# Patient Record
Sex: FEMALE | Race: WHITE | NOT HISPANIC OR LATINO | Employment: FULL TIME | URBAN - METROPOLITAN AREA
[De-identification: names, ages, dates, MRNs, and addresses within clinical notes are randomized per-mention and may not be internally consistent; named-entity substitution may affect disease eponyms.]

---

## 2017-02-24 ENCOUNTER — TRANSCRIBE ORDERS (OUTPATIENT)
Dept: LAB | Facility: CLINIC | Age: 62
End: 2017-02-24

## 2017-02-24 ENCOUNTER — APPOINTMENT (OUTPATIENT)
Dept: LAB | Facility: CLINIC | Age: 62
End: 2017-02-24
Payer: COMMERCIAL

## 2017-02-24 DIAGNOSIS — D80.2 SELECTIVE IGA IMMUNODEFICIENCY (HCC): ICD-10-CM

## 2017-02-24 DIAGNOSIS — D80.1 HYPOGAMMAGLOBULINAEMIA, UNSPECIFIED: ICD-10-CM

## 2017-02-24 DIAGNOSIS — J01.91 ACUTE RECURRENT SINUSITIS, UNSPECIFIED LOCATION: ICD-10-CM

## 2017-02-24 DIAGNOSIS — D84.9 UNSPECIFIED IMMUNITY DEFICIENCY: ICD-10-CM

## 2017-02-24 DIAGNOSIS — D84.89 CELL-MEDIATED IMMUNE DEFICIENCY (HCC): ICD-10-CM

## 2017-02-24 DIAGNOSIS — Z87.01 PERSONAL HISTORY OF PNEUMONIA (RECURRENT): Primary | ICD-10-CM

## 2017-02-24 DIAGNOSIS — D80.7: ICD-10-CM

## 2017-02-24 DIAGNOSIS — D80.3 SELECTIVE DEFICIENCY OF IGG (HCC): ICD-10-CM

## 2017-02-24 PROCEDURE — 86609 BACTERIUM ANTIBODY: CPT

## 2017-03-03 LAB
DEPRECATED S PNEUM14 IGG SER-MCNC: 11 UG/ML
DEPRECATED S PNEUM19 IGG SER-MCNC: 4.9 UG/ML
DEPRECATED S PNEUM23 IGG SER-MCNC: 3.2 UG/ML
DEPRECATED S PNEUM3 IGG SER-MCNC: 2.9 UG/ML
DEPRECATED S PNEUM4 IGG SER-MCNC: 0.6 UG/ML
DEPRECATED S PNEUM8 AB SER-MCNC: 0.7 UG/ML
DEPRECATED S PNEUM9 IGG SER-MCNC: >44.4 UG/ML
FLUBV RNA SPEC QL NAA+PROBE: 1.8 UG/ML
S PNEUM DA 18C IGG SER-MCNC: 3.4 UG/ML
S PNEUM DA 19A IGG SER-MCNC: 5.1 UG/ML
S PNEUM DA 6B IGG SER-MCNC: 8.2 UG/ML
STREP PNEUMO TYPE 1: 0.4 UG/ML
STREP PNEUMO TYPE 51: 13.9 UG/ML
STREP PNEUMO TYPE 68: 22.5 UG/ML

## 2017-05-12 ENCOUNTER — TRANSCRIBE ORDERS (OUTPATIENT)
Dept: ADMINISTRATIVE | Facility: HOSPITAL | Age: 62
End: 2017-05-12

## 2017-05-12 ENCOUNTER — APPOINTMENT (OUTPATIENT)
Dept: LAB | Facility: CLINIC | Age: 62
End: 2017-05-12
Payer: COMMERCIAL

## 2017-05-12 ENCOUNTER — TRANSCRIBE ORDERS (OUTPATIENT)
Dept: LAB | Facility: CLINIC | Age: 62
End: 2017-05-12

## 2017-05-12 DIAGNOSIS — K31.89 DYSPEPSIA AND OTHER SPECIFIED DISORDERS OF FUNCTION OF STOMACH: ICD-10-CM

## 2017-05-12 DIAGNOSIS — R10.13 EPIGASTRIC PAIN: Primary | ICD-10-CM

## 2017-05-12 DIAGNOSIS — Z79.899 ENCOUNTER FOR LONG-TERM (CURRENT) USE OF OTHER MEDICATIONS: ICD-10-CM

## 2017-05-12 DIAGNOSIS — R10.32 ABDOMINAL PAIN, LEFT LOWER QUADRANT: Primary | ICD-10-CM

## 2017-05-12 DIAGNOSIS — R10.13 DYSPEPSIA AND OTHER SPECIFIED DISORDERS OF FUNCTION OF STOMACH: ICD-10-CM

## 2017-05-12 DIAGNOSIS — R11.0 NAUSEA ALONE: ICD-10-CM

## 2017-05-12 LAB
ALBUMIN SERPL BCP-MCNC: 3.6 G/DL (ref 3.5–5)
ALP SERPL-CCNC: 99 U/L (ref 46–116)
ALT SERPL W P-5'-P-CCNC: 54 U/L (ref 12–78)
AMYLASE SERPL-CCNC: 38 IU/L (ref 25–115)
ANION GAP SERPL CALCULATED.3IONS-SCNC: 7 MMOL/L (ref 4–13)
AST SERPL W P-5'-P-CCNC: 43 U/L (ref 5–45)
BASOPHILS # BLD AUTO: 0.05 THOUSANDS/ΜL (ref 0–0.1)
BASOPHILS NFR BLD AUTO: 1 % (ref 0–1)
BILIRUB SERPL-MCNC: 0.53 MG/DL (ref 0.2–1)
BUN SERPL-MCNC: 22 MG/DL (ref 5–25)
CALCIUM SERPL-MCNC: 9.1 MG/DL (ref 8.3–10.1)
CHLORIDE SERPL-SCNC: 108 MMOL/L (ref 100–108)
CO2 SERPL-SCNC: 26 MMOL/L (ref 21–32)
CREAT SERPL-MCNC: 0.64 MG/DL (ref 0.6–1.3)
EOSINOPHIL # BLD AUTO: 0.47 THOUSAND/ΜL (ref 0–0.61)
EOSINOPHIL NFR BLD AUTO: 9 % (ref 0–6)
ERYTHROCYTE [DISTWIDTH] IN BLOOD BY AUTOMATED COUNT: 12.1 % (ref 11.6–15.1)
GFR SERPL CREATININE-BSD FRML MDRD: >60 ML/MIN/1.73SQ M
GLUCOSE P FAST SERPL-MCNC: 108 MG/DL (ref 65–99)
HCT VFR BLD AUTO: 41.5 % (ref 34.8–46.1)
HGB BLD-MCNC: 13.5 G/DL (ref 11.5–15.4)
LIPASE SERPL-CCNC: 143 U/L (ref 73–393)
LYMPHOCYTES # BLD AUTO: 1.98 THOUSANDS/ΜL (ref 0.6–4.47)
LYMPHOCYTES NFR BLD AUTO: 36 % (ref 14–44)
MCH RBC QN AUTO: 32.1 PG (ref 26.8–34.3)
MCHC RBC AUTO-ENTMCNC: 32.5 G/DL (ref 31.4–37.4)
MCV RBC AUTO: 99 FL (ref 82–98)
MONOCYTES # BLD AUTO: 0.54 THOUSAND/ΜL (ref 0.17–1.22)
MONOCYTES NFR BLD AUTO: 10 % (ref 4–12)
NEUTROPHILS # BLD AUTO: 2.42 THOUSANDS/ΜL (ref 1.85–7.62)
NEUTS SEG NFR BLD AUTO: 44 % (ref 43–75)
NRBC BLD AUTO-RTO: 0 /100 WBCS
PLATELET # BLD AUTO: 234 THOUSANDS/UL (ref 149–390)
PMV BLD AUTO: 10.2 FL (ref 8.9–12.7)
POTASSIUM SERPL-SCNC: 4.4 MMOL/L (ref 3.5–5.3)
PROT SERPL-MCNC: 7.2 G/DL (ref 6.4–8.2)
RBC # BLD AUTO: 4.2 MILLION/UL (ref 3.81–5.12)
SODIUM SERPL-SCNC: 141 MMOL/L (ref 136–145)
T4 FREE SERPL-MCNC: 0.91 NG/DL (ref 0.76–1.46)
TSH SERPL DL<=0.05 MIU/L-ACNC: 2.86 UIU/ML (ref 0.36–3.74)
WBC # BLD AUTO: 5.47 THOUSAND/UL (ref 4.31–10.16)

## 2017-05-12 PROCEDURE — 80053 COMPREHEN METABOLIC PANEL: CPT

## 2017-05-12 PROCEDURE — 85025 COMPLETE CBC W/AUTO DIFF WBC: CPT

## 2017-05-12 PROCEDURE — 84443 ASSAY THYROID STIM HORMONE: CPT

## 2017-05-12 PROCEDURE — 84439 ASSAY OF FREE THYROXINE: CPT

## 2017-05-12 PROCEDURE — 36415 COLL VENOUS BLD VENIPUNCTURE: CPT

## 2017-05-12 PROCEDURE — 83690 ASSAY OF LIPASE: CPT

## 2017-05-12 PROCEDURE — 82150 ASSAY OF AMYLASE: CPT

## 2017-05-19 ENCOUNTER — HOSPITAL ENCOUNTER (OUTPATIENT)
Dept: CT IMAGING | Facility: HOSPITAL | Age: 62
Discharge: HOME | End: 2017-05-19
Attending: INTERNAL MEDICINE
Payer: COMMERCIAL

## 2017-05-19 DIAGNOSIS — R10.13 EPIGASTRIC PAIN: ICD-10-CM

## 2017-05-19 PROCEDURE — 74177 CT ABD & PELVIS W/CONTRAST: CPT

## 2017-05-19 RX ADMIN — IOHEXOL 100 ML: 350 INJECTION, SOLUTION INTRAVENOUS at 19:03

## 2017-05-24 ENCOUNTER — ALLSCRIPTS OFFICE VISIT (OUTPATIENT)
Dept: OTHER | Facility: OTHER | Age: 62
End: 2017-05-24

## 2017-06-05 RX ORDER — PREDNISONE 20 MG/1
20 TABLET ORAL EVERY MORNING
Status: ON HOLD | COMMUNITY
End: 2018-01-24

## 2017-06-05 RX ORDER — DIPHENOXYLATE HYDROCHLORIDE AND ATROPINE SULFATE 2.5; .025 MG/1; MG/1
1 TABLET ORAL EVERY MORNING
COMMUNITY
End: 2018-04-12 | Stop reason: ALTCHOICE

## 2017-06-06 ENCOUNTER — ANESTHESIA (OUTPATIENT)
Dept: GASTROENTEROLOGY | Facility: AMBULARY SURGERY CENTER | Age: 62
End: 2017-06-06
Payer: COMMERCIAL

## 2017-06-06 ENCOUNTER — HOSPITAL ENCOUNTER (OUTPATIENT)
Facility: AMBULARY SURGERY CENTER | Age: 62
Setting detail: OUTPATIENT SURGERY
Discharge: HOME | End: 2017-06-06
Attending: INTERNAL MEDICINE | Admitting: INTERNAL MEDICINE
Payer: COMMERCIAL

## 2017-06-06 VITALS
SYSTOLIC BLOOD PRESSURE: 111 MMHG | DIASTOLIC BLOOD PRESSURE: 68 MMHG | RESPIRATION RATE: 18 BRPM | TEMPERATURE: 97.8 F | OXYGEN SATURATION: 97 % | HEART RATE: 66 BPM | HEIGHT: 65 IN | WEIGHT: 159 LBS | BODY MASS INDEX: 26.49 KG/M2

## 2017-06-06 DIAGNOSIS — R10.13 EPIGASTRIC PAIN: ICD-10-CM

## 2017-06-06 DIAGNOSIS — K21.9 GASTRO-ESOPHAGEAL REFLUX DISEASE WITHOUT ESOPHAGITIS: ICD-10-CM

## 2017-06-06 DIAGNOSIS — R19.4 CHANGE IN BOWEL HABITS: ICD-10-CM

## 2017-06-06 PROCEDURE — 88342 IMHCHEM/IMCYTCHM 1ST ANTB: CPT | Performed by: INTERNAL MEDICINE

## 2017-06-06 PROCEDURE — 88305 TISSUE EXAM BY PATHOLOGIST: CPT | Performed by: INTERNAL MEDICINE

## 2017-06-06 PROCEDURE — 88312 SPECIAL STAINS GROUP 1: CPT | Performed by: INTERNAL MEDICINE

## 2017-06-06 RX ORDER — SODIUM CHLORIDE, SODIUM LACTATE, POTASSIUM CHLORIDE, CALCIUM CHLORIDE 600; 310; 30; 20 MG/100ML; MG/100ML; MG/100ML; MG/100ML
INJECTION, SOLUTION INTRAVENOUS CONTINUOUS PRN
Status: DISCONTINUED | OUTPATIENT
Start: 2017-06-06 | End: 2017-06-06 | Stop reason: SURG

## 2017-06-06 RX ORDER — FENTANYL CITRATE 50 UG/ML
INJECTION, SOLUTION INTRAMUSCULAR; INTRAVENOUS AS NEEDED
Status: DISCONTINUED | OUTPATIENT
Start: 2017-06-06 | End: 2017-06-06 | Stop reason: SURG

## 2017-06-06 RX ORDER — PROPOFOL 10 MG/ML
INJECTION, EMULSION INTRAVENOUS AS NEEDED
Status: DISCONTINUED | OUTPATIENT
Start: 2017-06-06 | End: 2017-06-06 | Stop reason: SURG

## 2017-06-06 RX ADMIN — PROPOFOL 50 MG: 10 INJECTION, EMULSION INTRAVENOUS at 09:36

## 2017-06-06 RX ADMIN — FENTANYL CITRATE 50 MCG: 50 INJECTION, SOLUTION INTRAMUSCULAR; INTRAVENOUS at 09:35

## 2017-06-06 RX ADMIN — LIDOCAINE HYDROCHLORIDE 50 MG: 20 INJECTION, SOLUTION INTRAVENOUS at 09:27

## 2017-06-06 RX ADMIN — PROPOFOL 100 MG: 10 INJECTION, EMULSION INTRAVENOUS at 09:49

## 2017-06-06 RX ADMIN — PROPOFOL 50 MG: 10 INJECTION, EMULSION INTRAVENOUS at 09:42

## 2017-06-06 RX ADMIN — PROPOFOL 50 MG: 10 INJECTION, EMULSION INTRAVENOUS at 09:55

## 2017-06-06 RX ADMIN — SODIUM CHLORIDE, SODIUM LACTATE, POTASSIUM CHLORIDE, AND CALCIUM CHLORIDE: .6; .31; .03; .02 INJECTION, SOLUTION INTRAVENOUS at 09:15

## 2017-06-06 RX ADMIN — FENTANYL CITRATE 50 MCG: 50 INJECTION, SOLUTION INTRAMUSCULAR; INTRAVENOUS at 09:25

## 2017-06-06 RX ADMIN — PROPOFOL 150 MG: 10 INJECTION, EMULSION INTRAVENOUS at 09:27

## 2017-06-09 ENCOUNTER — TRANSCRIBE ORDERS (OUTPATIENT)
Dept: LAB | Facility: CLINIC | Age: 62
End: 2017-06-09

## 2017-06-09 ENCOUNTER — TRANSCRIBE ORDERS (OUTPATIENT)
Dept: ADMINISTRATIVE | Facility: HOSPITAL | Age: 62
End: 2017-06-09

## 2017-06-09 DIAGNOSIS — Z12.31 VISIT FOR SCREENING MAMMOGRAM: Primary | ICD-10-CM

## 2017-06-09 DIAGNOSIS — M81.0 SENILE OSTEOPOROSIS: ICD-10-CM

## 2017-06-13 ENCOUNTER — APPOINTMENT (OUTPATIENT)
Dept: LAB | Facility: CLINIC | Age: 62
End: 2017-06-13
Payer: COMMERCIAL

## 2017-06-13 ENCOUNTER — TRANSCRIBE ORDERS (OUTPATIENT)
Dept: LAB | Facility: CLINIC | Age: 62
End: 2017-06-13

## 2017-06-13 DIAGNOSIS — R53.83 FATIGUE, UNSPECIFIED TYPE: ICD-10-CM

## 2017-06-13 DIAGNOSIS — Z13.9 SCREENING FOR CONDITION: ICD-10-CM

## 2017-06-13 DIAGNOSIS — E78.00 PURE HYPERCHOLESTEROLEMIA: Primary | ICD-10-CM

## 2017-06-13 LAB
25(OH)D3 SERPL-MCNC: 15.4 NG/ML (ref 30–100)
ALT SERPL W P-5'-P-CCNC: 24 U/L (ref 12–78)
CHOLEST SERPL-MCNC: 211 MG/DL (ref 50–200)
HCV AB SER QL: NORMAL
HDLC SERPL-MCNC: 72 MG/DL (ref 40–60)
LDLC SERPL CALC-MCNC: 111 MG/DL (ref 0–100)
TRIGL SERPL-MCNC: 139 MG/DL

## 2017-06-13 PROCEDURE — 86665 EPSTEIN-BARR CAPSID VCA: CPT

## 2017-06-13 PROCEDURE — 82306 VITAMIN D 25 HYDROXY: CPT

## 2017-06-13 PROCEDURE — 86803 HEPATITIS C AB TEST: CPT

## 2017-06-13 PROCEDURE — 80061 LIPID PANEL: CPT

## 2017-06-13 PROCEDURE — 36415 COLL VENOUS BLD VENIPUNCTURE: CPT

## 2017-06-13 PROCEDURE — 84460 ALANINE AMINO (ALT) (SGPT): CPT

## 2017-06-14 LAB — EBV VCA IGM SER IA-ACNC: <36 U/ML (ref 0–35.9)

## 2017-06-20 ENCOUNTER — HOSPITAL ENCOUNTER (OUTPATIENT)
Dept: RADIOLOGY | Facility: HOSPITAL | Age: 62
Discharge: HOME | End: 2017-06-20
Attending: FAMILY MEDICINE
Payer: COMMERCIAL

## 2017-06-20 DIAGNOSIS — M81.0 SENILE OSTEOPOROSIS: ICD-10-CM

## 2017-06-20 DIAGNOSIS — Z12.31 VISIT FOR SCREENING MAMMOGRAM: ICD-10-CM

## 2017-06-20 PROCEDURE — G0202 SCR MAMMO BI INCL CAD: HCPCS

## 2017-06-20 PROCEDURE — 77080 DXA BONE DENSITY AXIAL: CPT

## 2017-07-21 ENCOUNTER — GENERIC CONVERSION - ENCOUNTER (OUTPATIENT)
Dept: OTHER | Facility: OTHER | Age: 62
End: 2017-07-21

## 2017-08-08 ENCOUNTER — HOSPITAL ENCOUNTER (OUTPATIENT)
Dept: RADIOLOGY | Facility: HOSPITAL | Age: 62
Discharge: HOME | End: 2017-08-08
Attending: FAMILY MEDICINE
Payer: COMMERCIAL

## 2017-08-08 ENCOUNTER — TRANSCRIBE ORDERS (OUTPATIENT)
Dept: ADMINISTRATIVE | Facility: HOSPITAL | Age: 62
End: 2017-08-08

## 2017-08-08 DIAGNOSIS — R52 ACUTE PAIN: Primary | ICD-10-CM

## 2017-08-08 DIAGNOSIS — R52 ACUTE PAIN: ICD-10-CM

## 2017-08-08 PROCEDURE — 73502 X-RAY EXAM HIP UNI 2-3 VIEWS: CPT

## 2017-08-08 PROCEDURE — 73552 X-RAY EXAM OF FEMUR 2/>: CPT

## 2017-08-15 ENCOUNTER — ALLSCRIPTS OFFICE VISIT (OUTPATIENT)
Dept: OTHER | Facility: OTHER | Age: 62
End: 2017-08-15

## 2017-08-15 DIAGNOSIS — M25.552 PAIN IN LEFT HIP: ICD-10-CM

## 2017-08-15 DIAGNOSIS — M16.12 PRIMARY OSTEOARTHRITIS OF LEFT HIP: ICD-10-CM

## 2017-08-17 ENCOUNTER — HOSPITAL ENCOUNTER (OUTPATIENT)
Dept: RADIOLOGY | Facility: HOSPITAL | Age: 62
Discharge: HOME | End: 2017-08-17
Attending: ORTHOPAEDIC SURGERY
Payer: COMMERCIAL

## 2017-08-17 DIAGNOSIS — M25.552 PAIN IN LEFT HIP: ICD-10-CM

## 2017-08-17 PROCEDURE — 73721 MRI JNT OF LWR EXTRE W/O DYE: CPT

## 2017-08-18 ENCOUNTER — ALLSCRIPTS OFFICE VISIT (OUTPATIENT)
Dept: OTHER | Facility: OTHER | Age: 62
End: 2017-08-18

## 2017-08-24 ENCOUNTER — GENERIC CONVERSION - ENCOUNTER (OUTPATIENT)
Dept: OTHER | Facility: OTHER | Age: 62
End: 2017-08-24

## 2017-10-23 ENCOUNTER — TRANSCRIBE ORDERS (OUTPATIENT)
Dept: LAB | Facility: CLINIC | Age: 62
End: 2017-10-23

## 2017-10-23 ENCOUNTER — APPOINTMENT (OUTPATIENT)
Dept: LAB | Facility: CLINIC | Age: 62
End: 2017-10-23
Payer: COMMERCIAL

## 2017-10-23 DIAGNOSIS — E78.00 PURE HYPERCHOLESTEROLEMIA: ICD-10-CM

## 2017-10-23 DIAGNOSIS — R53.83 FATIGUE, UNSPECIFIED TYPE: Primary | ICD-10-CM

## 2017-10-23 DIAGNOSIS — E55.9 AVITAMINOSIS D: ICD-10-CM

## 2017-10-23 LAB
25(OH)D3 SERPL-MCNC: 40.2 NG/ML (ref 30–100)
ALBUMIN SERPL BCP-MCNC: 3.7 G/DL (ref 3.5–5)
ALP SERPL-CCNC: 80 U/L (ref 46–116)
ALT SERPL W P-5'-P-CCNC: 24 U/L (ref 12–78)
ANION GAP SERPL CALCULATED.3IONS-SCNC: 4 MMOL/L (ref 4–13)
AST SERPL W P-5'-P-CCNC: 17 U/L (ref 5–45)
BASOPHILS # BLD AUTO: 0.03 THOUSANDS/ΜL (ref 0–0.1)
BASOPHILS NFR BLD AUTO: 0 % (ref 0–1)
BILIRUB SERPL-MCNC: 0.62 MG/DL (ref 0.2–1)
BUN SERPL-MCNC: 22 MG/DL (ref 5–25)
CALCIUM SERPL-MCNC: 9.5 MG/DL (ref 8.3–10.1)
CHLORIDE SERPL-SCNC: 104 MMOL/L (ref 100–108)
CHOLEST SERPL-MCNC: 249 MG/DL (ref 50–200)
CO2 SERPL-SCNC: 32 MMOL/L (ref 21–32)
CREAT SERPL-MCNC: 0.68 MG/DL (ref 0.6–1.3)
EOSINOPHIL # BLD AUTO: 0.27 THOUSAND/ΜL (ref 0–0.61)
EOSINOPHIL NFR BLD AUTO: 4 % (ref 0–6)
ERYTHROCYTE [DISTWIDTH] IN BLOOD BY AUTOMATED COUNT: 12.5 % (ref 11.6–15.1)
GFR SERPL CREATININE-BSD FRML MDRD: 94 ML/MIN/1.73SQ M
GLUCOSE P FAST SERPL-MCNC: 88 MG/DL (ref 65–99)
HCT VFR BLD AUTO: 41.5 % (ref 34.8–46.1)
HDLC SERPL-MCNC: 82 MG/DL (ref 40–60)
HGB BLD-MCNC: 13.8 G/DL (ref 11.5–15.4)
LDLC SERPL CALC-MCNC: 138 MG/DL (ref 0–100)
LYMPHOCYTES # BLD AUTO: 3.86 THOUSANDS/ΜL (ref 0.6–4.47)
LYMPHOCYTES NFR BLD AUTO: 54 % (ref 14–44)
MCH RBC QN AUTO: 33.3 PG (ref 26.8–34.3)
MCHC RBC AUTO-ENTMCNC: 33.3 G/DL (ref 31.4–37.4)
MCV RBC AUTO: 100 FL (ref 82–98)
MONOCYTES # BLD AUTO: 0.5 THOUSAND/ΜL (ref 0.17–1.22)
MONOCYTES NFR BLD AUTO: 7 % (ref 4–12)
NEUTROPHILS # BLD AUTO: 2.48 THOUSANDS/ΜL (ref 1.85–7.62)
NEUTS SEG NFR BLD AUTO: 35 % (ref 43–75)
NRBC BLD AUTO-RTO: 0 /100 WBCS
PLATELET # BLD AUTO: 241 THOUSANDS/UL (ref 149–390)
PMV BLD AUTO: 10.1 FL (ref 8.9–12.7)
POTASSIUM SERPL-SCNC: 3.9 MMOL/L (ref 3.5–5.3)
PROT SERPL-MCNC: 7.3 G/DL (ref 6.4–8.2)
RBC # BLD AUTO: 4.15 MILLION/UL (ref 3.81–5.12)
SODIUM SERPL-SCNC: 140 MMOL/L (ref 136–145)
TRIGL SERPL-MCNC: 145 MG/DL
WBC # BLD AUTO: 7.15 THOUSAND/UL (ref 4.31–10.16)

## 2017-10-23 PROCEDURE — 36415 COLL VENOUS BLD VENIPUNCTURE: CPT

## 2017-10-23 PROCEDURE — 80053 COMPREHEN METABOLIC PANEL: CPT

## 2017-10-23 PROCEDURE — 85025 COMPLETE CBC W/AUTO DIFF WBC: CPT

## 2017-10-23 PROCEDURE — 86618 LYME DISEASE ANTIBODY: CPT

## 2017-10-23 PROCEDURE — 82306 VITAMIN D 25 HYDROXY: CPT

## 2017-10-23 PROCEDURE — 80061 LIPID PANEL: CPT

## 2017-10-24 LAB
B BURGDOR IGG SER IA-ACNC: 0.07
B BURGDOR IGM SER IA-ACNC: 0.14

## 2017-11-08 ENCOUNTER — ALLSCRIPTS OFFICE VISIT (OUTPATIENT)
Dept: OTHER | Facility: OTHER | Age: 62
End: 2017-11-08

## 2017-11-10 NOTE — PROGRESS NOTES
Assessment    1  Asthma (493 90) (J45 909)   2  Allergic rhinitis (477 9) (J30 9)   3  Hypoxia, sleep related (327 24) (G47 34)   4  GERD (gastroesophageal reflux disease) (530 81) (K21 9)    Plan  Asthma    · ProAir  (90 Base) MCG/ACT Inhalation Aerosol Solution; INHALE 2 PUFFSEVERY 4 - 6 HOURS AS NEEDED   Rx By: Diony Llamas; Dispense: 30 Days ; #:1 X 8 5 GM Inhaler; Refill: 5;Asthma; LIZANDRO = N; Verified Transmission to [address] [place] [address]; Last Updated By: SystemMavatar; 11/8/2017 4:20:14 PM  Argueta's esophagus, GERD (gastroesophageal reflux disease)    · Pantoprazole Sodium 40 MG Oral Tablet Delayed Release; one tablet by mouthonce daily   Rx By: Diony Llamas; Dispense: 30 Days ; #:30 Tablet Delayed Release; Refill: 5;Argueta's esophagus, GERD (gastroesophageal reflux disease); LIZANDRO = N; Sent To: [address] [place] [address]    Discussion/Summary  Discussion Summary:   Overall, her asthma has been fairly well controlled despite not taking any maintenance inhalers  I renewed her ProAir  She understands that if she is needing it on a daily basis, she must resume Symbicort as her maintenance inhaler  She was encouraged to follow up with both ENT and GI for optimization of her sinus and reflux/hiatal hernia issues  I refilled her Protonix for now pending further GI evaluation  She can follow up with me in 6 months or sooner if needed  Counseling Documentation With Imm: The patient was counseled regarding instructions for management  Patient's Capacity to Self-Care: Patient is able to Self-Care  Active Problems    1  Acute sinusitis (461 9) (J01 90)   2  Allergic rhinitis (477 9) (J30 9)   3  Asthma (493 90) (J45 909)   4  Argueta's esophagus (530 85) (K22 70)   5  Breast pain (611 71) (N64 4)   6  Chronic sinusitis (473 9) (J32 9)   7  Cough (786 2) (R05)   8  Encounter for screening mammogram for malignant neoplasm of breast (V76 12) (Z12 31)   9   GERD (gastroesophageal reflux disease) (530 81) (K21 9)   10  Hypoxia, sleep related (327 24) (G47 34)   11  Left hip pain (719 45) (M25 552)   12  Limb pain (729 5) (M79 609)   13  Localized Primary Osteoarthritis Of Right Shoulder Acromioclavicular Joint (715 11)   14  Obstructive sleep apnea (327 23) (G47 33)   15  Pelvic pain in female (625 9) (R10 2)   16  Primary osteoarthritis of left hip (715 15) (M16 12)    History of Present Illness  HPI: Ld Demarco is here today for follow-up regarding asthma  She has not been using any maintenance inhalers  She rarely needs her albuterol rescue inhaler  She recently took a trip to [facility] in felt that her breathing was better there  Since being home, she has been using oxygen during hours of sleep and feels that she is sleeping better  She is scheduled to see ENT in a few weeks to discuss possible polyp surgery  She has also been following with Gastroenterology and they are proposing repeat EGD with which she describes as pH monitoring  She has no significant cough, wheeze or sputum production  Her sinuses are current we fairly clear with using prednisone 10 mg daily  Review of Systems  Complete-Female - Pulm:  Constitutional: No fever, no chills, feels well, no tiredness, no recent weight gain or weight loss  Eyes: no complaints of vision problems  ENT: as noted in HPI  Cardiovascular: no palpitations, no chest pain  Respiratory: as noted in HPI  Past Medical History  1  History of nasal polyp (V13 89) (Z87 09)   2  History of Lump of skin (782 2) (R22 9)   3  Normal delivery 21   9)    Surgical History    1  History of  Section   2  History of Complete Colonoscopy   3  History of Diagnostic Esophagogastroduodenoscopy   4  History of Dilation And Curettage   5  History of Hysteroscopy   6  History of Laparoscopy With Vaginal Hysterectomy   7  History of Salpingo-oophorectomy Bilateral   8  History of Simple Excision Of Nasal Polyp   9  History of Sinus Surgery   10   History of Stab Phlebectomy Of Varicose Veins   11  History of Tonsillectomy   12  History of Tubal Ligation During  Section  Surgical History Reviewed: The surgical history was reviewed and updated today  Family History  Mother    1  Family history of cerebrovascular accident (V17 1) (Z82 3)   2  Family history of diabetes mellitus (V18 0) (Z83 3)   3  Family history of hypertension (V17 49) (Z82 49)   4  Family history of malignant neoplasm of breast (V16 3) (Z80 3)   5  Family history of malignant neoplasm of cervix (V16 49) (Z80 49)   6  Family history of myocardial infarction (V17 3) (Z82 49)  Father    7  Family history of cerebrovascular accident (V17 1) (Z82 3)   8  Family history of hypertension (V17 49) (Z82 49)  Sister    5  Family history of osteoporosis (V17 81) (Z82 62)  Grandmother    10  Family history of malignant neoplasm of stomach (V16 0) (Z80 0)  Grandfather    6  Family history of malignant neoplasm of brain (V16 8) (Z80 8)  Family History    12  Family history of Arthritis (V17 7)  Family History Reviewed: The family history was reviewed and updated today  Social History     · Alcohol Use (History)   · Daily Coffee Consumption (___ Cups/Day)   · Daily Tea Consumption (___ Cups/Day)   · Never A Smoker   · Occupation   · sosa  Social History Reviewed: The social history was reviewed and is unchanged  Current Meds   1  Aleve TABS Recorded   2  Budesonide 0 25 MG/2ML Inhalation Suspension; Therapy: (Recorded:14Rzh1419) to Recorded   3  Multi-Vitamin Daily Oral Tablet Recorded   4  Pantoprazole Sodium 40 MG Oral Tablet Delayed Release; one tablet by mouth once daily  Requested for: 11Nsa4255; Last Rx:12Ydp6266 Ordered   5  PredniSONE 10 MG Oral Tablet; TAKE 1 TABLET DAILY; Therapy: 18JZE8378 to (Evaluate:03Oyz0033)  Requested for: 78MDN1737; Last Rx:96Fsx3605 Ordered   6  ProAir HFA AERS; Therapy: (Recorded:2013) to Recorded   7   Qnasl 80 MCG/ACT Nasal Aerosol Solution; INHALE 2 PUFF Daily; Therapy: 24Aug2016 to (Evaluate:95Yqw0228)  Requested for: 01Toq0526; Last Rx:24Aug2016 Ordered   8  Singulair 10 MG Oral Tablet; TAKE 1 TABLET DAILY; Last Rx:15Mar2017 Ordered   9  Xanax 0 25 MG Oral Tablet; Therapy: [phone]) to Recorded   10  ZyrTEC-D Allergy & Congestion 5-120 MG Oral Tablet Extended Release 12 Hour  Recorded  Medication List Reviewed: The medication list was reviewed and updated today  Allergies  1  cefditoren   2  Livalo TABS   3  Aleve TABS    Vitals  Vital Signs    Recorded: 41AYE1085 03:49PM   Temperature 98 1 F   Heart Rate 86   Respiration 14   Systolic 485   Diastolic 66   Height 5 ft    Weight 151 lb 6 oz   BMI Calculated 29 56   BSA Calculated 1 66   O2 Saturation 98, RA       Physical Exam   Constitutional  General appearance: No acute distress, well appearing and well nourished  Pulmonary  Respiratory effort: No increased work of breathing or signs of respiratory distress  Auscultation of lungs: Clear to auscultation, no rales, no crackles, no wheezing  Cardiovascular  Auscultation of heart: Normal rate and rhythm, normal S1 and S2, no murmurs  Examination of extremities for edema and/or varicosities: Normal    Neurologic  Mental Status: Normal  Not confused, no evidence of dementia, good comprehension, good concentration  Psychiatric  Mood and affect: Normal        Thank you very much for allowing me to participate in the care of this patient  If you have any questions, please do not hesitate to contact me        Signatures   Electronically signed by : Daquan Lucas DO; Nov 8 2017  4:27PM EST                       (Author)

## 2018-01-13 VITALS
DIASTOLIC BLOOD PRESSURE: 85 MMHG | SYSTOLIC BLOOD PRESSURE: 145 MMHG | WEIGHT: 152.25 LBS | HEART RATE: 97 BPM | BODY MASS INDEX: 29.89 KG/M2 | HEIGHT: 60 IN

## 2018-01-13 NOTE — MISCELLANEOUS
Message  Per pt's request,records faxed to Dr Aaron Hickman at [phone]  Active Problems    1  Acute sinusitis (461 9) (J01 90)   2  Allergic rhinitis (477 9) (J30 9)   3  Asthma (493 90) (J45 909)   4  Argueta's esophagus (530 85) (K22 70)   5  Breast pain (611 71) (N64 4)   6  Chronic sinusitis (473 9) (J32 9)   7  Cough (786 2) (R05)   8  Encounter for screening mammogram for malignant neoplasm of breast (V76 12)   (Z12 31)   9  Limb pain (729 5) (M79 609)   10  Localized Primary Osteoarthritis Of The Right Shoulder Acromioclavicular Joint (715 11)   11  Pelvic pain in female (625 9) (R10 2)    Current Meds   1  Aleve TABS Recorded   2  Multi-Vitamin Daily Oral Tablet Recorded   3  Nasonex 50 MCG/ACT Nasal Suspension (Mometasone Furoate); Therapy: 42WPH3880 to Recorded   4  Pantoprazole Sodium 40 MG Oral Tablet Delayed Release Recorded   5  ProAir HFA AERS; Therapy: (232 2319) to Recorded   6  Qvar 80 MCG/ACT Inhalation Aerosol Solution; Therapy: (Recorded:04Mar2013) to Recorded   7  Singulair 10 MG Oral Tablet (Montelukast Sodium) Recorded   8  Xanax 0 25 MG Oral Tablet (ALPRAZolam); Therapy: (154 7711) to Recorded   9  ZyrTEC-D Allergy & Congestion 5-120 MG Oral Tablet Extended Release 12 Hour   Recorded    Allergies    1  cefditoren   2  Livalo TABS   3   Aleve TABS    Signatures   Electronically signed by : Domingo Bryant, ; Jun 21 2016  8:42AM EST                       (Author)

## 2018-01-14 VITALS
WEIGHT: 152.5 LBS | HEIGHT: 60 IN | HEART RATE: 86 BPM | BODY MASS INDEX: 29.94 KG/M2 | SYSTOLIC BLOOD PRESSURE: 126 MMHG | DIASTOLIC BLOOD PRESSURE: 79 MMHG

## 2018-01-14 VITALS
WEIGHT: 157.5 LBS | HEART RATE: 98 BPM | HEIGHT: 60 IN | BODY MASS INDEX: 30.92 KG/M2 | DIASTOLIC BLOOD PRESSURE: 72 MMHG | OXYGEN SATURATION: 97 % | SYSTOLIC BLOOD PRESSURE: 124 MMHG | RESPIRATION RATE: 14 BRPM | TEMPERATURE: 98.2 F

## 2018-01-15 VITALS
OXYGEN SATURATION: 98 % | SYSTOLIC BLOOD PRESSURE: 108 MMHG | TEMPERATURE: 98.1 F | RESPIRATION RATE: 14 BRPM | DIASTOLIC BLOOD PRESSURE: 66 MMHG | HEIGHT: 60 IN | WEIGHT: 151.38 LBS | BODY MASS INDEX: 29.72 KG/M2 | HEART RATE: 86 BPM

## 2018-01-18 NOTE — MISCELLANEOUS
Message   Recorded as Task   Date: 03/03/2016 10:06 AM, Created By: Moe Morris   Task Name: Follow Up   Assigned To: Delilah Warren   Regarding Patient: Perez Zelaya, Status: In Progress   Ata Priest - 03 Mar 2016 10:06 AM     TASK CREATED  Caller: Self; General Medical Question; [phone] (Home); [phone] (Work)  PT called stating she needs a new script for a diagnostic mammogram due to having pain  Any questions PT can be reached at [phone]  Maura Sarbjit - 68 Mar 2016 10:11 AM     TASK REASSIGNED: Previously Assigned To Milla Olivo - 03 Mar 2016 10:24 AM     TASK IN PROGRESS   Ankit Pandey - 61 Mar 2016 10:28 AM     TASK EDITED  i ordered bilateral diag mammo for bilat breast pain  LM for pt - tcb if she needs further info  Will be having it done at [facility] [address]        Active Problems    1  Acute sinusitis (461 9) (J01 90)   2  Allergic rhinitis (477 9) (J30 9)   3  Asthma (493 90) (J45 909)   4  Argueta's esophagus (530 85) (K22 70)   5  Breast pain (611 71) (N64 4)   6  Chronic sinusitis (473 9) (J32 9)   7  Cough (786 2) (R05)   8  Encounter for screening mammogram for malignant neoplasm of breast (V76 12)   (Z12 31)   9  Limb pain (729 5) (M79 609)   10  Localized Primary Osteoarthritis Of The Right Shoulder Acromioclavicular Joint (715 11)   11  Pelvic pain in female (625 9) (R10 2)    Current Meds   1  Aleve TABS Recorded   2  Multi-Vitamin Daily Oral Tablet Recorded   3  Nasonex 50 MCG/ACT Nasal Suspension; Therapy: 49EOB8003 to Recorded   4  Pantoprazole Sodium 40 MG Oral Tablet Delayed Release Recorded   5  ProAir HFA AERS; Therapy: [phone]) to Recorded   6  Qvar 80 MCG/ACT Inhalation Aerosol Solution; Therapy: (Recorded:04Mar2013) to Recorded   7  Singulair 10 MG Oral Tablet (Montelukast Sodium) Recorded   8  Xanax 0 25 MG Oral Tablet (ALPRAZolam); Therapy: [phone]) to Recorded   9   [facility] 5-120 MG Oral Tablet Extended Release 12 Hour   Recorded    Allergies    1  cefditoren   2  Livalo TABS    Plan  Breast pain    · MAMMO DIAGNOSTIC BILATERAL W CAD; Status:Hold For - Scheduling,Retrospective  Authorization; Requested for:03Mar2016;     Signatures   Electronically signed by :  Deepa Crawford, ; Mar  3 2016 10:28AM EST                       (Author)

## 2018-01-22 ENCOUNTER — APPOINTMENT (EMERGENCY)
Dept: RADIOLOGY | Facility: HOSPITAL | Age: 63
DRG: 194 | End: 2018-01-22
Payer: COMMERCIAL

## 2018-01-22 ENCOUNTER — HOSPITAL ENCOUNTER (INPATIENT)
Facility: HOSPITAL | Age: 63
LOS: 1 days | Discharge: HOME | DRG: 194 | End: 2018-01-24
Attending: EMERGENCY MEDICINE | Admitting: INTERNAL MEDICINE
Payer: COMMERCIAL

## 2018-01-22 DIAGNOSIS — J10.1 INFLUENZA A: Primary | ICD-10-CM

## 2018-01-22 DIAGNOSIS — J45.41 MODERATE PERSISTENT ASTHMA WITH ACUTE EXACERBATION: ICD-10-CM

## 2018-01-22 DIAGNOSIS — J45.41 MODERATE PERSISTENT ASTHMA WITH EXACERBATION: ICD-10-CM

## 2018-01-22 PROBLEM — R79.89 ELEVATED LFTS: Status: ACTIVE | Noted: 2018-01-22

## 2018-01-22 PROBLEM — R00.0 TACHYCARDIA: Status: ACTIVE | Noted: 2018-01-22

## 2018-01-22 LAB
ALBUMIN SERPL BCP-MCNC: 3.5 G/DL (ref 3.5–5)
ALP SERPL-CCNC: 131 U/L (ref 46–116)
ALT SERPL W P-5'-P-CCNC: 146 U/L (ref 12–78)
ANION GAP SERPL CALCULATED.3IONS-SCNC: 9 MMOL/L (ref 4–13)
APTT PPP: 25 SECONDS (ref 23–35)
AST SERPL W P-5'-P-CCNC: 102 U/L (ref 5–45)
ATRIAL RATE: 101 BPM
BASOPHILS # BLD AUTO: 0.03 THOUSANDS/ΜL (ref 0–0.1)
BASOPHILS NFR BLD AUTO: 1 % (ref 0–1)
BILIRUB DIRECT SERPL-MCNC: 0.09 MG/DL (ref 0–0.2)
BILIRUB SERPL-MCNC: 0.3 MG/DL (ref 0.2–1)
BUN SERPL-MCNC: 11 MG/DL (ref 5–25)
CALCIUM SERPL-MCNC: 9.1 MG/DL (ref 8.3–10.1)
CHLORIDE SERPL-SCNC: 101 MMOL/L (ref 100–108)
CO2 SERPL-SCNC: 29 MMOL/L (ref 21–32)
CREAT SERPL-MCNC: 0.74 MG/DL (ref 0.6–1.3)
EOSINOPHIL # BLD AUTO: 0.32 THOUSAND/ΜL (ref 0–0.61)
EOSINOPHIL NFR BLD AUTO: 6 % (ref 0–6)
ERYTHROCYTE [DISTWIDTH] IN BLOOD BY AUTOMATED COUNT: 11.9 % (ref 11.6–15.1)
FLUAV AG SPEC QL IA: POSITIVE
FLUBV AG SPEC QL IA: NEGATIVE
GFR SERPL CREATININE-BSD FRML MDRD: 87 ML/MIN/1.73SQ M
GLUCOSE SERPL-MCNC: 97 MG/DL (ref 65–140)
HCT VFR BLD AUTO: 40 % (ref 34.8–46.1)
HGB BLD-MCNC: 13.2 G/DL (ref 11.5–15.4)
INR PPP: 0.9 (ref 0.86–1.16)
LACTATE SERPL-SCNC: 1.9 MMOL/L (ref 0.5–2)
LYMPHOCYTES # BLD AUTO: 0.74 THOUSANDS/ΜL (ref 0.6–4.47)
LYMPHOCYTES NFR BLD AUTO: 13 % (ref 14–44)
MCH RBC QN AUTO: 31.9 PG (ref 26.8–34.3)
MCHC RBC AUTO-ENTMCNC: 33 G/DL (ref 31.4–37.4)
MCV RBC AUTO: 97 FL (ref 82–98)
MONOCYTES # BLD AUTO: 0.69 THOUSAND/ΜL (ref 0.17–1.22)
MONOCYTES NFR BLD AUTO: 12 % (ref 4–12)
NEUTROPHILS # BLD AUTO: 3.8 THOUSANDS/ΜL (ref 1.85–7.62)
NEUTS SEG NFR BLD AUTO: 68 % (ref 43–75)
P AXIS: 77 DEGREES
PLATELET # BLD AUTO: 213 THOUSANDS/UL (ref 149–390)
PMV BLD AUTO: 9 FL (ref 8.9–12.7)
POTASSIUM SERPL-SCNC: 3.6 MMOL/L (ref 3.5–5.3)
PR INTERVAL: 148 MS
PROT SERPL-MCNC: 7 G/DL (ref 6.4–8.2)
PROTHROMBIN TIME: 12.4 SECONDS (ref 12.1–14.4)
QRS AXIS: 51 DEGREES
QRSD INTERVAL: 80 MS
QT INTERVAL: 314 MS
QTC INTERVAL: 397 MS
RBC # BLD AUTO: 4.14 MILLION/UL (ref 3.81–5.12)
SODIUM SERPL-SCNC: 139 MMOL/L (ref 136–145)
T WAVE AXIS: 74 DEGREES
TROPONIN I SERPL-MCNC: <0.02 NG/ML
VENTRICULAR RATE: 96 BPM
WBC # BLD AUTO: 5.58 THOUSAND/UL (ref 4.31–10.16)

## 2018-01-22 PROCEDURE — 80076 HEPATIC FUNCTION PANEL: CPT | Performed by: EMERGENCY MEDICINE

## 2018-01-22 PROCEDURE — 85025 COMPLETE CBC W/AUTO DIFF WBC: CPT | Performed by: EMERGENCY MEDICINE

## 2018-01-22 PROCEDURE — 94760 N-INVAS EAR/PLS OXIMETRY 1: CPT

## 2018-01-22 PROCEDURE — 96360 HYDRATION IV INFUSION INIT: CPT

## 2018-01-22 PROCEDURE — 83605 ASSAY OF LACTIC ACID: CPT | Performed by: EMERGENCY MEDICINE

## 2018-01-22 PROCEDURE — 94150 VITAL CAPACITY TEST: CPT

## 2018-01-22 PROCEDURE — 99285 EMERGENCY DEPT VISIT HI MDM: CPT

## 2018-01-22 PROCEDURE — 94640 AIRWAY INHALATION TREATMENT: CPT

## 2018-01-22 PROCEDURE — 93005 ELECTROCARDIOGRAM TRACING: CPT

## 2018-01-22 PROCEDURE — 87040 BLOOD CULTURE FOR BACTERIA: CPT | Performed by: EMERGENCY MEDICINE

## 2018-01-22 PROCEDURE — 93005 ELECTROCARDIOGRAM TRACING: CPT | Performed by: EMERGENCY MEDICINE

## 2018-01-22 PROCEDURE — 85730 THROMBOPLASTIN TIME PARTIAL: CPT | Performed by: EMERGENCY MEDICINE

## 2018-01-22 PROCEDURE — 87400 INFLUENZA A/B EACH AG IA: CPT | Performed by: EMERGENCY MEDICINE

## 2018-01-22 PROCEDURE — 84484 ASSAY OF TROPONIN QUANT: CPT | Performed by: EMERGENCY MEDICINE

## 2018-01-22 PROCEDURE — 94664 DEMO&/EVAL PT USE INHALER: CPT

## 2018-01-22 PROCEDURE — 80048 BASIC METABOLIC PNL TOTAL CA: CPT | Performed by: EMERGENCY MEDICINE

## 2018-01-22 PROCEDURE — 85610 PROTHROMBIN TIME: CPT | Performed by: EMERGENCY MEDICINE

## 2018-01-22 PROCEDURE — 36415 COLL VENOUS BLD VENIPUNCTURE: CPT | Performed by: EMERGENCY MEDICINE

## 2018-01-22 PROCEDURE — 71046 X-RAY EXAM CHEST 2 VIEWS: CPT

## 2018-01-22 RX ORDER — CEFUROXIME AXETIL 500 MG/1
500 TABLET ORAL EVERY 12 HOURS SCHEDULED
COMMUNITY
End: 2018-01-24 | Stop reason: HOSPADM

## 2018-01-22 RX ORDER — METHYLPREDNISOLONE SODIUM SUCCINATE 40 MG/ML
40 INJECTION, POWDER, LYOPHILIZED, FOR SOLUTION INTRAMUSCULAR; INTRAVENOUS EVERY 8 HOURS SCHEDULED
Status: DISCONTINUED | OUTPATIENT
Start: 2018-01-22 | End: 2018-01-22

## 2018-01-22 RX ORDER — FLUTICASONE PROPIONATE 110 UG/1
1 AEROSOL, METERED RESPIRATORY (INHALATION) EVERY 12 HOURS SCHEDULED
Status: DISCONTINUED | OUTPATIENT
Start: 2018-01-22 | End: 2018-01-24 | Stop reason: HOSPADM

## 2018-01-22 RX ORDER — OSELTAMIVIR PHOSPHATE 75 MG/1
75 CAPSULE ORAL ONCE
Status: COMPLETED | OUTPATIENT
Start: 2018-01-22 | End: 2018-01-22

## 2018-01-22 RX ORDER — ALBUTEROL SULFATE 90 UG/1
2 AEROSOL, METERED RESPIRATORY (INHALATION) EVERY 6 HOURS PRN
Status: DISCONTINUED | OUTPATIENT
Start: 2018-01-22 | End: 2018-01-22

## 2018-01-22 RX ORDER — ONDANSETRON 2 MG/ML
4 INJECTION INTRAMUSCULAR; INTRAVENOUS EVERY 6 HOURS PRN
Status: DISCONTINUED | OUTPATIENT
Start: 2018-01-22 | End: 2018-01-24 | Stop reason: HOSPADM

## 2018-01-22 RX ORDER — LORATADINE 10 MG/1
10 TABLET ORAL DAILY
Status: DISCONTINUED | OUTPATIENT
Start: 2018-01-23 | End: 2018-01-24 | Stop reason: HOSPADM

## 2018-01-22 RX ORDER — SODIUM CHLORIDE 9 MG/ML
125 INJECTION, SOLUTION INTRAVENOUS CONTINUOUS
Status: DISCONTINUED | OUTPATIENT
Start: 2018-01-22 | End: 2018-01-24 | Stop reason: HOSPADM

## 2018-01-22 RX ORDER — ALBUTEROL SULFATE 90 UG/1
2 AEROSOL, METERED RESPIRATORY (INHALATION) EVERY 4 HOURS PRN
Status: DISCONTINUED | OUTPATIENT
Start: 2018-01-22 | End: 2018-01-24 | Stop reason: HOSPADM

## 2018-01-22 RX ORDER — ACETAMINOPHEN 325 MG/1
650 TABLET ORAL ONCE
Status: COMPLETED | OUTPATIENT
Start: 2018-01-22 | End: 2018-01-22

## 2018-01-22 RX ORDER — LEVALBUTEROL INHALATION SOLUTION 0.63 MG/3ML
0.63 SOLUTION RESPIRATORY (INHALATION)
Status: DISCONTINUED | OUTPATIENT
Start: 2018-01-22 | End: 2018-01-22

## 2018-01-22 RX ORDER — MONTELUKAST SODIUM 10 MG/1
10 TABLET ORAL
Status: DISCONTINUED | OUTPATIENT
Start: 2018-01-22 | End: 2018-01-24 | Stop reason: HOSPADM

## 2018-01-22 RX ORDER — ALBUTEROL SULFATE 2.5 MG/3ML
2.5 SOLUTION RESPIRATORY (INHALATION) EVERY 6 HOURS PRN
Status: DISCONTINUED | OUTPATIENT
Start: 2018-01-22 | End: 2018-01-24 | Stop reason: HOSPADM

## 2018-01-22 RX ORDER — OSELTAMIVIR PHOSPHATE 75 MG/1
75 CAPSULE ORAL EVERY 12 HOURS SCHEDULED
Status: DISCONTINUED | OUTPATIENT
Start: 2018-01-22 | End: 2018-01-24 | Stop reason: HOSPADM

## 2018-01-22 RX ORDER — ALPRAZOLAM 0.25 MG/1
0.25 TABLET ORAL 3 TIMES DAILY PRN
Status: DISCONTINUED | OUTPATIENT
Start: 2018-01-22 | End: 2018-01-24 | Stop reason: HOSPADM

## 2018-01-22 RX ORDER — SODIUM CHLORIDE FOR INHALATION 0.9 %
3 VIAL, NEBULIZER (ML) INHALATION
Status: DISCONTINUED | OUTPATIENT
Start: 2018-01-22 | End: 2018-01-24 | Stop reason: HOSPADM

## 2018-01-22 RX ORDER — LEVALBUTEROL 1.25 MG/.5ML
1.25 SOLUTION, CONCENTRATE RESPIRATORY (INHALATION)
Status: DISCONTINUED | OUTPATIENT
Start: 2018-01-22 | End: 2018-01-24

## 2018-01-22 RX ORDER — METHYLPREDNISOLONE SODIUM SUCCINATE 40 MG/ML
40 INJECTION, POWDER, LYOPHILIZED, FOR SOLUTION INTRAMUSCULAR; INTRAVENOUS EVERY 8 HOURS
Status: DISCONTINUED | OUTPATIENT
Start: 2018-01-23 | End: 2018-01-24

## 2018-01-22 RX ORDER — OSELTAMIVIR PHOSPHATE 75 MG/1
75 CAPSULE ORAL EVERY 12 HOURS
Qty: 9 CAPSULE | Refills: 0 | Status: SHIPPED | OUTPATIENT
Start: 2018-01-22 | End: 2018-01-27

## 2018-01-22 RX ADMIN — ALBUTEROL SULFATE 5 MG: 2.5 SOLUTION RESPIRATORY (INHALATION) at 09:43

## 2018-01-22 RX ADMIN — ALPRAZOLAM 0.25 MG: 0.25 TABLET ORAL at 21:20

## 2018-01-22 RX ADMIN — FLUTICASONE PROPIONATE 1 PUFF: 110 AEROSOL, METERED RESPIRATORY (INHALATION) at 21:15

## 2018-01-22 RX ADMIN — LEVALBUTEROL HYDROCHLORIDE 1.25 MG: 1.25 SOLUTION, CONCENTRATE RESPIRATORY (INHALATION) at 20:32

## 2018-01-22 RX ADMIN — METHYLPREDNISOLONE SODIUM SUCCINATE 40 MG: 40 INJECTION, POWDER, FOR SOLUTION INTRAMUSCULAR; INTRAVENOUS at 18:01

## 2018-01-22 RX ADMIN — ACETAMINOPHEN 650 MG: 325 TABLET, FILM COATED ORAL at 11:53

## 2018-01-22 RX ADMIN — MONTELUKAST SODIUM 10 MG: 10 TABLET, FILM COATED ORAL at 21:15

## 2018-01-22 RX ADMIN — IPRATROPIUM BROMIDE 0.5 MG: 0.5 SOLUTION RESPIRATORY (INHALATION) at 09:43

## 2018-01-22 RX ADMIN — OSELTAMIVIR PHOSPHATE 75 MG: 75 CAPSULE ORAL at 09:43

## 2018-01-22 RX ADMIN — ISODIUM CHLORIDE 3 ML: 0.03 SOLUTION RESPIRATORY (INHALATION) at 20:32

## 2018-01-22 RX ADMIN — OSELTAMIVIR PHOSPHATE 75 MG: 75 CAPSULE ORAL at 21:15

## 2018-01-22 RX ADMIN — ONDANSETRON 4 MG: 2 INJECTION INTRAMUSCULAR; INTRAVENOUS at 18:01

## 2018-01-22 RX ADMIN — SODIUM CHLORIDE 125 ML/HR: 0.9 INJECTION, SOLUTION INTRAVENOUS at 18:01

## 2018-01-22 RX ADMIN — SODIUM CHLORIDE 1000 ML: 0.9 INJECTION, SOLUTION INTRAVENOUS at 08:51

## 2018-01-22 NOTE — ED PROVIDER NOTES
History  Chief Complaint   Patient presents with    Flu Symptoms     pt being tx with abx x2 weeks for sinusitis and steriods for asthma with frequent attacks lately  also states she had a fever of 104 last night and body aches  History provided by:  Patient and spouse  Flu Symptoms   Presenting symptoms: cough, fever, myalgias, nausea, rhinorrhea, shortness of breath and sore throat    Presenting symptoms: no diarrhea, no headaches and no vomiting    Cough:     Cough characteristics:  Productive    Sputum characteristics:  Nondescript    Severity:  Moderate    Onset quality:  Gradual    Duration:  2 days    Timing:  Intermittent    Progression:  Waxing and waning    Chronicity:  New  Fever:     Duration:  1 day    Timing:  Intermittent    Temp source:  Subjective    Progression:  Unchanged  Myalgias:     Location:  Generalized    Quality:  Aching    Severity:  Moderate    Onset quality:  Gradual    Duration:  2 days    Timing:  Intermittent    Progression:  Worsening  Rhinorrhea:     Quality:  Yellow    Severity:  Moderate    Rhinorrhea duration: This is a chronic problem due to patient having acute on chronic sinusitis  Timing:  Intermittent    Rhinorrhea progression: Worse over the past few weeks, prompting patient to be started on cephalosporin and prednisone by ENT  Severity:  Moderate  Onset quality:  Gradual  Duration:  2 days  Progression:  Waxing and waning  Chronicity:  New  Relieved by:  None tried  Worsened by:  Nothing  Ineffective treatments:  None tried  Associated symptoms: no chills, no decreased appetite, no decrease in physical activity, no ear pain, no mental status change, no congestion, no neck stiffness and no syncope    Associated symptoms comment:  Hypoxia home 88% last night on home pulse ox, patient does have p r n  oxygen for home use, states that 88% is not uncommon for her      Prior to Admission Medications   Prescriptions Last Dose Informant Patient Reported? Taking? ALPRAZolam (XANAX) 0 25 mg tablet   Yes Yes   Sig: Take 0 25 mg by mouth 3 (three) times a day as needed for anxiety  OXYGEN-HELIUM IN   Yes Yes   Sig: Inhale 2 L at hs via N/C   Red Yeast Rice Extract (RED YEAST RICE PO)   Yes No   Sig: Take by mouth every morning   albuterol (PROVENTIL HFA,VENTOLIN HFA) 90 mcg/act inhaler   Yes Yes   Sig: Inhale 2 puffs every 6 (six) hours as needed for wheezing  beclomethasone (QVAR) 80 MCG/ACT inhaler   Yes Yes   Sig: Inhale 1 puff 2 (two) times a day   cefuroxime (CEFTIN) 500 mg tablet   Yes Yes   Sig: Take 500 mg by mouth every 12 (twelve) hours   cetirizine (ZyrTEC) 10 mg tablet   Yes Yes   Sig: Take 10 mg by mouth every morning     montelukast (SINGULAIR) 10 mg tablet   Yes Yes   Sig: Take 10 mg by mouth daily at bedtime  multivitamin (THERAGRAN) TABS   Yes Yes   Sig: Take 1 tablet by mouth every morning   predniSONE 20 mg tablet   Yes Yes   Sig: Take 20 mg by mouth every morning      Facility-Administered Medications: None       Past Medical History:   Diagnosis Date    Anesthesia complication     desaturation of oxygen mostly at night- on O2 at 2L at hs-may wake up with asthma issue    Anxiety     Asthma     Argueta esophagus     DVT (deep venous thrombosis) (HCC)     during pregnancy    Hiatal hernia     Hypercholesteremia     Nasal polyps     Sinus disorder     Sinusitis, chronic        Past Surgical History:   Procedure Laterality Date     SECTION N/A     x 2    COLONOSCOPY      ESOPHAGOGASTRODUODENOSCOPY      HYSTERECTOMY      complete    NASAL POLYP SURGERY      x2    AR COLSC FLX W/RMVL OF TUMOR POLYP LESION SNARE TQ N/A 2017    Procedure: COLONOSCOPYwith  snare polypectomy ;  Surgeon: Kathia Rebollar MD;  Location: [facility];   Service: Gastroenterology    AR EGD TRANSORAL BIOPSY SINGLE/MULTIPLE N/A 2017    Procedure: ESOPHAGOGASTRODUODENOSCOPY (EGD)and biopsy ;  Surgeon: Kathia Rebollar MD;  Location: [facility]; Service: Gastroenterology    TONSILLECTOMY N/A     VEIN LIGATION AND STRIPPING Bilateral        Family History   Problem Relation Age of Onset    Heart disease Mother      CHF    Dysphagia Father      I have reviewed and agree with the history as documented  Social History   Substance Use Topics    Smoking status: Never Smoker    Smokeless tobacco: Never Used    Alcohol use Yes      Comment: occassionally        Review of Systems   Constitutional: Positive for fever  Negative for activity change, chills, decreased appetite and diaphoresis  HENT: Positive for rhinorrhea and sore throat  Negative for congestion, ear pain and sinus pressure  Eyes: Negative for pain and visual disturbance  Respiratory: Positive for cough and shortness of breath  Negative for chest tightness, wheezing and stridor  Cardiovascular: Negative for chest pain and palpitations  Gastrointestinal: Positive for nausea  Negative for abdominal distention, abdominal pain, constipation, diarrhea and vomiting  Genitourinary: Negative for dysuria and frequency  Musculoskeletal: Positive for myalgias  Negative for neck pain and neck stiffness  Skin: Negative for rash  Neurological: Negative for dizziness, speech difficulty, light-headedness, numbness and headaches  Physical Exam  ED Triage Vitals   Temperature Pulse Respirations Blood Pressure SpO2   01/22/18 0816 01/22/18 0816 01/22/18 0816 01/22/18 0821 01/22/18 0816   100 °F (37 8 °C) 100 18 138/65 94 %      Temp Source Heart Rate Source Patient Position - Orthostatic VS BP Location FiO2 (%)   01/22/18 0816 -- 01/22/18 0816 01/22/18 0816 --   Oral  Sitting Right arm       Pain Score       --                  Orthostatic Vital Signs  Vitals:    01/22/18 0816 01/22/18 0821 01/22/18 1000   BP:  138/65 130/59   Pulse: 100     Patient Position - Orthostatic VS: Sitting Sitting        Physical Exam   Constitutional: She is oriented to person, place, and time   She appears well-developed  No distress  HENT:   Head: Normocephalic and atraumatic  Eyes: Pupils are equal, round, and reactive to light  Neck: Normal range of motion  Neck supple  No tracheal deviation present  Cardiovascular: Normal rate, regular rhythm, normal heart sounds and intact distal pulses  No murmur heard  Pulmonary/Chest: Effort normal  No stridor  No respiratory distress  She has wheezes  Abdominal: Soft  She exhibits no distension  There is no tenderness  There is no rebound and no guarding  Musculoskeletal: Normal range of motion  Neurological: She is alert and oriented to person, place, and time  Skin: Skin is warm and dry  She is not diaphoretic  No erythema  No pallor  Psychiatric: She has a normal mood and affect  Vitals reviewed  ED Medications  Medications   oseltamivir (TAMIFLU) capsule 75 mg (75 mg Oral Not Given 1/22/18 1102)   sodium chloride 0 9 % bolus 1,000 mL (1,000 mL Intravenous New Bag 1/22/18 0851)   ipratropium (ATROVENT) 0 02 % inhalation solution 0 5 mg (0 5 mg Nebulization Given 1/22/18 0943)   albuterol inhalation solution 5 mg (5 mg Nebulization Given 1/22/18 0943)   oseltamivir (TAMIFLU) capsule 75 mg (75 mg Oral Given 1/22/18 0943)   acetaminophen (TYLENOL) tablet 650 mg (650 mg Oral Given 1/22/18 1153)       Diagnostic Studies  Results Reviewed     Procedure Component Value Units Date/Time    Lactic acid, plasma [[id number]]  (Normal) Collected:  01/22/18 1108    Lab Status:  Final result Specimen:  Blood from Arm, Left Updated:  01/22/18 1145     LACTIC ACID 1 9 mmol/L     Narrative:         Result may be elevated if tourniquet was used during collection  Blood culture #1 [[id number]] Collected:  01/22/18 0851    Lab Status: In process Specimen:  Blood from Arm, Right Updated:  01/22/18 1114    Blood culture #2 [[id number]] Collected:  01/22/18 1108    Lab Status:   In process Specimen:  Blood from Arm, Left Updated:  01/22/18 1112    Rapid Influenza Screen with Reflex PCR (indicated for patients <2mo of age) [[id number]]  (Abnormal) Collected:  01/22/18 0851    Lab Status:  Final result Specimen:  Nasopharyngeal from Nasopharyngeal Swab Updated:  01/22/18 0927     Rapid Influenza A Ag Positive (A)     Rapid Influenza B Ag Negative    Troponin I [[id number]]  (Normal) Collected:  01/22/18 0851    Lab Status:  Final result Specimen:  Blood from Arm, Right Updated:  01/22/18 0919     Troponin I <0 02 ng/mL     Narrative:         [employer] Chemistry analyzer 99% cutoff is > 0 04 ng/mL in network labs    o cTnI 99% cutoff is useful only when applied to patients in the clinical setting of myocardial ischemia  o cTnI 99% cutoff should be interpreted in the context of clinical history, ECG findings and possibly cardiac imaging to establish correct diagnosis  o cTnI 99% cutoff may be suggestive but clearly not indicative of a coronary event without the clinical setting of myocardial ischemia  Hepatic function panel [[id number]]  (Abnormal) Collected:  01/22/18 0851    Lab Status:  Final result Specimen:  Blood from Arm, Right Updated:  01/22/18 0916     Total Bilirubin 0 30 mg/dL      Bilirubin, Direct 0 09 mg/dL      Alkaline Phosphatase 131 (H) U/L       (H) U/L       (H) U/L      Total Protein 7 0 g/dL      Albumin 3 5 g/dL     Basic metabolic panel [[id number]] Collected:  01/22/18 0851    Lab Status:  Final result Specimen:  Blood from Arm, Right Updated:  01/22/18 0916     Sodium 139 mmol/L      Potassium 3 6 mmol/L      Chloride 101 mmol/L      CO2 29 mmol/L      Anion Gap 9 mmol/L      BUN 11 mg/dL      Creatinine 0 74 mg/dL      Glucose 97 mg/dL      Calcium 9 1 mg/dL      eGFR 87 ml/min/1 73sq m     Narrative:         National Kidney Disease Education Program recommendations are as follows:  GFR calculation is accurate only with a steady state creatinine  Chronic Kidney disease less than 60 ml/min/1 73 sq  meters  Kidney failure less than 15 ml/min/1 73 sq  meters  Protime-INR [[id number]]  (Normal) Collected:  01/22/18 0851    Lab Status:  Final result Specimen:  Blood from Arm, Right Updated:  01/22/18 0910     Protime 12 4 seconds      INR 0 90    APTT [[id number]]  (Normal) Collected:  01/22/18 0851    Lab Status:  Final result Specimen:  Blood from Arm, Right Updated:  01/22/18 0910     PTT 25 seconds     Narrative: Therapeutic Heparin Range = 60-90 seconds    CBC and differential [[id number]]  (Abnormal) Collected:  01/22/18 0851    Lab Status:  Final result Specimen:  Blood from Arm, Right Updated:  01/22/18 0901     WBC 5 58 Thousand/uL      RBC 4 14 Million/uL      Hemoglobin 13 2 g/dL      Hematocrit 40 0 %      MCV 97 fL      MCH 31 9 pg      MCHC 33 0 g/dL      RDW 11 9 %      MPV 9 0 fL      Platelets 827 Thousands/uL      Neutrophils Relative 68 %      Lymphocytes Relative 13 (L) %      Monocytes Relative 12 %      Eosinophils Relative 6 %      Basophils Relative 1 %      Neutrophils Absolute 3 80 Thousands/µL      Lymphocytes Absolute 0 74 Thousands/µL      Monocytes Absolute 0 69 Thousand/µL      Eosinophils Absolute 0 32 Thousand/µL      Basophils Absolute 0 03 Thousands/µL                  XR chest 2 views   ED Interpretation by Huang Stuart DO (01/22 7027)   No acute infiltrate      Final Result by Ellen Asher MD (01/22 2906)      1  Minimal linear atelectasis at the left lung base           Workstation performed: [id number]                    Procedures  ECG 12 Lead Documentation  Date/Time: 1/22/2018 9:16 AM  Performed by: Vahid Vaca  Authorized by: Vahid Vaca     ECG reviewed by me, the ED Provider: yes    Patient location:  ED  Previous ECG:     Previous ECG:  Compared to current    Comparison ECG info:  2 26 2016    Similarity:  No change  Interpretation:     Interpretation: normal    Rate:     ECG rate:  96    ECG rate assessment: normal    Rhythm:     Rhythm: sinus rhythm    Ectopy:     Ectopy: none    QRS:     QRS axis:  Normal    QRS intervals:  Normal  Conduction:     Conduction: normal    ST segments:     ST segments:  Normal  T waves:     T waves: normal             Phone Contacts  ED Phone Contact    ED Course  ED Course as of Jan 22 1507   Mon Jan 22, 2018   0921 LFTs slightly elevated unclear significance, possibly viral, patient has no upper abdominal pain, repeat examination no abdominal tenderness still, negative Wilson sign    1009 Patient has been very uncomfortable with discharge plan, stating due to respiratory issues, he is very concerned patient herself does not want to be at home in this condition stating she feels too ill to care for herself and  is uncomfortable providing  , will call for admission due to mild hypoxia, asthma and influenza                          Initial Sepsis Screening     Row Name 01/22/18 1028                Is the patient's history suggestive of a new or worsening infection?         Suspected source of infection          Are two or more of the following signs & symptoms of infection both present and new to the patient? No  -EP        Indicate SIRS criteria          If the answer is yes to both questions, suspicion of sepsis is present          If severe sepsis is present AND tissue hypoperfusion perists in the hour after fluid resuscitation or lactate > 4, the patient meets criteria for SEPTIC SHOCK          Are any of the following organ dysfunction criteria present within 6 hours of suspected infection and SIRS criteria that are NOT considered to be chronic conditions?         Organ dysfunction          Date of presentation of severe sepsis          Time of presentation of severe sepsis          Tissue hypoperfusion persists in the hour after crystalloid fluid administration, evidenced, by either:          Was hypotension present within one hour of the conclusion of crystalloid fluid administration?           Date of presentation of septic shock          Time of presentation of septic shock            User Key  (r) = Recorded By, (t) = Taken By, (c) = Cosigned By    [address] Name Provider Velvet Gaucher, MD Physician                  MDM  Number of Diagnoses or Management Options  Influenza A: new and requires workup  Moderate persistent asthma with exacerbation: new and requires workup  Diagnosis management comments: 78-year-old female, chronic sinusitis current acute flare on antibiotics and steroids presents with difficulty breathing URI symptoms, cough, nighttime hypoxia of 88%, although this is normal for her and she wears p r n  oxygen at home for this  Initial DDx includes but is not limited to: Influenza, upper respiratory tract infection, pneumonia      Initial ED Plan:   Blood work, chest x-ray, breathing treatment for wheezing, disposition pending ED workup        Amount and/or Complexity of Data Reviewed  Clinical lab tests: reviewed and ordered  Tests in the radiology section of CPT®: ordered and reviewed  Decide to obtain previous medical records or to obtain history from someone other than the patient: yes  Obtain history from someone other than the patient: yes  Review and summarize past medical records: yes  Independent visualization of images, tracings, or specimens: yes      CritCare Time    Disposition  Final diagnoses:   Influenza A   Moderate persistent asthma with exacerbation     Time reflects when diagnosis was documented in both MDM as applicable and the Disposition within this note     Time User Action Codes Description Comment    1/22/2018  9:38 AM Bernardo DODD Add [J10 1] Influenza A     1/22/2018  9:38 AM Arabella Cosme [J45 41] Moderate persistent asthma with exacerbation       ED Disposition     ED Disposition Condition Comment    Admit  Case was discussed with Dr Rashel Sharp and the patient's admission status was agreed to be Admission Status: observation status to the service of Dr Rashel Sharp           Follow-up Information None       Patient's Medications   Discharge Prescriptions    OSELTAMIVIR (TAMIFLU) 75 MG CAPSULE    Take 1 capsule by mouth every 12 (twelve) hours for 5 days       Start Date: 1/22/2018 End Date: 1/27/2018       Order Dose: 75 mg       Quantity: 9 capsule    Refills: 0     No discharge procedures on file      ED Provider  Electronically Signed by           Nati Gaspar DO  01/22/18 DO Alison  01/22/18 7718

## 2018-01-22 NOTE — H&P
History and Physical - [facility] Internal Medicine    Patient Information: Cl Loco 58 y o  female MRN: [id number]  Unit/Bed#: MANNY Encounter: [id number]  Admitting Physician: Tom Marrufo MD  PCP: Prme Rausch DO  Date of Admission:  01/22/18    Assessment/Plan:    Hospital Problem List:     Principal Problem:    Influenza A  Active Problems:    Asthma    Tachycardia    Elevated LFTs      Plan for the Primary Problem(s): Influenza A  Will continue with tamiflu  Will give supportive care  Asthma  Feels tight  Some wheeze  Will treat with IV steroids and give breathing treatments  Will re-assess tomorrow  SIRS criteria  Tachycardia and temp  Secondary to flu  Elevated LFTs   Will check US  VTE Prophylaxis: Heparin  / sequential compression device   Code Status: Full Code  POLST: There is no POLST form on file for this patient (pre-hospital)    Anticipated Length of Stay:  Patient will be admitted on an Observation basis with an anticipated length of stay of less 2 midnights  Justification for Hospital Stay: Iv steroids  Total Time for Visit, including Counseling / Coordination of Care: 1 hour  Greater than 50% of this total time spent on direct patient counseling and coordination of care  Chief Complaint:     "I feel terrible"    History of Present Illness:    Cl Loco is a 58 y o  female who presents with shortness of breath and a cough  This has been progressively getting worse despite being started on IV antibiotics by her PCP  She now tests flu positive in the ED and says her chest feels "tight"  She does have a history of asthma, with 2 prior hospitalization, no ICU stays or intubations  She follows with Dr Mercedes Alfaro and she is on baseline 20 mg of steroids currently  Review of Systems:    Review of Systems   Constitutional: Positive for chills, diaphoresis and fatigue  Negative for activity change and appetite change     HENT: Positive for congestion  Negative for dental problem, drooling, ear discharge and facial swelling  Respiratory: Positive for cough, chest tightness, shortness of breath and wheezing  Negative for apnea, choking and stridor  Cardiovascular: Positive for chest pain  Negative for palpitations and leg swelling  Gastrointestinal: Negative for abdominal distention, abdominal pain, anal bleeding, blood in stool, constipation, diarrhea and nausea  Genitourinary: Negative for difficulty urinating, dyspareunia, dysuria, enuresis and flank pain  Musculoskeletal: Negative for arthralgias, back pain, gait problem, joint swelling, myalgias, neck pain and neck stiffness  Skin: Negative for color change, pallor, rash and wound  Neurological: Positive for light-headedness  Negative for dizziness, seizures, facial asymmetry, speech difficulty, numbness and headaches  Hematological: Negative for adenopathy  Does not bruise/bleed easily  Psychiatric/Behavioral: Negative for agitation, behavioral problems, confusion, decreased concentration and dysphoric mood  Past Medical and Surgical History:     Past Medical History:   Diagnosis Date    Anesthesia complication     desaturation of oxygen mostly at night- on O2 at 2L at hs-may wake up with asthma issue    Anxiety     Asthma     Argueta esophagus     DVT (deep venous thrombosis) (HCC)     during pregnancy    Hiatal hernia     Hypercholesteremia     Nasal polyps     Sinus disorder     Sinusitis, chronic        Past Surgical History:   Procedure Laterality Date     SECTION N/A     x 2    COLONOSCOPY      ESOPHAGOGASTRODUODENOSCOPY      HYSTERECTOMY      complete    NASAL POLYP SURGERY      x2    MD COLSC FLX W/RMVL OF TUMOR POLYP LESION SNARE TQ N/A 2017    Procedure: COLONOSCOPYwith  snare polypectomy ;  Surgeon: Monae Boggs MD;  Location: [facility] GI LAB;   Service: Gastroenterology    MD EGD TRANSORAL BIOPSY SINGLE/MULTIPLE N/A 6/6/2017    Procedure: ESOPHAGOGASTRODUODENOSCOPY (EGD)and biopsy ;  Surgeon: Papo Preston MD;  Location: [facility] GI LAB; Service: Gastroenterology    TONSILLECTOMY N/A     VEIN LIGATION AND STRIPPING Bilateral        Meds/Allergies:    Prior to Admission medications    Medication Sig Start Date End Date Taking? Authorizing Provider   albuterol (PROVENTIL HFA,VENTOLIN HFA) 90 mcg/act inhaler Inhale 2 puffs every 6 (six) hours as needed for wheezing  Yes Historical Provider, MD   ALPRAZolam Erling Le) 0 25 mg tablet Take 0 25 mg by mouth 3 (three) times a day as needed for anxiety  Yes Historical Provider, MD   beclomethasone (QVAR) 80 MCG/ACT inhaler Inhale 1 puff 2 (two) times a day   Yes Historical Provider, MD   cefuroxime (CEFTIN) 500 mg tablet Take 500 mg by mouth every 12 (twelve) hours   Yes Historical Provider, MD   cetirizine (ZyrTEC) 10 mg tablet Take 10 mg by mouth every morning     Yes Historical Provider, MD   montelukast (SINGULAIR) 10 mg tablet Take 10 mg by mouth daily at bedtime  Yes Historical Provider, MD   multivitamin (THERAGRAN) TABS Take 1 tablet by mouth every morning   Yes Historical Provider, MD   OXYGEN-HELIUM IN Inhale 2 L at hs via N/C   Yes Historical Provider, MD   predniSONE 20 mg tablet Take 20 mg by mouth every morning   Yes Historical Provider, MD   oseltamivir (TAMIFLU) 75 mg capsule Take 1 capsule by mouth every 12 (twelve) hours for 5 days 1/22/18 1/27/18  Ld Purvis,    Red Yeast Rice Extract (RED YEAST RICE PO) Take by mouth every morning    Historical Provider, MD     I have reviewed home medications with patient personally  Allergies: Allergies   Allergen Reactions    Aspirin     Dust Mite Extract     Livalo [Pitavastatin] Hives     Nausea    Mold Extract [Trichophyton]     Advil [Ibuprofen] Rash     And N/V    Cefditoren Pivoxil Hives     N/V       Social History:     Marital Status: /Civil Union   Occupation: Works as a sosa     Patient Pre-hospital Living Situation: Lives with   Fully mobile  Patient Pre-hospital Level of Mobility: Fully mobile  Drives a care  Patient Pre-hospital Diet Restrictions: none  Substance Use History:   History   Alcohol Use    Yes     Comment: occassionally     History   Smoking Status    Never Smoker   Smokeless Tobacco    Never Used     History   Drug Use No       Family History:    Family History   Problem Relation Age of Onset    Heart disease Mother      CHF    Dysphagia Father      Daughter has asthma as well  Physical Exam:     Vitals:   Blood Pressure: 110/59 (01/22/18 1700)  Pulse: 94 (01/22/18 1700)  Temperature: 98 9 °F (37 2 °C) (01/22/18 1510)  Temp Source: Oral (01/22/18 1510)  Respirations: 16 (01/22/18 1700)  Weight - Scale: 68 kg (150 lb) (01/22/18 0816)  SpO2: 95 % (01/22/18 1700)    Physical Exam   Constitutional: She is oriented to person, place, and time  No distress  HENT:   Head: Normocephalic and atraumatic  Eyes: Right eye exhibits no discharge  Left eye exhibits no discharge  No scleral icterus  Neck: No JVD present  No tracheal deviation present  No thyromegaly present  Cardiovascular: Exam reveals no gallop and no friction rub  Murmur heard  Pulmonary/Chest: No respiratory distress  She has wheezes  She has no rales  She exhibits no tenderness  Abdominal: She exhibits no distension  There is no tenderness  There is no rebound and no guarding  Musculoskeletal: She exhibits no edema, tenderness or deformity  Lymphadenopathy:     She has no cervical adenopathy  Neurological: She is alert and oriented to person, place, and time  No cranial nerve deficit  Coordination normal    Skin: No rash noted  She is not diaphoretic  No erythema  No pallor  Psychiatric: She has a normal mood and affect  Additional Data:     Lab Results: I have personally reviewed pertinent reports          Results from last 7 days  Lab Units 01/22/18  0851   WBC Thousand/uL 5 58   HEMOGLOBIN g/dL 13 2   HEMATOCRIT % 40 0   PLATELETS Thousands/uL 213   NEUTROS PCT % 68   LYMPHS PCT % 13*   MONOS PCT % 12   EOS PCT % 6       Results from last 7 days  Lab Units 01/22/18  0851   SODIUM mmol/L 139   POTASSIUM mmol/L 3 6   CHLORIDE mmol/L 101   CO2 mmol/L 29   BUN mg/dL 11   CREATININE mg/dL 0 74   CALCIUM mg/dL 9 1   TOTAL PROTEIN g/dL 7 0   BILIRUBIN TOTAL mg/dL 0 30   ALK PHOS U/L 131*   ALT U/L 146*   AST U/L 102*   GLUCOSE RANDOM mg/dL 97       Results from last 7 days  Lab Units 01/22/18  0851   INR  0 90       Imaging: I have personally reviewed pertinent reports  Xr Chest 2 Views    Result Date: 1/22/2018  Narrative: CHEST INDICATION:  cough  History taken directly from the electronic ordering system  COMPARISON:  Chest x-ray from 2/26/2016 VIEWS:  Frontal and lateral projections IMAGES:  2 FINDINGS:     Cardiomediastinal silhouette appears unremarkable  There is minimal linear atelectasis at the left lung base  No pneumothorax or pleural effusion  Visualized osseous structures appear within normal limits for the patient's age  Impression: 1  Minimal linear atelectasis at the left lung base  Workstation performed: [id number]       EKG, Pathology, and Other Studies Reviewed on Admission:   · EKG: sinus tachycardia  Allscripts / Epic Records Reviewed: Yes     ** Please Note: This note has been constructed using a voice recognition system   **

## 2018-01-22 NOTE — DISCHARGE INSTRUCTIONS
Influenza, Ambulatory Care   Centers for Disease Control and Prevention: Recommended Adult Immunization Schedule [place] - 2014  Centers for Disease Control and Prevention  Terry Lopez [address]  Available from URL: DefaultForum be  As accessed 2014-02-20  Influenza Division, [facility] for Immunization and Respiratory Diseases, [facility]: Prevention and control of seasonal influenza with vaccines  MMWR Recomm Rep 2013; 62(RR-07):1-43  Keara Maki RK: Influenza  In: Jesse FJ, ed  The 5-Minute Clinical Consult 2012, 20th ed  [address] [facility] [place], [place], 2012  Ildefonso ANN & Antonio SR: Influenza  In: Irlanda Ho RM, [employer], et al, eds  [address] Emergency Medicine Consult, 4th ed  [address] [facility] [place], [place], 2011, [facility] [phone]  Centers for Disease Control and Prevention: Respiratory Hygiene/Cough Etiquette in [employer]  Centers for Disease Control and Prevention  [facility] [place]  2003  Available from URL: http://"[employer]"/  As accessed 2009-05-07  Centers for Disease Control and Prevention: Interim guidance on antiviral recommendations for patients with Novel Influenza A (H1N1) virus infection and their close contacts  Centers for Disease Control and Prevention  [place], [address] [facility]  Available from URL: http://[facility]/  As accessed 2009-05-07  Centers for Disease Control and Prevention: Interim guidance for infection control for care of patients with confirmed or suspected Swine Influenza A (H1N1) virus infection in a healthcare setting  Centers for Disease Control and Prevention  [place], [address] [facility] [facility]  Available from URL: [facility]  As accessed 2009-04-28  Centers for Disease Control and Prevention: H1N1 Flu (Swine Flu)   Centers for Disease Control and Prevention  [place], [address] [facility]  Available from URL: NewsActor se  As accessed 2009-05-07  Centers for Disease Control and Prevention: Interim Guidance for Swine influenza A (H1N1): Taking Care of a Sick Person in Your Home  [facility] for Disease Control and Prevention  [place], [address]  Available from URL: theBench com pt  As accessed 2009-04-27  Centers for Disease Control and Prevention: Interim recommendations for facemask and respirator use in certain community settings where H1N1 Influenza Virus transmission has been detected  [facility] for Disease Control and Prevention  [place], [address]  Available from URL: GiggleClubs co nz  As accessed 2009-05-07  Centers for Disease Control and Prevention: Antiviral drugs and H1N1 Flu (Swine Flu)  [facility] for Disease Control and Prevention  [place], [address]  Available from URL: TelephoneAffiliates pl  As accessed 2009-05-07  Centers for Disease Control and Prevention: Key facts about Swine Influenza  [facility] for Disease Control and Prevention  [place], [address]  Available from URL: Child[employer]aming pl  As accessed 2009-05-07  Centers for Disease Control and Prevention: Interim guidance on specimen collection, processing, and testing for patients with suspected Swine-Origin Influenza A (H1N1) Virus Infection  [facility] for Disease Control and Prevention  [place], [address]  Available from URL: InvestmentInstructor fi  As accessed 2009-05-07  Centers for Disease Control and Prevention: Interim guidance for clinicians on identifying and caring for patients with Swine-origin Influenza A (H1N1) Virus Infection  [facility] for Disease Control and Prevention  [place], [address]  Available from URL: ApartmentProfile is  As accessed 2009-05-07    Sg CRUZ, Reggie CHRISTIANSEN, Cordell Lopez et al: Prevention and control of influenza: recommendations of the Advisory Committee on [employer] (ACIP), 2008  MMWR Recomm Rep 2008; 57(RR-7):1-60  [facility]): The Benefits of Hand Washing  [facility])  [place], [place]  2009  Available from URL: MobileOffering pl  As accessed 2009-05-01  Infectious Diseases Society of [place]: Seasonal Influenza in Adults and Children--Diagnosis, Treatment, Chemoprophylaxis, and Institutional Outbreak Management: Clinical Practice Guidelines of the Infectious Diseases Society of [facility]  Infectious Diseases Society of [place], [place]  2009  Available from URL: InvestmentInstructor be  [facility]/doi/full/10 [id number]  As accessed 2009-05-01  Carlos Enrique TO, Vernon D, Diana C, et al: Vaccines for preventing influenza in healthy adults  [place] Database Syst Rev 2007; 2007(2):1  Rothberg MB, Hacheri SD, & Benji RB: Complications of viral influenza  Am J Med 2008; 121(4):258-264  World Health Organization: Swine influenza frequently asked questions  World Health Organization  [facility]  2009  Available from URL: SeekCultures si  pdf  As accessed 2009-04-28  Tacos Menard RK: Recent changes in influenza vaccination recommendations, 2007  J Fam Pract 2007; 56(2 Suppl Vaccines):S12-17, C1  [facility] and [employer], Centers for Disease Control and Prevention  : Inactivated influenza vaccine; what you need to know 2004 - 2005    May 24, 2004  Available at: RentalVa fr  pdf , (cited 11/11/04)  [facility] and [employer], Centers for Disease Control and Prevention  : Live, intranasal influenza vaccine; what you need to know 2004 - 2005    May 24, 2004  Available at: RentalVa fr  pdf , (cited 11/11/04)    © 2014 [address] is for End User's use only and may not be sold, redistributed or otherwise used for commercial purposes  All illustrations and images included in CareNotes® are the copyrighted property of A D A M , Inc  or Tad Dorantes  The above information is an  only  It is not intended as medical advice for individual conditions or treatments  Talk to your doctor, nurse or pharmacist before following any medical regimen to see if it is safe and effective for you

## 2018-01-23 ENCOUNTER — APPOINTMENT (INPATIENT)
Dept: ULTRASOUND IMAGING | Facility: HOSPITAL | Age: 63
DRG: 194 | End: 2018-01-23
Payer: COMMERCIAL

## 2018-01-23 PROBLEM — R00.0 TACHYCARDIA: Status: RESOLVED | Noted: 2018-01-22 | Resolved: 2018-01-23

## 2018-01-23 LAB
ALBUMIN SERPL BCP-MCNC: 3.2 G/DL (ref 3.5–5)
ALP SERPL-CCNC: 132 U/L (ref 46–116)
ALT SERPL W P-5'-P-CCNC: 128 U/L (ref 12–78)
ANION GAP SERPL CALCULATED.3IONS-SCNC: 9 MMOL/L (ref 4–13)
AST SERPL W P-5'-P-CCNC: 87 U/L (ref 5–45)
BILIRUB SERPL-MCNC: 0.4 MG/DL (ref 0.2–1)
BUN SERPL-MCNC: 11 MG/DL (ref 5–25)
CALCIUM SERPL-MCNC: 8.8 MG/DL (ref 8.3–10.1)
CHLORIDE SERPL-SCNC: 103 MMOL/L (ref 100–108)
CO2 SERPL-SCNC: 25 MMOL/L (ref 21–32)
CREAT SERPL-MCNC: 0.66 MG/DL (ref 0.6–1.3)
ERYTHROCYTE [DISTWIDTH] IN BLOOD BY AUTOMATED COUNT: 11.8 % (ref 11.6–15.1)
GFR SERPL CREATININE-BSD FRML MDRD: 95 ML/MIN/1.73SQ M
GLUCOSE SERPL-MCNC: 163 MG/DL (ref 65–140)
HCT VFR BLD AUTO: 40.5 % (ref 34.8–46.1)
HGB BLD-MCNC: 13.1 G/DL (ref 11.5–15.4)
MAGNESIUM SERPL-MCNC: 2.1 MG/DL (ref 1.6–2.6)
MCH RBC QN AUTO: 31 PG (ref 26.8–34.3)
MCHC RBC AUTO-ENTMCNC: 32.3 G/DL (ref 31.4–37.4)
MCV RBC AUTO: 96 FL (ref 82–98)
PLATELET # BLD AUTO: 207 THOUSANDS/UL (ref 149–390)
PMV BLD AUTO: 9.3 FL (ref 8.9–12.7)
POTASSIUM SERPL-SCNC: 4.1 MMOL/L (ref 3.5–5.3)
PROT SERPL-MCNC: 6.7 G/DL (ref 6.4–8.2)
RBC # BLD AUTO: 4.22 MILLION/UL (ref 3.81–5.12)
SODIUM SERPL-SCNC: 137 MMOL/L (ref 136–145)
WBC # BLD AUTO: 3.15 THOUSAND/UL (ref 4.31–10.16)

## 2018-01-23 PROCEDURE — 94150 VITAL CAPACITY TEST: CPT

## 2018-01-23 PROCEDURE — 94640 AIRWAY INHALATION TREATMENT: CPT

## 2018-01-23 PROCEDURE — 80053 COMPREHEN METABOLIC PANEL: CPT | Performed by: INTERNAL MEDICINE

## 2018-01-23 PROCEDURE — 76705 ECHO EXAM OF ABDOMEN: CPT

## 2018-01-23 PROCEDURE — 83735 ASSAY OF MAGNESIUM: CPT | Performed by: INTERNAL MEDICINE

## 2018-01-23 PROCEDURE — 94760 N-INVAS EAR/PLS OXIMETRY 1: CPT

## 2018-01-23 PROCEDURE — 85027 COMPLETE CBC AUTOMATED: CPT | Performed by: INTERNAL MEDICINE

## 2018-01-23 RX ORDER — ACETAMINOPHEN 325 MG/1
650 TABLET ORAL EVERY 6 HOURS PRN
Status: DISCONTINUED | OUTPATIENT
Start: 2018-01-23 | End: 2018-01-24 | Stop reason: HOSPADM

## 2018-01-23 RX ADMIN — LEVALBUTEROL HYDROCHLORIDE 1.25 MG: 1.25 SOLUTION, CONCENTRATE RESPIRATORY (INHALATION) at 20:03

## 2018-01-23 RX ADMIN — ISODIUM CHLORIDE 3 ML: 0.03 SOLUTION RESPIRATORY (INHALATION) at 08:56

## 2018-01-23 RX ADMIN — METHYLPREDNISOLONE SODIUM SUCCINATE 40 MG: 40 INJECTION, POWDER, FOR SOLUTION INTRAMUSCULAR; INTRAVENOUS at 10:17

## 2018-01-23 RX ADMIN — FLUTICASONE PROPIONATE 1 PUFF: 110 AEROSOL, METERED RESPIRATORY (INHALATION) at 20:46

## 2018-01-23 RX ADMIN — SODIUM CHLORIDE 125 ML/HR: 0.9 INJECTION, SOLUTION INTRAVENOUS at 19:25

## 2018-01-23 RX ADMIN — SODIUM CHLORIDE 125 ML/HR: 0.9 INJECTION, SOLUTION INTRAVENOUS at 11:19

## 2018-01-23 RX ADMIN — ONDANSETRON 4 MG: 2 INJECTION INTRAMUSCULAR; INTRAVENOUS at 03:30

## 2018-01-23 RX ADMIN — OSELTAMIVIR PHOSPHATE 75 MG: 75 CAPSULE ORAL at 20:47

## 2018-01-23 RX ADMIN — LEVALBUTEROL HYDROCHLORIDE 1.25 MG: 1.25 SOLUTION, CONCENTRATE RESPIRATORY (INHALATION) at 14:49

## 2018-01-23 RX ADMIN — METHYLPREDNISOLONE SODIUM SUCCINATE 40 MG: 40 INJECTION, POWDER, FOR SOLUTION INTRAMUSCULAR; INTRAVENOUS at 02:44

## 2018-01-23 RX ADMIN — ALPRAZOLAM 0.25 MG: 0.25 TABLET ORAL at 20:47

## 2018-01-23 RX ADMIN — ISODIUM CHLORIDE 3 ML: 0.03 SOLUTION RESPIRATORY (INHALATION) at 14:49

## 2018-01-23 RX ADMIN — OSELTAMIVIR PHOSPHATE 75 MG: 75 CAPSULE ORAL at 10:14

## 2018-01-23 RX ADMIN — FLUTICASONE PROPIONATE 1 PUFF: 110 AEROSOL, METERED RESPIRATORY (INHALATION) at 10:15

## 2018-01-23 RX ADMIN — LEVALBUTEROL HYDROCHLORIDE 1.25 MG: 1.25 SOLUTION, CONCENTRATE RESPIRATORY (INHALATION) at 08:56

## 2018-01-23 RX ADMIN — LORATADINE 10 MG: 10 TABLET ORAL at 10:14

## 2018-01-23 RX ADMIN — ISODIUM CHLORIDE 3 ML: 0.03 SOLUTION RESPIRATORY (INHALATION) at 20:03

## 2018-01-23 RX ADMIN — ALPRAZOLAM 0.25 MG: 0.25 TABLET ORAL at 11:29

## 2018-01-23 RX ADMIN — METHYLPREDNISOLONE SODIUM SUCCINATE 40 MG: 40 INJECTION, POWDER, FOR SOLUTION INTRAMUSCULAR; INTRAVENOUS at 17:29

## 2018-01-23 RX ADMIN — MONTELUKAST SODIUM 10 MG: 10 TABLET, FILM COATED ORAL at 21:29

## 2018-01-23 RX ADMIN — ACETAMINOPHEN 650 MG: 325 TABLET, FILM COATED ORAL at 20:47

## 2018-01-23 RX ADMIN — SODIUM CHLORIDE 125 ML/HR: 0.9 INJECTION, SOLUTION INTRAVENOUS at 02:28

## 2018-01-23 RX ADMIN — ACETAMINOPHEN 650 MG: 325 TABLET, FILM COATED ORAL at 03:27

## 2018-01-23 NOTE — ASSESSMENT & PLAN NOTE
Rapid Flu A positive  Continue Tamiflu  O2 sat 94% on 2L N[place], will wean as tolerated  Afebrile, WBC 3 15, BC pending  CXR no evidence of PNA

## 2018-01-23 NOTE — PLAN OF CARE
Problem: Potential for Falls  Goal: Patient will remain free of falls  INTERVENTIONS:  - Assess patient frequently for physical needs  -  Identify cognitive and physical deficits and behaviors that affect risk of falls    -  [place] fall precautions as indicated by assessment   - Educate patient/family on patient safety including physical limitations  - Instruct patient to call for assistance with activity based on assessment  - Modify environment to reduce risk of injury  - Consider OT/PT consult to assist with strengthening/mobility   Outcome: Adequate for Discharge      Problem: PAIN - ADULT  Goal: Verbalizes/displays adequate comfort level or baseline comfort level  Interventions:  - Encourage patient to monitor pain and request assistance  - Assess pain using appropriate pain scale  - Administer analgesics based on type and severity of pain and evaluate response  - Implement non-pharmacological measures as appropriate and evaluate response  - Consider cultural and social influences on pain and pain management  - Notify physician/advanced practitioner if interventions unsuccessful or patient reports new pain   Outcome: Progressing      Problem: INFECTION - ADULT  Goal: Absence or prevention of progression during hospitalization  INTERVENTIONS:  - Assess and monitor for signs and symptoms of infection  - Monitor lab/diagnostic results  - Monitor all insertion sites, i e  indwelling lines, tubes, and drains  - Monitor endotracheal (as able) and nasal secretions for changes in amount and color  - [place] appropriate cooling/warming therapies per order  - Administer medications as ordered  - Instruct and encourage patient and family to use good hand hygiene technique  - Identify and instruct in appropriate isolation precautions for identified infection/condition   Outcome: Progressing    Goal: Absence of fever/infection during neutropenic period  INTERVENTIONS:  - Monitor WBC  - Implement neutropenic guidelines Outcome: Progressing      Problem: DISCHARGE PLANNING  Goal: Discharge to home or other facility with appropriate resources  INTERVENTIONS:  - Identify barriers to discharge w/patient and caregiver  - Arrange for needed discharge resources and transportation as appropriate  - Identify discharge learning needs (meds, wound care, etc )  - Arrange for interpretive services to assist at discharge as needed  - Refer to Case Management Department for coordinating discharge planning if the patient needs post-hospital services based on physician/advanced practitioner order or complex needs related to functional status, cognitive ability, or social support system   Outcome: Progressing

## 2018-01-23 NOTE — ASSESSMENT & PLAN NOTE
Wean O2 as tolerated, pt on 2L [place] at night at home  Check ambulatory O2 sat  Continue methylprednisolone, taper when off O2  Continue Xopenex, Fluticasone

## 2018-01-23 NOTE — CASE MANAGEMENT
Initial Clinical Review    Admission: Date/Time/Statement: 1/22/2018  1017 OBSERVATION   AND CHANGED TO INPATIENT 1/23/2018  1136     Inpatient Admission (Order [id number])   Admission   Date: 1/23/2018 Department: [id number] Unit Released By: Muriel Quintanilla PA-C (auto-released) Authorizing: Daniele Lowry MD   Order Information     Order Name   INPATIENT ADMISSION [263]     The link below is only for use if the above order is AMB REFERRAL TO [address]   Face to Face Certification Statement (for AMB REFERRAL TO [address] Only)   Order History   Inpatient   Date/Time Action Taken User Additional Information   01/23/18 [address] (auto-released) From Order: [id number]   01/23/18 1136 Complete Muriel Quintanilla PA-C    Order Details     Frequency Duration Priority Order Class   Once 1  occurrence Routine Hospital Performed   Order Questions     Question Answer Comment   Admitting Physician Farooq Staton    Level of Care Med Surg    Estimated length of stay More than 2 Midnights    Certification I certify that inpatient services are medically necessary for this patient for a duration of greater than two midnights  See H&P and MD Progress Notes for additional information about the patient's course of treatment            ED: Date/Time/Mode of Arrival:   ED Arrival Information     Expected Arrival Acuity Means of Arrival Escorted By Service Admission Type    - 1/22/2018 08:08 Urgent Walk-In Self General Medicine Urgent    Arrival Complaint    Possible Flu          Chief Complaint:   Chief Complaint   Patient presents with    Flu Symptoms     pt being tx with abx x2 weeks for sinusitis and steriods for asthma with frequent attacks lately  also states she had a fever of 104 last night and body aches  History of Illness: 58 y o  female who presents with shortness of breath and a cough     This has been progressively getting worse despite being started on IV antibiotics by her PCP  She now tests flu positive in the ED and says her chest feels "tight"  She does have a history of asthma, with 2 prior hospitalization, no ICU stays or intubations     She follows with Dr Chava Alexis and she is on baseline 20 mg of steroids currently  On 1/23- sat on room air 85%    ED Vital Signs:   ED Triage Vitals   Temperature Pulse Respirations Blood Pressure SpO2   01/22/18 0816 01/22/18 0816 01/22/18 0816 01/22/18 0821 01/22/18 0816   100 °F (37 8 °C) 100 18 138/65 94 %      Temp Source Heart Rate Source Patient Position - Orthostatic VS BP Location FiO2 (%)   01/22/18 0816 01/22/18 1510 01/22/18 0816 01/22/18 0816 --   Oral Monitor Sitting Right arm       Pain Score       01/22/18 1510       5        Wt Readings from Last 1 Encounters:   01/22/18 69 4 kg (153 lb)       Vital Signs (abnormal):  Maximum temperature 100 9    1/23 98 5  90  17  124/59  92% on 2 liters, low of 85% room air  Abnormal Labs/Diagnostic Test Results:   Rapid influenza A Ag - positive  Alkaline phosphatase 131  ast 102  CxR- Minimal linear atelectasis at the left lung base    Labs 1/23- glucose 163   ast 87  Alt 128  Alkaline phosphatase 132  Albumin 3 2  Wbc 3 15       ED Treatment:   Medication Administration from 01/22/2018 0808 to 01/22/2018 1752       Date/Time Order Dose Route Action Action by Comments     01/22/2018 0851 sodium chloride 0 9 % bolus 1,000 mL 1,000 mL [facility]venous [facility]      01/22/2018 0943 ipratropium (ATROVENT) 0 02 % inhalation solution 0 5 mg 0 5 mg Nebulization Given Polo QuantSUNDEEP      01/22/2018 0943 albuterol inhalation solution 5 mg 5 mg Nebulization Given Polo QuantSUNDEEP      01/22/2018 0326 oseltamivir (TAMIFLU) capsule 75 mg 75 mg Oral Given Polo Quant RN      01/22/2018 1153 acetaminophen (TYLENOL) tablet 650 mg 650 mg Oral Given Hilario Puga RN      01/22/2018 1102 oseltamivir (TAMIFLU) capsule 75 mg 75 mg Oral Not Given Sariah Velez RN given earlier today          Past Medical/Surgical History: Active Ambulatory Problems     Diagnosis Date Noted    Asthma 02/26/2016    Chronic sinusitis 02/26/2016    Anxiety 02/26/2016    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    Cough 02/26/2016    Osteoarthritis 02/26/2016    Allergic reaction caused by a drug 02/29/2016     Resolved Ambulatory Problems     Diagnosis Date Noted    No Resolved Ambulatory Problems     Past Medical History:   Diagnosis Date    Anesthesia complication     Anxiety     Asthma     Argueta esophagus     DVT (deep venous thrombosis) (HCC)     Hiatal hernia     Hypercholesteremia     Nasal polyps     Sinus disorder     Sinusitis, chronic        Admitting Diagnosis: Influenza A [J10 1]  Moderate persistent asthma with exacerbation [J45 41]  Flu-like symptoms [R68 89]    Age/Sex: 58 y o  female    Assessment/Plan: Influenza A  Will continue with tamiflu  Will give supportive care       Asthma  Feels tight  Some wheeze  Will treat with IV steroids and give breathing treatments         Admission Orders:  1/22/2018  1017 OBSERVATION  AND CHANGED TO INPATIENT 1/23/2018  1136  Scheduled Meds:   fluticasone 1 puff Inhalation Q12H [address]   levalbuterol 1 25 mg Nebulization TID   loratadine 10 mg Oral Daily   methylPREDNISolone sodium succinate 40 mg Intravenous Q8H   montelukast 10 mg Oral HS   oseltamivir 75 mg Oral Q12H [address]   sodium chloride 3 mL Nebulization TID     Continuous Infusions:   sodium chloride 125 mL/hr Last Rate: 125 mL/hr (01/23/18 0228)     PRN Meds:   Acetaminophen - used x 1      albuterol    albuterol    ALPRAZolam - used x 1      Ondansetron  4 mg iv - used x 2  OTHER ORDERS: continuous cardiac monitoring  Respiratory protocol- on 2 liters         Changed to inpatient on 9/14:  Certification Statement: The patient will continue to require additional inpatient hospital stay due to influenza a requiring supplemental O2  Influenza A   Assessment & Plan     Rapid Flu A positive  Continue Tamiflu  O2 sat 94% on 2L Nc, will wean as tolerated  Afebrile, WBC 3 15, BC pending  CXR no evidence of PNA          Asthma   Assessment & Plan     Wean O2 as tolerated, pt on 2L NC at night at home  Check ambulatory O2 sat  Continue methylprednisolone, taper when off O2  Continue Xopenex, Fluticasone                Elevated LFTs   Assessment & Plan     Most likely viral  Will check abdominal u/s  Pt denies alcohol abuse, hx of hepatitis, liver dz       Thank you,  [facility] in the [facility] by Tad Dorantes for 2017  Network Utilization Review Department  Phone: [phone]; Fax [phone]  ATTENTION: The Network Utilization Review Department is now centralized for our 7 Facilities  Make a note that we have a new phone and fax numbers for our Department  Please call with any questions or concerns to [phone] and carefully follow the prompts so that you are directed to the right person  All voicemails are confidential  Fax any determinations, approvals, denials, and requests for initial or continue stay review clinical to [phone]  Due to HIGH CALL volume, it would be easier if you could please send faxed requests to expedite your requests and in part, help us provide discharge notifications faster

## 2018-01-23 NOTE — PROGRESS NOTES
Progress Note - Ignacio Alarcon 1955, 58 y o  female MRN: [id number]    Unit/Bed#: -01 Encounter: [id number]    Primary Care Provider: Darina Primrose, DO   Date and time admitted to hospital: 1/22/2018  8:12 AM        * Influenza A   Assessment & Plan    Rapid Flu A positive  Continue Tamiflu  O2 sat 94% on 2L Nc, will wean as tolerated  Afebrile, WBC 3 15, BC pending  CXR no evidence of PNA        Asthma   Assessment & Plan    Wean O2 as tolerated, pt on 2L NC at night at home  Check ambulatory O2 sat  Continue methylprednisolone, taper when off O2  Continue Xopenex, Fluticasone            Elevated LFTs   Assessment & Plan    Most likely viral  Will check abdominal u/s  Pt denies alcohol abuse, hx of hepatitis, liver dz              VTE Pharmacologic Prophylaxis:   Pharmacologic: Pt ambulatory  Mechanical VTE Prophylaxis in Place: Yes    Patient Centered Rounds: I have performed bedside rounds with nursing staff today  Discussions with Specialists or Other Care Team Provider: n/a    Education and Discussions with Family / Patient:     Time Spent for Care: 20 minutes  More than 50% of total time spent on counseling and coordination of care as described above  Current Length of Stay: 0 day(s)    Current Patient Status: Inpatient   Certification Statement: The patient will continue to require additional inpatient hospital stay due to influenza a requiring supplemental O2    Discharge Plan: d/c to home tomorrow  Code Status: Level 1 - Full Code      Subjective:   Pt seen and examined at bedside  Pt states she still feels fatigued and has dry cough  Denies chest pain, SOB, fever/chills  She states she was on home O2 2L at night only, currently on 2L O2 Nc  States she has nasal polyps and has f/u appointment at [facility] for possible surgical removal  She states prior to admission she was on 20 mg prednisone qd for asthma and abt for sinusitis   Patient with elevated LFTs on admission, she denies hx of alcohol abuse, hepatitis, or liver dz  Denies abdominal pain, n/v/c/d  Objective:     Vitals:   Temp (24hrs), Av °F (37 2 °C), Min:98 5 °F (36 9 °C), Max:99 6 °F (37 6 °C)    HR:  [79-96] 90  Resp:  [12-18] 17  BP: (100-124)/(55-59) 124/59  SpO2:  [92 %-95 %] 95 %  Body mass index is 29 88 kg/m²  Input and Output Summary (last 24 hours): Intake/Output Summary (Last 24 hours) at 18 1310  Last data filed at 18 0806   Gross per 24 hour   Intake              480 ml   Output                0 ml   Net              480 ml       Physical Exam:     Physical Exam   Constitutional: She is oriented to person, place, and time  She appears well-developed and well-nourished  No distress  HENT:   Head: Normocephalic  Eyes: EOM are normal    Neck: Neck supple  Cardiovascular: Normal rate, regular rhythm, normal heart sounds and intact distal pulses  Pulmonary/Chest: Effort normal  No respiratory distress  Inspiratory crackles lower lung fields b/l  No wheezing appreciated   Abdominal: Soft  Bowel sounds are normal  She exhibits no distension  There is no tenderness  Musculoskeletal: She exhibits no edema  Neurological: She is alert and oriented to person, place, and time  No cranial nerve deficit  Skin: Skin is warm and dry  Psychiatric: She has a normal mood and affect           Additional Data:     Labs:      Results from last 7 days  Lab Units 18  0532 18  0851   WBC Thousand/uL 3 15* 5 58   HEMOGLOBIN g/dL 13 1 13 2   HEMATOCRIT % 40 5 40 0   PLATELETS Thousands/uL 207 213   NEUTROS PCT %  --  68   LYMPHS PCT %  --  13*   MONOS PCT %  --  12   EOS PCT %  --  6       Results from last 7 days  Lab Units 18  0532   SODIUM mmol/L 137   POTASSIUM mmol/L 4 1   CHLORIDE mmol/L 103   CO2 mmol/L 25   BUN mg/dL 11   CREATININE mg/dL 0 66   CALCIUM mg/dL 8 8   TOTAL PROTEIN g/dL 6 7   BILIRUBIN TOTAL mg/dL 0 40   ALK PHOS U/L 132*   ALT U/L 128*   AST U/L 87*   GLUCOSE RANDOM mg/dL 163*       Results from last 7 days  Lab Units 01/22/18  0851   INR  0 90       * I Have Reviewed All Lab Data Listed Above  * Additional Pertinent Lab Tests Reviewed: All Labs Within Last 24 Hours Reviewed    Imaging:    Imaging Reports Reviewed Today Include: CXR  Imaging Personally Reviewed by Myself Includes:  none    Recent Cultures (last 7 days):           Last 24 Hours Medication List:     fluticasone 1 puff Inhalation Q12H [facility]   levalbuterol 1 25 mg Nebulization TID   loratadine 10 mg Oral Daily   methylPREDNISolone sodium succinate 40 mg Intravenous Q8H   montelukast 10 mg Oral HS   oseltamivir 75 mg Oral Q12H [facility]   sodium chloride 3 mL Nebulization TID        Today, Patient Was Seen By: Valentina York PA-C    ** Please Note: Dictation voice to text software may have been used in the creation of this document   **

## 2018-01-24 VITALS
DIASTOLIC BLOOD PRESSURE: 70 MMHG | HEART RATE: 78 BPM | RESPIRATION RATE: 18 BRPM | HEIGHT: 60 IN | SYSTOLIC BLOOD PRESSURE: 125 MMHG | TEMPERATURE: 98.1 F | OXYGEN SATURATION: 94 % | BODY MASS INDEX: 30.04 KG/M2 | WEIGHT: 153 LBS

## 2018-01-24 LAB
ALBUMIN SERPL BCP-MCNC: 2.8 G/DL (ref 3.5–5)
ALP SERPL-CCNC: 102 U/L (ref 46–116)
ALT SERPL W P-5'-P-CCNC: 91 U/L (ref 12–78)
ANION GAP SERPL CALCULATED.3IONS-SCNC: 10 MMOL/L (ref 4–13)
AST SERPL W P-5'-P-CCNC: 42 U/L (ref 5–45)
BILIRUB SERPL-MCNC: 0.2 MG/DL (ref 0.2–1)
BUN SERPL-MCNC: 11 MG/DL (ref 5–25)
CALCIUM SERPL-MCNC: 7.8 MG/DL (ref 8.3–10.1)
CHLORIDE SERPL-SCNC: 110 MMOL/L (ref 100–108)
CO2 SERPL-SCNC: 23 MMOL/L (ref 21–32)
CREAT SERPL-MCNC: 0.58 MG/DL (ref 0.6–1.3)
ERYTHROCYTE [DISTWIDTH] IN BLOOD BY AUTOMATED COUNT: 12 % (ref 11.6–15.1)
GFR SERPL CREATININE-BSD FRML MDRD: 99 ML/MIN/1.73SQ M
GLUCOSE SERPL-MCNC: 156 MG/DL (ref 65–140)
HCT VFR BLD AUTO: 35.6 % (ref 34.8–46.1)
HGB BLD-MCNC: 11.3 G/DL (ref 11.5–15.4)
MCH RBC QN AUTO: 31.7 PG (ref 26.8–34.3)
MCHC RBC AUTO-ENTMCNC: 31.7 G/DL (ref 31.4–37.4)
MCV RBC AUTO: 100 FL (ref 82–98)
PLATELET # BLD AUTO: 194 THOUSANDS/UL (ref 149–390)
PMV BLD AUTO: 9.5 FL (ref 8.9–12.7)
POTASSIUM SERPL-SCNC: 3.5 MMOL/L (ref 3.5–5.3)
PROT SERPL-MCNC: 5.8 G/DL (ref 6.4–8.2)
RBC # BLD AUTO: 3.57 MILLION/UL (ref 3.81–5.12)
SODIUM SERPL-SCNC: 143 MMOL/L (ref 136–145)
WBC # BLD AUTO: 7.15 THOUSAND/UL (ref 4.31–10.16)

## 2018-01-24 PROCEDURE — 85027 COMPLETE CBC AUTOMATED: CPT | Performed by: PHYSICIAN ASSISTANT

## 2018-01-24 PROCEDURE — 99238 HOSP IP/OBS DSCHRG MGMT 30/<: CPT | Performed by: PHYSICIAN ASSISTANT

## 2018-01-24 PROCEDURE — 80053 COMPREHEN METABOLIC PANEL: CPT | Performed by: PHYSICIAN ASSISTANT

## 2018-01-24 RX ORDER — METHYLPREDNISOLONE SODIUM SUCCINATE 40 MG/ML
40 INJECTION, POWDER, LYOPHILIZED, FOR SOLUTION INTRAMUSCULAR; INTRAVENOUS EVERY 12 HOURS SCHEDULED
Status: DISCONTINUED | OUTPATIENT
Start: 2018-01-24 | End: 2018-01-24

## 2018-01-24 RX ORDER — OSELTAMIVIR PHOSPHATE 75 MG/1
75 CAPSULE ORAL 2 TIMES DAILY
Qty: 5 CAPSULE | Refills: 0 | Status: SHIPPED | OUTPATIENT
Start: 2018-01-24 | End: 2018-01-26

## 2018-01-24 RX ORDER — GUAIFENESIN 600 MG
600 TABLET, EXTENDED RELEASE 12 HR ORAL EVERY 12 HOURS SCHEDULED
Status: DISCONTINUED | OUTPATIENT
Start: 2018-01-24 | End: 2018-01-24 | Stop reason: HOSPADM

## 2018-01-24 RX ORDER — METHYLPREDNISOLONE SODIUM SUCCINATE 40 MG/ML
40 INJECTION, POWDER, LYOPHILIZED, FOR SOLUTION INTRAMUSCULAR; INTRAVENOUS EVERY 12 HOURS SCHEDULED
Status: DISCONTINUED | OUTPATIENT
Start: 2018-01-24 | End: 2018-01-24 | Stop reason: HOSPADM

## 2018-01-24 RX ORDER — PREDNISONE 20 MG/1
20 TABLET ORAL EVERY MORNING
Qty: 14 TABLET | Refills: 0 | Status: SHIPPED | OUTPATIENT
Start: 2018-01-24 | End: 2018-04-12 | Stop reason: ALTCHOICE

## 2018-01-24 RX ORDER — METHYLPREDNISOLONE SODIUM SUCCINATE 40 MG/ML
INJECTION, POWDER, LYOPHILIZED, FOR SOLUTION INTRAMUSCULAR; INTRAVENOUS
Status: DISPENSED
Start: 2018-01-24 | End: 2018-01-24

## 2018-01-24 RX ADMIN — ALPRAZOLAM 0.25 MG: 0.25 TABLET ORAL at 08:40

## 2018-01-24 RX ADMIN — OSELTAMIVIR PHOSPHATE 75 MG: 75 CAPSULE ORAL at 08:37

## 2018-01-24 RX ADMIN — METHYLPREDNISOLONE SODIUM SUCCINATE 40 MG: 40 INJECTION, POWDER, FOR SOLUTION INTRAMUSCULAR; INTRAVENOUS at 00:43

## 2018-01-24 RX ADMIN — LORATADINE 10 MG: 10 TABLET ORAL at 08:37

## 2018-01-24 RX ADMIN — SODIUM CHLORIDE 125 ML/HR: 0.9 INJECTION, SOLUTION INTRAVENOUS at 03:24

## 2018-01-24 RX ADMIN — ALPRAZOLAM 0.25 MG: 0.25 TABLET ORAL at 03:24

## 2018-01-24 RX ADMIN — GUAIFENESIN 600 MG: 600 TABLET, EXTENDED RELEASE ORAL at 14:23

## 2018-01-24 RX ADMIN — METHYLPREDNISOLONE SODIUM SUCCINATE 40 MG: 40 INJECTION, POWDER, FOR SOLUTION INTRAMUSCULAR; INTRAVENOUS at 14:08

## 2018-01-24 RX ADMIN — FLUTICASONE PROPIONATE 1 PUFF: 110 AEROSOL, METERED RESPIRATORY (INHALATION) at 08:40

## 2018-01-24 NOTE — DISCHARGE SUMMARY
Discharge- Wil Suarez 1955, 58 y o  female MRN: [id number]    Unit/Bed#: -01 Encounter: [id number]    Primary Care Provider: Rani Estrada DO   Date and time admitted to hospital: 1/22/2018  8:12 AM        No new Assessment & Plan notes have been filed under this hospital service since the last note was generated  Service: Hospitalist          Consultations During Hospital Stay:  · none    Procedures Performed:     · none    Significant Findings / Test Results:     · CXR negative for pneumonia  · Flu A positive  · Elevated LFTs, abdominal U/S wnl    Incidental Findings:   · none    Test Results Pending at Discharge (will require follow up):   · none     Outpatient Tests Requested:  · F/u pulmonology    Complications:  none    Reason for Admission: Flu sx    Hospital Course:     Wil Suarez is a 58 y o  female patient who originally presented to the hospital on 1/22/2018 due to SOB and cough x 1 week  Pt tested positive for flu and was treated for asthma exacerbation with methylprednisolone, receive prednisone taper at D/C  On day of discharge pt O2 saturations 96% while ambulating and at rest  She will complete course of Tamiflu and follow up with her pulmonologist as an outpatient for further management of her asthma  Please see above list of diagnoses and related plan for additional information  Condition at Discharge: stable     Discharge Day Visit / Exam:     Subjective:  Pt seen and examined at bedside  Reports albuterol treatments make her feel like she is "jumping out of her skin"  Vitals: Blood Pressure: 125/70 (01/24/18 0759)  Pulse: 78 (01/24/18 0759)  Temperature: 98 1 °F (36 7 °C) (01/24/18 0759)  Temp Source: Oral (01/24/18 0759)  Respirations: 18 (01/24/18 0759)  Height: 5' (152 4 cm) (01/22/18 1756)  Weight - Scale: 69 4 kg (153 lb) (01/22/18 1756)  SpO2: 94 % (01/24/18 1100)  Exam:   Physical Exam   Constitutional: She is oriented to person, place, and time   She appears well-developed and well-nourished  No distress  HENT:   Head: Normocephalic and atraumatic  Eyes: EOM are normal    Neck: Neck supple  Cardiovascular: Normal rate, regular rhythm, normal heart sounds and intact distal pulses  No LE edema appreciated   Pulmonary/Chest: Effort normal  No respiratory distress  She has no wheezes  Inspiratory crackles LLL field   Abdominal: Soft  Bowel sounds are normal  She exhibits no distension  Neurological: She is alert and oriented to person, place, and time  Skin: Skin is warm and dry  Psychiatric:   Anxious       Discussion with Family: none    Discharge instructions/Information to patient and family:   See after visit summary for information provided to patient and family  Provisions for Follow-Up Care:  See after visit summary for information related to follow-up care and any pertinent home health orders  Disposition:     Home    For Discharges to [facility]:   · Not Applicable to this Patient - Not Applicable to this Patient    Planned Readmission: none     Discharge Statement:  I spent 30 minutes discharging the patient  This time was spent on the day of discharge  I had direct contact with the patient on the day of discharge  Greater than 50% of the total time was spent examining patient, answering all patient questions, arranging and discussing plan of care with patient as well as directly providing post-discharge instructions  Additional time then spent on discharge activities  Discharge Medications:  See after visit summary for reconciled discharge medications provided to patient and family        ** Please Note: This note has been constructed using a voice recognition system **

## 2018-01-24 NOTE — ASSESSMENT & PLAN NOTE
Wean O2 as tolerated, pt on 2L NC at night at home  ambulatory O2 sat 96%  Continue Xopenex, Fluticasone  Received 1 dose methylprednisolone, will continue prednisone taper as OP and f/u with pulmonologist

## 2018-01-24 NOTE — PLAN OF CARE
Problem: Potential for Falls  Goal: Patient will remain free of falls  INTERVENTIONS:  - Assess patient frequently for physical needs  -  Identify cognitive and physical deficits and behaviors that affect risk of falls    -  [place] fall precautions as indicated by assessment   - Educate patient/family on patient safety including physical limitations  - Instruct patient to call for assistance with activity based on assessment  - Modify environment to reduce risk of injury  - Consider OT/PT consult to assist with strengthening/mobility   Outcome: Progressing      Problem: PAIN - ADULT  Goal: Verbalizes/displays adequate comfort level or baseline comfort level  Interventions:  - Encourage patient to monitor pain and request assistance  - Assess pain using appropriate pain scale  - Administer analgesics based on type and severity of pain and evaluate response  - Implement non-pharmacological measures as appropriate and evaluate response  - Consider cultural and social influences on pain and pain management  - Notify physician/advanced practitioner if interventions unsuccessful or patient reports new pain   Outcome: Progressing      Problem: INFECTION - ADULT  Goal: Absence or prevention of progression during hospitalization  INTERVENTIONS:  - Assess and monitor for signs and symptoms of infection  - Monitor lab/diagnostic results  - Monitor all insertion sites, i e  indwelling lines, tubes, and drains  - Monitor endotracheal (as able) and nasal secretions for changes in amount and color  - [place] appropriate cooling/warming therapies per order  - Administer medications as ordered  - Instruct and encourage patient and family to use good hand hygiene technique  - Identify and instruct in appropriate isolation precautions for identified infection/condition   Outcome: Progressing    Goal: Absence of fever/infection during neutropenic period  INTERVENTIONS:  - Monitor WBC  - Implement neutropenic guidelines   Outcome: Progressing      Problem: DISCHARGE PLANNING  Goal: Discharge to home or other facility with appropriate resources  INTERVENTIONS:  - Identify barriers to discharge w/patient and caregiver  - Arrange for needed discharge resources and transportation as appropriate  - Identify discharge learning needs (meds, wound care, etc )  - Arrange for interpretive services to assist at discharge as needed  - Refer to Case Management Department for coordinating discharge planning if the patient needs post-hospital services based on physician/advanced practitioner order or complex needs related to functional status, cognitive ability, or social support system   Outcome: Progressing      Problem: Knowledge Deficit  Goal: Patient/family/caregiver demonstrates understanding of disease process, treatment plan, medications, and discharge instructions  Complete learning assessment and assess knowledge base    Interventions:  - Provide teaching at level of understanding  - Provide teaching via preferred learning methods   Outcome: Progressing

## 2018-01-25 NOTE — CASE MANAGEMENT
Reference #[id number]    Notification of Discharge  This is a Notification of Discharge from our facility [address]  Please be advised that this patient has been discharge from our facility  Below you will find the admission and discharge date and time including the patients disposition  PRESENTATION DATE: 1/22/2018  8:12 AM  IP ADMISSION DATE: 1/23/18 1136  DISCHARGE DATE: 1/24/2018  2:45 PM  DISPOSITION: [address] [place] [address] in the [facility] by Tad Dorantes for 2017  Network Utilization Review Department  Phone: [phone]; Fax [phone]  ATTENTION: The Network Utilization Review Department is now centralized for our 7 Facilities  Make a note that we have a new phone and fax numbers for our Department  Please call with any questions or concerns to [phone] and carefully follow the prompts so that you are directed to the right person  All voicemails are confidential  Fax any determinations, approvals, denials, and requests for initial or continue stay review clinical to [phone]  Due to HIGH CALL volume, it would be easier if you could please send faxed requests to expedite your requests and in part, help us provide discharge notifications faster

## 2018-01-27 LAB
BACTERIA BLD CULT: NORMAL
BACTERIA BLD CULT: NORMAL

## 2018-03-26 ENCOUNTER — APPOINTMENT (OUTPATIENT)
Dept: LAB | Facility: CLINIC | Age: 63
End: 2018-03-26
Payer: COMMERCIAL

## 2018-03-26 ENCOUNTER — TRANSCRIBE ORDERS (OUTPATIENT)
Dept: LAB | Facility: CLINIC | Age: 63
End: 2018-03-26

## 2018-03-26 DIAGNOSIS — S40.871A: Primary | ICD-10-CM

## 2018-03-26 PROCEDURE — 36415 COLL VENOUS BLD VENIPUNCTURE: CPT

## 2018-03-26 PROCEDURE — 86618 LYME DISEASE ANTIBODY: CPT

## 2018-03-27 LAB
B BURGDOR IGG SER IA-ACNC: 0.07
B BURGDOR IGM SER IA-ACNC: 0.14

## 2018-04-11 RX ORDER — MULTIVIT-MIN/IRON/FOLIC ACID/K 18-600-40
2000 CAPSULE ORAL
COMMUNITY
End: 2019-05-09 | Stop reason: ALTCHOICE

## 2018-04-11 RX ORDER — CEFUROXIME AXETIL 500 MG/1
500 TABLET ORAL 2 TIMES DAILY
Refills: 0 | COMMUNITY
Start: 2018-01-31 | End: 2018-06-26 | Stop reason: ALTCHOICE

## 2018-04-11 RX ORDER — NAPROXEN SODIUM 220 MG
TABLET ORAL
COMMUNITY
End: 2018-04-12 | Stop reason: ALTCHOICE

## 2018-04-11 RX ORDER — CHLORAL HYDRATE 500 MG
1000 CAPSULE ORAL
COMMUNITY
End: 2019-05-09 | Stop reason: ALTCHOICE

## 2018-04-11 RX ORDER — MAGNESIUM HYDROXIDE 1200 MG/15ML
LIQUID ORAL
Refills: 1 | COMMUNITY
Start: 2018-01-31 | End: 2019-02-27 | Stop reason: ALTCHOICE

## 2018-04-11 RX ORDER — PANTOPRAZOLE SODIUM 40 MG/1
TABLET, DELAYED RELEASE ORAL
COMMUNITY
End: 2018-04-12 | Stop reason: ALTCHOICE

## 2018-04-11 RX ORDER — BUDESONIDE AND FORMOTEROL FUMARATE DIHYDRATE 160; 4.5 UG/1; UG/1
AEROSOL RESPIRATORY (INHALATION)
COMMUNITY
End: 2018-04-12 | Stop reason: SDDI

## 2018-04-11 RX ORDER — OXYCODONE HYDROCHLORIDE AND ACETAMINOPHEN 5; 325 MG/1; MG/1
1 TABLET ORAL
COMMUNITY
Start: 2018-01-31 | End: 2018-04-12 | Stop reason: ALTCHOICE

## 2018-04-11 RX ORDER — BUDESONIDE 0.25 MG/2ML
INHALANT ORAL
COMMUNITY
Start: 2018-01-31 | End: 2018-04-12 | Stop reason: SDDI

## 2018-04-12 ENCOUNTER — OFFICE VISIT (OUTPATIENT)
Dept: PULMONOLOGY | Facility: CLINIC | Age: 63
End: 2018-04-12
Payer: COMMERCIAL

## 2018-04-12 VITALS
SYSTOLIC BLOOD PRESSURE: 108 MMHG | WEIGHT: 152.4 LBS | DIASTOLIC BLOOD PRESSURE: 70 MMHG | OXYGEN SATURATION: 97 % | BODY MASS INDEX: 25.39 KG/M2 | HEIGHT: 65 IN | TEMPERATURE: 97.7 F | HEART RATE: 82 BPM

## 2018-04-12 DIAGNOSIS — K21.9 GASTROESOPHAGEAL REFLUX DISEASE, ESOPHAGITIS PRESENCE NOT SPECIFIED: ICD-10-CM

## 2018-04-12 DIAGNOSIS — K21.9 GASTROESOPHAGEAL REFLUX DISEASE WITHOUT ESOPHAGITIS: ICD-10-CM

## 2018-04-12 DIAGNOSIS — J45.41 MODERATE PERSISTENT ASTHMA WITH ACUTE EXACERBATION: Primary | ICD-10-CM

## 2018-04-12 PROBLEM — J10.1 INFLUENZA A: Status: RESOLVED | Noted: 2018-01-22 | Resolved: 2018-04-12

## 2018-04-12 PROCEDURE — 99214 OFFICE O/P EST MOD 30 MIN: CPT | Performed by: INTERNAL MEDICINE

## 2018-04-12 RX ORDER — RANITIDINE 150 MG/1
150 CAPSULE ORAL EVERY EVENING
Qty: 30 CAPSULE | Refills: 11 | Status: SHIPPED | OUTPATIENT
Start: 2018-04-12 | End: 2018-08-01 | Stop reason: ALTCHOICE

## 2018-04-12 RX ORDER — BUDESONIDE 0.25 MG/2ML
0.25 INHALANT ORAL DAILY
COMMUNITY
End: 2018-06-26

## 2018-04-12 NOTE — ASSESSMENT & PLAN NOTE
She will continue with ProAir on an as-needed basis  She understands that if she is using the ProAir more than 2 or 3 times per day, we will need to revisit maintenance inhaler therapy  She would rather not use maintenance inhalers at this time

## 2018-04-12 NOTE — ASSESSMENT & PLAN NOTE
She has uncontrolled reflux and I will initiate Zantac once daily  I encouraged her to follow up with GI as well

## 2018-04-12 NOTE — PROGRESS NOTES
Pulmonary Follow Up Note   Rylan Schmitz 58 y o  female MRN: [id number]  4/12/2018      Assessment/Plan:    Asthma  She will continue with ProAir on an as-needed basis  She understands that if she is using the ProAir more than 2 or 3 times per day, we will need to revisit maintenance inhaler therapy  She would rather not use maintenance inhalers at this time  Gastroesophageal reflux disease without esophagitis  She has uncontrolled reflux and I will initiate Zantac once daily  I encouraged her to follow up with GI as well  Visit orders:    Diagnoses and all orders for this visit:    Moderate persistent asthma with acute exacerbation    Gastroesophageal reflux disease, esophagitis presence not specified  -     ranitidine (ZANTAC) 150 MG capsule; Take 1 capsule (150 mg total) by mouth every evening    Gastroesophageal reflux disease without esophagitis    Other orders  -     Discontinue: naproxen sodium (ALEVE) 220 MG tablet; Take by mouth  -     Discontinue: budesonide (PULMICORT) 0 25 mg/2 mL nebulizer solution; ADD 1 RESPULE TO 8OZ OF NORMAL SALINE 2 TIMES PER DAY  IRRIGATE EACH SIDE OF THE NOSE WITH 4OZ IN THE MORNING AND 4OZ IN THE EVENING   -     Discontinue: budesonide-formoterol (SYMBICORT) 160-4 5 mcg/act inhaler; Inhale  -     cefuroxime (CEFTIN) 500 mg tablet; Take 500 mg by mouth 2 (two) times a day  -     Cholecalciferol (VITAMIN D) 2000 units CAPS; Take 2,000 Units by mouth  -     LACTOBACILLUS ACID-PECTIN PO; Take by mouth  -     Omega-3 Fatty Acids (FISH OIL) 1,000 mg; Take 1,000 mg by mouth  -     Discontinue: oxyCODONE-acetaminophen (PERCOCET) 5-325 mg per tablet; Take 1 tablet by mouth  -     Discontinue: pantoprazole (PROTONIX) 40 mg tablet;  Take by mouth  -     Discontinue: Beclomethasone Dipropionate (QNASL) 80 MCG/ACT AERS; into each nostril Daily  -     sodium chloride 0 9 % irrigation; IRRIGATE EACH NARES WITH 4 OUNCES ONCE DAILY  -     budesonide (PULMICORT) 0 25 mg/2 mL nebulizer solution; Take 0 25 mg by nebulization daily        Return in about 4 months (around 2018)  History of Present Illness   HPI:  Tien Calvert is a 58 y o  female who is here today for follow-up regarding asthma  She was hospitalized in January with influenza a and asthma exacerbation  She was treated with high-dose steroids which caused significant shakiness  She was subsequently weaned off steroids  She then underwent sinus surgery in February  She did well with the surgery  She has not been using any maintenance inhalers  She uses albuterol on occasion  She denies shortness of breath  She has heartburn at least 3-4 times per week  She is no longer using the Protonix as previously prescribed  She has not followed up with GI either  Review of Systems   Constitutional: Negative for fever  HENT:        Recent sinus surgery   Eyes: Positive for visual disturbance  Respiratory:        Per HPI   Cardiovascular: Negative for chest pain  Gastrointestinal:        Heartburn 3-4 x per week   Musculoskeletal:        Left shoulder pain   Skin: Negative for rash  Hematological: Negative for adenopathy  Psychiatric/Behavioral: The patient is nervous/anxious          Historical Information   Past Medical History:   Diagnosis Date    Anesthesia complication     desaturation of oxygen mostly at night- on O2 at 2L at hs-may wake up with asthma issue    Anxiety     Asthma     Argueta esophagus     DVT (deep venous thrombosis) (HCC)     during pregnancy    Hiatal hernia     Hypercholesteremia     Nasal polyps     Sinus disorder     Sinusitis, chronic      Past Surgical History:   Procedure Laterality Date     SECTION N/A     x 2    COLONOSCOPY      ESOPHAGOGASTRODUODENOSCOPY      HYSTERECTOMY      complete    NASAL POLYP SURGERY      x2    NASAL POLYP SURGERY  2018    SC COLSC FLX W/RMVL OF TUMOR POLYP LESION SNARE TQ N/A 2017    Procedure: COLONOSCOPYwith  snare polypectomy ;  Surgeon: Edith Braxton MD;  Location: [facility] GI [facility]; Service: Gastroenterology    UT EGD TRANSORAL BIOPSY SINGLE/MULTIPLE N/A 6/6/2017    Procedure: ESOPHAGOGASTRODUODENOSCOPY (EGD)and biopsy ;  Surgeon: Edith Braxton MD;  Location: [place] [address] GI LAB; Service: Gastroenterology    TONSILLECTOMY N/A     VEIN LIGATION AND STRIPPING Bilateral      Family History   Problem Relation Age of Onset    Heart disease Mother      CHF    Dysphagia Father        History   Smoking Status    Never Smoker   Smokeless Tobacco    Never Used     Meds/Allergies     Current Outpatient Prescriptions:     albuterol (PROVENTIL HFA,VENTOLIN HFA) 90 mcg/act inhaler, Inhale 2 puffs every 6 (six) hours as needed for wheezing , Disp: , Rfl:     ALPRAZolam (XANAX) 0 25 mg tablet, Take 0 25 mg by mouth 3 (three) times a day as needed for anxiety  , Disp: , Rfl:     budesonide (PULMICORT) 0 25 mg/2 mL nebulizer solution, Take 0 25 mg by nebulization daily, Disp: , Rfl:     cetirizine (ZyrTEC) 10 mg tablet, Take 10 mg by mouth every morning  , Disp: , Rfl:     Cholecalciferol (VITAMIN D) 2000 units CAPS, Take 2,000 Units by mouth, Disp: , Rfl:     LACTOBACILLUS ACID-PECTIN PO, Take by mouth, Disp: , Rfl:     montelukast (SINGULAIR) 10 mg tablet, Take 10 mg by mouth daily at bedtime  , Disp: , Rfl:     Omega-3 Fatty Acids (FISH OIL) 1,000 mg, Take 1,000 mg by mouth, Disp: , Rfl:     OXYGEN-HELIUM IN, Inhale 2 L at hs via N/C, Disp: , Rfl:     Red Yeast Rice Extract (RED YEAST RICE PO), Take by mouth every morning, Disp: , Rfl:     sodium chloride 0 9 % irrigation, IRRIGATE EACH NARES WITH 4 OUNCES ONCE DAILY, Disp: , Rfl: 1    cefuroxime (CEFTIN) 500 mg tablet, Take 500 mg by mouth 2 (two) times a day, Disp: , Rfl: 0    ranitidine (ZANTAC) 150 MG capsule, Take 1 capsule (150 mg total) by mouth every evening, Disp: 30 capsule, Rfl: 11  Allergies   Allergen Reactions    Livalo [Pitavastatin] Hives and Shortness Of Breath     Nausea    Aspirin     Dust Mite Extract     Mold Extract [Trichophyton]     Advil [Ibuprofen] Rash     And N/V    Cefditoren Pivoxil Hives     N/V       Vitals: Blood pressure 108/70, pulse 82, temperature 97 7 °F (36 5 °C), temperature source Tympanic, height 5' 5" (1 651 m), weight 69 1 kg (152 lb 6 4 oz), SpO2 97 %  Body mass index is 25 36 kg/m²  Oxygen Therapy  SpO2: 97 %  Oxygen Therapy: None (Room air)    Physical Exam   Physical Exam   Constitutional: She is oriented to person, place, and time  No distress  HENT:   Head: Normocephalic  Mouth/Throat: No oropharyngeal exudate  Eyes: Pupils are equal, round, and reactive to light  No scleral icterus  Neck: Neck supple  No JVD present  Cardiovascular: Normal rate and regular rhythm  Pulmonary/Chest: She has no wheezes  She has no rales  Abdominal: Soft  There is no tenderness  Musculoskeletal: She exhibits no edema  Lymphadenopathy:     She has no cervical adenopathy  Neurological: She is alert and oriented to person, place, and time  Skin: Skin is warm and dry  Psychiatric: She has a normal mood and affect  Labs: I have personally reviewed pertinent lab results    Lab Results   Component Value Date    WBC 7 15 01/24/2018    HGB 11 3 (L) 01/24/2018    HCT 35 6 01/24/2018     (H) 01/24/2018     01/24/2018     Lab Results   Component Value Date    GLUCOSE 156 (H) 01/24/2018    CALCIUM 7 8 (L) 01/24/2018     01/24/2018    K 3 5 01/24/2018    CO2 23 01/24/2018     (H) 01/24/2018    BUN 11 01/24/2018    CREATININE 0 58 (L) 01/24/2018     No results found for: IGE  Lab Results   Component Value Date    ALT 91 (H) 01/24/2018    AST 42 01/24/2018    ALKPHOS 102 01/24/2018    BILITOT 0 20 01/24/2018     Imaging and other studies: I have personally reviewed pertinent films in PACS CXR 1/22/18 - lungs clear

## 2018-04-23 ENCOUNTER — APPOINTMENT (OUTPATIENT)
Dept: LAB | Facility: CLINIC | Age: 63
End: 2018-04-23
Payer: COMMERCIAL

## 2018-04-23 ENCOUNTER — TRANSCRIBE ORDERS (OUTPATIENT)
Dept: LAB | Facility: CLINIC | Age: 63
End: 2018-04-23

## 2018-04-23 DIAGNOSIS — Z13.29 SCREENING FOR THYROID DISORDER: Primary | ICD-10-CM

## 2018-04-23 LAB — TSH SERPL DL<=0.05 MIU/L-ACNC: 2.25 UIU/ML (ref 0.36–3.74)

## 2018-04-23 PROCEDURE — 36415 COLL VENOUS BLD VENIPUNCTURE: CPT

## 2018-04-23 PROCEDURE — 84443 ASSAY THYROID STIM HORMONE: CPT

## 2018-05-01 ENCOUNTER — HOSPITAL ENCOUNTER (EMERGENCY)
Facility: HOSPITAL | Age: 63
Discharge: HOME | End: 2018-05-01
Attending: EMERGENCY MEDICINE
Payer: COMMERCIAL

## 2018-05-01 VITALS
DIASTOLIC BLOOD PRESSURE: 58 MMHG | SYSTOLIC BLOOD PRESSURE: 120 MMHG | TEMPERATURE: 97.9 F | HEART RATE: 96 BPM | BODY MASS INDEX: 24.98 KG/M2 | OXYGEN SATURATION: 97 % | WEIGHT: 150.13 LBS | RESPIRATION RATE: 18 BRPM

## 2018-05-01 DIAGNOSIS — L50.9 FULL BODY HIVES: Primary | ICD-10-CM

## 2018-05-01 PROCEDURE — 96375 TX/PRO/DX INJ NEW DRUG ADDON: CPT

## 2018-05-01 PROCEDURE — 96374 THER/PROPH/DIAG INJ IV PUSH: CPT

## 2018-05-01 PROCEDURE — 99283 EMERGENCY DEPT VISIT LOW MDM: CPT

## 2018-05-01 PROCEDURE — 96372 THER/PROPH/DIAG INJ SC/IM: CPT

## 2018-05-01 RX ORDER — EPINEPHRINE 1 MG/ML
0.3 INJECTION, SOLUTION, CONCENTRATE INTRAVENOUS ONCE
Status: COMPLETED | OUTPATIENT
Start: 2018-05-01 | End: 2018-05-01

## 2018-05-01 RX ORDER — DIPHENHYDRAMINE HYDROCHLORIDE 50 MG/ML
50 INJECTION INTRAMUSCULAR; INTRAVENOUS ONCE
Status: COMPLETED | OUTPATIENT
Start: 2018-05-01 | End: 2018-05-01

## 2018-05-01 RX ORDER — METHYLPREDNISOLONE SODIUM SUCCINATE 125 MG/2ML
60 INJECTION, POWDER, LYOPHILIZED, FOR SOLUTION INTRAMUSCULAR; INTRAVENOUS ONCE
Status: COMPLETED | OUTPATIENT
Start: 2018-05-01 | End: 2018-05-01

## 2018-05-01 RX ORDER — PREDNISONE 20 MG/1
40 TABLET ORAL DAILY
Qty: 8 TABLET | Refills: 0 | Status: SHIPPED | OUTPATIENT
Start: 2018-05-01 | End: 2018-05-05

## 2018-05-01 RX ORDER — DIPHENHYDRAMINE HCL 25 MG
25 CAPSULE ORAL EVERY 6 HOURS PRN
Qty: 20 CAPSULE | Refills: 0 | Status: SHIPPED | OUTPATIENT
Start: 2018-05-01 | End: 2018-06-26 | Stop reason: ALTCHOICE

## 2018-05-01 RX ADMIN — EPINEPHRINE 0.3 MG: 1 INJECTION, SOLUTION, CONCENTRATE INTRAVENOUS at 19:32

## 2018-05-01 RX ADMIN — DIPHENHYDRAMINE HYDROCHLORIDE 50 MG: 50 INJECTION, SOLUTION INTRAMUSCULAR; INTRAVENOUS at 20:10

## 2018-05-01 RX ADMIN — EPINEPHRINE 0.3 MG: 1 INJECTION, SOLUTION, CONCENTRATE INTRAVENOUS at 20:50

## 2018-05-01 RX ADMIN — METHYLPREDNISOLONE SODIUM SUCCINATE 60 MG: 125 INJECTION, POWDER, FOR SOLUTION INTRAMUSCULAR; INTRAVENOUS at 20:47

## 2018-05-01 NOTE — ED PROVIDER NOTES
History  Chief Complaint   Patient presents with    Rash     PT reports "I have had this rash for a week, but now it is getting worse and my skin is starting to swell  I am taking zyrtec for it " PT has a history, "same thing about 5 years ago"     77-year-old female presents with chief complaint of worsening diffuse maculopapular rash  Patient has been taking oral Zyrtec without improvement  Patient has a history of recent diagnosis of macular degeneration as well as another retinal disorder that she has been told may be related to her chronic steroid usage because of both asthma and nasal polyps  Patient has not taken any steroids the past 6 months  History provided by:  Patient   used: No    Rash   Location:  Full body  Quality: itchiness, redness and swelling    Severity:  Moderate  Onset quality:  Gradual  Duration:  1 week  Timing:  Constant  Progression:  Worsening  Chronicity:  New  Relieved by:  Nothing  Worsened by:  Nothing  Ineffective treatments:  Antihistamines  Associated symptoms: wheezing    Associated symptoms: no diarrhea, no fever, no joint pain, no nausea, no shortness of breath and not vomiting        Prior to Admission Medications   Prescriptions Last Dose Informant Patient Reported? Taking? ALPRAZolam (XANAX) 0 25 mg tablet   Yes No   Sig: Take 0 25 mg by mouth 3 (three) times a day as needed for anxiety  Cholecalciferol (VITAMIN D) 2000 units CAPS   Yes No   Sig: Take 2,000 Units by mouth   LACTOBACILLUS ACID-PECTIN PO   Yes No   Sig: Take by mouth   OXYGEN-HELIUM IN   Yes No   Sig: Inhale 2 L at hs via N/C   Omega-3 Fatty Acids (FISH OIL) 1,000 mg   Yes No   Sig: Take 1,000 mg by mouth   Red Yeast Rice Extract (RED YEAST RICE PO)   Yes No   Sig: Take by mouth every morning   albuterol (PROVENTIL HFA,VENTOLIN HFA) 90 mcg/act inhaler   Yes No   Sig: Inhale 2 puffs every 6 (six) hours as needed for wheezing     budesonide (PULMICORT) 0 25 mg/2 mL nebulizer solution   Yes No   Sig: Take 0 25 mg by nebulization daily   cefuroxime (CEFTIN) 500 mg tablet   Yes No   Sig: Take 500 mg by mouth 2 (two) times a day   cetirizine (ZyrTEC) 10 mg tablet   Yes No   Sig: Take 10 mg by mouth every morning     montelukast (SINGULAIR) 10 mg tablet   Yes No   Sig: Take 10 mg by mouth daily at bedtime  ranitidine (ZANTAC) 150 MG capsule   No No   Sig: Take 1 capsule (150 mg total) by mouth every evening   sodium chloride 0 9 % irrigation   Yes No   Sig: IRRIGATE EACH NARES WITH 4 OUNCES ONCE DAILY      Facility-Administered Medications: None       Past Medical History:   Diagnosis Date    Anesthesia complication     desaturation of oxygen mostly at night- on O2 at 2L at hs-may wake up with asthma issue    Anxiety     Asthma     Argueta esophagus     DVT (deep venous thrombosis) (HCC)     during pregnancy    Hiatal hernia     Hypercholesteremia     Nasal polyps     Sinus disorder     Sinusitis, chronic        Past Surgical History:   Procedure Laterality Date     SECTION N/A     x 2    COLONOSCOPY      ESOPHAGOGASTRODUODENOSCOPY      HYSTERECTOMY      complete    NASAL POLYP SURGERY      x2    NASAL POLYP SURGERY  2018    SD COLSC FLX W/RMVL OF TUMOR POLYP LESION SNARE TQ N/A 2017    Procedure: COLONOSCOPYwith  snare polypectomy ;  Surgeon: Ling Carrillo MD;  Location: [facility] GI [facility]; Service: Gastroenterology    SD EGD TRANSORAL BIOPSY SINGLE/MULTIPLE N/A 2017    Procedure: ESOPHAGOGASTRODUODENOSCOPY (EGD)and biopsy ;  Surgeon: Ling Carrillo MD;  Location: [facility]; Service: Gastroenterology    TONSILLECTOMY N/A     VEIN LIGATION AND STRIPPING Bilateral        Family History   Problem Relation Age of Onset    Heart disease Mother      CHF    Dysphagia Father      I have reviewed and agree with the history as documented      Social History   Substance Use Topics    Smoking status: Never Smoker    Smokeless tobacco: Never Used  Alcohol use Yes      Comment: occassionally        Review of Systems   Constitutional: Negative for chills, diaphoresis and fever  Respiratory: Positive for wheezing  Negative for shortness of breath  Cardiovascular: Negative for chest pain and palpitations  Gastrointestinal: Negative for diarrhea, nausea and vomiting  Genitourinary: Negative for dysuria and frequency  Musculoskeletal: Negative for arthralgias  Skin: Positive for rash  All other systems reviewed and are negative  Physical Exam  ED Triage Vitals [05/01/18 1855]   Temperature Pulse Respirations Blood Pressure SpO2   97 9 °F (36 6 °C) 92 18 131/60 98 %      Temp Source Heart Rate Source Patient Position - Orthostatic VS BP Location FiO2 (%)   Oral Monitor Lying Right arm --      Pain Score       --           Orthostatic Vital Signs  Vitals:    05/01/18 1855 05/01/18 2030   BP: 131/60 120/58   Pulse: 92 96   Patient Position - Orthostatic VS: Lying Sitting       Physical Exam   Constitutional: She is oriented to person, place, and time  She appears well-developed and well-nourished  She appears distressed  HENT:   Head: Normocephalic and atraumatic  Eyes: EOM are normal  Pupils are equal, round, and reactive to light  Neck: Normal range of motion  No JVD present  Cardiovascular: Normal rate, regular rhythm, normal heart sounds and intact distal pulses  Exam reveals no gallop and no friction rub  No murmur heard  Pulmonary/Chest: Effort normal and breath sounds normal  No respiratory distress  She has no wheezes  She has no rales  She exhibits no tenderness  Musculoskeletal: Normal range of motion  She exhibits no tenderness  Neurological: She is alert and oriented to person, place, and time  Skin: Skin is warm and dry  Rash (diffuse maculopapular rash, blanchable) noted  Psychiatric: She has a normal mood and affect   Her behavior is normal  Judgment and thought content normal    Nursing note and vitals reviewed  ED Medications  Medications   methylPREDNISolone sodium succinate (Solu-MEDROL) injection 60 mg (not administered)   EPINEPHrine PF (ADRENALIN) 1 mg/mL injection 0 3 mg (not administered)   EPINEPHrine PF (ADRENALIN) 1 mg/mL injection 0 3 mg (0 3 mg Intramuscular Given 5/1/18 1932)   diphenhydrAMINE (BENADRYL) injection 50 mg (50 mg Intravenous Given 5/1/18 2010)       Diagnostic Studies  Results Reviewed     None                 No orders to display              Procedures  Procedures       Phone Contacts  ED Phone Contact    ED Course  ED Course as of May 01 2044   Tue May 01, 2018   1955 Patient reports no significant improvement with IM epinephrine (given about 20 minutes ago)  Will give injection of benadryl  2043 Patient still unfortunately did not get significant relief from diphenhydramine  After a long discussion of pros and cons of steroids she decided to move forward with a course of steroids (5 days, first dose in ED) and another injection of epinephrine  [facility]  Number of Diagnoses or Management Options  Full body hives: new and does not require workup  Diagnosis management comments: 61 y o  female presents with chief complaint of diffuse rash  No new exposures  Will offer steroids and recommend topical antihistamines for home  Patient requesting im epinephrine  Risk of Complications, Morbidity, and/or Mortality  Management options: high    Patient Progress  Patient progress: stable    CritCare Time    Disposition  Final diagnoses:   Full body hives     Time reflects when diagnosis was documented in both MDM as applicable and the Disposition within this note     Time User Action Codes Description Comment    5/1/2018  8:41 PM Amadou Rogerschestushar MARTINES Add [L50 9] Full body hives       ED Disposition     ED Disposition Condition Comment    Discharge  Sharla Broaden discharge to home/self care      Condition at discharge: Good        Follow-up Information Follow up With Specialties Details Why Contact Info    Abigail Flores DO Family Medicine Schedule an appointment as soon as possible for a visit As needed [address]  [address] [place] [address]  [phone]          Patient's Medications   Discharge Prescriptions    DIPHENHYDRAMINE (BENADRYL) 25 MG CAPSULE    Take 1 capsule (25 mg total) by mouth every 6 (six) hours as needed for itching       Start Date: 5/1/2018  End Date: --       Order Dose: 25 mg       Quantity: 20 capsule    Refills: 0    PREDNISONE 20 MG TABLET    Take 2 tablets (40 mg total) by mouth daily for 4 days       Start Date: 5/1/2018  End Date: 5/5/2018       Order Dose: 40 mg       Quantity: 8 tablet    Refills: 0     No discharge procedures on file      ED Provider  Electronically Signed by           Aggie Trujillo MD  05/01/18 Colin Roth MD  05/01/18 0549

## 2018-05-02 NOTE — DISCHARGE INSTRUCTIONS
Urticaria   WHAT YOU NEED TO KNOW:   Urticaria is also called hives  Hives can change size and shape, and appear anywhere on your skin  They can be mild or severe and last from a few minutes to a few days  Hives may be a sign of a severe allergic reaction called anaphylaxis that needs immediate treatment  Urticaria that lasts longer than 6 weeks may be a chronic condition that needs long-term treatment  DISCHARGE INSTRUCTIONS:   Call 911 for signs or symptoms of anaphylaxis,  such as trouble breathing, swelling in your mouth or throat, or wheezing  You may also have itching, a rash, or feel like you are going to faint  Return to the emergency department if:   · Your heart is beating faster than it normally does  · You have cramping or severe pain in your abdomen  Contact your healthcare provider if:   · You have a fever  · Your skin still itches 24 hours after you take your medicine  · You still have hives after 7 days  · Your joints are painful and swollen  · You have questions or concerns about your condition or care  Medicines:   · Epinephrine  is used to treat severe allergic reactions such as anaphylaxis  · Antihistamines  decrease mild symptoms such as itching or a rash  · Steroids  decrease redness, pain, and swelling  · Take your medicine as directed  Contact your healthcare provider if you think your medicine is not helping or if you have side effects  Tell him of her if you are allergic to any medicine  Keep a list of the medicines, vitamins, and herbs you take  Include the amounts, and when and why you take them  Bring the list or the pill bottles to follow-up visits  Carry your medicine list with you in case of an emergency  Steps to take for signs or symptoms of anaphylaxis:   · Immediately  give 1 shot of epinephrine only into the outer thigh muscle  · Leave the shot in place  as directed   Your healthcare provider may recommend you leave it in place for up to 10 seconds before you remove it  This helps make sure all of the epinephrine is delivered  · Call 911 and go to the emergency department,  even if the shot improved symptoms  Do not drive yourself  Bring the used epinephrine shot with you  Safety precautions to take if you are at risk for anaphylaxis:   · Keep 2 shots of epinephrine with you at all times  You may need a second shot, because epinephrine only works for about 20 minutes and symptoms may return  Your healthcare provider can show you and family members how to give the shot  Check the expiration date every month and replace it before it expires  · Create an action plan  Your healthcare provider can help you create a written plan that explains the allergy and an emergency plan to treat a reaction  The plan explains when to give a second epinephrine shot if symptoms return or do not improve after the first  Give copies of the action plan and emergency instructions to family members, work and school staff, and  providers  Show them how to give a shot of epinephrine  · Be careful when you exercise  If you have had exercise-induced anaphylaxis, do not exercise right after you eat  Stop exercising right away if you start to develop any signs or symptoms of anaphylaxis  You may first feel tired, warm, or have itchy skin  Hives, swelling, and severe breathing problems may develop if you continue to exercise  · Carry medical alert identification  Wear medical alert jewelry or carry a card that explains the allergy  Ask your healthcare provider where to get these items  · Keep a record of triggers and symptoms  Record everything you eat, drink, or apply to your skin for 3 weeks  Include stressful events and what you were doing right before your hives started  Bring the record with you to follow-up visits with your healthcare provider  Manage urticaria:   · Cool your skin  This may help decrease itching  Apply a cool pack to your hives  Dip a hand towel in cool water, wring it out, and place it on your hives  You may also soak your skin in a cool oatmeal bath  · Do not rub your hives  This can irritate your skin and cause more hives  · Wear loose clothing  Tight clothes may irritate your skin and cause more hives  · Manage stress  Stress may trigger hives, or make them worse  Learn new ways to relax, such as deep breathing  Follow up with your healthcare provider as directed:  Write down your questions so you remember to ask them during your visits  © 2017 [employer] [employer] Information is for End User's use only and may not be sold, redistributed or otherwise used for commercial purposes  All illustrations and images included in CareNotes® are the copyrighted property of [employer]  or [employer]ad Dor[employer]  The above information is an  only  It is not intended as medical advice for individual conditions or treatments  Talk to your doctor, nurse or pharmacist before following any medical regimen to see if it is safe and effective for you

## 2018-05-09 ENCOUNTER — APPOINTMENT (EMERGENCY)
Dept: CT IMAGING | Facility: HOSPITAL | Age: 63
End: 2018-05-09
Payer: COMMERCIAL

## 2018-05-09 ENCOUNTER — HOSPITAL ENCOUNTER (EMERGENCY)
Facility: HOSPITAL | Age: 63
Discharge: HOME | End: 2018-05-09
Attending: EMERGENCY MEDICINE
Payer: COMMERCIAL

## 2018-05-09 VITALS
DIASTOLIC BLOOD PRESSURE: 58 MMHG | OXYGEN SATURATION: 93 % | SYSTOLIC BLOOD PRESSURE: 132 MMHG | TEMPERATURE: 97.6 F | RESPIRATION RATE: 18 BRPM | HEART RATE: 90 BPM

## 2018-05-09 DIAGNOSIS — S29.012A STRAIN OF MUSCLE AND TENDON OF BACK WALL OF THORAX, INITIAL ENCOUNTER: ICD-10-CM

## 2018-05-09 DIAGNOSIS — K21.9 GERD (GASTROESOPHAGEAL REFLUX DISEASE): Primary | ICD-10-CM

## 2018-05-09 LAB
ALBUMIN SERPL BCP-MCNC: 3.8 G/DL (ref 3.5–5)
ALP SERPL-CCNC: 83 U/L (ref 46–116)
ALT SERPL W P-5'-P-CCNC: 30 U/L (ref 12–78)
ANION GAP SERPL CALCULATED.3IONS-SCNC: 10 MMOL/L (ref 4–13)
APTT PPP: 26 SECONDS (ref 23–35)
AST SERPL W P-5'-P-CCNC: 19 U/L (ref 5–45)
BASOPHILS # BLD AUTO: 0.04 THOUSANDS/ΜL (ref 0–0.1)
BASOPHILS NFR BLD AUTO: 1 % (ref 0–1)
BILIRUB SERPL-MCNC: 0.4 MG/DL (ref 0.2–1)
BUN SERPL-MCNC: 20 MG/DL (ref 5–25)
CALCIUM SERPL-MCNC: 9.5 MG/DL (ref 8.3–10.1)
CHLORIDE SERPL-SCNC: 106 MMOL/L (ref 100–108)
CO2 SERPL-SCNC: 26 MMOL/L (ref 21–32)
CREAT SERPL-MCNC: 0.87 MG/DL (ref 0.6–1.3)
EOSINOPHIL # BLD AUTO: 1.41 THOUSAND/ΜL (ref 0–0.61)
EOSINOPHIL NFR BLD AUTO: 17 % (ref 0–6)
ERYTHROCYTE [DISTWIDTH] IN BLOOD BY AUTOMATED COUNT: 11.9 % (ref 11.6–15.1)
GFR SERPL CREATININE-BSD FRML MDRD: 71 ML/MIN/1.73SQ M
GLUCOSE SERPL-MCNC: 97 MG/DL (ref 65–140)
HCT VFR BLD AUTO: 39.3 % (ref 34.8–46.1)
HGB BLD-MCNC: 13 G/DL (ref 11.5–15.4)
INR PPP: 0.86 (ref 0.86–1.16)
LIPASE SERPL-CCNC: 99 U/L (ref 73–393)
LYMPHOCYTES # BLD AUTO: 2.78 THOUSANDS/ΜL (ref 0.6–4.47)
LYMPHOCYTES NFR BLD AUTO: 33 % (ref 14–44)
MCH RBC QN AUTO: 31.5 PG (ref 26.8–34.3)
MCHC RBC AUTO-ENTMCNC: 33.1 G/DL (ref 31.4–37.4)
MCV RBC AUTO: 95 FL (ref 82–98)
MONOCYTES # BLD AUTO: 0.6 THOUSAND/ΜL (ref 0.17–1.22)
MONOCYTES NFR BLD AUTO: 7 % (ref 4–12)
NEUTROPHILS # BLD AUTO: 3.68 THOUSANDS/ΜL (ref 1.85–7.62)
NEUTS SEG NFR BLD AUTO: 42 % (ref 43–75)
PLATELET # BLD AUTO: 249 THOUSANDS/UL (ref 149–390)
PMV BLD AUTO: 9.4 FL (ref 8.9–12.7)
POTASSIUM SERPL-SCNC: 3.5 MMOL/L (ref 3.5–5.3)
PROT SERPL-MCNC: 6.9 G/DL (ref 6.4–8.2)
PROTHROMBIN TIME: 12 SECONDS (ref 12.1–14.4)
RBC # BLD AUTO: 4.13 MILLION/UL (ref 3.81–5.12)
SODIUM SERPL-SCNC: 142 MMOL/L (ref 136–145)
TROPONIN I SERPL-MCNC: <0.02 NG/ML
WBC # BLD AUTO: 8.51 THOUSAND/UL (ref 4.31–10.16)

## 2018-05-09 PROCEDURE — 85610 PROTHROMBIN TIME: CPT | Performed by: EMERGENCY MEDICINE

## 2018-05-09 PROCEDURE — 85025 COMPLETE CBC W/AUTO DIFF WBC: CPT | Performed by: EMERGENCY MEDICINE

## 2018-05-09 PROCEDURE — 93005 ELECTROCARDIOGRAM TRACING: CPT

## 2018-05-09 PROCEDURE — 83690 ASSAY OF LIPASE: CPT | Performed by: EMERGENCY MEDICINE

## 2018-05-09 PROCEDURE — 99285 EMERGENCY DEPT VISIT HI MDM: CPT

## 2018-05-09 PROCEDURE — 80053 COMPREHEN METABOLIC PANEL: CPT | Performed by: EMERGENCY MEDICINE

## 2018-05-09 PROCEDURE — 84484 ASSAY OF TROPONIN QUANT: CPT | Performed by: EMERGENCY MEDICINE

## 2018-05-09 PROCEDURE — 36415 COLL VENOUS BLD VENIPUNCTURE: CPT | Performed by: EMERGENCY MEDICINE

## 2018-05-09 PROCEDURE — 96374 THER/PROPH/DIAG INJ IV PUSH: CPT

## 2018-05-09 PROCEDURE — 74177 CT ABD & PELVIS W/CONTRAST: CPT

## 2018-05-09 PROCEDURE — 85730 THROMBOPLASTIN TIME PARTIAL: CPT | Performed by: EMERGENCY MEDICINE

## 2018-05-09 PROCEDURE — 96361 HYDRATE IV INFUSION ADD-ON: CPT

## 2018-05-09 RX ORDER — HYDROCODONE BITARTRATE AND ACETAMINOPHEN 5; 325 MG/1; MG/1
1 TABLET ORAL EVERY 6 HOURS PRN
Qty: 12 TABLET | Refills: 0 | Status: SHIPPED | OUTPATIENT
Start: 2018-05-09 | End: 2018-05-19

## 2018-05-09 RX ORDER — MAGNESIUM HYDROXIDE/ALUMINUM HYDROXICE/SIMETHICONE 120; 1200; 1200 MG/30ML; MG/30ML; MG/30ML
20 SUSPENSION ORAL ONCE
Status: COMPLETED | OUTPATIENT
Start: 2018-05-09 | End: 2018-05-09

## 2018-05-09 RX ADMIN — ALUMINUM HYDROXIDE, MAGNESIUM HYDROXIDE, AND SIMETHICONE 20 ML: 200; 200; 20 SUSPENSION ORAL at 20:20

## 2018-05-09 RX ADMIN — LIDOCAINE HYDROCHLORIDE 15 ML: 20 SOLUTION ORAL; TOPICAL at 20:20

## 2018-05-09 RX ADMIN — HYDROMORPHONE HYDROCHLORIDE 0.5 MG: 1 INJECTION, SOLUTION INTRAMUSCULAR; INTRAVENOUS; SUBCUTANEOUS at 18:52

## 2018-05-09 RX ADMIN — IOHEXOL 100 ML: 350 INJECTION, SOLUTION INTRAVENOUS at 19:26

## 2018-05-09 RX ADMIN — SODIUM CHLORIDE 1000 ML: 0.9 INJECTION, SOLUTION INTRAVENOUS at 18:52

## 2018-05-09 NOTE — ED PROVIDER NOTES
History  Chief Complaint   Patient presents with    Abdominal Pain     Pt here with abd pain and back pain and rib pain  Pt noted with very dry lips, c/o nausea  Very anxious during triage   Chest Pain       History provided by:  Patient   used: No     77-year-old female presented for evaluation of upper back pain bilaterally beginning last night with some radiation to the epigastrium  States that pain has been somewhat intermittent, worse with movement  No history of similar pain  No nausea, vomiting  No rashes  No recent medication changes  She has a history of hiatal hernia with Argueta's esophagus  Tender in the epigastrium and the right upper quadrant with Dongola sign  No back tenderness  Differential diagnosis includes pancreatitis, cholecystitis, esophagitis  Will check labs, CT, control pain and re-evaluate  Prior to Admission Medications   Prescriptions Last Dose Informant Patient Reported? Taking? ALPRAZolam (XANAX) 0 25 mg tablet   Yes Yes   Sig: Take 0 25 mg by mouth 3 (three) times a day as needed for anxiety  Cholecalciferol (VITAMIN D) 2000 units CAPS   Yes Yes   Sig: Take 2,000 Units by mouth   LACTOBACILLUS ACID-PECTIN PO   Yes Yes   Sig: Take by mouth   OXYGEN-HELIUM IN   Yes Yes   Sig: Inhale 2 L at hs via N/C   Omega-3 Fatty Acids (FISH OIL) 1,000 mg   Yes Yes   Sig: Take 1,000 mg by mouth   Red Yeast Rice Extract (RED YEAST RICE PO)   Yes Yes   Sig: Take by mouth every morning   albuterol (PROVENTIL HFA,VENTOLIN HFA) 90 mcg/act inhaler   Yes Yes   Sig: Inhale 2 puffs every 6 (six) hours as needed for wheezing     budesonide (PULMICORT) 0 25 mg/2 mL nebulizer solution   Yes Yes   Sig: Take 0 25 mg by nebulization daily   cefuroxime (CEFTIN) 500 mg tablet   Yes Yes   Sig: Take 500 mg by mouth 2 (two) times a day   cetirizine (ZyrTEC) 10 mg tablet   Yes Yes   Sig: Take 10 mg by mouth every morning     diphenhydrAMINE (BENADRYL) 25 mg capsule   No Yes Sig: Take 1 capsule (25 mg total) by mouth every 6 (six) hours as needed for itching   montelukast (SINGULAIR) 10 mg tablet   Yes Yes   Sig: Take 10 mg by mouth daily at bedtime  ranitidine (ZANTAC) 150 MG capsule   No Yes   Sig: Take 1 capsule (150 mg total) by mouth every evening   sodium chloride 0 9 % irrigation   Yes Yes   Sig: IRRIGATE EACH NARES WITH 4 OUNCES ONCE DAILY      Facility-Administered Medications: None       Past Medical History:   Diagnosis Date    Anesthesia complication     desaturation of oxygen mostly at night- on O2 at 2L at hs-may wake up with asthma issue    Anxiety     Asthma     Argueta esophagus     DVT (deep venous thrombosis) (HCC)     during pregnancy    Hiatal hernia     Hypercholesteremia     Nasal polyps     Sinus disorder     Sinusitis, chronic        Past Surgical History:   Procedure Laterality Date     SECTION N/A     x 2    COLONOSCOPY      ESOPHAGOGASTRODUODENOSCOPY      HYSTERECTOMY      complete    NASAL POLYP SURGERY      x2    NASAL POLYP SURGERY  2018    IA COLSC FLX W/RMVL OF TUMOR POLYP LESION SNARE TQ N/A 2017    Procedure: COLONOSCOPYwith  snare polypectomy ;  Surgeon: Anupam Narayan MD;  Location: [facility] GI LAB; Service: Gastroenterology    IA EGD TRANSORAL BIOPSY SINGLE/MULTIPLE N/A 2017    Procedure: ESOPHAGOGASTRODUODENOSCOPY (EGD)and biopsy ;  Surgeon: Anupam Narayan MD;  Location: [facility] GI LAB; Service: Gastroenterology    TONSILLECTOMY N/A     VEIN LIGATION AND STRIPPING Bilateral        Family History   Problem Relation Age of Onset    Heart disease Mother      CHF    Dysphagia Father      I have reviewed and agree with the history as documented      Social History   Substance Use Topics    Smoking status: Never Smoker    Smokeless tobacco: Never Used    Alcohol use Yes      Comment: occassionally        Review of Systems   Constitutional: Negative for activity change, appetite change, fatigue and fever    Respiratory: Negative for chest tightness and shortness of breath  Cardiovascular: Negative for chest pain  Gastrointestinal: Positive for abdominal pain  Negative for nausea and vomiting  Musculoskeletal: Positive for back pain  Skin: Negative for color change and rash  Neurological: Negative for dizziness and weakness  All other systems reviewed and are negative  Physical Exam  ED Triage Vitals [05/09/18 1713]   Temperature Pulse Respirations Blood Pressure SpO2   97 6 °F (36 4 °C) 96 18 110/64 98 %      Temp Source Heart Rate Source Patient Position - Orthostatic VS BP Location FiO2 (%)   Oral Monitor Sitting Left arm --      Pain Score       7           Orthostatic Vital Signs  Vitals:    05/09/18 1713 05/09/18 1841   BP: 110/64 132/58   Pulse: 96 82   Patient Position - Orthostatic VS: Sitting Lying       Physical Exam   Constitutional: She is oriented to person, place, and time  She appears well-developed and well-nourished  No distress  HENT:   Head: Normocephalic and atraumatic  Oropharynx somewhat dry  Neck: Normal range of motion  Neck supple  Cardiovascular: Normal rate, regular rhythm and intact distal pulses  Pulmonary/Chest: Effort normal and breath sounds normal    Abdominal: Soft  She exhibits no distension  Tenderness in the epigastrium and in the right upper quadrant with a Lopeno sign  Musculoskeletal: Normal range of motion  She exhibits no edema or tenderness  No spine or rib tenderness  Neurological: She is alert and oriented to person, place, and time  Skin: Skin is warm and dry  Psychiatric: She has a normal mood and affect  Her behavior is normal    Nursing note and vitals reviewed        ED Medications  Medications   aluminum-magnesium hydroxide-simethicone (MYLANTA) 200-200-20 mg/5 mL oral suspension 20 mL (not administered)   lidocaine viscous (XYLOCAINE) 2 % mucosal solution 15 mL (not administered)   HYDROmorphone (DILAUDID) injection 0 5 mg (0 5 mg Intravenous Given 5/9/18 1852)   sodium chloride 0 9 % bolus 1,000 mL (1,000 mL Intravenous New Bag 5/9/18 1852)   iohexol (OMNIPAQUE) 350 MG/ML injection (MULTI-DOSE) 100 mL (100 mL Intravenous Given 5/9/18 1926)       Diagnostic Studies  Results Reviewed     Procedure Component Value Units Date/Time    Lipase [[id number]]  (Normal) Collected:  05/09/18 1825    Lab Status:  Final result Specimen:  Blood from Arm, Right Updated:  05/09/18 1915     Lipase 99 u/L     Comprehensive metabolic panel [[id number]] Collected:  05/09/18 1825    Lab Status:  Final result Specimen:  Blood from Arm, Right Updated:  05/09/18 1915     Sodium 142 mmol/L      Potassium 3 5 mmol/L      Chloride 106 mmol/L      CO2 26 mmol/L      Anion Gap 10 mmol/L      BUN 20 mg/dL      Creatinine 0 87 mg/dL      Glucose 97 mg/dL      Calcium 9 5 mg/dL      AST 19 U/L      ALT 30 U/L      Alkaline Phosphatase 83 U/L      Total Protein 6 9 g/dL      Albumin 3 8 g/dL      Total Bilirubin 0 40 mg/dL      eGFR 71 ml/min/1 73sq m     Narrative:         National Kidney Disease Education Program recommendations are as follows:  GFR calculation is accurate only with a steady state creatinine  Chronic Kidney disease less than 60 ml/min/1 73 sq  meters  Kidney failure less than 15 ml/min/1 73 sq  meters  Troponin I [[id number]]  (Normal) Collected:  05/09/18 1825    Lab Status:  Final result Specimen:  Blood from Arm, Right Updated:  05/09/18 1914     Troponin I <0 02 ng/mL     Narrative:         [employer] Chemistry analyzer 99% cutoff is > 0 04 ng/mL in network labs    o cTnI 99% cutoff is useful only when applied to patients in the clinical setting of myocardial ischemia  o cTnI 99% cutoff should be interpreted in the context of clinical history, ECG findings and possibly cardiac imaging to establish correct diagnosis    o cTnI 99% cutoff may be suggestive but clearly not indicative of a coronary event without the clinical setting of myocardial ischemia  Protime-INR [[id number]]  (Abnormal) Collected:  05/09/18 1825    Lab Status:  Final result Specimen:  Blood from Arm, Right Updated:  05/09/18 1910     Protime 12 0 (L) seconds      INR 0 86    APTT [[id number]]  (Normal) Collected:  05/09/18 1825    Lab Status:  Final result Specimen:  Blood from Arm, Right Updated:  05/09/18 1910     PTT 26 seconds     CBC and differential [[id number]]  (Abnormal) Collected:  05/09/18 1825    Lab Status:  Final result Specimen:  Blood from Arm, Right Updated:  05/09/18 1841     WBC 8 51 Thousand/uL      RBC 4 13 Million/uL      Hemoglobin 13 0 g/dL      Hematocrit 39 3 %      MCV 95 fL      MCH 31 5 pg      MCHC 33 1 g/dL      RDW 11 9 %      MPV 9 4 fL      Platelets 109 Thousands/uL      Neutrophils Relative 42 (L) %      Lymphocytes Relative 33 %      Monocytes Relative 7 %      Eosinophils Relative 17 (H) %      Basophils Relative 1 %      Neutrophils Absolute 3 68 Thousands/µL      Lymphocytes Absolute 2 78 Thousands/µL      Monocytes Absolute 0 60 Thousand/µL      Eosinophils Absolute 1 41 (H) Thousand/µL      Basophils Absolute 0 04 Thousands/µL                  CT abdomen pelvis with contrast   Final Result by Nisa Arriaza MD (05/09 1944)      No acute intra-abdominal abnormality  No free air or free fluid              Workstation performed: [id number]                    Procedures  ECG 12 Lead Documentation  Date/Time: 5/9/2018 6:12 PM  Performed by: Jackqulyn Fothergill  Authorized by: Jackqulyn Fothergill     Indications / Diagnosis:  Epigastric pain  ECG reviewed by me, the ED Provider: yes    Patient location:  ED  Previous ECG:     Previous ECG:  Compared to current    Comparison ECG info:  1/22/18    Similarity:  No change  Rate:     ECG rate:  83  Rhythm:     Rhythm: sinus rhythm    Ectopy:     Ectopy: none    QRS:     QRS axis:  Normal  Conduction:     Conduction: normal    ST segments:     ST segments:  Normal  T waves:     T waves: normal Phone Contacts  ED Phone Contact    ED Course                               MDM  Number of Diagnoses or Management Options  GERD (gastroesophageal reflux disease):   Strain of muscle and tendon of back wall of thorax, initial encounter:   Diagnosis management comments: 60-year-old female presented with bilateral upper back pain beginning yesterday with some radiation to the epigastrium today  She had tenderness in the epigastrium and right upper quadrant on exam   Her CT and lab work was unremarkable  No ACS, pancreatitis, biliary disease  Differential diagnosis remains GERD with history of hiatal hernia, muscular strain in the back  Patient stable at this time  Will plan discharge and have her return if symptoms worsen  Amount and/or Complexity of Data Reviewed  Clinical lab tests: ordered and reviewed  Tests in the radiology section of CPT®: ordered and reviewed    Patient Progress  Patient progress: improved    CritCare Time    Disposition  Final diagnoses:   GERD (gastroesophageal reflux disease)   Strain of muscle and tendon of back wall of thorax, initial encounter     Time reflects when diagnosis was documented in both MDM as applicable and the Disposition within this note     Time User Action Codes Description Comment    5/9/2018  8:02 PM Jie DODD Add [K21 9] GERD (gastroesophageal reflux disease)     5/9/2018  8:03 PM Brayan [address]  [Z63 [id number]] Strain of muscle and tendon of back wall of thorax, initial encounter       ED Disposition     ED Disposition Condition Comment    Discharge  Tito Ahumada discharge to home/self care      Condition at discharge: Good        Follow-up Information     Follow up With Specialties Details Why Contact Info Additional [address], [address]  [address]  [address] [place] [address] Emergency Department Emergency Medicine  If symptoms worsen [address] [facility] [address] [place] [address]  [phone]  ED, [address], [place], [place], [address]        Patient's Medications   Discharge Prescriptions    HYDROCODONE-ACETAMINOPHEN (NORCO) 5-325 MG PER TABLET    Take 1 tablet by mouth every 6 (six) hours as needed for pain for up to 10 days Max Daily Amount: 4 tablets       Start Date: 5/9/2018  End Date: 5/19/2018       Order Dose: 1 tablet       Quantity: 12 tablet    Refills: 0     No discharge procedures on file      ED Provider  Electronically Signed by           My Ferro MD  05/09/18 2005

## 2018-05-10 LAB
ATRIAL RATE: 83 BPM
P AXIS: 73 DEGREES
PR INTERVAL: 156 MS
QRS AXIS: 26 DEGREES
QRSD INTERVAL: 82 MS
QT INTERVAL: 360 MS
QTC INTERVAL: 423 MS
T WAVE AXIS: 82 DEGREES
VENTRICULAR RATE: 83 BPM

## 2018-05-10 PROCEDURE — 93010 ELECTROCARDIOGRAM REPORT: CPT | Performed by: INTERNAL MEDICINE

## 2018-05-10 NOTE — DISCHARGE INSTRUCTIONS
Gastroesophageal Reflux Disease   WHAT YOU NEED TO KNOW:   Gastroesophageal reflux occurs when acid and food in the stomach back up into the esophagus  Gastroesophageal reflux disease (GERD) is reflux that occurs more than twice a week for a few weeks  It usually causes heartburn and other symptoms  GERD can cause other health problems over time if it is not treated  DISCHARGE INSTRUCTIONS:   Return to the emergency department if:   · You feel full and cannot burp or vomit  · You have severe chest pain and sudden trouble breathing  · Your bowel movements are black, bloody, or tarry-looking  · Your vomit looks like coffee grounds or has blood in it  Contact your healthcare provider if:   · You vomit large amounts, or you vomit often  · You have trouble breathing after you vomit  · You have trouble swallowing, or pain with swallowing  · You are losing weight without trying  · Your symptoms get worse or do not improve with treatment  · You have questions or concerns about your condition or care  Medicines:   · Medicines  are used to decrease stomach acid  Medicine may also be used to help your lower esophageal sphincter and stomach contract (tighten) more  · Take your medicine as directed  Contact your healthcare provider if you think your medicine is not helping or if you have side effects  Tell him of her if you are allergic to any medicine  Keep a list of the medicines, vitamins, and herbs you take  Include the amounts, and when and why you take them  Bring the list or the pill bottles to follow-up visits  Carry your medicine list with you in case of an emergency  Manage GERD:   · Do not have foods or drinks that may increase heartburn  These include chocolate, peppermint, fried or fatty foods, drinks that contain caffeine, or carbonated drinks (soda)  Other foods include spicy foods, onions, tomatoes, and tomato-based foods   Do not have foods or drinks that can irritate your esophagus, such as citrus fruits, juices, and alcohol  · Do not eat large meals  When you eat a lot of food at one time, your stomach needs more acid to digest it  Eat 6 small meals each day instead of 3 large ones, and eat slowly  Do not eat meals 2 to 3 hours before bedtime  · Elevate the head of your bed  Place 6-inch blocks under the head of your bed frame  You may also use more than one pillow under your head and shoulders while you sleep  · Maintain a healthy weight  If you are overweight, weight loss may help relieve symptoms of GERD  · Do not smoke  Smoking weakens the lower esophageal sphincter and increases the risk of GERD  Ask your healthcare provider for information if you currently smoke and need help to quit  E-cigarettes or smokeless tobacco still contain nicotine  Talk to your healthcare provider before you use these products  · Do not wear clothing that is tight around your waist   Tight clothing can put pressure on your stomach and cause or worsen GERD symptoms  Follow up with your healthcare provider as directed:  Write down your questions so you remember to ask them during your visits  © 2017 [employer] [facility] Information is for End User's use only and may not be sold, redistributed or otherwise used for commercial purposes  All illustrations and images included in CareNotes® are the copyrighted property of A D A M , Inc  or Tad Dorantes  The above information is an  only  It is not intended as medical advice for individual conditions or treatments  Talk to your doctor, nurse or pharmacist before following any medical regimen to see if it is safe and effective for you  Muscle Strain   WHAT YOU NEED TO KNOW:   A muscle strain is a twist, pull, or tear of a muscle or tendon  A tendon is a strong elastic tissue that connects a muscle to a bone   Signs of a strained muscle include bruising and swelling over the area, pain with movement, and loss of strength  DISCHARGE INSTRUCTIONS:   Return to the emergency department if:   · You suddenly cannot feel or move your injured muscle  Contact your healthcare provider if:   · Your pain and swelling worsen or do not go away  · You have questions or concerns about your condition or care  Medicines:   · NSAIDs  help decrease swelling and pain or fever  This medicine is available with or without a doctor's order  NSAIDs can cause stomach bleeding or kidney problems in certain people  If you take blood thinner medicine, always ask your healthcare provider if NSAIDs are safe for you  Always read the medicine label and follow directions  · Muscle relaxers  help decrease pain and muscle spasms  · Take your medicine as directed  Contact your healthcare provider if you think your medicine is not helping or if you have side effects  Tell him of her if you are allergic to any medicine  Keep a list of the medicines, vitamins, and herbs you take  Include the amounts, and when and why you take them  Bring the list or the pill bottles to follow-up visits  Carry your medicine list with you in case of an emergency  Follow up with your healthcare provider as directed: Your healthcare provider may suggest that you have a follow-up visit before you go back to your usual activity  Write down your questions so you remember to ask them during your visits  Self-care:   · 3 to 7 days after the injury:  Use Rest, Ice, Compression, and Elevation (RICE) to help stop bruising and decrease pain and swelling  ¨ Rest:  Rest your muscle to allow your injury to heal  When the pain decreases, begin normal, slow movements  For mild and moderate muscle strains, you should rest your muscles for about 2 days  However, if you have a severe muscle strain, you should rest for 10 to 14 days  You may need to use crutches to walk if your muscle strain is in your legs or lower body       ¨ Ice:  Put an ice pack on the injured area  Put a towel between the ice pack and your skin  Do not put the ice pack directly on your skin  You can use a package of frozen peas instead of an ice pack  ¨ Compression:  You may need to wrap an elastic bandage around the area to decrease swelling  It should be tight enough for you to feel support  Do not wrap it too tightly  ¨ Elevation:  Keep the injured muscle raised above your heart if possible  For example if you have a strain of your lower leg muscle, lie down and prop your leg up on pillows  This helps decrease pain and swelling  · 3 to 21 days after the injury:  Start to slowly and regularly exercise your muscle  This will help it heal  If you feel pain, decrease how hard you are exercising  · 1 to 6 weeks after the injury:  Stretch the injured muscle  Hold the stretch for about 30 seconds  Do this 4 times a day  You may stretch the muscle until you feel a slight pull  Stop stretching if you feel pain  · 2 weeks to 6 months after the injury:  The goal of this phase is to return to the activity you were doing before the injury happened, without hurting the muscle again  · 3 weeks to 6 months after the injury:  Keep stretching and strengthening your muscles to avoid injury  Slowly increase the time and distance that you exercise  You may have signs and symptoms of muscle strain 6 months after the injury, even if you do things to help it heal  In this case, you may need surgery on the muscle  © 2017 [address] [employer] Information is for End User's use only and may not be sold, redistributed or otherwise used for commercial purposes  All illustrations and images included in CareNotes® are the copyrighted property of A D A M , Inc  or Tad Dorantes  The above information is an  only  It is not intended as medical advice for individual conditions or treatments   Talk to your doctor, nurse or pharmacist before following any medical regimen to see if it is safe and effective for you

## 2018-06-18 ENCOUNTER — APPOINTMENT (OUTPATIENT)
Dept: LAB | Facility: CLINIC | Age: 63
End: 2018-06-18
Payer: COMMERCIAL

## 2018-06-18 ENCOUNTER — TRANSCRIBE ORDERS (OUTPATIENT)
Dept: LAB | Facility: CLINIC | Age: 63
End: 2018-06-18

## 2018-06-18 DIAGNOSIS — Z13.9 SCREENING FOR CONDITION: ICD-10-CM

## 2018-06-18 DIAGNOSIS — E55.9 VITAMIN D DEFICIENCY: Primary | ICD-10-CM

## 2018-06-18 LAB
25(OH)D3 SERPL-MCNC: 26.2 NG/ML (ref 30–100)
ALBUMIN SERPL BCP-MCNC: 3.6 G/DL (ref 3.5–5)
ALP SERPL-CCNC: 85 U/L (ref 46–116)
ALT SERPL W P-5'-P-CCNC: 23 U/L (ref 12–78)
ANION GAP SERPL CALCULATED.3IONS-SCNC: 6 MMOL/L (ref 4–13)
AST SERPL W P-5'-P-CCNC: 18 U/L (ref 5–45)
BASOPHILS # BLD AUTO: 0.09 THOUSANDS/ΜL (ref 0–0.1)
BASOPHILS NFR BLD AUTO: 2 % (ref 0–1)
BILIRUB SERPL-MCNC: 0.29 MG/DL (ref 0.2–1)
BUN SERPL-MCNC: 16 MG/DL (ref 5–25)
CALCIUM SERPL-MCNC: 8.5 MG/DL (ref 8.3–10.1)
CHLORIDE SERPL-SCNC: 109 MMOL/L (ref 100–108)
CHOLEST SERPL-MCNC: 158 MG/DL (ref 50–200)
CO2 SERPL-SCNC: 27 MMOL/L (ref 21–32)
CREAT SERPL-MCNC: 0.71 MG/DL (ref 0.6–1.3)
EOSINOPHIL # BLD AUTO: 1.3 THOUSAND/ΜL (ref 0–0.61)
EOSINOPHIL NFR BLD AUTO: 21 % (ref 0–6)
ERYTHROCYTE [DISTWIDTH] IN BLOOD BY AUTOMATED COUNT: 12 % (ref 11.6–15.1)
GFR SERPL CREATININE-BSD FRML MDRD: 91 ML/MIN/1.73SQ M
GLUCOSE P FAST SERPL-MCNC: 104 MG/DL (ref 65–99)
HCT VFR BLD AUTO: 39.5 % (ref 34.8–46.1)
HDLC SERPL-MCNC: 51 MG/DL (ref 40–60)
HGB BLD-MCNC: 12.9 G/DL (ref 11.5–15.4)
IMM GRANULOCYTES # BLD AUTO: 0.01 THOUSAND/UL (ref 0–0.2)
IMM GRANULOCYTES NFR BLD AUTO: 0 % (ref 0–2)
LDLC SERPL CALC-MCNC: 88 MG/DL (ref 0–100)
LYMPHOCYTES # BLD AUTO: 2.16 THOUSANDS/ΜL (ref 0.6–4.47)
LYMPHOCYTES NFR BLD AUTO: 36 % (ref 14–44)
MCH RBC QN AUTO: 31.8 PG (ref 26.8–34.3)
MCHC RBC AUTO-ENTMCNC: 32.7 G/DL (ref 31.4–37.4)
MCV RBC AUTO: 97 FL (ref 82–98)
MONOCYTES # BLD AUTO: 0.5 THOUSAND/ΜL (ref 0.17–1.22)
MONOCYTES NFR BLD AUTO: 8 % (ref 4–12)
NEUTROPHILS # BLD AUTO: 2.05 THOUSANDS/ΜL (ref 1.85–7.62)
NEUTS SEG NFR BLD AUTO: 33 % (ref 43–75)
NONHDLC SERPL-MCNC: 107 MG/DL
NRBC BLD AUTO-RTO: 0 /100 WBCS
PLATELET # BLD AUTO: 231 THOUSANDS/UL (ref 149–390)
PMV BLD AUTO: 10.3 FL (ref 8.9–12.7)
POTASSIUM SERPL-SCNC: 4 MMOL/L (ref 3.5–5.3)
PROT SERPL-MCNC: 6.8 G/DL (ref 6.4–8.2)
RBC # BLD AUTO: 4.06 MILLION/UL (ref 3.81–5.12)
SODIUM SERPL-SCNC: 142 MMOL/L (ref 136–145)
TRIGL SERPL-MCNC: 97 MG/DL
TSH SERPL DL<=0.05 MIU/L-ACNC: 2.23 UIU/ML (ref 0.36–3.74)
WBC # BLD AUTO: 6.11 THOUSAND/UL (ref 4.31–10.16)

## 2018-06-18 PROCEDURE — 80053 COMPREHEN METABOLIC PANEL: CPT

## 2018-06-18 PROCEDURE — 36415 COLL VENOUS BLD VENIPUNCTURE: CPT

## 2018-06-18 PROCEDURE — 85025 COMPLETE CBC W/AUTO DIFF WBC: CPT

## 2018-06-18 PROCEDURE — 84443 ASSAY THYROID STIM HORMONE: CPT

## 2018-06-18 PROCEDURE — 82306 VITAMIN D 25 HYDROXY: CPT

## 2018-06-18 PROCEDURE — 80061 LIPID PANEL: CPT

## 2018-06-26 ENCOUNTER — OFFICE VISIT (OUTPATIENT)
Dept: PULMONOLOGY | Facility: CLINIC | Age: 63
End: 2018-06-26
Payer: COMMERCIAL

## 2018-06-26 VITALS
TEMPERATURE: 97.8 F | OXYGEN SATURATION: 95 % | DIASTOLIC BLOOD PRESSURE: 70 MMHG | HEART RATE: 85 BPM | BODY MASS INDEX: 24.32 KG/M2 | SYSTOLIC BLOOD PRESSURE: 102 MMHG | WEIGHT: 146 LBS | HEIGHT: 65 IN

## 2018-06-26 DIAGNOSIS — R09.02 HYPOXIA: ICD-10-CM

## 2018-06-26 DIAGNOSIS — J20.9 ACUTE BRONCHITIS, UNSPECIFIED ORGANISM: Primary | ICD-10-CM

## 2018-06-26 DIAGNOSIS — K21.9 GASTROESOPHAGEAL REFLUX DISEASE WITHOUT ESOPHAGITIS: ICD-10-CM

## 2018-06-26 DIAGNOSIS — R06.02 SHORTNESS OF BREATH: ICD-10-CM

## 2018-06-26 DIAGNOSIS — J45.41 MODERATE PERSISTENT ASTHMA WITH ACUTE EXACERBATION: ICD-10-CM

## 2018-06-26 DIAGNOSIS — J20.8 ACUTE BRONCHITIS DUE TO OTHER SPECIFIED ORGANISMS: ICD-10-CM

## 2018-06-26 DIAGNOSIS — J45.40 MODERATE PERSISTENT ASTHMA WITHOUT COMPLICATION: ICD-10-CM

## 2018-06-26 PROCEDURE — 99215 OFFICE O/P EST HI 40 MIN: CPT | Performed by: INTERNAL MEDICINE

## 2018-06-26 RX ORDER — ERYTHROMYCIN 5 MG/G
OINTMENT OPHTHALMIC
Refills: 0 | COMMUNITY
Start: 2018-04-25 | End: 2018-08-01 | Stop reason: ALTCHOICE

## 2018-06-26 RX ORDER — HYDROXYZINE HYDROCHLORIDE 25 MG/1
TABLET, FILM COATED ORAL
Refills: 0 | COMMUNITY
Start: 2018-04-25 | End: 2018-06-26 | Stop reason: SDUPTHER

## 2018-06-26 RX ORDER — ALBUTEROL SULFATE 2.5 MG/3ML
2.5 SOLUTION RESPIRATORY (INHALATION) EVERY 6 HOURS PRN
Qty: 120 VIAL | Refills: 5 | Status: SHIPPED | OUTPATIENT
Start: 2018-06-26 | End: 2019-05-09 | Stop reason: ALTCHOICE

## 2018-06-26 RX ORDER — BUDESONIDE AND FORMOTEROL FUMARATE DIHYDRATE 160; 4.5 UG/1; UG/1
2 AEROSOL RESPIRATORY (INHALATION) 2 TIMES DAILY
Qty: 1 INHALER | Refills: 5 | Status: SHIPPED | OUTPATIENT
Start: 2018-06-26 | End: 2019-02-27 | Stop reason: ALTCHOICE

## 2018-06-26 RX ORDER — TRAZODONE HYDROCHLORIDE 100 MG/1
TABLET ORAL
COMMUNITY
Start: 2018-05-07 | End: 2018-08-01 | Stop reason: ALTCHOICE

## 2018-06-26 RX ORDER — FAMCICLOVIR 500 MG/1
TABLET, FILM COATED ORAL
Refills: 0 | COMMUNITY
Start: 2018-06-04 | End: 2019-05-09 | Stop reason: ALTCHOICE

## 2018-06-26 NOTE — ASSESSMENT & PLAN NOTE
Despite having her nasal polyps addressed, she continues to suffer from difficult to control asthma  I am worried that she may have secondary complications related to asthma such as bronchiectasis or even ABPA,  given her recurrent  lower respiratory infections  For further evaluation, I would like to update her PFTs and also obtain high-resolution chest CT to evaluate for underlying bronchiectasis or infiltrates not appreciated on chest x-ray  If there is in fact evidence of underlying bronchiectasis, then further evaluation for cystic fibrosis or other secondary causes of bronchiectasis would be indicated and undertaken  I instructed her to increase Symbicort to 2 puffs twice daily  I will also add a nebulizer to use albuterol at least 3 times per day

## 2018-06-26 NOTE — PROGRESS NOTES
Pulmonary Follow Up Note   Felicitas Mean 61 y o  female MRN: [id number]  6/26/2018      Assessment/Plan: Moderate persistent asthma without complication   Despite having her nasal polyps addressed, she continues to suffer from difficult to control asthma  I am worried that she may have secondary complications related to asthma such as bronchiectasis or even ABPA,  given her recurrent  lower respiratory infections  For further evaluation, I would like to update her PFTs and also obtain high-resolution chest CT to evaluate for underlying bronchiectasis or infiltrates not appreciated on chest x-ray  If there is in fact evidence of underlying bronchiectasis, then further evaluation for cystic fibrosis or other secondary causes of bronchiectasis would be indicated and undertaken  I instructed her to increase Symbicort to 2 puffs twice daily  I will also add a nebulizer to use albuterol at least 3 times per day  Acute bronchitis due to other specified organisms    We will obtain a sputum culture prior to deciding on which, if any antibiotic to prescribe    Gastroesophageal reflux disease without esophagitis   Despite having a diagnosis of reflux, she feels that reflux is not playing a part and does not wish to use Protonix  She does elevate the head of the bed which may be helpful  I encouraged her to go back to GI for ongoing follow-up      Visit orders:    Diagnoses and all orders for this visit:    Acute bronchitis, unspecified organism  -     Sputum culture and Gram stain; Future    Moderate persistent asthma with acute exacerbation  -     budesonide-formoterol (SYMBICORT) 160-4 5 mcg/act inhaler; Inhale 2 puffs 2 (two) times a day Rinse mouth after use  -     CT chest high resolution; Future  -     Complete pulmonary function test; Future  -     Nebulizer  -     Nebulizer Supplies  -     albuterol (2 5 mg/3 mL) 0 083 % nebulizer solution;  Take 1 vial (2 5 mg total) by nebulization every 6 (six) hours as needed for wheezing or shortness of breath    Shortness of breath  -     CT chest high resolution; Future    Hypoxia  -     Oxygen Supplies    Acute bronchitis due to other specified organisms    Gastroesophageal reflux disease without esophagitis    Moderate persistent asthma without complication    Other orders  -     erythromycin (ILOTYCIN) ophthalmic ointment; apply into left eye four times a day  -     famciclovir (FAMVIR) 500 mg tablet; TAKE 3 TABLETS BY MOUTH AS DIRECTED AT ONSET OF VIRAL ULCER  -     Discontinue: hydrOXYzine HCL (ATARAX) 25 mg tablet; take 1 to 2 tablets by mouth four times a day if needed for itching  -     traZODone (DESYREL) 100 mg tablet; No Follow-up on file  History of Present Illness   HPI:  Kenia Victoria is a 61 y o  female who  Is here today for follow-up regarding asthma  She has been struggling with persistent cough, wheeze and sputum which is sticky / green in nature  She is concerned that perhaps she has cystic fibrosis and is asking to be tested  She was seen by an allergist about 2 months ago and was restarted on Symbicort which she has been using 2 puffs in the morning only  She has been suffering from frequent "asthma" attacks, requiring frequent ProAir  She does not have a nebulizer at home  She has supplemental oxygen that she uses on occasion  She says her  Saturations will drop down to 88% when she is most short of breath  She has no fevers or chills  She was seen in the emergency department in May with complaints of abdominal pain and was diagnosed with reflux  She took Protonix first few days afterwards, but has since stopped  She has not been back to GI  She does not believe that reflux is contributing to her symptoms  Review of Systems   Constitutional: Negative for chills, fever and unexpected weight change  HENT: Positive for postnasal drip and sinus pressure  Negative for sore throat  Eyes: Negative for visual disturbance  Respiratory:        As noted in HPI   Cardiovascular: Negative for chest pain  Gastrointestinal: Positive for abdominal pain  Negative for diarrhea and vomiting  "Sticky" stools   Genitourinary: Negative for difficulty urinating  Musculoskeletal: Positive for arthralgias  Skin: Negative for rash  Neurological: Negative for headaches  Hematological: Negative for adenopathy  Psychiatric/Behavioral: Negative  All other systems reviewed and are negative  Historical Information   Past Medical History:   Diagnosis Date    Anesthesia complication     desaturation of oxygen mostly at night- on O2 at 2L at hs-may wake up with asthma issue    Anxiety     Asthma     Argueta esophagus     DVT (deep venous thrombosis) (HCC)     during pregnancy    Hiatal hernia     Hypercholesteremia     Nasal polyps     Sinus disorder     Sinusitis, chronic      Past Surgical History:   Procedure Laterality Date     SECTION N/A     x 2    COLONOSCOPY      ESOPHAGOGASTRODUODENOSCOPY      HYSTERECTOMY      complete    NASAL POLYP SURGERY      x2    NASAL POLYP SURGERY  2018    [place] COLSC FLX W/RMVL OF TUMOR POLYP LESION SNARE TQ N/A 2017    Procedure: COLONOSCOPYwith  snare polypectomy ;  Surgeon: Raissa Cordero MD;  Location: [facility]; Service: Gastroenterology    [place] EGD TRANSORAL BIOPSY SINGLE/MULTIPLE N/A 2017    Procedure: ESOPHAGOGASTRODUODENOSCOPY (EGD)and biopsy ;  Surgeon: Raissa Cordero MD;  Location: [facility];   Service: Gastroenterology    TONSILLECTOMY N/A     VEIN LIGATION AND STRIPPING Bilateral      Family History   Problem Relation Age of Onset    Heart disease Mother         CHF    Dysphagia Father        History   Smoking Status    Never Smoker   Smokeless Tobacco    Never Used         Meds/Allergies     Current Outpatient Prescriptions:     albuterol (PROVENTIL HFA,VENTOLIN HFA) 90 mcg/act inhaler, Inhale 2 puffs every 6 (six) hours as needed for wheezing , Disp: , Rfl:     ALPRAZolam (XANAX) 0 25 mg tablet, Take 0 25 mg by mouth 3 (three) times a day as needed for anxiety  , Disp: , Rfl:     cetirizine (ZyrTEC) 10 mg tablet, Take 10 mg by mouth every morning  , Disp: , Rfl:     Cholecalciferol (VITAMIN D) 2000 units CAPS, Take 2,000 Units by mouth, Disp: , Rfl:     erythromycin (ILOTYCIN) ophthalmic ointment, apply into left eye four times a day, Disp: , Rfl: 0    famciclovir (FAMVIR) 500 mg tablet, TAKE 3 TABLETS BY MOUTH AS DIRECTED AT ONSET OF VIRAL ULCER, Disp: , Rfl: 0    LACTOBACILLUS ACID-PECTIN PO, Take by mouth, Disp: , Rfl:     montelukast (SINGULAIR) 10 mg tablet, Take 10 mg by mouth daily at bedtime  , Disp: , Rfl:     Omega-3 Fatty Acids (FISH OIL) 1,000 mg, Take 1,000 mg by mouth, Disp: , Rfl:     OXYGEN-HELIUM IN, Inhale 2 L at hs via N/C, Disp: , Rfl:     ranitidine (ZANTAC) 150 MG capsule, Take 1 capsule (150 mg total) by mouth every evening, Disp: 30 capsule, Rfl: 11    sodium chloride 0 9 % irrigation, IRRIGATE EACH NARES WITH 4 OUNCES ONCE DAILY, Disp: , Rfl: 1    traZODone (DESYREL) 100 mg tablet, , Disp: , Rfl:     albuterol (2 5 mg/3 mL) 0 083 % nebulizer solution, Take 1 vial (2 5 mg total) by nebulization every 6 (six) hours as needed for wheezing or shortness of breath, Disp: 120 vial, Rfl: 5    budesonide-formoterol (SYMBICORT) 160-4 5 mcg/act inhaler, Inhale 2 puffs 2 (two) times a day Rinse mouth after use , Disp: 1 Inhaler, Rfl: 5  Allergies   Allergen Reactions    Livalo [Pitavastatin] Hives and Shortness Of Breath     Nausea    Aspirin     Dust Mite Extract     Mold Extract [Trichophyton]     Naproxen     Advil [Ibuprofen] Rash     And N/V    Cefditoren Pivoxil Hives     N/V       Vitals: Blood pressure 102/70, pulse 85, temperature 97 8 °F (36 6 °C), temperature source Tympanic, height 5' 5" (1 651 m), weight 66 2 kg (146 lb), SpO2 95 %  Body mass index is 24 3 kg/m²   Oxygen Therapy  SpO2: 95 %  Oxygen Therapy: None (Room air)    Physical Exam   Physical Exam   Constitutional: She is oriented to person, place, and time  No distress  HENT:   Head: Normocephalic  Mouth/Throat: No oropharyngeal exudate  Eyes: Pupils are equal, round, and reactive to light  No scleral icterus  Neck: Neck supple  No JVD present  Cardiovascular: Normal rate and regular rhythm  Pulmonary/Chest: No respiratory distress  She has wheezes ( left base)  She has no rales  Abdominal: Soft  There is no tenderness  Musculoskeletal: She exhibits no edema  Lymphadenopathy:     She has no cervical adenopathy  Neurological: She is alert and oriented to person, place, and time  Skin: Skin is warm and dry  Psychiatric: She has a normal mood and affect  Labs: I have personally reviewed pertinent lab results    Lab Results   Component Value Date    WBC 6 11 06/18/2018    HGB 12 9 06/18/2018    HCT 39 5 06/18/2018    MCV 97 06/18/2018     06/18/2018     Lab Results   Component Value Date    GLUCOSE 97 05/09/2018    CALCIUM 8 5 06/18/2018     06/18/2018    K 4 0 06/18/2018    CO2 27 06/18/2018     (H) 06/18/2018    BUN 16 06/18/2018    CREATININE 0 71 06/18/2018     No results found for: IGE  Lab Results   Component Value Date    ALT 23 06/18/2018    AST 18 06/18/2018    ALKPHOS 85 06/18/2018    BILITOT 0 29 06/18/2018       Imaging and other studies: I have personally reviewed pertinent films in PACS  Chest x-ray from 01/22/2018 shows minimal left basilar atelectasis    Pulmonary function testing:  Performed  05/13/2016   FEV1/FVC ratio  75%   FEV1  93% predicted  FVC  95% predicted   no response to bronchodilators  TLC  99 % predicted  RV  111 % predicted  DLCO corrected for hemoglobin  83 % predicted   methacholine challenge testing was also performed however patient experienced chest tightness at the 1 milligram/mL dose in testing was discontinued

## 2018-06-26 NOTE — ASSESSMENT & PLAN NOTE
Despite having a diagnosis of reflux, she feels that reflux is not playing a part and does not wish to use Protonix  She does elevate the head of the bed which may be helpful    I encouraged her to go back to GI for ongoing follow-up

## 2018-06-29 ENCOUNTER — TELEPHONE (OUTPATIENT)
Dept: PULMONOLOGY | Facility: CLINIC | Age: 63
End: 2018-06-29

## 2018-06-29 NOTE — TELEPHONE ENCOUNTER
[facility] calling asking for chart notes  They said they received a script for a nebulizer yesterday but no notes  Notes faxed

## 2018-07-02 ENCOUNTER — APPOINTMENT (OUTPATIENT)
Dept: LAB | Facility: CLINIC | Age: 63
End: 2018-07-02
Payer: COMMERCIAL

## 2018-07-02 DIAGNOSIS — J20.9 ACUTE BRONCHITIS, UNSPECIFIED ORGANISM: ICD-10-CM

## 2018-07-02 PROCEDURE — 87070 CULTURE OTHR SPECIMN AEROBIC: CPT

## 2018-07-02 PROCEDURE — 87205 SMEAR GRAM STAIN: CPT

## 2018-07-04 LAB
BACTERIA SPT RESP CULT: NORMAL
GRAM STN SPEC: NORMAL

## 2018-07-09 ENCOUNTER — HOSPITAL ENCOUNTER (OUTPATIENT)
Dept: CT IMAGING | Facility: HOSPITAL | Age: 63
Discharge: HOME | End: 2018-07-09
Attending: INTERNAL MEDICINE
Payer: COMMERCIAL

## 2018-07-09 DIAGNOSIS — R06.02 SHORTNESS OF BREATH: ICD-10-CM

## 2018-07-09 DIAGNOSIS — J45.41 MODERATE PERSISTENT ASTHMA WITH ACUTE EXACERBATION: ICD-10-CM

## 2018-07-09 PROCEDURE — 71250 CT THORAX DX C-: CPT

## 2018-07-11 ENCOUNTER — HOSPITAL ENCOUNTER (OUTPATIENT)
Dept: PULMONOLOGY | Facility: HOSPITAL | Age: 63
Discharge: HOME | End: 2018-07-11
Attending: INTERNAL MEDICINE
Payer: COMMERCIAL

## 2018-07-11 DIAGNOSIS — J45.41 MODERATE PERSISTENT ASTHMA WITH ACUTE EXACERBATION: ICD-10-CM

## 2018-07-11 PROCEDURE — 94060 EVALUATION OF WHEEZING: CPT

## 2018-07-11 PROCEDURE — 94729 DIFFUSING CAPACITY: CPT

## 2018-07-11 PROCEDURE — 94726 PLETHYSMOGRAPHY LUNG VOLUMES: CPT

## 2018-07-11 PROCEDURE — 94760 N-INVAS EAR/PLS OXIMETRY 1: CPT

## 2018-07-11 PROCEDURE — 94726 PLETHYSMOGRAPHY LUNG VOLUMES: CPT | Performed by: INTERNAL MEDICINE

## 2018-07-11 PROCEDURE — 94729 DIFFUSING CAPACITY: CPT | Performed by: INTERNAL MEDICINE

## 2018-07-11 PROCEDURE — 94060 EVALUATION OF WHEEZING: CPT | Performed by: INTERNAL MEDICINE

## 2018-07-11 RX ORDER — ALBUTEROL SULFATE 2.5 MG/3ML
2.5 SOLUTION RESPIRATORY (INHALATION) ONCE
Status: COMPLETED | OUTPATIENT
Start: 2018-07-11 | End: 2018-07-11

## 2018-07-11 RX ADMIN — ALBUTEROL SULFATE 2.5 MG: 2.5 SOLUTION RESPIRATORY (INHALATION) at 09:02

## 2018-07-18 ENCOUNTER — TELEPHONE (OUTPATIENT)
Dept: PULMONOLOGY | Facility: CLINIC | Age: 63
End: 2018-07-18

## 2018-07-18 DIAGNOSIS — J45.909 UNCOMPLICATED ASTHMA, UNSPECIFIED ASTHMA SEVERITY, UNSPECIFIED WHETHER PERSISTENT: Primary | ICD-10-CM

## 2018-07-19 RX ORDER — PREDNISONE 20 MG/1
20 TABLET ORAL DAILY
Qty: 5 TABLET | Refills: 0 | Status: SHIPPED | OUTPATIENT
Start: 2018-07-19 | End: 2018-07-24

## 2018-07-19 NOTE — TELEPHONE ENCOUNTER
[facility] and discussed results of HRCT and PFTs  All questions answered  She continues to have occasional wheezing and a cough productive of clear mucous  She is tired and SOB at times  D/W Dr Jaspreet Harrison will start prednisone 20 mg PO daily x 5 days and initiate Spiriva  She is aware to call our office with further questions or concerns      LEIF Manzo

## 2018-08-01 ENCOUNTER — OFFICE VISIT (OUTPATIENT)
Dept: PULMONOLOGY | Facility: CLINIC | Age: 63
End: 2018-08-01
Payer: COMMERCIAL

## 2018-08-01 VITALS
TEMPERATURE: 97.4 F | BODY MASS INDEX: 28.47 KG/M2 | HEART RATE: 83 BPM | DIASTOLIC BLOOD PRESSURE: 60 MMHG | SYSTOLIC BLOOD PRESSURE: 110 MMHG | HEIGHT: 60 IN | WEIGHT: 145 LBS | OXYGEN SATURATION: 94 %

## 2018-08-01 DIAGNOSIS — J45.40 MODERATE PERSISTENT ASTHMA WITHOUT COMPLICATION: Primary | ICD-10-CM

## 2018-08-01 DIAGNOSIS — G47.33 OSA (OBSTRUCTIVE SLEEP APNEA): ICD-10-CM

## 2018-08-01 DIAGNOSIS — R05.9 COUGH: ICD-10-CM

## 2018-08-01 DIAGNOSIS — G47.34 NOCTURNAL HYPOXIA: ICD-10-CM

## 2018-08-01 PROBLEM — J20.8 ACUTE BRONCHITIS DUE TO OTHER SPECIFIED ORGANISMS: Status: RESOLVED | Noted: 2018-06-26 | Resolved: 2018-08-01

## 2018-08-01 PROCEDURE — 99215 OFFICE O/P EST HI 40 MIN: CPT | Performed by: INTERNAL MEDICINE

## 2018-08-01 NOTE — ASSESSMENT & PLAN NOTE
Danilo Solano continues to suffer from difficult to control moderate persistent asthma  She is using rescue therapy 2-3 times per day  Despite having her nasal polyps and postnasal drip addressed, there has been no improvement in her asthma  In reviewing her blood work over the past 6 months, she has persistent eosinophilia and I think she would benefit from either [place] or [place]  I provided her with websites for additional information  To be complete, we will also check an IgE level and [place] [facility]  I asked her to call me after she has reviewed the literature to determine whether she would like to proceed

## 2018-08-01 NOTE — PROGRESS NOTES
Pulmonary Follow Up Note   Johanne Goldberg 61 y o  female MRN: [id number]  8/1/2018      Assessment/Plan: Moderate persistent asthma without complication   Kerri Osei continues to suffer from difficult to control moderate persistent asthma  She is using rescue therapy 2-3 times per day  Despite having her nasal polyps and postnasal drip addressed, there has been no improvement in her asthma  In reviewing her blood work over the past 6 months, she has persistent eosinophilia and I think she would benefit from either [place] or [place]  I provided her with websites for additional information  To be complete, we will also check an IgE level and [place] [facility]  I asked her to call me after she has reviewed the literature to determine whether she would like to proceed  Nocturnal hypoxia   She will continue with oxygen at 2 liters/minute during sleep  She has declined CPAP in past       Visit orders:    Diagnoses and all orders for this visit:    Moderate persistent asthma without complication  -     ipratropium (ATROVENT) 0 02 % nebulizer solution; Take 1 vial (0 5 mg total) by nebulization 3 (three) times a day  -     IgE; Future  -     [facility] Allergy Panel, Adult; Future    Cough    WILBERT (obstructive sleep apnea)    Nocturnal hypoxia        No Follow-up on file  History of Present Illness   HPI:  Johanne Goldberg is a 61 y o  female who is here today for follow-up regarding moderate persistent asthma  She continues to have problems with cough, wheeze and sputum production  She is using Symbicort twice daily  She did not like the way Spiriva made her feel  She finds that she gets the most benefit by using her nebulizer with albuterol  She complains of chest tightness  She has no fevers, chills or sweats  She follows with an allergist   She has tried allergy shots in the past which were ineffective  She has no pets      Review of Systems   Constitutional: Negative for chills, fever and unexpected weight change  HENT: Positive for congestion  Negative for postnasal drip and sore throat  Eyes: Negative for visual disturbance  Respiratory:        As noted in HPI   Cardiovascular: Negative for chest pain  Gastrointestinal: Negative for abdominal pain, diarrhea and vomiting  Genitourinary: Negative for difficulty urinating  Musculoskeletal: Positive for arthralgias  Skin: Negative for rash  Neurological: Negative for headaches  Hematological: Negative for adenopathy  Psychiatric/Behavioral: Negative  All other systems reviewed and are negative  Historical Information   Past Medical History:   Diagnosis Date    Anesthesia complication     desaturation of oxygen mostly at night- on O2 at 2L at hs-may wake up with asthma issue    Anxiety     Asthma     Argueta esophagus     DVT (deep venous thrombosis) (HCC)     during pregnancy    Hiatal hernia     Hypercholesteremia     Nasal polyps     Sinus disorder     Sinusitis, chronic      Past Surgical History:   Procedure Laterality Date     SECTION N/A     x 2    COLONOSCOPY      ESOPHAGOGASTRODUODENOSCOPY      HYSTERECTOMY      complete    NASAL POLYP SURGERY      x2    NASAL POLYP SURGERY  2018    [place] COLSC FLX W/RMVL OF TUMOR POLYP LESION SNARE TQ N/A 2017    Procedure: COLONOSCOPYwith  snare polypectomy ;  Surgeon: Hugo Fragoso MD;  Location: [facility]; Service: Gastroenterology    [place] EGD TRANSORAL BIOPSY SINGLE/MULTIPLE N/A 2017    Procedure: ESOPHAGOGASTRODUODENOSCOPY (EGD)and biopsy ;  Surgeon: Hugo Fragoso MD;  Location: [facility];   Service: Gastroenterology    TONSILLECTOMY N/A     VEIN LIGATION AND STRIPPING Bilateral      Family History   Problem Relation Age of Onset    Heart disease Mother         CHF    Dysphagia Father        History   Smoking Status    Never Smoker   Smokeless Tobacco    Never Used         Meds/Allergies     Current Outpatient Prescriptions:    albuterol (2 5 mg/3 mL) 0 083 % nebulizer solution, Take 1 vial (2 5 mg total) by nebulization every 6 (six) hours as needed for wheezing or shortness of breath, Disp: 120 vial, Rfl: 5    albuterol (PROVENTIL HFA,VENTOLIN HFA) 90 mcg/act inhaler, Inhale 2 puffs every 6 (six) hours as needed for wheezing , Disp: , Rfl:     ALPRAZolam (XANAX) 0 25 mg tablet, Take 0 25 mg by mouth 3 (three) times a day as needed for anxiety  , Disp: , Rfl:     budesonide-formoterol (SYMBICORT) 160-4 5 mcg/act inhaler, Inhale 2 puffs 2 (two) times a day Rinse mouth after use , Disp: 1 Inhaler, Rfl: 5    cetirizine (ZyrTEC) 10 mg tablet, Take 10 mg by mouth every morning  , Disp: , Rfl:     Cholecalciferol (VITAMIN D) 2000 units CAPS, Take 2,000 Units by mouth, Disp: , Rfl:     famciclovir (FAMVIR) 500 mg tablet, TAKE 3 TABLETS BY MOUTH AS DIRECTED AT ONSET OF VIRAL ULCER, Disp: , Rfl: 0    LACTOBACILLUS ACID-PECTIN PO, Take by mouth, Disp: , Rfl:     montelukast (SINGULAIR) 10 mg tablet, Take 10 mg by mouth daily at bedtime  , Disp: , Rfl:     Omega-3 Fatty Acids (FISH OIL) 1,000 mg, Take 1,000 mg by mouth, Disp: , Rfl:     OXYGEN-HELIUM IN, Inhale 2 L at hs via N/C, Disp: , Rfl:     sodium chloride 0 9 % irrigation, IRRIGATE EACH NARES WITH 4 OUNCES ONCE DAILY, Disp: , Rfl: 1    ipratropium (ATROVENT) 0 02 % nebulizer solution, Take 1 vial (0 5 mg total) by nebulization 3 (three) times a day, Disp: 90 vial, Rfl: 5  Allergies   Allergen Reactions    Livalo [Pitavastatin] Hives and Shortness Of Breath     Nausea    Aspirin     Dust Mite Extract     Mold Extract [Trichophyton]     Naproxen     Advil [Ibuprofen] Rash     And N/V    Cefditoren Pivoxil Hives     N/V       Vitals: Blood pressure 110/60, pulse 83, temperature (!) 97 4 °F (36 3 °C), temperature source Oral, height 5' (1 524 m), weight 65 8 kg (145 lb), SpO2 94 %  Body mass index is 28 32 kg/m²   Oxygen Therapy  SpO2: 94 %  Oxygen Therapy: None (Room air)    Physical Exam   Physical Exam   Constitutional: She is oriented to person, place, and time  No distress  HENT:   Head: Normocephalic  Mouth/Throat: No oropharyngeal exudate  Eyes: Pupils are equal, round, and reactive to light  No scleral icterus  Neck: Neck supple  No JVD present  Cardiovascular: Normal rate and regular rhythm  Abdominal: Soft  There is no tenderness  Musculoskeletal: She exhibits no edema  Lymphadenopathy:     She has no cervical adenopathy  Neurological: She is alert and oriented to person, place, and time  Skin: Skin is warm and dry  Psychiatric: She has a normal mood and affect  Labs: I have personally reviewed pertinent lab results  Lab Results   Component Value Date    WBC 6 11 06/18/2018    HGB 12 9 06/18/2018    HCT 39 5 06/18/2018    MCV 97 06/18/2018     06/18/2018     21% eosinophils    Lab Results   Component Value Date    GLUCOSE 97 05/09/2018    CALCIUM 8 5 06/18/2018     06/18/2018    K 4 0 06/18/2018    CO2 27 06/18/2018     (H) 06/18/2018    BUN 16 06/18/2018    CREATININE 0 71 06/18/2018     Lab Results   Component Value Date    ALT 23 06/18/2018    AST 18 06/18/2018    ALKPHOS 85 06/18/2018    BILITOT 0 29 06/18/2018     Sputum cultures from 7/4/18 showed mixed zechariah    Imaging and other studies: I have personally reviewed pertinent films in PACS   High-resolution chest CT from 7/9/18 shows no evidence of interstitial lung disease or bronchiectasis  There is mild bronchial wall thickening consistent with reactive airways disease  Pulmonary function testing:  Performed   7/11/18   FEV1/FVC ratio 62%   FEV1 50% predicted  FVC 61% predicted  There is significant response to bronchodilators (+24%)   post bronchodilator FEV1 is 62% predicted  TLC 92 % predicted   % predicted  DLCO corrected for hemoglobin 43 % predicted  PFTs show moderate obstruction with reduced vital capacity due to air trapping    There is improvement with bronchodilators  Diffusion capacity is reduced

## 2018-08-08 ENCOUNTER — APPOINTMENT (OUTPATIENT)
Dept: LAB | Facility: CLINIC | Age: 63
End: 2018-08-08
Payer: COMMERCIAL

## 2018-08-08 DIAGNOSIS — J45.40 MODERATE PERSISTENT ASTHMA WITHOUT COMPLICATION: ICD-10-CM

## 2018-08-08 PROCEDURE — 86003 ALLG SPEC IGE CRUDE XTRC EA: CPT

## 2018-08-08 PROCEDURE — 82785 ASSAY OF IGE: CPT

## 2018-08-08 PROCEDURE — 36415 COLL VENOUS BLD VENIPUNCTURE: CPT

## 2018-08-09 LAB
A ALTERNATA IGE QN: <0.1 KUA/I
A FUMIGATUS IGE QN: <0.1 KUA/I
ALLERGEN COMMENT: ABNORMAL
BERMUDA GRASS IGE QN: <0.1 KUA/I
BOXELDER IGE QN: <0.1 KUA/I
C HERBARUM IGE QN: <0.1 KUA/I
CAT DANDER IGE QN: <0.1 KUA/I
CMN PIGWEED IGE QN: <0.1 KUA/I
COMMON RAGWEED IGE QN: 0.26 KUA/I
COTTONWOOD IGE QN: <0.1 KUA/I
D FARINAE IGE QN: 0.8 KUA/I
D PTERONYSS IGE QN: 0.6 KUA/I
DOG DANDER IGE QN: <0.1 KUA/I
LONDON PLANE IGE QN: <0.1 KUA/I
MOUSE URINE PROT IGE QN: <0.1 KUA/I
MT JUNIPER IGE QN: <0.1 KUA/I
MUGWORT IGE QN: 0.3 KUA/I
P NOTATUM IGE QN: <0.1 KUA/I
ROACH IGE QN: <0.1 KUA/I
SHEEP SORREL IGE QN: <0.1 KUA/I
SILVER BIRCH IGE QN: <0.1 KUA/I
TIMOTHY IGE QN: <0.1 KUA/I
TOTAL IGE SMQN RAST: 64 KU/L (ref 0–113)
WALNUT IGE QN: <0.1 KUA/I
WHITE ASH IGE QN: <0.1 KUA/I
WHITE ELM IGE QN: <0.1 KUA/I
WHITE MULBERRY IGE QN: <0.1 KUA/I
WHITE OAK IGE QN: <0.1 KUA/I

## 2018-08-30 ENCOUNTER — TELEPHONE (OUTPATIENT)
Dept: PULMONOLOGY | Facility: CLINIC | Age: 63
End: 2018-08-30

## 2018-08-30 DIAGNOSIS — J40 BRONCHITIS: Primary | ICD-10-CM

## 2018-08-30 RX ORDER — AZITHROMYCIN 250 MG/1
TABLET, FILM COATED ORAL
Qty: 6 TABLET | Refills: 0 | Status: SHIPPED | OUTPATIENT
Start: 2018-08-30 | End: 2018-09-03

## 2018-08-30 NOTE — TELEPHONE ENCOUNTER
Called Kerri Osie to discuss symptoms  She reports cough with yellow mucous production  "I always have too much mucous " She does not have fevers or chills  She is seeing an allergist tomorrow and wants to discuss Yohannes Shelton versus Josh Yi at that time; however, I answered many questions about both for her  She will call our office if she decides to start one of these therapies  In regard to her cough, I have prescribed azithromycin to complete a course  She is agreeable and is aware to call our office with further questions or concerns  D/W Dr Diane Turner who is in agreement with LEIF Antunez

## 2018-08-30 NOTE — TELEPHONE ENCOUNTER
Kody Maldonado calling saying she has been using her nebulizer 3 times a day for the past 2 weeks and not getting much relief  She is using her ProAir 3-4 times a week as well  She has chest congestion  She is wheezing  She has SOB all the time  She is tired  She is coughing up "bubbles" and yellow sputum  At the last visit it was introduced for her to try [place] or [place]  She did look them up and has some questions before she decides if she wants to try that or not  She wants to know if she should have more blood work again to check her eosinophils again and if they are always high or do they fluctuate  She wants to know what there injections do to the WBC  She wants to know if she can just stop it if need be  Patient would like call back

## 2018-08-31 ENCOUNTER — TELEPHONE (OUTPATIENT)
Dept: PULMONOLOGY | Facility: CLINIC | Age: 63
End: 2018-08-31

## 2018-08-31 NOTE — TELEPHONE ENCOUNTER
Dr Rashida Turpin would like to enroll patient for [facility]  I called pt to notify her she will be getting a form that requires her signature to start the enrollment process  I asked patient to send back the form to my ATT  Michelle Lino (Dr Jered Aguilera MA) is aware

## 2018-09-14 NOTE — TELEPHONE ENCOUNTER
I lvm to patient asking  if she mailed back the [employer] form to us  I asked patient to call us back and ask for Robbie Davis or myself

## 2018-09-27 NOTE — TELEPHONE ENCOUNTER
I called pt back to inform her we did receive the enrollment form and it was faxed on 9/18  Pt can call the office and ask for Elio Pollock for an update

## 2018-09-27 NOTE — TELEPHONE ENCOUNTER
Patient called stating she had forwarded the enrollment form for the [facility]  She would call back on Tuesday 10/01/18 for an update   FYI

## 2018-09-28 NOTE — TELEPHONE ENCOUNTER
Received letter that a prior auth was needed  I called and got prior auth approval   Awaiting for the approval letter so that I can fax it over and get the process of to the next steps   Pt is aware

## 2018-10-09 DIAGNOSIS — J45.50 SEVERE PERSISTENT ASTHMA, UNSPECIFIED WHETHER COMPLICATED: Primary | ICD-10-CM

## 2018-10-09 RX ORDER — EPINEPHRINE 0.3 MG/.3ML
0.3 INJECTION SUBCUTANEOUS ONCE
Qty: 0.3 ML | Refills: 0 | Status: SHIPPED | OUTPATIENT
Start: 2018-10-09 | End: 2019-07-15 | Stop reason: SDUPTHER

## 2018-10-16 ENCOUNTER — HOSPITAL ENCOUNTER (OUTPATIENT)
Dept: INFUSION CENTER | Facility: CLINIC | Age: 63
Discharge: HOME | End: 2018-10-16
Payer: COMMERCIAL

## 2018-10-16 VITALS
HEART RATE: 76 BPM | TEMPERATURE: 98 F | DIASTOLIC BLOOD PRESSURE: 65 MMHG | SYSTOLIC BLOOD PRESSURE: 134 MMHG | RESPIRATION RATE: 18 BRPM | OXYGEN SATURATION: 98 %

## 2018-10-16 PROCEDURE — 96372 THER/PROPH/DIAG INJ SC/IM: CPT

## 2018-10-16 NOTE — PROGRESS NOTES
Pt to clinic for fasenra injection, epi pen checked expiration date of July 2019, pt tolerated injection without complications and stayed for one hour observation time, pt making next appointment when leaving, declines avs

## 2018-10-30 ENCOUNTER — OFFICE VISIT (OUTPATIENT)
Dept: PULMONOLOGY | Facility: CLINIC | Age: 63
End: 2018-10-30
Payer: COMMERCIAL

## 2018-10-30 ENCOUNTER — DOCUMENTATION (OUTPATIENT)
Dept: PULMONOLOGY | Facility: CLINIC | Age: 63
End: 2018-10-30

## 2018-10-30 VITALS
DIASTOLIC BLOOD PRESSURE: 60 MMHG | SYSTOLIC BLOOD PRESSURE: 100 MMHG | WEIGHT: 145 LBS | HEIGHT: 60 IN | HEART RATE: 84 BPM | OXYGEN SATURATION: 92 % | TEMPERATURE: 97.4 F | BODY MASS INDEX: 28.47 KG/M2

## 2018-10-30 DIAGNOSIS — Z23 NEED FOR INFLUENZA VACCINATION: ICD-10-CM

## 2018-10-30 DIAGNOSIS — J32.0 CHRONIC MAXILLARY SINUSITIS: ICD-10-CM

## 2018-10-30 DIAGNOSIS — J45.40 MODERATE PERSISTENT ASTHMA WITHOUT COMPLICATION: Primary | ICD-10-CM

## 2018-10-30 DIAGNOSIS — G47.34 NOCTURNAL HYPOXIA: ICD-10-CM

## 2018-10-30 PROCEDURE — 90471 IMMUNIZATION ADMIN: CPT

## 2018-10-30 PROCEDURE — 99214 OFFICE O/P EST MOD 30 MIN: CPT | Performed by: INTERNAL MEDICINE

## 2018-10-30 PROCEDURE — 90682 RIV4 VACC RECOMBINANT DNA IM: CPT

## 2018-10-30 RX ORDER — BENRALIZUMAB 30 MG/ML
30 INJECTION, SOLUTION SUBCUTANEOUS
COMMUNITY
Start: 2018-10-09 | End: 2021-11-17

## 2018-10-30 RX ORDER — BUDESONIDE 0.25 MG/2ML
INHALANT ORAL
Refills: 1 | COMMUNITY
Start: 2018-09-27 | End: 2018-11-16

## 2018-10-30 RX ORDER — CLARITHROMYCIN 500 MG/1
500 TABLET, COATED ORAL EVERY 12 HOURS SCHEDULED
Qty: 28 TABLET | Refills: 0 | Status: SHIPPED | OUTPATIENT
Start: 2018-10-30 | End: 2018-11-13

## 2018-10-30 NOTE — ASSESSMENT & PLAN NOTE
She was encouraged to use the Symbicort twice daily on a regular basis  She will continue with Asmita Hampton  I will reach out to the rep and/or infusion  to see if he can assist with the billing procedure for administration  She is concerned that her co-pay may become unreasonable  She will follow up with [facility] to see if she would be a candidate for aspirin desensitization

## 2018-10-30 NOTE — ASSESSMENT & PLAN NOTE
I will initiate Biaxin for a 2 week course and defer whether she needs more prolonged treatment to her ENT  I will also defer whether she needs steroids to ENT as well  She has no wheezing on exam, therefore does not need prednisone from pulmonary perspective

## 2018-10-30 NOTE — PROGRESS NOTES
I Faxed order to [facility] for o2 mask also faxed patients office note from today and 8/1/18 along with her PFT to [address]  Dr Adrienne Canales Otorhinolaryngology Head and neck surgery fax # [phone]

## 2018-10-30 NOTE — LETTER
October 30, 2018     Mayra Giraldo DO  [address] [facility]  [address]  [address] [place] [address]    Patient: Eleonora Storm   YOB: 1955   Date of Visit: 10/30/2018       Dear Dr Flaquito Laura: Thank you for referring Temi Phani to me for evaluation  Below are my notes for this consultation  If you have questions, please do not hesitate to call me  I look forward to following your patient along with you  Sincerely,        Cedrick Cadena DO        CC: MD Cedrick Grady DO  10/30/2018  9:00 AM  Signed  Pulmonary Follow Up Note   Eleonora Storm 61 y o  female MRN: [id number]  10/30/2018      Assessment/Plan: Moderate persistent asthma without complication    She was encouraged to use the Symbicort twice daily on a regular basis  She will continue with Aleda Babinski  I will reach out to the rep and/or infusion  to see if he can assist with the billing procedure for administration  She is concerned that her co-pay may become unreasonable  She will follow up with Riddhi Youngblood [id number] to see if she would be a candidate for aspirin desensitization  Chronic sinusitis    I will initiate Biaxin for a 2 week course and defer whether she needs more prolonged treatment to her ENT  I will also defer whether she needs steroids to ENT as well  She has no wheezing on exam, therefore does not need prednisone from pulmonary perspective  Visit orders:    Diagnoses and all orders for this visit:    Moderate persistent asthma without complication    Nocturnal hypoxia  -     Oxygen Supplies    Chronic maxillary sinusitis  -     clarithromycin (BIAXIN) 500 mg tablet;  Take 1 tablet (500 mg total) by mouth every 12 (twelve) hours for 14 days    Need for influenza vaccination  -     influenza vaccine, [id number], quadrivalent, recombinant, PF, 0 5 mL, for patients 18 yr+ (FLUBLOK)    Other orders  -     FASENRA;   -     budesonide (PULMICORT) 0 25 mg/2 mL nebulizer solution; Return in about 3 months (around 1/30/2019)  History of Present Illness   HPI:  Ilana Bhtati is a 61 y o  female who is here today for follow-up regarding moderate persistent asthma  She recently started Clayton Stable in the hopes of improving her asthma control  Unfortunately, she continues to struggle with significant sinus congestion and her polyps have recurred  She recently saw ENT   But treatment was not changed  She continues with steroid saline washes  She has significant congestion  She complains of significant sinus pressure  She is struggling with wheezing most notable in the mornings and mostly in her upper airway  She is using Symbicort in the mornings and sometimes forgets the evening dose  She uses ProAir once or twice per day  She uses oxygen during hours of sleep, but when her sinuses are congested, she does not feel that she is getting the oxygen flow rate  She has done some research and is looking into aspirin desensitization  She has an appointment with Oliverio RAPHAEL [place] to discuss this option later this month  Review of Systems   Constitutional: Positive for fatigue  Negative for chills, fever and unexpected weight change  HENT: Positive for nosebleeds, postnasal drip, rhinorrhea, sinus pain and sinus pressure  Negative for sore throat  Eyes: Negative for visual disturbance  Respiratory:        As noted in HPI   Cardiovascular: Negative for chest pain  Gastrointestinal: Negative for abdominal pain, diarrhea and vomiting  Genitourinary: Negative for difficulty urinating  Skin: Negative for rash  Neurological: Negative for headaches  Hematological: Negative for adenopathy  Psychiatric/Behavioral: Negative  All other systems reviewed and are negative        Historical Information   Past Medical History:   Diagnosis Date    Anesthesia complication     desaturation of oxygen mostly at night- on O2 at 2L at hs-may wake up with asthma issue    Anxiety  Asthma     Argueta esophagus     DVT (deep venous thrombosis) (HCC)     during pregnancy    Hiatal hernia     Hypercholesteremia     Nasal polyps     Sinus disorder     Sinusitis, chronic      Past Surgical History:   Procedure Laterality Date     SECTION N/A     x 2    COLONOSCOPY      ESOPHAGOGASTRODUODENOSCOPY      HYSTERECTOMY      complete    NASAL POLYP SURGERY      x2    NASAL POLYP SURGERY  2018    AR COLSC FLX W/RMVL OF TUMOR POLYP LESION SNARE TQ N/A 2017    Procedure: COLONOSCOPYwith  snare polypectomy ;  Surgeon: Leslee Cazares MD;  Location: [facility] GI LAB; Service: Gastroenterology    AR EGD TRANSORAL BIOPSY SINGLE/MULTIPLE N/A 2017    Procedure: ESOPHAGOGASTRODUODENOSCOPY (EGD)and biopsy ;  Surgeon: Leslee Cazares MD;  Location: [facility] GI LAB; Service: Gastroenterology    TONSILLECTOMY N/A     VEIN LIGATION AND STRIPPING Bilateral      Family History   Problem Relation Age of Onset    Heart disease Mother         CHF    Dysphagia Father      History   Smoking Status    Never Smoker   Smokeless Tobacco    Never Used     Meds/Allergies     Current Outpatient Prescriptions:     albuterol (2 5 mg/3 mL) 0 083 % nebulizer solution, Take 1 vial (2 5 mg total) by nebulization every 6 (six) hours as needed for wheezing or shortness of breath, Disp: 120 vial, Rfl: 5    albuterol (PROVENTIL HFA,VENTOLIN HFA) 90 mcg/act inhaler, Inhale 2 puffs every 6 (six) hours as needed for wheezing , Disp: , Rfl:     ALPRAZolam (XANAX) 0 25 mg tablet, Take 0 25 mg by mouth 3 (three) times a day as needed for anxiety  , Disp: , Rfl:     budesonide (PULMICORT) 0 25 mg/2 mL nebulizer solution, , Disp: , Rfl: 1    budesonide-formoterol (SYMBICORT) 160-4 5 mcg/act inhaler, Inhale 2 puffs 2 (two) times a day Rinse mouth after use , Disp: 1 Inhaler, Rfl: 5    cetirizine (ZyrTEC) 10 mg tablet, Take 10 mg by mouth every morning  , Disp: , Rfl:     Cholecalciferol (VITAMIN D) 2000 units CAPS, Take 2,000 Units by mouth, Disp: , Rfl:     FASENRA, , Disp: , Rfl:     ipratropium (ATROVENT) 0 02 % nebulizer solution, Take 1 vial (0 5 mg total) by nebulization 3 (three) times a day, Disp: 90 vial, Rfl: 5    montelukast (SINGULAIR) 10 mg tablet, Take 10 mg by mouth daily at bedtime  , Disp: , Rfl:     Omega-3 Fatty Acids (FISH OIL) 1,000 mg, Take 1,000 mg by mouth, Disp: , Rfl:     OXYGEN-HELIUM IN, Inhale 2 L at hs via N/C, Disp: , Rfl:     sodium chloride 0 9 % irrigation, IRRIGATE EACH NARES WITH 4 OUNCES ONCE DAILY, Disp: , Rfl: 1    clarithromycin (BIAXIN) 500 mg tablet, Take 1 tablet (500 mg total) by mouth every 12 (twelve) hours for 14 days, Disp: 28 tablet, Rfl: 0    EPINEPHrine (EPIPEN) 0 3 mg/0 3 mL SOAJ, Inject 0 3 mL (0 3 mg total) into a muscle once for 1 dose, Disp: 0 3 mL, Rfl: 0    famciclovir (FAMVIR) 500 mg tablet, TAKE 3 TABLETS BY MOUTH AS DIRECTED AT ONSET OF VIRAL ULCER, Disp: , Rfl: 0    LACTOBACILLUS ACID-PECTIN PO, Take by mouth, Disp: , Rfl:   Allergies   Allergen Reactions    Livalo [Pitavastatin] Hives and Shortness Of Breath     Nausea    Aspirin     Dust Mite Extract     Mold Extract [Trichophyton]     Naproxen     Advil [Ibuprofen] Rash     And N/V    Cefditoren Pivoxil Hives     N/V     Vitals: Blood pressure 100/60, pulse 84, temperature (!) 97 4 °F (36 3 °C), temperature source Tympanic, height 5' (1 524 m), weight 65 8 kg (145 lb), SpO2 92 %  Body mass index is 28 32 kg/m²  Oxygen Therapy  SpO2: 92 %  Oxygen Therapy: None (Room air)    Physical Exam   Physical Exam   Constitutional: She is oriented to person, place, and time  No distress  HENT:   Head: Normocephalic  Mouth/Throat: No oropharyngeal exudate  Significant nasal congestion with purulent drainage B/L   Eyes: Pupils are equal, round, and reactive to light  No scleral icterus  Neck: Neck supple  No JVD present  Cardiovascular: Normal rate and regular rhythm  Pulmonary/Chest: She has no wheezes  She has no rales  Abdominal: Soft  There is no tenderness  Musculoskeletal: She exhibits no edema  Lymphadenopathy:     She has no cervical adenopathy  Neurological: She is alert and oriented to person, place, and time  Skin: Skin is warm and dry  Psychiatric: She has a normal mood and affect  Labs: I have personally reviewed pertinent lab results  Lab Results   Component Value Date    WBC 6 11 06/18/2018    HGB 12 9 06/18/2018    HCT 39 5 06/18/2018    MCV 97 06/18/2018     06/18/2018     Lab Results   Component Value Date    GLUCOSE 165 (H) 05/08/2015    CALCIUM 8 5 06/18/2018     06/18/2018    K 4 0 06/18/2018    CO2 27 06/18/2018     (H) 06/18/2018    BUN 16 06/18/2018    CREATININE 0 71 06/18/2018     Lab Results   Component Value Date    IGE 64 0 08/08/2018     Lab Results   Component Value Date    ALT 23 06/18/2018    AST 18 06/18/2018    ALKPHOS 85 06/18/2018    BILITOT 0 48 05/08/2015     Imaging and other studies: I have personally reviewed pertinent reports  and I have personally reviewed pertinent films in PACS High-resolution chest CT from 7/9/18 shows no evidence of interstitial lung disease or bronchiectasis  There is mild bronchial wall thickening consistent with reactive airways disease      Pulmonary function testing:  Performed 7/11/18   FEV1/FVC ratio 62%   FEV1 50% predicted  FVC 61% predicted  There is significant response to bronchodilators (+24%)   post bronchodilator FEV1 is 62% predicted  TLC 92 % predicted   % predicted  DLCO corrected for hemoglobin 43 % predicted  PFTs show moderate obstruction with reduced vital capacity due to air trapping  There is improvement with bronchodilators    Diffusion capacity is reduced

## 2018-10-30 NOTE — PATIENT INSTRUCTIONS
· Start Biaxin - I will prescribe 2 weeks prescription and defer to ENT whether you need a longer course of steroids for the nasal polyps  · Continue Symbicort twice a day  · Continue Fasenra every month for the next 2 months then every 2 months thereafter

## 2018-11-14 ENCOUNTER — HOSPITAL ENCOUNTER (OUTPATIENT)
Dept: INFUSION CENTER | Facility: CLINIC | Age: 63
Discharge: HOME | End: 2018-11-14
Payer: COMMERCIAL

## 2018-11-14 VITALS
BODY MASS INDEX: 28.37 KG/M2 | HEART RATE: 87 BPM | TEMPERATURE: 97.7 F | DIASTOLIC BLOOD PRESSURE: 61 MMHG | RESPIRATION RATE: 18 BRPM | OXYGEN SATURATION: 97 % | SYSTOLIC BLOOD PRESSURE: 125 MMHG | WEIGHT: 144.5 LBS | HEIGHT: 60 IN

## 2018-11-14 PROCEDURE — 96372 THER/PROPH/DIAG INJ SC/IM: CPT

## 2018-11-14 NOTE — PROGRESS NOTES
Pt to clinic for fasenra injection, pt offers no complaints at this time, epi pen checked expiration date Oct 2019, will continue monitor, aware of next appointment, declines avs

## 2018-11-16 ENCOUNTER — TELEPHONE (OUTPATIENT)
Dept: PULMONOLOGY | Facility: CLINIC | Age: 63
End: 2018-11-16

## 2018-11-16 NOTE — TELEPHONE ENCOUNTER
Moisés Woodall calling saying she has been sick  She tries to stay off steroids, but feels she may need them this time to get better  She is coughing up a yellow/green sputum  She is wheezing  She is SOB all the time  She has chest tightness and congestion  She says she feels "tingling" in her body  Denies fevers, chills and night sweats  She is using her inhalers and nebulizer as prescribed  If something gets called in, please send to [phone]  Please advise

## 2018-11-16 NOTE — TELEPHONE ENCOUNTER
Return call to Abimbola Connor regarding her complaint of feeling sick  Abimbola Connor reports that she saw Dr Bambi Willams in the end of October  Note reviewed  At that time, she was given a course of Biaxin for two weeks for chronic sinusitis and was to follow up with ENT for prolonged antibiotic delineation  Abimbola Connor reports that she feels very congested in her sinuses and is having more drainage  She has postnasal drip, but does not have a cough productive of mucus from her chest at this time  She reports she does have occasional wheezing and shortness of breath  She refuses any systemic corticosteroids at this time  She is using her Symbicort two puffs twice daily  She has her nebulizer and has only been using it 1 to 2 times daily  I have told her to use her albuterol and Atrovent via nebulizer 4 times daily and she will call our office if symptoms do not improve  She is aware to go to the emergency room in case of an emergency  Of note, she continues to use her budesonide/saline sinus washes      LEIF Pérez

## 2018-12-12 ENCOUNTER — HOSPITAL ENCOUNTER (OUTPATIENT)
Dept: INFUSION CENTER | Facility: CLINIC | Age: 63
Discharge: HOME | End: 2018-12-12
Payer: COMMERCIAL

## 2018-12-12 VITALS
HEART RATE: 93 BPM | TEMPERATURE: 97.9 F | OXYGEN SATURATION: 96 % | RESPIRATION RATE: 18 BRPM | DIASTOLIC BLOOD PRESSURE: 58 MMHG | SYSTOLIC BLOOD PRESSURE: 121 MMHG

## 2018-12-12 PROCEDURE — 96372 THER/PROPH/DIAG INJ SC/IM: CPT

## 2018-12-12 RX ORDER — ZILEUTON 600 MG/1
1200 TABLET, MULTILAYER, EXTENDED RELEASE ORAL 2 TIMES DAILY
COMMUNITY
Start: 2018-11-28 | End: 2020-07-02 | Stop reason: ALTCHOICE

## 2018-12-12 NOTE — PROGRESS NOTES
Pt to clinic for fasenra injection, today is dose 3 now dosing will go to every 8 weeks, pt offers no complaints at this time, will continue to monitor, epi pen checked expiration date of July 2019, aware of next appointment, declines avs

## 2019-01-15 ENCOUNTER — OFFICE VISIT (OUTPATIENT)
Dept: PULMONOLOGY | Facility: CLINIC | Age: 64
End: 2019-01-15
Payer: COMMERCIAL

## 2019-01-15 VITALS
BODY MASS INDEX: 27.52 KG/M2 | RESPIRATION RATE: 18 BRPM | HEIGHT: 60 IN | OXYGEN SATURATION: 98 % | DIASTOLIC BLOOD PRESSURE: 70 MMHG | SYSTOLIC BLOOD PRESSURE: 118 MMHG | WEIGHT: 140.2 LBS | HEART RATE: 92 BPM | TEMPERATURE: 97.8 F

## 2019-01-15 DIAGNOSIS — R07.89 OTHER CHEST PAIN: ICD-10-CM

## 2019-01-15 DIAGNOSIS — G47.33 OSA (OBSTRUCTIVE SLEEP APNEA): ICD-10-CM

## 2019-01-15 DIAGNOSIS — G47.34 NOCTURNAL HYPOXIA: ICD-10-CM

## 2019-01-15 DIAGNOSIS — J45.40 MODERATE PERSISTENT ASTHMA WITHOUT COMPLICATION: Primary | ICD-10-CM

## 2019-01-15 PROCEDURE — 99214 OFFICE O/P EST MOD 30 MIN: CPT | Performed by: INTERNAL MEDICINE

## 2019-01-15 RX ORDER — FLUTICASONE PROPIONATE AND SALMETEROL 232; 14 UG/1; UG/1
1 POWDER, METERED RESPIRATORY (INHALATION)
COMMUNITY
Start: 2018-12-20 | End: 2019-05-09 | Stop reason: ALTCHOICE

## 2019-01-15 NOTE — ASSESSMENT & PLAN NOTE
I suspect her complaints of chest pain are related to musculoskeletal discomfort  She also complains of episodic palpitations, though feels this may be related to stress  She has no history of underlying cardiac disease, but I suggest that if her symptoms continue to be an issue, she be evaluated by Cardiology

## 2019-01-15 NOTE — PROGRESS NOTES
Pulmonary Follow Up Note   Sammy Sanchez 61 y o  female MRN: [id number]  1/15/2019      Assessment/Plan: Moderate persistent asthma without complication    Asthma control continues to be an issue  She very clearly has an eosinophilic- predominant asthma and I would favor continuing with Fabiola Bussing  for at least 6 months to see if we can gain control  She will continue with AirDuo  twice daily and albuterol as needed  She is considering holding Zyflo  For a few days to see if some of her side effects will improve, but she is hesitant, feeling that her current regimen seems to be helping  She will be following up with ENT next week to ask additional questions regarding timing of sinus surgery and aspirin desensitization  Other chest pain    I suspect her complaints of chest pain are related to musculoskeletal discomfort  She also complains of episodic palpitations, though feels this may be related to stress  She has no history of underlying cardiac disease, but I suggest that if her symptoms continue to be an issue, she be evaluated by Cardiology  Visit orders:    Diagnoses and all orders for this visit:    Moderate persistent asthma without complication    WILBERT (obstructive sleep apnea)    Nocturnal hypoxia    Other chest pain  -     Ambulatory referral to Cardiology; Future    Other orders  -     Fluticasone-Salmeterol 232-14 MCG/ACT AEPB; Inhale 1 puff        Return in about 1 month (around 2/15/2019)  History of Present Illness   HPI:  Sammy Sanchez is a 61 y o  female who is here today for follow-up regarding moderate persistent, eosinophilic asthma  She initiated Fabiola Bussing  In November and has received 3 injections so far  In the interim, she also started Zyflo and Symbicort was transitioned to AirDuo at the direction of her ENT at [address] [place] [address]    She is having some side effects of increase in her headaches and muscle aches, though she is not sure if the symptoms have been related to her new medications or intercurrent illnesses  She also reports episodic tingling in her arms and legs  She continues to struggle with sinus congestion and coughing  Wheezing has been minimal   She is hoping to proceed with sinus surgery and aspirin desensitization and in review of ENT notes, this is anticipated for after her asthma control has improved  Review of Systems   Constitutional: Negative for chills, fever and unexpected weight change  HENT: Positive for congestion and sinus pressure  Negative for postnasal drip and sore throat  Eyes: Negative for visual disturbance  Respiratory:        As noted in HPI   Cardiovascular: Positive for chest pain (  Episodic, occurs with coughing, at rest and with exertion  No radiation or associated diaphoresis ) and palpitations  Negative for leg swelling  Gastrointestinal: Negative for abdominal pain, diarrhea and vomiting  Genitourinary: Negative for difficulty urinating  Musculoskeletal: Positive for myalgias  Skin: Negative for rash  Neurological: Positive for headaches  Hematological: Negative for adenopathy  Psychiatric/Behavioral: Negative  All other systems reviewed and are negative  Historical Information   Past Medical History:   Diagnosis Date    Anesthesia complication     desaturation of oxygen mostly at night- on O2 at 2L at hs-may wake up with asthma issue    Anxiety     Asthma     Argueta esophagus     DVT (deep venous thrombosis) (HCC)     during pregnancy    Hiatal hernia     Hypercholesteremia     Nasal polyps     Sinus disorder     Sinusitis, chronic      Past Surgical History:   Procedure Laterality Date     SECTION N/A     x 2    COLONOSCOPY      ESOPHAGOGASTRODUODENOSCOPY      HYSTERECTOMY      complete    NASAL POLYP SURGERY      x2    NASAL POLYP SURGERY  2018    ID COLSC FLX W/RMVL OF TUMOR POLYP LESION SNARE TQ N/A 2017    Procedure: COLONOSCOPYwith  snare polypectomy  ; Surgeon: Sandee Acosta MD;  Location: [facility] GI LAB; Service: Gastroenterology    [place] EGD TRANSORAL BIOPSY SINGLE/MULTIPLE N/A 6/6/2017    Procedure: ESOPHAGOGASTRODUODENOSCOPY (EGD)and biopsy ;  Surgeon: Sandee Acosta MD;  Location: [facility]; Service: Gastroenterology    TONSILLECTOMY N/A     VEIN LIGATION AND STRIPPING Bilateral      Family History   Problem Relation Age of Onset    Heart disease Mother         CHF    Dysphagia Father        History   Smoking Status    Never Smoker   Smokeless Tobacco    Never Used     Meds/Allergies     Current Outpatient Prescriptions:     albuterol (2 5 mg/3 mL) 0 083 % nebulizer solution, Take 1 vial (2 5 mg total) by nebulization every 6 (six) hours as needed for wheezing or shortness of breath, Disp: 120 vial, Rfl: 5    albuterol (PROVENTIL HFA,VENTOLIN HFA) 90 mcg/act inhaler, Inhale 2 puffs every 6 (six) hours as needed for wheezing , Disp: , Rfl:     ALPRAZolam (XANAX) 0 25 mg tablet, Take 0 25 mg by mouth 3 (three) times a day as needed for anxiety  , Disp: , Rfl:     cetirizine (ZyrTEC) 10 mg tablet, Take 10 mg by mouth every morning  , Disp: , Rfl:     Cholecalciferol (VITAMIN D) 2000 units CAPS, Take 2,000 Units by mouth, Disp: , Rfl:     FASENRA, , Disp: , Rfl:     Fluticasone-Salmeterol 232-14 MCG/ACT AEPB, Inhale 1 puff, Disp: , Rfl:     ipratropium (ATROVENT) 0 02 % nebulizer solution, Take 1 vial (0 5 mg total) by nebulization 3 (three) times a day, Disp: 90 vial, Rfl: 5    LACTOBACILLUS ACID-PECTIN PO, Take by mouth, Disp: , Rfl:     montelukast (SINGULAIR) 10 mg tablet, Take 10 mg by mouth daily at bedtime  , Disp: , Rfl:     OXYGEN-HELIUM IN, Inhale 2 L at hs via N/C, Disp: , Rfl:     zileuton (ZYFLO CR) 600 MG, Take 1,200 mg by mouth 2 (two) times a day, Disp: , Rfl:     budesonide-formoterol (SYMBICORT) 160-4 5 mcg/act inhaler, Inhale 2 puffs 2 (two) times a day Rinse mouth after use   (Patient not taking: Reported on 1/15/2019 ), Disp: 1 Inhaler, Rfl: 5    EPINEPHrine (EPIPEN) 0 3 mg/0 3 mL SOAJ, Inject 0 3 mL (0 3 mg total) into a muscle once for 1 dose, Disp: 0 3 mL, Rfl: 0    famciclovir (FAMVIR) 500 mg tablet, TAKE 3 TABLETS BY MOUTH AS DIRECTED AT ONSET OF VIRAL ULCER, Disp: , Rfl: 0    Omega-3 Fatty Acids (FISH OIL) 1,000 mg, Take 1,000 mg by mouth, Disp: , Rfl:     sodium chloride 0 9 % irrigation, IRRIGATE EACH NARES WITH 4 OUNCES ONCE DAILY, Disp: , Rfl: 1  Allergies   Allergen Reactions    Livalo [Pitavastatin] Hives and Shortness Of Breath     Nausea    Aspirin     Dust Mite Extract     Mold Extract [Trichophyton]     Naproxen     Advil [Ibuprofen] Rash     And N/V    Cefditoren Pivoxil Hives     N/V       Vitals: Blood pressure 118/70, pulse 92, temperature 97 8 °F (36 6 °C), temperature source Tympanic, resp  rate 18, height 5' (1 524 m), weight 63 6 kg (140 lb 3 2 oz), SpO2 98 %  Body mass index is 27 38 kg/m²  Oxygen Therapy  SpO2: 98 %    Physical Exam   Physical Exam   Constitutional: She is oriented to person, place, and time  No distress  HENT:   Head: Normocephalic  Mouth/Throat: No oropharyngeal exudate  Eyes: Pupils are equal, round, and reactive to light  No scleral icterus  Neck: Neck supple  No JVD present  Cardiovascular: Normal rate and regular rhythm  Pulmonary/Chest: No respiratory distress  She has no wheezes  She has no rales  Abdominal: Soft  There is no tenderness  Musculoskeletal: She exhibits no edema  Lymphadenopathy:     She has no cervical adenopathy  Neurological: She is alert and oriented to person, place, and time  Skin: Skin is warm and dry  Psychiatric: She has a normal mood and affect  Labs: I have personally reviewed pertinent lab results    Lab Results   Component Value Date    WBC 6 11 06/18/2018    HGB 12 9 06/18/2018    HCT 39 5 06/18/2018    MCV 97 06/18/2018     06/18/2018     Lab Results   Component Value Date    GLUCOSE 165 (H) 05/08/2015    CALCIUM 8 5 06/18/2018     05/08/2015    K 4 0 06/18/2018    CO2 27 06/18/2018     (H) 06/18/2018    BUN 16 06/18/2018    CREATININE 0 71 06/18/2018     Lab Results   Component Value Date    IGE 64 0 08/08/2018     Lab Results   Component Value Date    ALT 23 06/18/2018    AST 18 06/18/2018    ALKPHOS 85 06/18/2018    BILITOT 0 48 05/08/2015     Imaging and other studies: I have personally reviewed pertinent reports  and I have personally reviewed pertinent films in PACS High-resolution chest CT from 7/9/18 shows no evidence of interstitial lung disease or bronchiectasis  Figueroa Gnosticism is mild bronchial wall thickening consistent with reactive airways disease      Pulmonary function testing:  Performed 7/11/18   FEV1/FVC ratio 62%   FEV1 50% predicted  FVC 61% predicted  There is significant response to bronchodilators (+24%)   post bronchodilator FEV1 is 62% predicted  TLC 92 % predicted   % predicted  DLCO corrected for hemoglobin 43 % predicted    PFTs show moderate obstruction with reduced vital capacity due to air trapping   There is improvement with bronchodilators   Diffusion capacity is reduced

## 2019-01-15 NOTE — LETTER
January 15, 2019     Candice Mulligan DO  [address] [place] [address]    Patient: Wing Benitez   YOB: 1955   Date of Visit: 1/15/2019       Dear Dr Arnie Gonzáles: Thank you for referring Seema Martinez to me for evaluation  Below are my notes for this consultation  If you have questions, please do not hesitate to call me  I look forward to following your patient along with you  Sincerely,        Beena Ortega DO        CC: Kristian Peabody, MD Tamsen Gaskin, DO  1/15/2019  8:39 AM  Sign at close encounter  Pulmonary Follow Up Note   Wing Benitez 61 y o  female MRN: [id number]  1/15/2019      Assessment/Plan: Moderate persistent asthma without complication    Asthma control continues to be an issue  She very clearly has an eosinophilic- predominant asthma and I would favor continuing with [facility]  for at least 6 months to see if we can gain control  She will continue with AirDuo  twice daily and albuterol as needed  She is considering holding Zyflo  For a few days to see if some of her side effects will improve, but she is hesitant, feeling that her current regimen seems to be helping  She will be following up with ENT next week to ask additional questions regarding timing of sinus surgery and aspirin desensitization  Other chest pain    I suspect her complaints of chest pain are related to musculoskeletal discomfort  She also complains of episodic palpitations, though feels this may be related to stress  She has no history of underlying cardiac disease, but I suggest that if her symptoms continue to be an issue, she be evaluated by Cardiology  Visit orders:    Diagnoses and all orders for this visit:    Moderate persistent asthma without complication    WILBERT (obstructive sleep apnea)    Nocturnal hypoxia    Other chest pain  -     Ambulatory referral to Cardiology; Future    Other orders  -     Fluticasone-Salmeterol 232-14 MCG/ACT AEPB;  Inhale 1 puff        Return in about 1 month (around 2/15/2019)  History of Present Illness   HPI:  Sam Morin is a 61 y o  female who is here today for follow-up regarding moderate persistent, eosinophilic asthma  She initiated Lorri Captain  In November and has received 3 injections so far  In the interim, she also started Zyflo and Symbicort was transitioned to AirDuo at the direction of her ENT at [address]  She is having some side effects of increase in her headaches and muscle aches, though she is not sure if the symptoms have been related to her new medications or intercurrent illnesses  She also reports episodic tingling in her arms and legs  She continues to struggle with sinus congestion and coughing  Wheezing has been minimal   She is hoping to proceed with sinus surgery and aspirin desensitization and in review of ENT notes, this is anticipated for after her asthma control has improved  Review of Systems   Constitutional: Negative for chills, fever and unexpected weight change  HENT: Positive for congestion and sinus pressure  Negative for postnasal drip and sore throat  Eyes: Negative for visual disturbance  Respiratory:        As noted in HPI   Cardiovascular: Positive for chest pain (  Episodic, occurs with coughing, at rest and with exertion  No radiation or associated diaphoresis ) and palpitations  Negative for leg swelling  Gastrointestinal: Negative for abdominal pain, diarrhea and vomiting  Genitourinary: Negative for difficulty urinating  Musculoskeletal: Positive for myalgias  Skin: Negative for rash  Neurological: Positive for headaches  Hematological: Negative for adenopathy  Psychiatric/Behavioral: Negative  All other systems reviewed and are negative          Historical Information   Past Medical History:   Diagnosis Date    Anesthesia complication     desaturation of oxygen mostly at night- on O2 at 2L at hs-may wake up with asthma issue    Anxiety     Asthma     Argueta esophagus     DVT (deep venous thrombosis) (HCC)     during pregnancy    Hiatal hernia     Hypercholesteremia     Nasal polyps     Sinus disorder     Sinusitis, chronic      Past Surgical History:   Procedure Laterality Date     SECTION N/A     x 2    COLONOSCOPY      ESOPHAGOGASTRODUODENOSCOPY      HYSTERECTOMY      complete    NASAL POLYP SURGERY      x2    NASAL POLYP SURGERY  2018    MO COLSC FLX W/RMVL OF TUMOR POLYP LESION SNARE TQ N/A 2017    Procedure: COLONOSCOPYwith  snare polypectomy ;  Surgeon: Patricia Oakes MD;  Location: [place] [address] [facility]; Service: Gastroenterology    MO EGD TRANSORAL BIOPSY SINGLE/MULTIPLE N/A 2017    Procedure: ESOPHAGOGASTRODUODENOSCOPY (EGD)and biopsy ;  Surgeon: Patricia Oakes MD;  Location: [facility] [place] [address] [facility]; Service: Gastroenterology    TONSILLECTOMY N/A     VEIN LIGATION AND STRIPPING Bilateral      Family History   Problem Relation Age of Onset    Heart disease Mother         CHF    Dysphagia Father        History   Smoking Status    Never Smoker   Smokeless Tobacco    Never Used     Meds/Allergies     Current Outpatient Prescriptions:     albuterol (2 5 mg/3 mL) 0 083 % nebulizer solution, Take 1 vial (2 5 mg total) by nebulization every 6 (six) hours as needed for wheezing or shortness of breath, Disp: 120 vial, Rfl: 5    albuterol (PROVENTIL HFA,VENTOLIN HFA) 90 mcg/act inhaler, Inhale 2 puffs every 6 (six) hours as needed for wheezing , Disp: , Rfl:     ALPRAZolam (XANAX) 0 25 mg tablet, Take 0 25 mg by mouth 3 (three) times a day as needed for anxiety  , Disp: , Rfl:     cetirizine (ZyrTEC) 10 mg tablet, Take 10 mg by mouth every morning  , Disp: , Rfl:     Cholecalciferol (VITAMIN D) 2000 units CAPS, Take 2,000 Units by mouth, Disp: , Rfl:     FASENRA, , Disp: , Rfl:     Fluticasone-Salmeterol 232-14 MCG/ACT AEPB, Inhale 1 puff, Disp: , Rfl:     ipratropium (ATROVENT) 0 02 % nebulizer solution, Take 1 vial (0 5 mg total) by nebulization 3 (three) times a day, Disp: 90 vial, Rfl: 5    LACTOBACILLUS ACID-PECTIN PO, Take by mouth, Disp: , Rfl:     montelukast (SINGULAIR) 10 mg tablet, Take 10 mg by mouth daily at bedtime  , Disp: , Rfl:     OXYGEN-HELIUM IN, Inhale 2 L at hs via N/C, Disp: , Rfl:     zileuton (ZYFLO CR) 600 MG, Take 1,200 mg by mouth 2 (two) times a day, Disp: , Rfl:     budesonide-formoterol (SYMBICORT) 160-4 5 mcg/act inhaler, Inhale 2 puffs 2 (two) times a day Rinse mouth after use  (Patient not taking: Reported on 1/15/2019 ), Disp: 1 Inhaler, Rfl: 5    EPINEPHrine (EPIPEN) 0 3 mg/0 3 mL SOAJ, Inject 0 3 mL (0 3 mg total) into a muscle once for 1 dose, Disp: 0 3 mL, Rfl: 0    famciclovir (FAMVIR) 500 mg tablet, TAKE 3 TABLETS BY MOUTH AS DIRECTED AT ONSET OF VIRAL ULCER, Disp: , Rfl: 0    Omega-3 Fatty Acids (FISH OIL) 1,000 mg, Take 1,000 mg by mouth, Disp: , Rfl:     sodium chloride 0 9 % irrigation, IRRIGATE EACH NARES WITH 4 OUNCES ONCE DAILY, Disp: , Rfl: 1  Allergies   Allergen Reactions    Livalo [Pitavastatin] Hives and Shortness Of Breath     Nausea    Aspirin     Dust Mite Extract     Mold Extract [Trichophyton]     Naproxen     Advil [Ibuprofen] Rash     And N/V    Cefditoren Pivoxil Hives     N/V       Vitals: Blood pressure 118/70, pulse 92, temperature 97 8 °F (36 6 °C), temperature source Tympanic, resp  rate 18, height 5' (1 524 m), weight 63 6 kg (140 lb 3 2 oz), SpO2 98 %  Body mass index is 27 38 kg/m²  Oxygen Therapy  SpO2: 98 %    Physical Exam   Physical Exam   Constitutional: She is oriented to person, place, and time  No distress  HENT:   Head: Normocephalic  Mouth/Throat: No oropharyngeal exudate  Eyes: Pupils are equal, round, and reactive to light  No scleral icterus  Neck: Neck supple  No JVD present  Cardiovascular: Normal rate and regular rhythm  Pulmonary/Chest: No respiratory distress  She has no wheezes   She has no rales    Abdominal: Soft  There is no tenderness  Musculoskeletal: She exhibits no edema  Lymphadenopathy:     She has no cervical adenopathy  Neurological: She is alert and oriented to person, place, and time  Skin: Skin is warm and dry  Psychiatric: She has a normal mood and affect  Labs: I have personally reviewed pertinent lab results  Lab Results   Component Value Date    WBC 6 11 06/18/2018    HGB 12 9 06/18/2018    HCT 39 5 06/18/2018    MCV 97 06/18/2018     06/18/2018     Lab Results   Component Value Date    GLUCOSE 165 (H) 05/08/2015    CALCIUM 8 5 06/18/2018     05/08/2015    K 4 0 06/18/2018    CO2 27 06/18/2018     (H) 06/18/2018    BUN 16 06/18/2018    CREATININE 0 71 06/18/2018     Lab Results   Component Value Date    IGE 64 0 08/08/2018     Lab Results   Component Value Date    ALT 23 06/18/2018    AST 18 06/18/2018    ALKPHOS 85 06/18/2018    BILITOT 0 48 05/08/2015     Imaging and other studies: I have personally reviewed pertinent reports  and I have personally reviewed pertinent films in PACS High-resolution chest CT from 7/9/18 shows no evidence of interstitial lung disease or bronchiectasis  Steve Leem is mild bronchial wall thickening consistent with reactive airways disease      Pulmonary function testing:  Performed 7/11/18   FEV1/FVC ratio 62%   FEV1 50% predicted  FVC 61% predicted  There is significant response to bronchodilators (+24%)   post bronchodilator FEV1 is 62% predicted  TLC 92 % predicted   % predicted  DLCO corrected for hemoglobin 43 % predicted    PFTs show moderate obstruction with reduced vital capacity due to air trapping   There is improvement with bronchodilators   Diffusion capacity is reduced

## 2019-01-15 NOTE — ASSESSMENT & PLAN NOTE
Asthma control continues to be an issue  She very clearly has an eosinophilic- predominant asthma and I would favor continuing with [facility]  for at least 6 months to see if we can gain control  She will continue with AirDuo  twice daily and albuterol as needed  She is considering holding Zyflo  For a few days to see if some of her side effects will improve, but she is hesitant, feeling that her current regimen seems to be helping  She will be following up with ENT next week to ask additional questions regarding timing of sinus surgery and aspirin desensitization

## 2019-02-01 ENCOUNTER — TELEPHONE (OUTPATIENT)
Dept: PULMONOLOGY | Facility: CLINIC | Age: 64
End: 2019-02-01

## 2019-02-01 NOTE — TELEPHONE ENCOUNTER
Beau Padilla from [address] [phone] requesting order for patient's Alexandra Willoughby  Patient having infusion Wed 02/06/19   Please advise

## 2019-02-06 ENCOUNTER — HOSPITAL ENCOUNTER (OUTPATIENT)
Dept: INFUSION CENTER | Facility: CLINIC | Age: 64
Discharge: HOME | End: 2019-02-06
Payer: COMMERCIAL

## 2019-02-06 VITALS
SYSTOLIC BLOOD PRESSURE: 123 MMHG | HEART RATE: 82 BPM | TEMPERATURE: 97.9 F | RESPIRATION RATE: 20 BRPM | OXYGEN SATURATION: 96 % | DIASTOLIC BLOOD PRESSURE: 56 MMHG

## 2019-02-06 PROCEDURE — 96372 THER/PROPH/DIAG INJ SC/IM: CPT

## 2019-02-06 NOTE — PROGRESS NOTES
Pt to clinic for fasenra injection, epi pen checked expiration date of July 2019   Pt offers no complaints at this time, will continue to monitor, aware of next appointment, declines avs

## 2019-02-07 ENCOUNTER — TELEPHONE (OUTPATIENT)
Dept: PULMONOLOGY | Facility: CLINIC | Age: 64
End: 2019-02-07

## 2019-02-07 NOTE — TELEPHONE ENCOUNTER
Pt is going for pre testing on 2/13 same day she had a f/u appt with you  She is having  polyps removal in nose  Pt wants to know if  you want to see her prior to 2/13 or your next available is ok? Please advise

## 2019-02-11 ENCOUNTER — TRANSCRIBE ORDERS (OUTPATIENT)
Dept: ADMINISTRATIVE | Facility: HOSPITAL | Age: 64
End: 2019-02-11

## 2019-02-27 ENCOUNTER — OFFICE VISIT (OUTPATIENT)
Dept: PULMONOLOGY | Facility: CLINIC | Age: 64
End: 2019-02-27
Payer: COMMERCIAL

## 2019-02-27 VITALS
WEIGHT: 144.8 LBS | OXYGEN SATURATION: 97 % | HEART RATE: 72 BPM | SYSTOLIC BLOOD PRESSURE: 114 MMHG | TEMPERATURE: 96.5 F | BODY MASS INDEX: 28.43 KG/M2 | HEIGHT: 60 IN | DIASTOLIC BLOOD PRESSURE: 72 MMHG

## 2019-02-27 DIAGNOSIS — G47.33 OSA (OBSTRUCTIVE SLEEP APNEA): ICD-10-CM

## 2019-02-27 DIAGNOSIS — J45.40 MODERATE PERSISTENT ASTHMA WITHOUT COMPLICATION: Primary | ICD-10-CM

## 2019-02-27 PROCEDURE — 99212 OFFICE O/P EST SF 10 MIN: CPT | Performed by: INTERNAL MEDICINE

## 2019-02-27 RX ORDER — OXYCODONE HYDROCHLORIDE AND ACETAMINOPHEN 5; 325 MG/1; MG/1
1 TABLET ORAL
COMMUNITY
Start: 2019-02-13 | End: 2019-05-09 | Stop reason: ALTCHOICE

## 2019-02-27 RX ORDER — PREDNISONE 10 MG/1
TABLET ORAL
COMMUNITY
Start: 2019-02-13 | End: 2019-05-09 | Stop reason: ALTCHOICE

## 2019-02-27 RX ORDER — CIPROFLOXACIN 500 MG/1
500 TABLET, FILM COATED ORAL
COMMUNITY
Start: 2019-02-13 | End: 2019-05-09 | Stop reason: ALTCHOICE

## 2019-02-27 NOTE — PROGRESS NOTES
Pulmonary Follow Up Note   Ignacio Alarcon 61 y o  female MRN: [id number]  2/27/2019      Assessment/Plan: Moderate persistent asthma without complication    Her asthma control seems to have improved with Fasenra,  Which we will continue every 8 weeks  She will continue with fluticasone /salmeterol, Singulair and zyflo  She will be undergoing sinus surgery, followed by aspirin desensitization which will hopefully also help with her asthma control  Visit orders:    Diagnoses and all orders for this visit:    Moderate persistent asthma without complication    WILBERT (obstructive sleep apnea)    Other orders  -     ciprofloxacin (CIPRO) 500 mg tablet; Take 500 mg by mouth  -     oxyCODONE-acetaminophen (PERCOCET) 5-325 mg per tablet; Take 1 tablet by mouth  -     predniSONE 10 mg tablet; Take 2 tabs daily        Return in about 8 weeks (around 4/27/2019)  History of Present Illness   HPI:  Ignacio Alarcon is a 61 y o  female who is here today for follow-up regarding moderate persistent asthma  Since starting Byrd Matt,  Her asthma control seems to have improved  She is not using her rescue inhaler  She is not having frequent symptoms of cough, wheeze or shortness of breath  She is compliant with fluticasone /salmeterol twice daily  She has sinus surgery scheduled for next week, then aspirin desensitization 4 weeks thereafter  She is currently on prednisone for her sinuses  Review of Systems    She denies fevers or chills  She denies chest pain  She had been scheduled to see Cardiology today, but is considering postponing until after her surgery  She denies lower extremity edema or calf tenderness  She denies skin rashes  Her muscle aches have improved with the lower dose of Zyflo  She denies dysuria or hematuria  All other review of systems are negative      Historical Information   Past Medical History:   Diagnosis Date    Anesthesia complication     desaturation of oxygen mostly at night- on O2 at 2L at hs-may wake up with asthma issue    Anxiety     Asthma     Argueta esophagus     DVT (deep venous thrombosis) (HCC)     during pregnancy    Hiatal hernia     Hypercholesteremia     Nasal polyps     Sinus disorder     Sinusitis, chronic      Past Surgical History:   Procedure Laterality Date     SECTION N/A     x 2    COLONOSCOPY      ESOPHAGOGASTRODUODENOSCOPY      HYSTERECTOMY      complete    NASAL POLYP SURGERY      x2    NASAL POLYP SURGERY  2018    NC COLSC FLX W/RMVL OF TUMOR POLYP LESION SNARE TQ N/A 2017    Procedure: COLONOSCOPYwith  snare polypectomy ;  Surgeon: Jignesh Bunn MD;  Location: [facility]; Service: Gastroenterology    NC EGD TRANSORAL BIOPSY SINGLE/MULTIPLE N/A 2017    Procedure: ESOPHAGOGASTRODUODENOSCOPY (EGD)and biopsy ;  Surgeon: Jignesh Bunn MD;  Location: [facility]; Service: Gastroenterology    TONSILLECTOMY N/A     VEIN LIGATION AND STRIPPING Bilateral      Family History   Problem Relation Age of Onset    Heart disease Mother         CHF    Dysphagia Father        Social History     Tobacco Use   Smoking Status Never Smoker   Smokeless Tobacco Never Used     Meds/Allergies     Current Outpatient Medications:     albuterol (2 5 mg/3 mL) 0 083 % nebulizer solution, Take 1 vial (2 5 mg total) by nebulization every 6 (six) hours as needed for wheezing or shortness of breath, Disp: 120 vial, Rfl: 5    albuterol (PROVENTIL HFA,VENTOLIN HFA) 90 mcg/act inhaler, Inhale 2 puffs every 6 (six) hours as needed for wheezing , Disp: , Rfl:     ALPRAZolam (XANAX) 0 25 mg tablet, Take 0 25 mg by mouth 3 (three) times a day as needed for anxiety  , Disp: , Rfl:     cetirizine (ZyrTEC) 10 mg tablet, Take 10 mg by mouth every morning  , Disp: , Rfl:     Cholecalciferol (VITAMIN D) 2000 units CAPS, Take 2,000 Units by mouth, Disp: , Rfl:     ciprofloxacin (CIPRO) 500 mg tablet, Take 500 mg by mouth, Disp: , Rfl:     famciclovir (FAMVIR) 500 mg tablet, TAKE 3 TABLETS BY MOUTH AS DIRECTED AT ONSET OF VIRAL ULCER, Disp: , Rfl: 0    FASENRA, , Disp: , Rfl:     Fluticasone-Salmeterol 232-14 MCG/ACT AEPB, Inhale 1 puff, Disp: , Rfl:     ipratropium (ATROVENT) 0 02 % nebulizer solution, Take 1 vial (0 5 mg total) by nebulization 3 (three) times a day, Disp: 90 vial, Rfl: 5    LACTOBACILLUS ACID-PECTIN PO, Take by mouth, Disp: , Rfl:     montelukast (SINGULAIR) 10 mg tablet, Take 10 mg by mouth daily at bedtime  , Disp: , Rfl:     Omega-3 Fatty Acids (FISH OIL) 1,000 mg, Take 1,000 mg by mouth, Disp: , Rfl:     oxyCODONE-acetaminophen (PERCOCET) 5-325 mg per tablet, Take 1 tablet by mouth, Disp: , Rfl:     OXYGEN-HELIUM IN, Inhale 2 L at hs via N/C, Disp: , Rfl:     predniSONE 10 mg tablet, Take 2 tabs daily, Disp: , Rfl:     zileuton (ZYFLO CR) 600 MG, Take 1,200 mg by mouth 2 (two) times a day, Disp: , Rfl:     EPINEPHrine (EPIPEN) 0 3 mg/0 3 mL SOAJ, Inject 0 3 mL (0 3 mg total) into a muscle once for 1 dose, Disp: 0 3 mL, Rfl: 0  Allergies   Allergen Reactions    Livalo [Pitavastatin] Hives and Shortness Of Breath     Nausea    Aspirin     Dust Mite Extract     Mold Extract [Trichophyton]     Naproxen     Advil [Ibuprofen] Rash     And N/V    Cefditoren Pivoxil Hives     N/V       Vitals: Blood pressure 114/72, pulse 72, temperature (!) 96 5 °F (35 8 °C), temperature source Tympanic, height 5' (1 524 m), weight 65 7 kg (144 lb 12 8 oz), SpO2 97 %  Body mass index is 28 28 kg/m²  Oxygen Therapy  SpO2: 97 %  Oxygen Therapy: None (Room air)    Physical Exam   Physical Exam   Constitutional: She is oriented to person, place, and time  No distress  HENT:   Head: Normocephalic  Mouth/Throat: No oropharyngeal exudate  Eyes: Pupils are equal, round, and reactive to light  No scleral icterus  Neck: Neck supple  No JVD present  Cardiovascular: Normal rate and regular rhythm  Pulmonary/Chest: She has no wheezes   She has no rales    Abdominal: Soft  There is no tenderness  Musculoskeletal: She exhibits no edema  Lymphadenopathy:     She has no cervical adenopathy  Neurological: She is alert and oriented to person, place, and time  Skin: Skin is warm and dry  Psychiatric: She has a normal mood and affect  Labs: I have personally reviewed pertinent lab results  Lab Results   Component Value Date    WBC 6 11 06/18/2018    HGB 12 9 06/18/2018    HCT 39 5 06/18/2018    MCV 97 06/18/2018     06/18/2018     Lab Results   Component Value Date    GLUCOSE 165 (H) 05/08/2015    CALCIUM 8 5 06/18/2018     05/08/2015    K 4 0 06/18/2018    CO2 27 06/18/2018     (H) 06/18/2018    BUN 16 06/18/2018    CREATININE 0 71 06/18/2018     Lab Results   Component Value Date    IGE 64 0 08/08/2018     Lab Results   Component Value Date    ALT 23 06/18/2018    AST 18 06/18/2018    ALKPHOS 85 06/18/2018    BILITOT 0 48 05/08/2015       Pulmonary function testing:  Performed   7/11/18   FEV1/FVC ratio 62%   FEV1 50% predicted  FVC 61% predicted  (+) response to bronchodilators  TLC 92 % predicted   % predicted  DLCO corrected for hemoglobin 43 % predicted  PFT showed moderate obstruction with significant bronchodilator response  There is moderate air trapping

## 2019-02-27 NOTE — ASSESSMENT & PLAN NOTE
Her asthma control seems to have improved with Myrtis Ala,  Which we will continue every 8 weeks  She will continue with fluticasone /salmeterol, Singulair and zyflo  She will be undergoing sinus surgery, followed by aspirin desensitization which will hopefully also help with her asthma control

## 2019-04-05 ENCOUNTER — TELEPHONE (OUTPATIENT)
Dept: PULMONOLOGY | Facility: CLINIC | Age: 64
End: 2019-04-05

## 2019-04-10 ENCOUNTER — HOSPITAL ENCOUNTER (OUTPATIENT)
Dept: INFUSION CENTER | Facility: CLINIC | Age: 64
Discharge: HOME | End: 2019-04-10
Payer: COMMERCIAL

## 2019-04-10 VITALS
DIASTOLIC BLOOD PRESSURE: 63 MMHG | SYSTOLIC BLOOD PRESSURE: 113 MMHG | RESPIRATION RATE: 16 BRPM | HEART RATE: 72 BPM | TEMPERATURE: 98 F

## 2019-04-10 PROCEDURE — 96372 THER/PROPH/DIAG INJ SC/IM: CPT

## 2019-04-10 RX ORDER — OMEPRAZOLE 20 MG/1
20 CAPSULE, DELAYED RELEASE ORAL DAILY
COMMUNITY
End: 2019-12-05 | Stop reason: DRUGHIGH

## 2019-04-10 RX ORDER — ASPIRIN 325 MG
325 TABLET ORAL
COMMUNITY
End: 2020-07-02 | Stop reason: SDUPTHER

## 2019-04-10 RX ADMIN — BENRALIZUMAB 30 MG: 30 INJECTION, SOLUTION SUBCUTANEOUS at 09:05

## 2019-04-10 NOTE — PROGRESS NOTES
Patient has epi pen with expiration date of 7/2019  Patient tolerated injection, was observed for 30 minutes after injection, offers no complaints   Patient is aware of upcoming appts and refuses AVS

## 2019-04-24 ENCOUNTER — TELEPHONE (OUTPATIENT)
Dept: PULMONOLOGY | Facility: CLINIC | Age: 64
End: 2019-04-24

## 2019-04-26 DIAGNOSIS — R09.02 HYPOXIA: Primary | ICD-10-CM

## 2019-04-29 DIAGNOSIS — J45.40 MODERATE PERSISTENT ASTHMA WITHOUT COMPLICATION: ICD-10-CM

## 2019-04-29 RX ORDER — EPINEPHRINE 1 MG/ML
0.3 INJECTION, SOLUTION, CONCENTRATE INTRAVENOUS AS NEEDED
Status: CANCELLED | OUTPATIENT
Start: 2019-06-07

## 2019-05-09 ENCOUNTER — OFFICE VISIT (OUTPATIENT)
Dept: OBGYN CLINIC | Facility: CLINIC | Age: 64
End: 2019-05-09
Payer: COMMERCIAL

## 2019-05-09 VITALS — DIASTOLIC BLOOD PRESSURE: 60 MMHG | SYSTOLIC BLOOD PRESSURE: 100 MMHG | WEIGHT: 148 LBS | BODY MASS INDEX: 28.9 KG/M2

## 2019-05-09 DIAGNOSIS — Z01.419 ENCOUNTER FOR ANNUAL ROUTINE GYNECOLOGICAL EXAMINATION: Primary | ICD-10-CM

## 2019-05-09 DIAGNOSIS — Z12.4 PAP SMEAR FOR CERVICAL CANCER SCREENING: ICD-10-CM

## 2019-05-09 DIAGNOSIS — Z12.39 SCREENING FOR MALIGNANT NEOPLASM OF BREAST: ICD-10-CM

## 2019-05-09 PROCEDURE — 99386 PREV VISIT NEW AGE 40-64: CPT | Performed by: OBSTETRICS & GYNECOLOGY

## 2019-05-10 ENCOUNTER — OFFICE VISIT (OUTPATIENT)
Dept: PULMONOLOGY | Facility: CLINIC | Age: 64
End: 2019-05-10
Payer: COMMERCIAL

## 2019-05-10 VITALS
WEIGHT: 150.6 LBS | TEMPERATURE: 97.8 F | HEIGHT: 60 IN | OXYGEN SATURATION: 98 % | RESPIRATION RATE: 16 BRPM | HEART RATE: 68 BPM | DIASTOLIC BLOOD PRESSURE: 68 MMHG | SYSTOLIC BLOOD PRESSURE: 112 MMHG | BODY MASS INDEX: 29.57 KG/M2

## 2019-05-10 DIAGNOSIS — J45.40 MODERATE PERSISTENT ASTHMA WITHOUT COMPLICATION: Primary | ICD-10-CM

## 2019-05-10 DIAGNOSIS — G47.33 OSA (OBSTRUCTIVE SLEEP APNEA): ICD-10-CM

## 2019-05-10 PROCEDURE — 99213 OFFICE O/P EST LOW 20 MIN: CPT | Performed by: INTERNAL MEDICINE

## 2019-05-16 ENCOUNTER — APPOINTMENT (OUTPATIENT)
Dept: LAB | Facility: CLINIC | Age: 64
End: 2019-05-16
Payer: COMMERCIAL

## 2019-05-16 ENCOUNTER — HOSPITAL ENCOUNTER (OUTPATIENT)
Dept: RADIOLOGY | Facility: HOSPITAL | Age: 64
Discharge: HOME | End: 2019-05-16
Attending: OBSTETRICS & GYNECOLOGY
Payer: COMMERCIAL

## 2019-05-16 VITALS — BODY MASS INDEX: 29.45 KG/M2 | HEIGHT: 60 IN | WEIGHT: 150 LBS

## 2019-05-16 DIAGNOSIS — Z12.39 SCREENING FOR MALIGNANT NEOPLASM OF BREAST: ICD-10-CM

## 2019-05-16 DIAGNOSIS — Z01.419 ENCOUNTER FOR ANNUAL ROUTINE GYNECOLOGICAL EXAMINATION: ICD-10-CM

## 2019-05-16 LAB
T4 FREE SERPL-MCNC: 0.97 NG/DL (ref 0.76–1.46)
TSH 30M P TRH SERPL-ACNC: 2 UIU/ML
TSH SERPL DL<=0.05 MIU/L-ACNC: 2.01 UIU/ML

## 2019-05-16 PROCEDURE — 84439 ASSAY OF FREE THYROXINE: CPT

## 2019-05-16 PROCEDURE — 77067 SCR MAMMO BI INCL CAD: CPT

## 2019-05-16 PROCEDURE — 84443 ASSAY THYROID STIM HORMONE: CPT

## 2019-05-16 PROCEDURE — 36415 COLL VENOUS BLD VENIPUNCTURE: CPT

## 2019-05-21 LAB
HPV HR 12 DNA CVX QL NAA+PROBE: DETECTED
HPV16 DNA SPEC QL NAA+PROBE: NOT DETECTED
HPV18 DNA SPEC QL NAA+PROBE: NOT DETECTED
THIN PREP CVX: ABNORMAL

## 2019-06-03 ENCOUNTER — TELEPHONE (OUTPATIENT)
Dept: PULMONOLOGY | Facility: CLINIC | Age: 64
End: 2019-06-03

## 2019-06-05 ENCOUNTER — TELEPHONE (OUTPATIENT)
Dept: PULMONOLOGY | Facility: CLINIC | Age: 64
End: 2019-06-05

## 2019-06-05 NOTE — TELEPHONE ENCOUNTER
[facility] called requesting date change in [place] to June 7, 2019  Patient's coming in Friday for her Josh Skeans treatment   Please advise

## 2019-06-06 DIAGNOSIS — J45.30 MILD PERSISTENT ASTHMA WITHOUT COMPLICATION: Primary | ICD-10-CM

## 2019-06-06 RX ORDER — ALBUTEROL SULFATE 90 UG/1
2 AEROSOL, METERED RESPIRATORY (INHALATION) EVERY 6 HOURS PRN
Qty: 8.5 G | Refills: 5 | Status: SHIPPED | OUTPATIENT
Start: 2019-06-06 | End: 2019-09-27 | Stop reason: SDUPTHER

## 2019-06-07 ENCOUNTER — HOSPITAL ENCOUNTER (OUTPATIENT)
Dept: INFUSION CENTER | Facility: CLINIC | Age: 64
End: 2019-06-07

## 2019-06-07 ENCOUNTER — HOSPITAL ENCOUNTER (OUTPATIENT)
Dept: INFUSION CENTER | Facility: CLINIC | Age: 64
Discharge: HOME | End: 2019-06-07
Payer: COMMERCIAL

## 2019-06-07 VITALS
OXYGEN SATURATION: 96 % | RESPIRATION RATE: 18 BRPM | DIASTOLIC BLOOD PRESSURE: 62 MMHG | TEMPERATURE: 97.6 F | HEART RATE: 98 BPM | SYSTOLIC BLOOD PRESSURE: 108 MMHG

## 2019-06-07 DIAGNOSIS — J45.40 MODERATE PERSISTENT ASTHMA WITHOUT COMPLICATION: Primary | ICD-10-CM

## 2019-06-07 PROCEDURE — 96372 THER/PROPH/DIAG INJ SC/IM: CPT

## 2019-06-07 RX ORDER — EPINEPHRINE 1 MG/ML
0.3 INJECTION, SOLUTION, CONCENTRATE INTRAVENOUS AS NEEDED
Status: CANCELLED | OUTPATIENT
Start: 2019-08-02

## 2019-06-07 RX ORDER — EPINEPHRINE 1 MG/ML
0.3 INJECTION, SOLUTION, CONCENTRATE INTRAVENOUS AS NEEDED
Status: DISCONTINUED | OUTPATIENT
Start: 2019-06-07 | End: 2019-06-10 | Stop reason: HOSPADM

## 2019-06-07 RX ADMIN — BENRALIZUMAB 30 MG: 30 INJECTION, SOLUTION SUBCUTANEOUS at 08:41

## 2019-06-07 NOTE — PROGRESS NOTES
Pt observed x 30 min post injection w/out adverse reaction  Confirmed pts  Next appt pt   Declined AVS

## 2019-06-26 ENCOUNTER — TELEPHONE (OUTPATIENT)
Dept: PULMONOLOGY | Facility: CLINIC | Age: 64
End: 2019-06-26

## 2019-07-01 ENCOUNTER — TRANSCRIBE ORDERS (OUTPATIENT)
Dept: LAB | Facility: CLINIC | Age: 64
End: 2019-07-01

## 2019-07-01 ENCOUNTER — APPOINTMENT (OUTPATIENT)
Dept: LAB | Facility: CLINIC | Age: 64
End: 2019-07-01
Payer: COMMERCIAL

## 2019-07-01 DIAGNOSIS — E78.00 PURE HYPERCHOLESTEROLEMIA: Primary | ICD-10-CM

## 2019-07-01 DIAGNOSIS — Z13.9 SCREENING FOR UNSPECIFIED CONDITION: ICD-10-CM

## 2019-07-01 DIAGNOSIS — M25.50 PAIN IN JOINT, MULTIPLE SITES: ICD-10-CM

## 2019-07-01 DIAGNOSIS — Z13.820 SPECIAL SCREENING FOR OSTEOPOROSIS: ICD-10-CM

## 2019-07-01 DIAGNOSIS — E55.9 VITAMIN D DEFICIENCY, UNSPECIFIED: ICD-10-CM

## 2019-07-01 LAB
25(OH)D3 SERPL-MCNC: 23.7 NG/ML (ref 30–100)
ALBUMIN SERPL BCP-MCNC: 3.6 G/DL (ref 3.5–5)
ALP SERPL-CCNC: 89 U/L (ref 46–116)
ALT SERPL W P-5'-P-CCNC: 33 U/L (ref 12–78)
ANION GAP SERPL CALCULATED.3IONS-SCNC: 7 MMOL/L (ref 4–13)
AST SERPL W P-5'-P-CCNC: 27 U/L (ref 5–45)
BILIRUB SERPL-MCNC: 0.35 MG/DL (ref 0.2–1)
BUN SERPL-MCNC: 23 MG/DL (ref 5–25)
CALCIUM SERPL-MCNC: 9.1 MG/DL (ref 8.3–10.1)
CHLORIDE SERPL-SCNC: 109 MMOL/L (ref 100–108)
CHOLEST SERPL-MCNC: 190 MG/DL (ref 50–200)
CO2 SERPL-SCNC: 27 MMOL/L (ref 21–32)
CREAT SERPL-MCNC: 0.7 MG/DL (ref 0.6–1.3)
CRP SERPL QL: 8.8 MG/L
ERYTHROCYTE [DISTWIDTH] IN BLOOD BY AUTOMATED COUNT: 11.9 % (ref 11.6–15.1)
ERYTHROCYTE [SEDIMENTATION RATE] IN BLOOD: 16 MM/HOUR (ref 0–20)
GFR SERPL CREATININE-BSD FRML MDRD: 92 ML/MIN/1.73SQ M
GLUCOSE P FAST SERPL-MCNC: 91 MG/DL (ref 65–99)
HCT VFR BLD AUTO: 38.7 % (ref 34.8–46.1)
HDLC SERPL-MCNC: 45 MG/DL (ref 40–60)
HGB BLD-MCNC: 12.7 G/DL (ref 11.5–15.4)
LDLC SERPL CALC-MCNC: 105 MG/DL (ref 0–100)
MCH RBC QN AUTO: 32.4 PG (ref 26.8–34.3)
MCHC RBC AUTO-ENTMCNC: 32.8 G/DL (ref 31.4–37.4)
MCV RBC AUTO: 99 FL (ref 82–98)
NONHDLC SERPL-MCNC: 145 MG/DL
PLATELET # BLD AUTO: 228 THOUSANDS/UL (ref 149–390)
PMV BLD AUTO: 10.2 FL (ref 8.9–12.7)
POTASSIUM SERPL-SCNC: 3.8 MMOL/L (ref 3.5–5.3)
PROT SERPL-MCNC: 6.7 G/DL (ref 6.4–8.2)
RBC # BLD AUTO: 3.92 MILLION/UL (ref 3.81–5.12)
SODIUM SERPL-SCNC: 143 MMOL/L (ref 136–145)
TRIGL SERPL-MCNC: 198 MG/DL
WBC # BLD AUTO: 4.76 THOUSAND/UL (ref 4.31–10.16)

## 2019-07-01 PROCEDURE — 86618 LYME DISEASE ANTIBODY: CPT

## 2019-07-01 PROCEDURE — 86430 RHEUMATOID FACTOR TEST QUAL: CPT

## 2019-07-01 PROCEDURE — 80053 COMPREHEN METABOLIC PANEL: CPT

## 2019-07-01 PROCEDURE — 82306 VITAMIN D 25 HYDROXY: CPT

## 2019-07-01 PROCEDURE — 86225 DNA ANTIBODY NATIVE: CPT

## 2019-07-01 PROCEDURE — 85652 RBC SED RATE AUTOMATED: CPT

## 2019-07-01 PROCEDURE — 80061 LIPID PANEL: CPT

## 2019-07-01 PROCEDURE — 85027 COMPLETE CBC AUTOMATED: CPT

## 2019-07-01 PROCEDURE — 86038 ANTINUCLEAR ANTIBODIES: CPT

## 2019-07-01 PROCEDURE — 86140 C-REACTIVE PROTEIN: CPT

## 2019-07-01 PROCEDURE — 36415 COLL VENOUS BLD VENIPUNCTURE: CPT

## 2019-07-02 LAB
DSDNA AB SER-ACNC: 6 IU/ML (ref 0–9)
RHEUMATOID FACT SER QL LA: NEGATIVE

## 2019-07-03 LAB
B BURGDOR IGG SER IA-ACNC: 0.07
B BURGDOR IGM SER IA-ACNC: 0.16
RYE IGE QN: NEGATIVE

## 2019-07-10 ENCOUNTER — APPOINTMENT (OUTPATIENT)
Dept: RADIOLOGY | Facility: CLINIC | Age: 64
End: 2019-07-10
Payer: COMMERCIAL

## 2019-07-10 ENCOUNTER — OFFICE VISIT (OUTPATIENT)
Dept: RHEUMATOLOGY | Facility: CLINIC | Age: 64
End: 2019-07-10
Payer: COMMERCIAL

## 2019-07-10 VITALS
DIASTOLIC BLOOD PRESSURE: 70 MMHG | HEART RATE: 81 BPM | HEIGHT: 60 IN | SYSTOLIC BLOOD PRESSURE: 110 MMHG | WEIGHT: 150 LBS | BODY MASS INDEX: 29.45 KG/M2

## 2019-07-10 DIAGNOSIS — Z92.241 HISTORY OF RECENT STEROID USE: ICD-10-CM

## 2019-07-10 DIAGNOSIS — R79.82 ELEVATED C-REACTIVE PROTEIN (CRP): ICD-10-CM

## 2019-07-10 DIAGNOSIS — M25.50 DIFFUSE ARTHRALGIA: ICD-10-CM

## 2019-07-10 DIAGNOSIS — Z87.09 HISTORY OF NASAL POLYPOSIS: ICD-10-CM

## 2019-07-10 DIAGNOSIS — M85.80 OSTEOPENIA, UNSPECIFIED LOCATION: ICD-10-CM

## 2019-07-10 DIAGNOSIS — E55.9 VITAMIN D INSUFFICIENCY: ICD-10-CM

## 2019-07-10 DIAGNOSIS — J45.20 INTERMITTENT ASTHMA WITHOUT COMPLICATION, UNSPECIFIED ASTHMA SEVERITY: ICD-10-CM

## 2019-07-10 DIAGNOSIS — M25.50 DIFFUSE ARTHRALGIA: Primary | ICD-10-CM

## 2019-07-10 PROCEDURE — 73630 X-RAY EXAM OF FOOT: CPT

## 2019-07-10 PROCEDURE — 73130 X-RAY EXAM OF HAND: CPT

## 2019-07-10 PROCEDURE — 99205 OFFICE O/P NEW HI 60 MIN: CPT | Performed by: INTERNAL MEDICINE

## 2019-07-10 RX ORDER — MELATONIN
4000 DAILY
COMMUNITY
End: 2021-12-02

## 2019-07-10 RX ORDER — ACETAMINOPHEN 325 MG/1
650 TABLET ORAL EVERY 6 HOURS PRN
COMMUNITY

## 2019-07-10 NOTE — PROGRESS NOTES
Assessment and Plan:   Ms Turk is a 17-year-old female with history significant for osteopenia, recurrent nasal polyposis, chronic sinus disease, and chronic steroid use, who presents for further evaluation of an elevated C reactive protein of 8, as well as diffuse joint pains  She is referred by Dr Sarah Green presents today for further evaluation of diffuse arthralgias, which have become more pronounced following the discontinuation of steroids that were primarily used for respiratory issues  Her significant response to the steroids may be indicative of an underlying inflammatory arthritis, but based on the description of her symptoms this does not sound particularly concerning, and I do not appreciate any evidence of synovitis on her physical examination today  Her serologies till date have been unrevealing, including an anti neutrophilic cytoplasmic antibody (given the history of asthma, recurrent sinus disease and nasal polyposis), but I would like to obtain the additional x-rays and labs as listed below to further evaluate  In the interim I advised her to continue Tylenol daily as needed, but to limit the dose to 3-4 g daily  We will discuss the results at the follow-up visit and determine if specific treatment is indicated for the joint pains     - In view of the significant respiratory issues, including asthma, recurrent sinus disease and nasal polyposis, this currently appears to be in remission after a challenge with aspirin, as well as with use of Fasenra  I did review the nasal polyp histopathology reports, which did not show any evidence for vasculitis, granulomas or necrosis, but 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  She does have a negative anti neutrophilic cytoplasmic antibody, but if there is a concern for an underlying condition such as eosinophilic granulomatosis with poly angiitis, the presence of this antibody would not be required, and is frequently not seen    She does not appear to have peripheral eosinophilia on her most recent CBC (has been present in the past), and there are no concerning findings noted on the recently done CT of her chest   In addition her asthma appears to be well controlled at this time, without requirement for ongoing steroid use  At this time clinically there does not appear to be concerns for a vasculitic process that could be associated with the upper respiratory tract issues that she has been dealing with  She will continue follow up with ENT  - I will plan to see her back in the office in 6 weeks to review the results  Plan:  Diagnoses and all orders for this visit:    Diffuse arthralgia  -     Cyclic citrul peptide antibody, IgG; Future  -     Sjogren's Antibodies; Future  -     IgG, IgA, IgM; Future  -     CK; Future  -     XR hand 3+ vw right; Future  -     XR hand 3+ vw left; Future  -     XR foot 3+ vw left; Future  -     XR foot 3+ vw right; Future  -     Urinalysis with microscopic  -     Protein / creatinine ratio, urine  -     C4 complement; Future  -     C3 complement; Future  -     CBC and differential; Future    Elevated C-reactive protein (CRP)    Vitamin D insufficiency    History of nasal polyposis  -     IgG 1, 2, 3, and 4; Future    History of recent steroid use    Intermittent asthma without complication, unspecified asthma severity    Osteopenia, unspecified location    Other orders  -     acetaminophen (TYLENOL) 325 mg tablet; Take 650 mg by mouth every 6 (six) hours as needed for mild pain  -     cholecalciferol (VITAMIN D3) 1,000 units tablet; Take 4,000 Units by mouth daily      Activities as tolerated    Diet: low carb/low fat, more greens/vegetables, adequate hydration  Exercise: try to maintain a low impact exercise regimen as much as possible  Walk for 30 minutes a day for at least 3 days a week    Encouraged to maintain good sleep hygiene    Continue other medications as prescribed by PCP and other specialists        RTC in 6 weeks  HPI  Ms Nadia Quintanilla is a 77-year-old female with history significant for osteopenia, recurrent nasal polyposis, chronic sinus disease, and chronic steroid use, who presents for further evaluation of an elevated C reactive protein of 8, as well as diffuse joint pains  She is referred by Dr Joel Yung  Patient states she may have had chronic sinus issues from her childhood, but approximately 8 years ago is when she started seeking medical attention and was diagnosed with chronic sinus disease with recurrent sinus infections, as well as nasal polyposis  She has undergone multiple nasal surgeries and also had testing for anti neutrophilic cytoplasmic antibody which was negative  She has also been dealing with recurrent asthma exacerbations which have required chronic steroids over the years  She was diagnosed with aspirin hypersensitivity, but has been challenged with aspirin, and states since doing this the nasal polyposis has decreased  She was also started on Fasenra injections which has significantly helped with the asthma flares  Since March 2019 she has not required steroid use  She reports approximately 1 and half to 2 years ago is when she started noticing diffuse joint pains mostly affecting her hands, elbows, shoulders, knees, ankles and feet  Her symptoms have been gradually progressive over time, and especially since March 2019 when the steroids were discontinued is when she has noticed a significant flare-up  She does feel like overall when she was on intermittent chronic steroids her joint pains were better  She experiences her symptoms on a daily basis in an intermittent manner  She is a hairdresser, 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  She has not noticed any obvious joint swelling  She does experience morning stiffness which affects her diffusely and lasts about 1 hour    She is currently managing her symptoms with Tylenol 6-8 tablets daily  She refrains from NSAID use in view of the history of aspirin sensitivity  She also reports a bump that she has noticed on her right arm just above her elbow, with a tightness sensation in her left arm just above her elbow  She does report vision changes as a result of macular degeneration  She reports chronic dry mouth  She does have ongoing occasional cough with shortness of breath  She reports a history of DVT during pregnancy, but otherwise there is no history of blood clots  She denies fevers, chills, night sweats, unintentional weight loss, hearing changes, skin rash, photosensitivity, hemoptysis, epistaxis, mouth/nose ulcers, swollen glands, pleuritic chest pain, vomiting, diarrhea, blood in stools, blood in urine, muscle weakness, focal weakness (wrist/foot drop), persistent numbness/tingling, or family history of autoimmune disease  She was seen by her primary care physician and had additional testing done which revealed a negative CARLOS, double-stranded DNA antibody, ESR, rheumatoid factor, Lyme antibody profile, CBC and CMP  Previous testing of anti neutrophilic cytoplasmic antibody, IgG levels and eosinophils was unremarkable  A CRP was elevated at 8 8  I did review the multiple images in our system, including a CT chest which did not show evidence of interstitial lung disease  There was mild perihilar bronchial wall thickening with areas of mucoid impaction that would be seen with reactive airway disease  A CT of her abdomen was unrevealing  She has previously had an MRI of her pelvis which did not show any evidence of inflammatory arthritis or involvement of the sacroiliac joints  I also reviewed the histopathology reports following the nasal polyp surgeries which showed degrees of chronic inflammation with eosinophilic infiltration, but without evidence of necrosis, vasculitis or necrotizing granulomas      The following portions of the patient's history were reviewed and updated as appropriate: allergies, current medications, past family history, past medical history, past social history, past surgical history and problem list       Review of Systems  Constitutional: Negative for fevers, chills, night sweats, fatigue  Positive for weight gain  ENT/Mouth: Negative for hearing changes, ear pain, sinus pain, hoarseness, sore throat, rhinorrhea, swallowing difficulty  Eyes: Negative for pain, redness, discharge  Positive for vision changes secondary to macular degeneration  Cardiovascular: Negative for chest pain  Positive for palpitations  Respiratory:  Positive for intermittent cough, sputum, wheezing, dyspnea  Gastrointestinal: Negative for nausea, vomiting, diarrhea, constipation, heartburn  Positive for intermittent abdominal pain  Genitourinary: Negative for dysuria, urinary frequency, hematuria  Musculoskeletal: As per HPI  Skin: Negative for skin rash, color changes  Neuro: Negative for weakness, tingling, loss of consciousness  Positive for headaches and occasional numbness  Psych: Negative for depression  Positive for anxiety  Heme/Lymph: Negative for easy bruising, bleeding, lymphadenopathy          Past Medical History:   Diagnosis Date    Anesthesia complication     desaturation of oxygen mostly at night- on O2 at 2L at hs-may wake up with asthma issue    Anxiety     Asthma     Argueta esophagus     DVT (deep venous thrombosis) (HCC)     during pregnancy    Hiatal hernia     Hypercholesteremia     Nasal polyps     Sinus disorder     Sinusitis, chronic        Past Surgical History:   Procedure Laterality Date     SECTION N/A     x 2    COLONOSCOPY      ESOPHAGOGASTRODUODENOSCOPY      HYSTERECTOMY      complete    NASAL POLYP SURGERY      x2    NASAL POLYP SURGERY  2018    VT COLSC FLX W/RMVL OF TUMOR POLYP LESION SNARE TQ N/A 2017    Procedure: COLONOSCOPYwith  snare polypectomy ;  Surgeon: Ruslan Hooper MD;  Location: [facility] [address] [facility]; Service: Gastroenterology    TX EGD TRANSORAL BIOPSY SINGLE/MULTIPLE N/A 6/6/2017    Procedure: ESOPHAGOGASTRODUODENOSCOPY (EGD)and biopsy ;  Surgeon: Ruslan Hooper MD;  Location: [facility] [address] [facility];   Service: Gastroenterology    TONSILLECTOMY N/A     VEIN LIGATION AND STRIPPING Bilateral        Social History     Socioeconomic History    Marital status: /Civil Union     Spouse name: Not on file    Number of children: Not on file    Years of education: Not on file    Highest education level: Not on file   Occupational History    Not on file   Social Needs    Financial resource strain: Not on file    Food insecurity:     Worry: Not on file     Inability: Not on file    Transportation needs:     Medical: Not on file     Non-medical: Not on file   Tobacco Use    Smoking status: Never Smoker    Smokeless tobacco: Never Used   Substance and Sexual Activity    Alcohol use: Yes     Comment: occassionally    Drug use: No    Sexual activity: Yes     Partners: Male     Birth control/protection: Female Sterilization   Lifestyle    Physical activity:     Days per week: Not on file     Minutes per session: Not on file    Stress: Not on file   Relationships    Social connections:     Talks on phone: Not on file     Gets together: Not on file     Attends [employer] service: Not on file     Active member of club or organization: Not on file     Attends meetings of clubs or organizations: Not on file     Relationship status: Not on file    Intimate partner violence:     Fear of current or ex partner: Not on file     Emotionally abused: Not on file     Physically abused: Not on file     Forced sexual activity: Not on file   Other Topics Concern    Not on file   Social History Narrative    Not on file       Family History   Problem Relation Age of Onset    Heart disease Mother         CHF    Breast cancer additional onset Mother 52    Breast cancer Mother 52    Dysphagia Father     Breast cancer Maternal Aunt     Breast cancer Paternal Aunt        Allergies   Allergen Reactions    Livalo [Pitavastatin] Hives and Shortness Of Breath     Nausea    Dust Mite Extract     Mold Extract [Trichophyton]     Cefditoren Pivoxil Hives     N/V       Current Outpatient Medications:     acetaminophen (TYLENOL) 325 mg tablet, Take 650 mg by mouth every 6 (six) hours as needed for mild pain, Disp: , Rfl:     albuterol (PROAIR HFA) 90 mcg/act inhaler, Inhale 2 puffs every 6 (six) hours as needed for wheezing, Disp: 8 5 g, Rfl: 5    ALPRAZolam (XANAX) 0 25 mg tablet, Take 0 25 mg by mouth as needed for anxiety , Disp: , Rfl:     aspirin 325 mg tablet, Take 325 mg by mouth 2 (two) times a day, Disp: , Rfl:     cetirizine (ZyrTEC) 10 mg tablet, Take 10 mg by mouth every morning  , Disp: , Rfl:     cholecalciferol (VITAMIN D3) 1,000 units tablet, Take 4,000 Units by mouth daily, Disp: , Rfl:     FASENRA, 30 mg every 56 days , Disp: , Rfl:     omeprazole (PriLOSEC) 20 mg delayed release capsule, Take 20 mg by mouth daily, Disp: , Rfl:     EPINEPHrine (EPIPEN) 0 3 mg/0 3 mL SOAJ, Inject 0 3 mL (0 3 mg total) into a muscle once for 1 dose, Disp: 0 3 mL, Rfl: 0    LACTOBACILLUS ACID-PECTIN PO, Take by mouth, Disp: , Rfl:     OXYGEN-HELIUM IN, Inhale 2 L at hs via N/C, Disp: , Rfl:     zileuton (ZYFLO CR) 600 MG, Take 1,200 mg by mouth 2 (two) times a day, Disp: , Rfl:       Objective:    Vitals:    07/10/19 1437   BP: 110/70   BP Location: Left arm   Patient Position: Sitting   Cuff Size: Standard   Pulse: 81   Weight: 68 kg (150 lb)   Height: 5' (1 524 m)       Physical Exam  General: Well appearing, well nourished, in no distress  Oriented x 3, normal mood and affect  Ambulating without difficulty  Skin: Good turgor, no rash, unusual bruising or prominent lesions  Hair: Normal texture and distribution  Nails: Normal color, no deformities    HEENT:  Head: Normocephalic, atraumatic  Eyes: Conjunctiva clear, sclera non-icteric, EOM intact  Nose: No external lesions, mucosa non-inflamed  Mouth: Mucous membranes moist, no mucosal lesions  Heart: RRR  Lungs: CTAB  Extremities: No amputations or deformities, cyanosis, edema  Musculoskeletal:   Hands - she has mild osteoarthritic changes present at her bilateral DIPs and PIPs  She is tender to palpation of various DIPs and PIPs bilaterally, but there is no soft tissue swelling noted  Her MCPs bilaterally are unremarkable  She is able to make full fists bilaterally  Wrists - she is tender to palpation bilaterally, but there is no soft tissue swelling or restriction in range of motion  Elbows - mild tenderness to palpation of right elbow  There is no soft tissue swelling or restriction in range of motion bilaterally  Shoulders - unremarkable  Knees and ankles - unremarkable  Feet - positive MTP squeeze test bilaterally  There is no enthesitis or dactylitis  12/18 positive fibromyalgia tender points  Neurologic: Alert and oriented  No focal neurological deficits appreciated  Psychiatric: Normal mood and affect  RENARD Lawrence    Rheumatology

## 2019-07-15 ENCOUNTER — APPOINTMENT (OUTPATIENT)
Dept: LAB | Facility: CLINIC | Age: 64
End: 2019-07-15
Payer: COMMERCIAL

## 2019-07-15 DIAGNOSIS — Z87.09 HISTORY OF NASAL POLYPOSIS: ICD-10-CM

## 2019-07-15 DIAGNOSIS — M25.50 DIFFUSE ARTHRALGIA: ICD-10-CM

## 2019-07-15 DIAGNOSIS — J45.50 SEVERE PERSISTENT ASTHMA, UNSPECIFIED WHETHER COMPLICATED: ICD-10-CM

## 2019-07-15 LAB
BACTERIA UR QL AUTO: ABNORMAL /HPF
BASOPHILS # BLD AUTO: 0 THOUSANDS/ΜL (ref 0–0.1)
BASOPHILS NFR BLD AUTO: 0 % (ref 0–1)
BILIRUB UR QL STRIP: NEGATIVE
C3 SERPL-MCNC: 110 MG/DL (ref 90–180)
C4 SERPL-MCNC: 24 MG/DL (ref 10–40)
CK SERPL-CCNC: 52 U/L (ref 26–192)
CLARITY UR: CLEAR
COLOR UR: YELLOW
CREAT UR-MCNC: 66.1 MG/DL
EOSINOPHIL # BLD AUTO: 0.02 THOUSAND/ΜL (ref 0–0.61)
EOSINOPHIL NFR BLD AUTO: 0 % (ref 0–6)
ERYTHROCYTE [DISTWIDTH] IN BLOOD BY AUTOMATED COUNT: 11.9 % (ref 11.6–15.1)
GLUCOSE UR STRIP-MCNC: NEGATIVE MG/DL
HCT VFR BLD AUTO: 39.4 % (ref 34.8–46.1)
HGB BLD-MCNC: 12.9 G/DL (ref 11.5–15.4)
HGB UR QL STRIP.AUTO: NEGATIVE
HYALINE CASTS #/AREA URNS LPF: ABNORMAL /LPF
IGA SERPL-MCNC: 126 MG/DL (ref 70–400)
IGG SERPL-MCNC: 659 MG/DL (ref 700–1600)
IGM SERPL-MCNC: 176 MG/DL (ref 40–230)
IMM GRANULOCYTES # BLD AUTO: 0.02 THOUSAND/UL (ref 0–0.2)
IMM GRANULOCYTES NFR BLD AUTO: 0 % (ref 0–2)
KETONES UR STRIP-MCNC: ABNORMAL MG/DL
LEUKOCYTE ESTERASE UR QL STRIP: ABNORMAL
LYMPHOCYTES # BLD AUTO: 2.3 THOUSANDS/ΜL (ref 0.6–4.47)
LYMPHOCYTES NFR BLD AUTO: 34 % (ref 14–44)
MCH RBC QN AUTO: 32 PG (ref 26.8–34.3)
MCHC RBC AUTO-ENTMCNC: 32.7 G/DL (ref 31.4–37.4)
MCV RBC AUTO: 98 FL (ref 82–98)
MONOCYTES # BLD AUTO: 0.47 THOUSAND/ΜL (ref 0.17–1.22)
MONOCYTES NFR BLD AUTO: 7 % (ref 4–12)
NEUTROPHILS # BLD AUTO: 3.95 THOUSANDS/ΜL (ref 1.85–7.62)
NEUTS SEG NFR BLD AUTO: 59 % (ref 43–75)
NITRITE UR QL STRIP: NEGATIVE
NON-SQ EPI CELLS URNS QL MICRO: ABNORMAL /HPF
NRBC BLD AUTO-RTO: 0 /100 WBCS
PH UR STRIP.AUTO: 5.5 [PH]
PLATELET # BLD AUTO: 219 THOUSANDS/UL (ref 149–390)
PMV BLD AUTO: 10.6 FL (ref 8.9–12.7)
PROT UR STRIP-MCNC: NEGATIVE MG/DL
PROT UR-MCNC: 13 MG/DL
PROT/CREAT UR: 0.2 MG/G{CREAT} (ref 0–0.1)
RBC # BLD AUTO: 4.03 MILLION/UL (ref 3.81–5.12)
RBC #/AREA URNS AUTO: ABNORMAL /HPF
SP GR UR STRIP.AUTO: 1.02 (ref 1–1.03)
UROBILINOGEN UR QL STRIP.AUTO: 0.2 E.U./DL
WBC # BLD AUTO: 6.76 THOUSAND/UL (ref 4.31–10.16)
WBC #/AREA URNS AUTO: ABNORMAL /HPF

## 2019-07-15 PROCEDURE — 86200 CCP ANTIBODY: CPT

## 2019-07-15 PROCEDURE — 82784 ASSAY IGA/IGD/IGG/IGM EACH: CPT

## 2019-07-15 PROCEDURE — 82550 ASSAY OF CK (CPK): CPT

## 2019-07-15 PROCEDURE — 82787 IGG 1 2 3 OR 4 EACH: CPT

## 2019-07-15 PROCEDURE — 86160 COMPLEMENT ANTIGEN: CPT

## 2019-07-15 PROCEDURE — 36415 COLL VENOUS BLD VENIPUNCTURE: CPT

## 2019-07-15 PROCEDURE — 86235 NUCLEAR ANTIGEN ANTIBODY: CPT

## 2019-07-15 PROCEDURE — 84156 ASSAY OF PROTEIN URINE: CPT | Performed by: INTERNAL MEDICINE

## 2019-07-15 PROCEDURE — 82570 ASSAY OF URINE CREATININE: CPT | Performed by: INTERNAL MEDICINE

## 2019-07-15 PROCEDURE — 85025 COMPLETE CBC W/AUTO DIFF WBC: CPT

## 2019-07-15 PROCEDURE — 81001 URINALYSIS AUTO W/SCOPE: CPT | Performed by: INTERNAL MEDICINE

## 2019-07-15 RX ORDER — EPINEPHRINE 0.3 MG/.3ML
0.3 INJECTION SUBCUTANEOUS ONCE
Qty: 0.3 ML | Refills: 0 | Status: SHIPPED | OUTPATIENT
Start: 2019-07-15 | End: 2020-07-02 | Stop reason: SDUPTHER

## 2019-07-16 ENCOUNTER — TRANSCRIBE ORDERS (OUTPATIENT)
Dept: ADMINISTRATIVE | Facility: HOSPITAL | Age: 64
End: 2019-07-16

## 2019-07-16 DIAGNOSIS — Z92.241 HISTORY OF RECENT STEROID USE: Primary | ICD-10-CM

## 2019-07-17 LAB
CCP IGA+IGG SERPL IA-ACNC: 2 UNITS (ref 0–19)
ENA SS-A AB SER-ACNC: <0.2 AI (ref 0–0.9)
ENA SS-B AB SER-ACNC: <0.2 AI (ref 0–0.9)

## 2019-07-18 LAB
IGG SERPL-MCNC: 597 MG/DL (ref 700–1600)
IGG1 SER-MCNC: 349 MG/DL (ref 248–810)
IGG2 SER-MCNC: 154 MG/DL (ref 130–555)
IGG3 SER-MCNC: 25 MG/DL (ref 15–102)
IGG4 SER-MCNC: 58 MG/DL (ref 2–96)

## 2019-07-24 ENCOUNTER — HOSPITAL ENCOUNTER (OUTPATIENT)
Dept: PULMONOLOGY | Facility: HOSPITAL | Age: 64
Discharge: HOME | End: 2019-07-24
Attending: INTERNAL MEDICINE
Payer: COMMERCIAL

## 2019-07-24 DIAGNOSIS — J45.40 MODERATE PERSISTENT ASTHMA WITHOUT COMPLICATION: ICD-10-CM

## 2019-07-24 PROCEDURE — 94060 EVALUATION OF WHEEZING: CPT | Performed by: INTERNAL MEDICINE

## 2019-07-24 PROCEDURE — 94726 PLETHYSMOGRAPHY LUNG VOLUMES: CPT

## 2019-07-24 PROCEDURE — 94760 N-INVAS EAR/PLS OXIMETRY 1: CPT

## 2019-07-24 PROCEDURE — 94060 EVALUATION OF WHEEZING: CPT

## 2019-07-24 PROCEDURE — 94729 DIFFUSING CAPACITY: CPT

## 2019-07-24 PROCEDURE — 94729 DIFFUSING CAPACITY: CPT | Performed by: INTERNAL MEDICINE

## 2019-07-24 PROCEDURE — 94726 PLETHYSMOGRAPHY LUNG VOLUMES: CPT | Performed by: INTERNAL MEDICINE

## 2019-07-24 RX ORDER — ALBUTEROL SULFATE 2.5 MG/3ML
2.5 SOLUTION RESPIRATORY (INHALATION) ONCE
Status: COMPLETED | OUTPATIENT
Start: 2019-07-24 | End: 2019-07-24

## 2019-07-24 RX ADMIN — ALBUTEROL SULFATE 2.5 MG: 2.5 SOLUTION RESPIRATORY (INHALATION) at 15:26

## 2019-07-26 NOTE — QUICK NOTE
I called Freddy Alvarado and left a message for her  I discussed that her PFTs has significantly improved since last year  I stated that they improved so much that according to just these PFTs is difficult to diagnose any obstructive lung disease  I told patient if she had any questions to please feel free and call the office back

## 2019-08-01 ENCOUNTER — TELEPHONE (OUTPATIENT)
Dept: PULMONOLOGY | Facility: CLINIC | Age: 64
End: 2019-08-01

## 2019-08-01 RX ORDER — EPINEPHRINE 1 MG/ML
0.3 INJECTION, SOLUTION, CONCENTRATE INTRAVENOUS AS NEEDED
Status: CANCELLED | OUTPATIENT
Start: 2019-08-07

## 2019-08-07 ENCOUNTER — HOSPITAL ENCOUNTER (OUTPATIENT)
Dept: INFUSION CENTER | Facility: CLINIC | Age: 64
Discharge: HOME | End: 2019-08-07
Payer: COMMERCIAL

## 2019-08-07 VITALS
SYSTOLIC BLOOD PRESSURE: 113 MMHG | RESPIRATION RATE: 19 BRPM | OXYGEN SATURATION: 97 % | TEMPERATURE: 96.9 F | DIASTOLIC BLOOD PRESSURE: 56 MMHG | HEART RATE: 71 BPM

## 2019-08-07 DIAGNOSIS — J45.40 MODERATE PERSISTENT ASTHMA WITHOUT COMPLICATION: Primary | ICD-10-CM

## 2019-08-07 PROCEDURE — 96372 THER/PROPH/DIAG INJ SC/IM: CPT

## 2019-08-07 RX ORDER — EPINEPHRINE 1 MG/ML
0.3 INJECTION, SOLUTION, CONCENTRATE INTRAVENOUS AS NEEDED
Status: DISCONTINUED | OUTPATIENT
Start: 2019-08-07 | End: 2019-08-10 | Stop reason: HOSPADM

## 2019-08-07 RX ORDER — TRAZODONE HYDROCHLORIDE 100 MG/1
100 TABLET ORAL
Refills: 0 | COMMUNITY
Start: 2019-07-23

## 2019-08-07 RX ORDER — ALPRAZOLAM 0.5 MG/1
TABLET ORAL AS NEEDED
Refills: 0 | COMMUNITY
Start: 2019-07-23

## 2019-08-07 RX ORDER — EPINEPHRINE 1 MG/ML
0.3 INJECTION, SOLUTION, CONCENTRATE INTRAVENOUS AS NEEDED
Status: CANCELLED | OUTPATIENT
Start: 2019-10-02

## 2019-08-07 RX ADMIN — BENRALIZUMAB 30 MG: 30 INJECTION, SOLUTION SUBCUTANEOUS at 09:53

## 2019-08-07 NOTE — PROGRESS NOTES
Pt resting comfortably  Epi pen exp date 7/20  Pt administered Shubham Coffer as ordered  Pt monitored for 30 min post injection  No signs/symptoms of reaction noted  Pt declines AVS and aware of next appointment

## 2019-08-21 ENCOUNTER — HOSPITAL ENCOUNTER (OUTPATIENT)
Dept: RADIOLOGY | Facility: HOSPITAL | Age: 64
Discharge: HOME | End: 2019-08-21
Payer: COMMERCIAL

## 2019-08-21 ENCOUNTER — OFFICE VISIT (OUTPATIENT)
Dept: RHEUMATOLOGY | Facility: CLINIC | Age: 64
End: 2019-08-21
Payer: COMMERCIAL

## 2019-08-21 VITALS
HEIGHT: 60 IN | WEIGHT: 145.2 LBS | BODY MASS INDEX: 28.51 KG/M2 | SYSTOLIC BLOOD PRESSURE: 118 MMHG | HEART RATE: 66 BPM | DIASTOLIC BLOOD PRESSURE: 66 MMHG

## 2019-08-21 DIAGNOSIS — J45.20 INTERMITTENT ASTHMA WITHOUT COMPLICATION, UNSPECIFIED ASTHMA SEVERITY: ICD-10-CM

## 2019-08-21 DIAGNOSIS — M85.80 OSTEOPENIA, UNSPECIFIED LOCATION: ICD-10-CM

## 2019-08-21 DIAGNOSIS — Z87.09 HISTORY OF NASAL POLYPOSIS: ICD-10-CM

## 2019-08-21 DIAGNOSIS — R79.82 ELEVATED C-REACTIVE PROTEIN (CRP): ICD-10-CM

## 2019-08-21 DIAGNOSIS — G89.29 CHRONIC LEFT SHOULDER PAIN: ICD-10-CM

## 2019-08-21 DIAGNOSIS — M79.7 FIBROMYALGIA: ICD-10-CM

## 2019-08-21 DIAGNOSIS — M25.50 DIFFUSE ARTHRALGIA: Primary | ICD-10-CM

## 2019-08-21 DIAGNOSIS — M25.512 CHRONIC LEFT SHOULDER PAIN: ICD-10-CM

## 2019-08-21 DIAGNOSIS — M19.041 PRIMARY OSTEOARTHRITIS OF BOTH HANDS: ICD-10-CM

## 2019-08-21 DIAGNOSIS — E55.9 VITAMIN D INSUFFICIENCY: ICD-10-CM

## 2019-08-21 DIAGNOSIS — M19.042 PRIMARY OSTEOARTHRITIS OF BOTH HANDS: ICD-10-CM

## 2019-08-21 PROCEDURE — 73030 X-RAY EXAM OF SHOULDER: CPT

## 2019-08-21 PROCEDURE — 99214 OFFICE O/P EST MOD 30 MIN: CPT | Performed by: INTERNAL MEDICINE

## 2019-08-21 RX ORDER — DULOXETIN HYDROCHLORIDE 60 MG/1
60 CAPSULE, DELAYED RELEASE ORAL DAILY
Qty: 30 CAPSULE | Refills: 2 | Status: SHIPPED | OUTPATIENT
Start: 2019-08-21 | End: 2019-09-27 | Stop reason: SINTOL

## 2019-08-21 RX ORDER — DULOXETIN HYDROCHLORIDE 30 MG/1
30 CAPSULE, DELAYED RELEASE ORAL DAILY
Qty: 7 CAPSULE | Refills: 0 | Status: SHIPPED | OUTPATIENT
Start: 2019-08-21 | End: 2019-09-27 | Stop reason: SINTOL

## 2019-08-21 NOTE — PROGRESS NOTES
Assessment and Plan:   Ms Sun Rivera is a 69-year-old female with history significant for osteopenia, recurrent nasal polyposis, chronic sinus disease, and history of chronic steroid use, who presents for follow up of an elevated C reactive protein of 8, as well as diffuse arthralgias  Mich Torres presents today for follow-up of diffuse arthralgias, which have become more pronounced following the discontinuation of steroids that were primarily used for respiratory issues and a history of asthma  Although she did experience significant improvement of her joint pains with the prednisone, it did not eliminate her arthralgias entirely  She is currently trying to manage her symptoms with aspirin twice daily as well as Tylenol 6-8 times daily, but she continues to remain symptomatic     - I do not appreciate any evidence of synovitis on her physical examination, and at this time I suspect her symptoms are more closely related to osteoarthritis or fibromyalgia/chronic pain syndrome as opposed to an inflammatory arthritis  For now I would like to proceed with treatment for fibromyalgia, and offered her initiation of Cymbalta 60 mg daily  We will monitor her response with this medication, and if no improvement is noted in her symptoms we can always re-evaluate for an inflammatory arthritis at follow-up visits  For the bilateral hand CMC osteoarthritis I have referred her to our hand surgeon  - In addition I did discuss with her my concerns for a vasculitic conditions such as eosinophilic granulomatosis with poly angiitis, but my suspicion for this diagnosis is low as there is no ongoing peripheral eosinophilia, and there has also been stability noted in the nasal polyposis as well as with the asthma    In addition, there are no features at this time suggestive or concerning for an active vasculitis based on her symptoms or physical examination      - In view of the significant respiratory issues, including asthma, recurrent sinus disease and nasal polyposis, this currently appears to be in remission after a challenge with aspirin, as well as with use of Fasenra  I did review the nasal polyp histopathology reports, which did not show any evidence for vasculitis, granulomas or necrosis, but 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  She does have a negative anti neutrophilic cytoplasmic antibody, but if there is a concern for an underlying condition such as eosinophilic granulomatosis with poly angiitis, the presence of this antibody would not be required, and is frequently not seen  She does not appear to have peripheral eosinophilia on her most recent CBC (has been present in the past), and there are no concerning findings noted on the recently done CT of her chest   In addition her asthma appears to be well controlled at this time, without requirement for ongoing steroid use  At this time clinically there does not appear to be concerns for a vasculitic process that could be associated with the upper respiratory tract issues that she has been dealing with  She will continue follow up with ENT     - I will plan to see her back in the office in 3 months to assess her response to the Cymbalta, and determine if at any time a re-evaluation of her symptoms is indicated  Plan:  Diagnoses and all orders for this visit:    Diffuse arthralgia  -     DULoxetine (CYMBALTA) 30 mg delayed release capsule; Take 1 capsule (30 mg total) by mouth daily  -     DULoxetine (CYMBALTA) 60 mg delayed release capsule; Take 1 capsule (60 mg total) by mouth daily    Elevated C-reactive protein (CRP)    Vitamin D insufficiency    History of nasal polyposis    Intermittent asthma without complication, unspecified asthma severity    Osteopenia, unspecified location    Primary osteoarthritis of both hands  -     Ambulatory referral to Orthopedic Surgery; Future    Fibromyalgia  -     DULoxetine (CYMBALTA) 30 mg delayed release capsule;  Take 1 capsule (30 mg total) by mouth daily  -     DULoxetine (CYMBALTA) 60 mg delayed release capsule; Take 1 capsule (60 mg total) by mouth daily    Chronic left shoulder pain  -     XR shoulder 2+ vw left; Future      Activities as tolerated    Diet: low carb/low fat, more greens/vegetables, adequate hydration  Exercise: try to maintain a low impact exercise regimen as much as possible  Walk for 30 minutes a day for at least 3 days a week    Encouraged to maintain good sleep hygiene  Continue other medications as prescribed by PCP and other specialists        RTC in 3 months  HPI    INITIAL VISIT NOTE:  Ms Keira Salamanca is a 77-year-old female with history significant for osteopenia, recurrent nasal polyposis, chronic sinus disease, and chronic steroid use, who presents for further evaluation of an elevated C reactive protein of 8, as well as diffuse joint pains  She is referred by Dr Rasheeda Frederick      Patient states she may have had chronic sinus issues from her childhood, but approximately 8 years ago is when she started seeking medical attention and was diagnosed with chronic sinus disease with recurrent sinus infections, as well as nasal polyposis  She has undergone multiple nasal surgeries and also had testing for anti neutrophilic cytoplasmic antibody which was negative  She has also been dealing with recurrent asthma exacerbations which have required chronic steroids over the years  She was diagnosed with aspirin hypersensitivity, but has been challenged with aspirin, and states since doing this the nasal polyposis has decreased  She was also started on Fasenra injections which has significantly helped with the asthma flares  Since March 2019 she has not required steroid use      She reports approximately 1 and half to 2 years ago is when she started noticing diffuse joint pains mostly affecting her hands, elbows, shoulders, knees, ankles and feet    Her symptoms have been gradually progressive over time, and especially since March 2019 when the steroids were discontinued is when she has noticed a significant flare-up  She does feel like overall when she was on intermittent chronic steroids her joint pains were better  She experiences her symptoms on a daily basis in an intermittent manner  She is a hairdresser, 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  She has not noticed any obvious joint swelling  She does experience morning stiffness which affects her diffusely and lasts about 1 hour  She is currently managing her symptoms with Tylenol 6-8 tablets daily  She refrains from NSAID use in view of the history of aspirin sensitivity  She also reports a bump that she has noticed on her right arm just above her elbow, with a tightness sensation in her left arm just above her elbow      She does report vision changes as a result of macular degeneration  She reports chronic dry mouth  She does have ongoing occasional cough with shortness of breath  She reports a history of DVT during pregnancy, but otherwise there is no history of blood clots  She denies fevers, chills, night sweats, unintentional weight loss, hearing changes, skin rash, photosensitivity, hemoptysis, epistaxis, mouth/nose ulcers, swollen glands, pleuritic chest pain, vomiting, diarrhea, blood in stools, blood in urine, muscle weakness, focal weakness (wrist/foot drop), persistent numbness/tingling, or family history of autoimmune disease      She was seen by her primary care physician and had additional testing done which revealed a negative CARLOS, double-stranded DNA antibody, ESR, rheumatoid factor, Lyme antibody profile, CBC and CMP  Previous testing of anti neutrophilic cytoplasmic antibody, IgG levels and eosinophils was unremarkable  A CRP was elevated at 8 8      I did review the multiple images in our system, including a CT chest which did not show evidence of interstitial lung disease    There was mild perihilar bronchial wall thickening with areas of mucoid impaction that would be seen with reactive airway disease  A CT of her abdomen was unrevealing  She has previously had an MRI of her pelvis which did not show any evidence of inflammatory arthritis or involvement of the sacroiliac joints  I also reviewed the histopathology reports following the nasal polyp surgeries which showed degrees of chronic inflammation with eosinophilic infiltration, but without evidence of necrosis, vasculitis or necrotizing granulomas        8/21/2019:  Patient presents for a follow-up today  We reviewed her recently done labs which showed a urine protein creatinine ratio of 0 2  There were no RBCs seen on her urinalysis  IgG levels were borderline low at 659  Anti CCP antibody, Sjogren's antibodies, CK, C3, C4, CBC and IgG subclasses were normal   X-rays of her hands showed moderate degenerative changes at the bilateral CMC joints and the 1st interphalangeal joints  X-rays of her feet did not show any significant degenerative changes  Patient reports since the prior office visit her only new complaint is presence of left shoulder pain  She is still describing the band like sensation surrounding her left proximal arm, as well as concern for bumps that she has been feeling on the inner aspect of her right proximal arm  Otherwise she continues to experience pain affecting her hands, knees, ankles and feet  She has not really noticed any joint swelling  She does experience morning stiffness which affects her diffusely and lasts about 1 hour  She continues to take Tylenol 6-8 tablets daily, as well as aspirin twice daily  She states nothing really helps with relieving her joint pains entirely  Even while she was on the prednisone it helped her significantly, but did not eliminate her joint pains  She reports the asthma and nasal polyposis is currently stable  No new complaints noted otherwise      The following portions of the patient's history were reviewed and updated as appropriate: allergies, current medications, past family history, past medical history, past social history, past surgical history and problem list       Review of Systems  Constitutional: Negative for weight change, fevers, chills, night sweats  Positive for fatigue  ENT/Mouth: Negative for hearing changes, ear pain, sinus pain, hoarseness, sore throat, rhinorrhea, swallowing difficulty  Positive for nasal congestion  Positive for occasional mouth sores  Eyes: Negative for pain, redness, discharge  Positive for dry eyes and vision changes  Cardiovascular: Negative for chest pain, SOB, palpitations  Respiratory: Negative for sputum, wheezing, dyspnea  Positive for cough  Gastrointestinal: Negative for nausea, vomiting, diarrhea, constipation, pain, heartburn  Genitourinary: Negative for dysuria, urinary frequency, hematuria  Musculoskeletal: As per HPI  Skin: Negative for skin rash, color changes  Neuro: Negative for numbness, tingling, loss of consciousness  Positive for headaches and weakness  Psych: Positive for anxiety, depression  Heme/Lymph: Negative for easy bruising, bleeding, lymphadenopathy  Past Medical History:   Diagnosis Date    Anesthesia complication     desaturation of oxygen mostly at night- on O2 at 2L at hs-may wake up with asthma issue    Anxiety     Asthma     Argueta esophagus     DVT (deep venous thrombosis) (HCC)     during pregnancy    Hiatal hernia     Hypercholesteremia     Nasal polyps     Sinus disorder     Sinusitis, chronic        Past Surgical History:   Procedure Laterality Date     SECTION N/A     x 2    COLONOSCOPY      ESOPHAGOGASTRODUODENOSCOPY      HYSTERECTOMY      complete    NASAL POLYP SURGERY      x2    NASAL POLYP SURGERY  2018    [place] COLSC FLX W/RMVL OF TUMOR POLYP LESION SNARE TQ N/A 2017    Procedure: COLONOSCOPYwith  snare polypectomy  ; Surgeon: Reese Godfrey MD;  Location: [facility]; Service: Gastroenterology    [place] EGD TRANSORAL BIOPSY SINGLE/MULTIPLE N/A 6/6/2017    Procedure: ESOPHAGOGASTRODUODENOSCOPY (EGD)and biopsy ;  Surgeon: Reese Godfrey MD;  Location: [facility];   Service: Gastroenterology    TONSILLECTOMY N/A     VEIN LIGATION AND STRIPPING Bilateral        Social History     Socioeconomic History    Marital status: /Civil Union     Spouse name: Not on file    Number of children: Not on file    Years of education: Not on file    Highest education level: Not on file   Occupational History    Not on file   Social Needs    Financial resource strain: Not on file    Food insecurity:     Worry: Not on file     Inability: Not on file    Transportation needs:     Medical: Not on file     Non-medical: Not on file   Tobacco Use    Smoking status: Never Smoker    Smokeless tobacco: Never Used   Substance and Sexual Activity    Alcohol use: Yes     Frequency: 2-4 times a month     Drinks per session: 1 or 2    Drug use: No    Sexual activity: Yes     Partners: Male     Birth control/protection: Female Sterilization   Lifestyle    Physical activity:     Days per week: Not on file     Minutes per session: Not on file    Stress: Not on file   Relationships    Social connections:     Talks on phone: Not on file     Gets together: Not on file     Attends Sikhism service: Not on file     Active member of club or organization: Not on file     Attends meetings of clubs or organizations: Not on file     Relationship status: Not on file    Intimate partner violence:     Fear of current or ex partner: Not on file     Emotionally abused: Not on file     Physically abused: Not on file     Forced sexual activity: Not on file   Other Topics Concern    Not on file   Social History Narrative    Not on file       Family History   Problem Relation Age of Onset    Heart disease Mother         CHF    Breast cancer additional onset Mother 52    Breast cancer Mother 52    Dysphagia Father     Breast cancer Maternal Aunt     Breast cancer Paternal Aunt        Allergies   Allergen Reactions    Livalo [Pitavastatin] Hives and Shortness Of Breath     Nausea    Dust Mite Extract     Mold Extract [Trichophyton]     Cefditoren Pivoxil Hives     N/V       Current Outpatient Medications:     acetaminophen (TYLENOL) 325 mg tablet, Take 650 mg by mouth every 6 (six) hours as needed for mild pain, Disp: , Rfl:     albuterol (PROAIR HFA) 90 mcg/act inhaler, Inhale 2 puffs every 6 (six) hours as needed for wheezing, Disp: 8 5 g, Rfl: 5    ALPRAZolam (XANAX) 0 5 mg tablet, , Disp: , Rfl: 0    aspirin 325 mg tablet, Take 325 mg by mouth 2 (two) times a day, Disp: , Rfl:     cetirizine (ZyrTEC) 10 mg tablet, Take 10 mg by mouth every morning  , Disp: , Rfl:     cholecalciferol (VITAMIN D3) 1,000 units tablet, Take 4,000 Units by mouth daily, Disp: , Rfl:     FASENRA, 30 mg every 56 days , Disp: , Rfl:     LACTOBACILLUS ACID-PECTIN PO, Take by mouth, Disp: , Rfl:     omeprazole (PriLOSEC) 20 mg delayed release capsule, Take 20 mg by mouth daily, Disp: , Rfl:     OXYGEN-HELIUM IN, Inhale 2 L at hs via N/C, Disp: , Rfl:     traZODone (DESYREL) 100 mg tablet, , Disp: , Rfl: 0    zileuton (ZYFLO CR) 600 MG, Take 1,200 mg by mouth 2 (two) times a day, Disp: , Rfl:     DULoxetine (CYMBALTA) 30 mg delayed release capsule, Take 1 capsule (30 mg total) by mouth daily, Disp: 7 capsule, Rfl: 0    DULoxetine (CYMBALTA) 60 mg delayed release capsule, Take 1 capsule (60 mg total) by mouth daily, Disp: 30 capsule, Rfl: 2    EPINEPHrine (EPIPEN) 0 3 mg/0 3 mL SOAJ, Inject 0 3 mL (0 3 mg total) into a muscle once for 1 dose, Disp: 0 3 mL, Rfl: 0      Objective:    Vitals:    08/21/19 1048   BP: 118/66   Pulse: 66   Weight: 65 9 kg (145 lb 3 2 oz)   Height: 5' (1 524 m)       Physical Exam  General: Well appearing, well nourished, in no distress  Oriented x 3, normal mood and affect  Ambulating without difficulty  Skin: Good turgor, no rash, unusual bruising or prominent lesions  Hair: Normal texture and distribution  Nails: Normal color, no deformities  HEENT:  Head: Normocephalic, atraumatic  Eyes: Conjunctiva clear, sclera non-icteric, EOM intact  Nose: No external lesions, mucosa non-inflamed  Mouth: Mucous membranes moist, no mucosal lesions  Neck: Supple, thyroid non-enlarged and non-tender  No lymphadenopathy  Extremities: No amputations or deformities, cyanosis, edema  Musculoskeletal:   Hands - there is mild squaring present at her bilateral CMC joints  There is no tenderness or soft tissue swelling to her bilateral PIPs or MCPs  Wrists and elbows - unremarkable  Shoulders - discomfort to manipulation of the left shoulder  She is also tender to palpation in her mid proximal arm  Knees - unremarkable  Ankles - tender to palpation of the left ankle  There is no soft tissue swelling noted  Feet - mildly positive MTP squeeze test bilaterally  6/18 positive fibromyalgia tender points  Neurologic: Alert and oriented  No focal neurological deficits appreciated  Psychiatric: Normal mood and affect  RENARD Reyes    Rheumatology

## 2019-08-26 ENCOUNTER — TRANSCRIBE ORDERS (OUTPATIENT)
Dept: ADMINISTRATIVE | Facility: HOSPITAL | Age: 64
End: 2019-08-26

## 2019-09-09 DIAGNOSIS — J44.9 CHRONIC OBSTRUCTIVE PULMONARY DISEASE, UNSPECIFIED COPD TYPE (HCC): Primary | ICD-10-CM

## 2019-09-09 RX ORDER — ALBUTEROL SULFATE 2.5 MG/3ML
2.5 SOLUTION RESPIRATORY (INHALATION) EVERY 6 HOURS PRN
Qty: 120 VIAL | Refills: 5 | Status: SHIPPED | OUTPATIENT
Start: 2019-09-09 | End: 2020-07-02 | Stop reason: SDUPTHER

## 2019-09-18 ENCOUNTER — HOSPITAL ENCOUNTER (OUTPATIENT)
Dept: RADIOLOGY | Facility: HOSPITAL | Age: 64
Discharge: HOME | End: 2019-09-18
Attending: FAMILY MEDICINE
Payer: COMMERCIAL

## 2019-09-18 DIAGNOSIS — Z92.241 HISTORY OF RECENT STEROID USE: ICD-10-CM

## 2019-09-18 PROCEDURE — 77080 DXA BONE DENSITY AXIAL: CPT

## 2019-09-26 RX ORDER — PROMETHAZINE HYDROCHLORIDE AND CODEINE PHOSPHATE 6.25; 1 MG/5ML; MG/5ML
SYRUP ORAL
Refills: 0 | COMMUNITY
Start: 2019-09-04 | End: 2020-07-02 | Stop reason: SDUPTHER

## 2019-09-26 RX ORDER — AZITHROMYCIN 500 MG/1
500 TABLET, FILM COATED ORAL DAILY
Refills: 0 | COMMUNITY
Start: 2019-09-04 | End: 2020-02-28 | Stop reason: ALTCHOICE

## 2019-09-26 RX ORDER — BUDESONIDE 0.5 MG/2ML
INHALANT ORAL
Refills: 1 | COMMUNITY
Start: 2019-08-26 | End: 2020-07-02 | Stop reason: SDUPTHER

## 2019-09-26 RX ORDER — MONTELUKAST SODIUM 10 MG/1
10 TABLET ORAL
Refills: 0 | COMMUNITY
Start: 2019-09-12 | End: 2020-07-02 | Stop reason: SDUPTHER

## 2019-09-26 RX ORDER — MOMETASONE FUROATE 220 UG/1
INHALANT RESPIRATORY (INHALATION)
Refills: 0 | COMMUNITY
Start: 2019-09-12 | End: 2020-07-02 | Stop reason: ALTCHOICE

## 2019-09-27 ENCOUNTER — TELEPHONE (OUTPATIENT)
Dept: PULMONOLOGY | Facility: CLINIC | Age: 64
End: 2019-09-27

## 2019-09-27 ENCOUNTER — HOSPITAL ENCOUNTER (OUTPATIENT)
Dept: RADIOLOGY | Facility: HOSPITAL | Age: 64
Discharge: HOME | End: 2019-09-27
Attending: INTERNAL MEDICINE
Payer: COMMERCIAL

## 2019-09-27 ENCOUNTER — OFFICE VISIT (OUTPATIENT)
Dept: PULMONOLOGY | Facility: CLINIC | Age: 64
End: 2019-09-27
Payer: COMMERCIAL

## 2019-09-27 VITALS
BODY MASS INDEX: 28.07 KG/M2 | HEIGHT: 60 IN | OXYGEN SATURATION: 94 % | HEART RATE: 95 BPM | DIASTOLIC BLOOD PRESSURE: 70 MMHG | RESPIRATION RATE: 18 BRPM | WEIGHT: 143 LBS | TEMPERATURE: 97.8 F | SYSTOLIC BLOOD PRESSURE: 132 MMHG

## 2019-09-27 DIAGNOSIS — J45.30 MILD PERSISTENT ASTHMA WITHOUT COMPLICATION: ICD-10-CM

## 2019-09-27 DIAGNOSIS — J45.41 MODERATE PERSISTENT ASTHMA WITH ACUTE EXACERBATION: ICD-10-CM

## 2019-09-27 DIAGNOSIS — K21.9 GASTROESOPHAGEAL REFLUX DISEASE WITHOUT ESOPHAGITIS: ICD-10-CM

## 2019-09-27 DIAGNOSIS — J45.41 MODERATE PERSISTENT ASTHMA WITH ACUTE EXACERBATION: Primary | ICD-10-CM

## 2019-09-27 PROCEDURE — 99214 OFFICE O/P EST MOD 30 MIN: CPT | Performed by: INTERNAL MEDICINE

## 2019-09-27 PROCEDURE — 71046 X-RAY EXAM CHEST 2 VIEWS: CPT

## 2019-09-27 RX ORDER — OMEPRAZOLE 20 MG/1
20 CAPSULE, DELAYED RELEASE ORAL DAILY
Qty: 30 CAPSULE | Refills: 5 | Status: SHIPPED | OUTPATIENT
Start: 2019-09-27 | End: 2019-12-05 | Stop reason: DRUGHIGH

## 2019-09-27 RX ORDER — BENZONATATE 100 MG/1
100 CAPSULE ORAL 3 TIMES DAILY PRN
Qty: 30 CAPSULE | Refills: 0 | Status: SHIPPED | OUTPATIENT
Start: 2019-09-27 | End: 2019-11-04 | Stop reason: CLARIF

## 2019-09-27 RX ORDER — ALBUTEROL SULFATE 90 UG/1
2 AEROSOL, METERED RESPIRATORY (INHALATION) EVERY 6 HOURS PRN
Qty: 8.5 G | Refills: 5 | Status: SHIPPED | OUTPATIENT
Start: 2019-09-27 | End: 2021-10-25 | Stop reason: SDUPTHER

## 2019-09-27 RX ORDER — PREDNISONE 10 MG/1
TABLET ORAL
Qty: 30 TABLET | Refills: 0 | Status: SHIPPED | OUTPATIENT
Start: 2019-09-27 | End: 2020-02-28 | Stop reason: ALTCHOICE

## 2019-09-27 NOTE — ASSESSMENT & PLAN NOTE
She will continue with omeprazole once daily  She is scheduled for an EGD on October 30th, but understands that the procedure should be postponed if she still does not have good asthma control

## 2019-09-27 NOTE — ASSESSMENT & PLAN NOTE
It seemed as if the  Aspirin desensitization, inhaler therapy and Seleta Font  was helping to control her asthma  Unfortunately, she is now having an exacerbation  This may be related to the recent change in weather and/or acute bronchitis  She has significant wheezing and I feel the only way to improve her situation is to initiate a prednisone taper  She hesitantly agrees  We will start with 40 mg daily and taper by 10 mg every 3 days  With her persistent cough, she will also undergo chest x-ray to exclude underlying infiltrate  She completed a course of azithromycin  I will hold off on additional antibiotics for now  I prescribed Tessalon to help with symptom control of the cough as well

## 2019-09-27 NOTE — PROGRESS NOTES
Pulmonary Follow Up Note   Keli El 59 y o  female MRN: [id number]  9/27/2019      Assessment/Plan: Moderate persistent asthma with acute exacerbation    It seemed as if the  Aspirin desensitization, inhaler therapy and Clay Jorgensen  was helping to control her asthma  Unfortunately, she is now having an exacerbation  This may be related to the recent change in weather and/or acute bronchitis  She has significant wheezing and I feel the only way to improve her situation is to initiate a prednisone taper  She hesitantly agrees  We will start with 40 mg daily and taper by 10 mg every 3 days  With her persistent cough, she will also undergo chest x-ray to exclude underlying infiltrate  She completed a course of azithromycin  I will hold off on additional antibiotics for now  I prescribed Tessalon to help with symptom control of the cough as well  Gastroesophageal reflux disease without esophagitis    She will continue with omeprazole once daily  She is scheduled for an EGD on October 30th, but understands that the procedure should be postponed if she still does not have good asthma control  Visit orders:    Diagnoses and all orders for this visit:    Moderate persistent asthma with acute exacerbation  -     XR chest pa & lateral; Future  -     predniSONE 10 mg tablet; 4 tabs daily x 3 D then 3 tabs daily x 3 D then 2 tabs daily x 3 D then 1 tab daily x 3 D  -     benzonatate (TESSALON PERLES) 100 mg capsule; Take 1 capsule (100 mg total) by mouth 3 (three) times a day as needed for cough    Gastroesophageal reflux disease without esophagitis  -     omeprazole (PriLOSEC) 20 mg delayed release capsule; Take 1 capsule (20 mg total) by mouth daily    Other orders  -     promethazine-codeine (PHENERGAN WITH CODEINE) 6 25-10 mg/5 mL syrup; take 10 milliliters by mouth every 4 to 6 hours if needed for cough  -     montelukast (SINGULAIR) 10 mg tablet;  Take 10 mg by mouth daily at bedtime  -     ASMANEX, 30 METERED DOSES, 220 MCG/INH inhaler  -     azithromycin (ZITHROMAX) 500 MG tablet; Take 500 mg by mouth daily  -     budesonide (PULMICORT) 0 5 mg/2 mL nebulizer solution        No follow-ups on file  History of Present Illness   HPI:  Gilmer Bernheim is a 59 y o  female whoIs here today for follow-up regarding moderate persistent asthma  Over the past few weeks, she has increased cough, congestion and wheezing  She is using her rescue inhaler several times per day  She has been getting Josias Mole  And initially felt that it was beneficial, but is now questioning whether some of her symptoms could be related to the medication  She is having subjective fevers  She recently saw her primary care physician who prescribed a course of azithromycin  Despite completing the antibiotic, she continues to feel poorly  She has significant chest tightness and wheeze  She complains of fatigue  Her cough is nonproductive  The cough is keeping her up at night  She denies significant postnasal drip  Review of Systems   Constitutional: Positive for fatigue and fever  Negative for chills and unexpected weight change  HENT: Negative for postnasal drip and sore throat  Eyes: Negative for visual disturbance  Respiratory:        As noted in HPI   Cardiovascular: Negative for chest pain  Gastrointestinal: Negative for abdominal pain, diarrhea and vomiting  Genitourinary: Negative for difficulty urinating  Musculoskeletal: Positive for arthralgias and myalgias  Skin: Negative for rash  Neurological: Negative for headaches  Hematological: Negative for adenopathy  Psychiatric/Behavioral: Negative  All other systems reviewed and are negative          Historical Information   Past Medical History:   Diagnosis Date    Anesthesia complication     desaturation of oxygen mostly at night- on O2 at 2L at hs-may wake up with asthma issue    Anxiety     Asthma     Argueta esophagus     DVT (deep venous thrombosis) ([facility] [address] )     during pregnancy    Hiatal hernia     Hypercholesteremia     Nasal polyps     Sinus disorder     Sinusitis, chronic      Past Surgical History:   Procedure Laterality Date     SECTION N/A     x 2    COLONOSCOPY      ESOPHAGOGASTRODUODENOSCOPY      HYSTERECTOMY      complete    NASAL POLYP SURGERY      x2    NASAL POLYP SURGERY  2018    GA COLSC FLX W/RMVL OF TUMOR POLYP LESION SNARE TQ N/A 2017    Procedure: COLONOSCOPYwith  snare polypectomy ;  Surgeon: Anupam Narayan MD;  Location: [facility]; Service: Gastroenterology    GA EGD TRANSORAL BIOPSY SINGLE/MULTIPLE N/A 2017    Procedure: ESOPHAGOGASTRODUODENOSCOPY (EGD)and biopsy ;  Surgeon: Anupam Narayan MD;  Location: [facility];   Service: Gastroenterology    TONSILLECTOMY N/A     VEIN LIGATION AND STRIPPING Bilateral      Family History   Problem Relation Age of Onset    Heart disease Mother         CHF    Breast cancer additional onset Mother 54    Breast cancer Mother 52    Dysphagia Father     Breast cancer Maternal Aunt     Breast cancer Paternal Aunt        Social History     Tobacco Use   Smoking Status Never Smoker   Smokeless Tobacco Never Used         Meds/Allergies     Current Outpatient Medications:     acetaminophen (TYLENOL) 325 mg tablet, Take 650 mg by mouth every 6 (six) hours as needed for mild pain, Disp: , Rfl:     albuterol (2 5 mg/3 mL) 0 083 % nebulizer solution, Take 1 vial (2 5 mg total) by nebulization every 6 (six) hours as needed for wheezing or shortness of breath, Disp: 120 vial, Rfl: 5    albuterol (PROAIR HFA) 90 mcg/act inhaler, Inhale 2 puffs every 6 (six) hours as needed for wheezing, Disp: 8 5 g, Rfl: 5    ALPRAZolam (XANAX) 0 5 mg tablet, , Disp: , Rfl: 0    aspirin 325 mg tablet, Take 325 mg by mouth 2 (two) times a day, Disp: , Rfl:     budesonide (PULMICORT) 0 5 mg/2 mL nebulizer solution, , Disp: , Rfl: 1    cetirizine (ZyrTEC) 10 mg tablet, Take 10 mg by mouth every morning  , Disp: , Rfl:     cholecalciferol (VITAMIN D3) 1,000 units tablet, Take 4,000 Units by mouth daily, Disp: , Rfl:     FASENRA, 30 mg every 56 days , Disp: , Rfl:     LACTOBACILLUS ACID-PECTIN PO, Take by mouth, Disp: , Rfl:     montelukast (SINGULAIR) 10 mg tablet, Take 10 mg by mouth daily at bedtime, Disp: , Rfl: 0    OXYGEN-HELIUM IN, Inhale 2 L at hs via N/C, Disp: , Rfl:     ASMANEX, 30 METERED DOSES, 220 MCG/INH inhaler, , Disp: , Rfl: 0    azithromycin (ZITHROMAX) 500 MG tablet, Take 500 mg by mouth daily, Disp: , Rfl: 0    benzonatate (TESSALON PERLES) 100 mg capsule, Take 1 capsule (100 mg total) by mouth 3 (three) times a day as needed for cough, Disp: 30 capsule, Rfl: 0    EPINEPHrine (EPIPEN) 0 3 mg/0 3 mL SOAJ, Inject 0 3 mL (0 3 mg total) into a muscle once for 1 dose, Disp: 0 3 mL, Rfl: 0    omeprazole (PriLOSEC) 20 mg delayed release capsule, Take 20 mg by mouth daily, Disp: , Rfl:     omeprazole (PriLOSEC) 20 mg delayed release capsule, Take 1 capsule (20 mg total) by mouth daily, Disp: 30 capsule, Rfl: 5    predniSONE 10 mg tablet, 4 tabs daily x 3 D then 3 tabs daily x 3 D then 2 tabs daily x 3 D then 1 tab daily x 3 D, Disp: 30 tablet, Rfl: 0    promethazine-codeine (PHENERGAN WITH CODEINE) 6 25-10 mg/5 mL syrup, take 10 milliliters by mouth every 4 to 6 hours if needed for cough, Disp: , Rfl: 0    traZODone (DESYREL) 100 mg tablet, , Disp: , Rfl: 0    zileuton (ZYFLO CR) 600 MG, Take 1,200 mg by mouth 2 (two) times a day, Disp: , Rfl:   Allergies   Allergen Reactions    Livalo [Pitavastatin] Hives and Shortness Of Breath     Nausea    Dust Mite Extract     Mold Extract [Trichophyton]     Cefditoren Pivoxil Hives     N/V       Vitals: Blood pressure 132/70, pulse 95, temperature 97 8 °F (36 6 °C), temperature source Tympanic, resp  rate 18, height 5' (1 524 m), weight 64 9 kg (143 lb), SpO2 94 %  Body mass index is 27 93 kg/m²   Oxygen Therapy  SpO2: 94 %    Physical Exam   Constitutional: She is oriented to person, place, and time  No distress  HENT:   Head: Normocephalic  Mouth/Throat: No oropharyngeal exudate  Eyes: Pupils are equal, round, and reactive to light  No scleral icterus  Neck: Neck supple  No JVD present  Cardiovascular: Normal rate and regular rhythm  Pulmonary/Chest: No respiratory distress  She has wheezes  She has no rales  Abdominal: Soft  There is no tenderness  Musculoskeletal: She exhibits no edema  Lymphadenopathy:     She has no cervical adenopathy  Neurological: She is alert and oriented to person, place, and time  Skin: Skin is warm and dry  Psychiatric: She has a normal mood and affect  Labs: I have personally reviewed pertinent lab results  Lab Results   Component Value Date    WBC 6 76 07/15/2019    HGB 12 9 07/15/2019    HCT 39 4 07/15/2019    MCV 98 07/15/2019     07/15/2019     Lab Results   Component Value Date    GLUCOSE 165 (H) 05/08/2015    CALCIUM 9 1 07/01/2019     05/08/2015    K 3 8 07/01/2019    CO2 27 07/01/2019     (H) 07/01/2019    BUN 23 07/01/2019    CREATININE 0 70 07/01/2019     Lab Results   Component Value Date    IGE 64 0 08/08/2018     Lab Results   Component Value Date    ALT 33 07/01/2019    AST 27 07/01/2019    ALKPHOS 89 07/01/2019    BILITOT 0 48 05/08/2015       Pulmonary function testing:  Performed   7/24/19   FEV1/FVC ratio 76%   FEV1 101% predicted  % predicted  TLC 98 % predicted  RV 94 % predicted  DLCO corrected for hemoglobin 91 % predicted     PFTs are normal   Significant improvement when compared with July 2018

## 2019-09-27 NOTE — TELEPHONE ENCOUNTER
Called [facility] to have [place] shipped to the infusion center  I spoke with Anais Rizo whom informed me that they a new script  I provided a verbal and the Yoly stated that the pt needs to call and ok shipment of the medication to the infusion center  I called pt and left her a VM informing her to please call [address] [facility] at [phone] so that they can ship medication over to the infusion center    I explained that she does have an appt for infusion scheduled  11/2/19 and that Yoly has put the order in as a STAT delivering pending patients call for consent

## 2019-10-02 ENCOUNTER — HOSPITAL ENCOUNTER (OUTPATIENT)
Dept: INFUSION CENTER | Facility: CLINIC | Age: 64
Discharge: HOME | End: 2019-10-02
Payer: COMMERCIAL

## 2019-10-02 VITALS
RESPIRATION RATE: 18 BRPM | DIASTOLIC BLOOD PRESSURE: 74 MMHG | HEART RATE: 72 BPM | SYSTOLIC BLOOD PRESSURE: 122 MMHG | TEMPERATURE: 97.7 F

## 2019-10-02 DIAGNOSIS — J45.41 MODERATE PERSISTENT ASTHMA WITH ACUTE EXACERBATION: Primary | ICD-10-CM

## 2019-10-02 PROCEDURE — 96372 THER/PROPH/DIAG INJ SC/IM: CPT

## 2019-10-02 RX ORDER — EPINEPHRINE 1 MG/ML
0.3 INJECTION, SOLUTION, CONCENTRATE INTRAVENOUS AS NEEDED
Status: CANCELLED | OUTPATIENT
Start: 2019-11-27

## 2019-10-02 RX ORDER — EPINEPHRINE 1 MG/ML
0.3 INJECTION, SOLUTION, CONCENTRATE INTRAVENOUS AS NEEDED
Status: DISCONTINUED | OUTPATIENT
Start: 2019-10-02 | End: 2019-10-05 | Stop reason: HOSPADM

## 2019-10-02 RX ADMIN — BENRALIZUMAB 30 MG: 30 INJECTION, SOLUTION SUBCUTANEOUS at 14:52

## 2019-10-02 NOTE — PROGRESS NOTES
Pt here for treatment  Verbalizes no complaitns  Epi pen present with exp date of July 2020  Call bell within reach  Will continue to monitor

## 2019-11-04 ENCOUNTER — TELEPHONE (OUTPATIENT)
Dept: PULMONOLOGY | Facility: CLINIC | Age: 64
End: 2019-11-04

## 2019-11-04 DIAGNOSIS — J20.9 ACUTE BRONCHITIS, UNSPECIFIED ORGANISM: Primary | ICD-10-CM

## 2019-11-04 RX ORDER — BENZONATATE 200 MG/1
200 CAPSULE ORAL 3 TIMES DAILY PRN
Qty: 20 CAPSULE | Refills: 1 | Status: SHIPPED | OUTPATIENT
Start: 2019-11-04 | End: 2019-12-13 | Stop reason: SDUPTHER

## 2019-11-04 RX ORDER — CLARITHROMYCIN 500 MG/1
500 TABLET, COATED ORAL EVERY 12 HOURS SCHEDULED
Qty: 14 TABLET | Refills: 0 | Status: SHIPPED | OUTPATIENT
Start: 2019-11-04 | End: 2019-11-11

## 2019-11-04 NOTE — TELEPHONE ENCOUNTER
Spoke with patient  She notes that she initially felt better after being placed on prednisone at the end of September  Approximately 2 weeks later she began to develop symptoms again  Currently, she has increased shortness of breath, increased cough with yellowish-green sputum and increased bronchospasm  She is very tired and has been using her nebulizers 3 times daily  She has a very low-grade fever of 99  She is also taking Zyrtec  She would like to try and avoid steroids at this time  I will place her on a 7 day course of Biaxin and also prescribed Tessalon Perles 200 mg 3 times daily as needed  Both were called into the pharmacy  All her questions were answered  She was instructed to call the office with any questions she may have or if her breathing does not improve or should worsen  If her breathing acutely worsens she was instructed to go to the emergency room  She will follow-up as scheduled on December 2nd at 8:20 a m  Chuck Crowder

## 2019-11-04 NOTE — TELEPHONE ENCOUNTER
Spoke with Michael Lang  She said she was seen on 9/27 and was given a prednisone taper  She was feeling better for a few weeks but now she is back to feeling sick again  She is coughing up yellow/green mucous  She is wheezing, si SOB on exertion and has chest tightness  She says she feels tired and sluggish  Her temp is around 99  She is using her nebulizer, inhalers and taking Zyrtec  Please advise

## 2019-11-12 NOTE — TELEPHONE ENCOUNTER
Patient calling saying she is still coughing and its keeping her up at night  She is exhausted and her oxygen levels are going down   [phone]

## 2019-11-12 NOTE — TELEPHONE ENCOUNTER
Spoke with Octavio Martinez  She said she is still coughing up yellow/green mucous  She says her side hurts when coughing  She is SOB sometimes  She is wheezing  She said her oxygen level at night drops  She woke up one night and it was as low as 81%  During the day it is 91-95%  This is without oxygen  Her temps have been 99 at HS  She was given antibiotics last week, so I schedule her for a sick visit tomorrow with Jerry Mensah  I did offer today but patient declined

## 2019-11-20 ENCOUNTER — TELEPHONE (OUTPATIENT)
Dept: PULMONOLOGY | Facility: CLINIC | Age: 64
End: 2019-11-20

## 2019-11-20 NOTE — TELEPHONE ENCOUNTER
Spoke with Candy from [employer] and confirmed shipment of [employer]  Medication is set to arrive tomorrow at the [facility]  Pt's upcoming appt is on 11/27/2019

## 2019-11-27 ENCOUNTER — TELEPHONE (OUTPATIENT)
Dept: RHEUMATOLOGY | Facility: CLINIC | Age: 64
End: 2019-11-27

## 2019-11-27 ENCOUNTER — HOSPITAL ENCOUNTER (OUTPATIENT)
Dept: INFUSION CENTER | Facility: CLINIC | Age: 64
Discharge: HOME | End: 2019-11-27
Payer: COMMERCIAL

## 2019-11-27 ENCOUNTER — OFFICE VISIT (OUTPATIENT)
Dept: RHEUMATOLOGY | Facility: CLINIC | Age: 64
End: 2019-11-27
Payer: COMMERCIAL

## 2019-11-27 VITALS
DIASTOLIC BLOOD PRESSURE: 56 MMHG | HEART RATE: 62 BPM | RESPIRATION RATE: 18 BRPM | SYSTOLIC BLOOD PRESSURE: 98 MMHG | TEMPERATURE: 97.8 F

## 2019-11-27 VITALS
BODY MASS INDEX: 27.73 KG/M2 | SYSTOLIC BLOOD PRESSURE: 122 MMHG | DIASTOLIC BLOOD PRESSURE: 82 MMHG | HEART RATE: 73 BPM | WEIGHT: 142 LBS

## 2019-11-27 DIAGNOSIS — E55.9 VITAMIN D INSUFFICIENCY: ICD-10-CM

## 2019-11-27 DIAGNOSIS — Z79.52 CURRENT USE OF STEROID MEDICATION: ICD-10-CM

## 2019-11-27 DIAGNOSIS — M19.012 LOCALIZED OSTEOARTHRITIS OF LEFT SHOULDER: ICD-10-CM

## 2019-11-27 DIAGNOSIS — J45.20 INTERMITTENT ASTHMA WITHOUT COMPLICATION, UNSPECIFIED ASTHMA SEVERITY: ICD-10-CM

## 2019-11-27 DIAGNOSIS — J45.41 MODERATE PERSISTENT ASTHMA WITH ACUTE EXACERBATION: Primary | ICD-10-CM

## 2019-11-27 DIAGNOSIS — Z87.09 HISTORY OF NASAL POLYPOSIS: ICD-10-CM

## 2019-11-27 DIAGNOSIS — M79.7 FIBROMYALGIA: Primary | ICD-10-CM

## 2019-11-27 DIAGNOSIS — M85.80 OSTEOPENIA, UNSPECIFIED LOCATION: ICD-10-CM

## 2019-11-27 DIAGNOSIS — M19.049 OSTEOARTHROSIS, LOCALIZED, PRIMARY, HAND, UNSPECIFIED LATERALITY: ICD-10-CM

## 2019-11-27 PROCEDURE — 99214 OFFICE O/P EST MOD 30 MIN: CPT | Performed by: INTERNAL MEDICINE

## 2019-11-27 PROCEDURE — 96372 THER/PROPH/DIAG INJ SC/IM: CPT

## 2019-11-27 RX ORDER — EPINEPHRINE 1 MG/ML
0.3 INJECTION, SOLUTION, CONCENTRATE INTRAVENOUS AS NEEDED
Status: DISCONTINUED | OUTPATIENT
Start: 2019-11-27 | End: 2019-11-30 | Stop reason: HOSPADM

## 2019-11-27 RX ORDER — EPINEPHRINE 1 MG/ML
0.3 INJECTION, SOLUTION, CONCENTRATE INTRAVENOUS AS NEEDED
Status: CANCELLED | OUTPATIENT
Start: 2020-01-22

## 2019-11-27 RX ORDER — FAMCICLOVIR 500 MG/1
1500 TABLET, FILM COATED ORAL AS NEEDED
Refills: 0 | COMMUNITY
Start: 2019-10-25

## 2019-11-27 RX ORDER — MAGNESIUM HYDROXIDE 1200 MG/15ML
LIQUID ORAL DAILY
Refills: 0 | COMMUNITY
Start: 2019-10-23 | End: 2021-12-08

## 2019-11-27 RX ORDER — DULOXETIN HYDROCHLORIDE 30 MG/1
30 CAPSULE, DELAYED RELEASE ORAL DAILY
COMMUNITY
End: 2021-02-05 | Stop reason: ALTCHOICE

## 2019-11-27 RX ORDER — OMEPRAZOLE 40 MG/1
40 CAPSULE, DELAYED RELEASE ORAL
Refills: 0 | COMMUNITY
Start: 2019-10-30 | End: 2021-12-16 | Stop reason: ALTCHOICE

## 2019-11-27 RX ADMIN — BENRALIZUMAB 30 MG: 30 INJECTION, SOLUTION SUBCUTANEOUS at 11:09

## 2019-11-27 NOTE — PROGRESS NOTES
Assessment and Plan:   Ms Magaly Mckee a 49-year-old female with history significant for osteopenia, recurrent nasal polyposis, chronic sinus disease, and history of chronic steroid use, who presents for follow up of diffuse arthralgias        - Eun Gibson presents today for follow-up of diffuse arthralgias, which have become more pronounced following the discontinuation of steroids that were primarily used for respiratory issues and a history of asthma  Although she did experience improvement of her joint pains with the prednisone, it did not eliminate her arthralgias entirely  She is currently trying to manage her symptoms with aspirin twice daily as well as Tylenol 6-8 times daily, but she continues to remain symptomatic  She reports recently being started on prednisone 40 mg once daily approximately 1 week ago due to a flare-up of her respiratory symptoms  She states that there has been no significant improvement in her arthralgias, which guides me away from an underlying inflammatory arthritis  At the last office visit we had discussed a diagnosis of fibromyalgia and I had prescribed Cymbalta 60 mg once daily, but patient states that she discontinued this medication after a few days due to side effects she was experiencing with the prednisone  - At this time I advised her to continue follow-up with her pulmonologist and allergist to treat the respiratory symptoms  Once the steroids are discontinued we can always discuss re-initiating Cymbalta  She can also continue the Tylenol 6-8 times daily as needed  I advised her that based on the distribution of her joint pains also affecting her thumbs and left shoulder this is most likely related to osteoarthritis  I offered her intra-articular cortisone injections today but she would like to hold off for now as she is on systemic steroids    I requested she call the office at any time if she would like to consider these injections       - In addition I did discuss with her my concerns for a vasculitic conditions such as eosinophilic granulomatosis with poly angiitis, but my suspicion for this diagnosis is low as there is no ongoing peripheral eosinophilia, and there has also been stability noted in the nasal polyposis as well as with the asthma (per her pulmonologist, the flare-up in her respiratory issues does not seem to be related to asthma)  In addition, there are no features at this time suggestive or concerning for an active vasculitis based on her symptoms or physical examination  I requested she make me aware if she has to ever have a recurrent skin rash, as I would like to biopsy this      - I also reviewed the nasal polyp histopathology reports, which did not show any evidence for vasculitis, granulomas or necrosis, but 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   She does have a negative anti neutrophilic cytoplasmic antibody, but if there is a concern for an underlying condition such as eosinophilic granulomatosis with poly angiitis, the presence of this antibody would not be required, and is frequently not seen   She does not appear to have peripheral eosinophilia on her most recent CBC (has been present in the past), and there are no concerning findings noted on the recently done CT of her chest   In addition it seems the asthma is well controlled at this time, but I am unsure what her recurrent respiratory symptoms are related to  At this time clinically there does not appear to be concerns for a vasculitic process that could be associated with the upper respiratory tract issues that she has been dealing with  Sade Aparicio will continue follow up with ENT    I requested she also forward me the results from her cardiologist including the EKG, echocardiogram and Holter monitor results      - I will plan to see her back in the office in 3 months        Plan:  Diagnoses and all orders for this visit:    Fibromyalgia    Osteoarthrosis, localized, primary, hand, unspecified laterality    Vitamin D insufficiency    Osteopenia, unspecified location    History of nasal polyposis    Intermittent asthma without complication, unspecified asthma severity    Localized osteoarthritis of left shoulder    Current use of steroid medication      I have personally reviewed pertinent films in PACS which shows osteoarthritis of the left shoulder x-ray  Activities as tolerated    Diet: low carb/low fat, more greens/vegetables, adequate hydration  Exercise: try to maintain a low impact exercise regimen as much as possible  Walk for 30 minutes a day for at least 3 days a week    Encouraged to maintain good sleep hygiene  Continue other medications as prescribed by PCP and other specialists        RTC in 3 months         HPI    INITIAL VISIT NOTE:  Ms Marline Campbell a 70-year-old female with history significant for osteopenia, recurrent nasal polyposis, chronic sinus disease, and chronic steroid use, who presents for further evaluation of an elevated C reactive protein of 8, as well as diffuse joint pains   She is referred by Dr Shyanne Dunne      Patient states she may have had chronic sinus issues from her childhood, but approximately 8 years ago is when she started seeking medical attention and was diagnosed with chronic sinus disease with recurrent sinus infections, as well as nasal polyposis  Camila Calderon has undergone multiple nasal surgeries and also had testing for anti neutrophilic cytoplasmic antibody which was negative   She has also been dealing with recurrent asthma exacerbations which have required chronic steroids over the years  Camila 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, but has been challenged with aspirin, and states since doing this the nasal polyposis has decreased   She was also started on Fasenra injections which has significantly helped with the asthma flares   Since March 2019 she has not required steroid use      She reports approximately 1 and half to 2 years ago is when she started noticing diffuse joint pains mostly affecting her hands, elbows, shoulders, knees, ankles and feet   Her symptoms have been gradually progressive over time, and especially since March 2019 when the steroids were discontinued is when she has noticed a significant flare-up   She does feel like overall when she was on intermittent chronic steroids her joint pains were better   She experiences her symptoms on a daily basis in an intermittent manner  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, 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   She has not noticed any obvious joint swelling   She does experience morning stiffness which affects her diffusely and lasts about 1 hour   She is currently managing her symptoms with Tylenol 6-8 tablets daily   She refrains from NSAID use in view of the history of aspirin sensitivity   She also reports a bump that she has noticed on her right arm just above her elbow, with a tightness sensation in her left arm just above her elbow      She does report vision changes as a result of macular degeneration   She reports chronic dry mouth   She does have ongoing occasional cough with shortness of breath   She reports a history of DVT during pregnancy, but otherwise there is no history of blood clots   She denies fevers, chills, night sweats, unintentional weight loss, hearing changes, skin rash, photosensitivity, hemoptysis, epistaxis, mouth/nose ulcers, swollen glands, pleuritic chest pain, vomiting, diarrhea, blood in stools, blood in urine, muscle weakness, focal weakness (wrist/foot drop), persistent numbness/tingling, or family history of autoimmune disease      She was seen by her primary care physician and had additional testing done which revealed a negative CARLOS, double-stranded DNA antibody, ESR, rheumatoid factor, Lyme antibody profile, CBC and CMP   Previous testing of anti neutrophilic cytoplasmic antibody, IgG levels and eosinophils was unremarkable   A CRP was elevated at 8 8      I did review the multiple images in our system, including a CT chest which did not show evidence of interstitial lung disease   There was mild perihilar bronchial wall thickening with areas of mucoid impaction that would be seen with reactive airway disease   A CT of her abdomen was unrevealing  Junette Homans has previously had an MRI of her pelvis which did not show any evidence of inflammatory arthritis or involvement of the sacroiliac joints   I also reviewed the histopathology reports following the nasal polyp surgeries which showed degrees of chronic inflammation with eosinophilic infiltration, but without evidence of necrosis, vasculitis or necrotizing granulomas         8/21/2019:  Patient presents for a follow-up today  We reviewed her recently done labs which showed a urine protein creatinine ratio of 0 2  There were no RBCs seen on her urinalysis  IgG levels were borderline low at 659  Anti CCP antibody, Sjogren's antibodies, CK, C3, C4, CBC and IgG subclasses were normal   X-rays of her hands showed moderate degenerative changes at the bilateral CMC joints and the 1st interphalangeal joints  X-rays of her feet did not show any significant degenerative changes      Patient reports since the prior office visit her only new complaint is presence of left shoulder pain  She is still describing the band like sensation surrounding her left proximal arm, as well as concern for bumps that she has been feeling on the inner aspect of her right proximal arm  Otherwise she continues to experience pain affecting her hands, knees, ankles and feet  She has not really noticed any joint swelling  She does experience morning stiffness which affects her diffusely and lasts about 1 hour  She continues to take Tylenol 6-8 tablets daily, as well as aspirin twice daily  She states nothing really helps with relieving her joint pains entirely    Even while she was on the prednisone it helped her significantly, but did not eliminate her joint pains      She reports the asthma and nasal polyposis is currently stable  No new complaints noted otherwise  11/27/2019:  Patient presents for a follow-up today  At the last office visit I had prescribed her Cymbalta for the possibility of fibromyalgia, but she states that she only took this for 2 days  She states following this she had onset of respiratory symptoms including cough and wheezing for which she was seen by her pulmonologist and primary care physician  She completed a course of azithromycin as well as a tapering course of prednisone which helped  A chest x-ray was unremarkable  She states due to the combination of side effects while on the prednisone and Cymbalta including mood disturbances, this prompted her to discontinue the Cymbalta and she has not restarted it since then  She reports after the azithromycin and prednisone she was well for about 1 week, and then had recurrent symptoms of cough and wheezing  She was seen by her allergist recently as this has been going on for the past month  She was prescribed a prednisone course starting at 40 mg once daily and has been on this for the past week  She will be following with her allergist later today to determine the next step in steroid dosing  She reports while being on the prednisone there has been no significant improvement in her joint pains, and she primarily describes this affecting her bilateral thumbs, left elbow, left shoulder and feet  She has not noticed swollen joints  She does experience morning stiffness affecting these joints which improves with activities, but states that the pain persists throughout the day  Of note she does have osteoarthritis present at her bilateral CMC joints as well as at her left shoulder joint as evidenced on x-rays  She is not interested in receiving intra-articular cortisone injections at this time as she is on systemic steroids    She also takes Tylenol 6-8 tablets daily, as she refrains from NSAID use due to a history of hypersensitivity  She denies any fevers, unintentional weight loss, current skin rash, numbness/tingling or focal weakness  She reports a few years ago she had been dealing with a pinpoint rash on her upper and lower extremities, and was seen by multiple doctors at that time without a clear diagnosis  She mentions the rash was present both while she was on steroids for respiratory symptoms, and also without the steroids  It eventually resolved spontaneously  She has not had any recent skin rash  She has also been following with her cardiologist for an abnormal stress test/EKG  She had an echocardiogram done and also has a Holter monitor  The following portions of the patient's history were reviewed and updated as appropriate: allergies, current medications, past family history, past medical history, past social history, past surgical history and problem list       Review of Systems  Constitutional: Negative for weight change, fevers, chills, night sweats  Positive for fatigue  ENT/Mouth: Negative for hearing changes, ear pain, nasal congestion, sinus pain, hoarseness, sore throat, rhinorrhea, swallowing difficulty  Eyes: Negative for pain, redness, discharge, vision changes  Positive for dryness  Cardiovascular: Negative for SOB, palpitations  Positive for chest pain  Respiratory: Negative for sputum, wheezing, dyspnea  Positive for cough  Gastrointestinal: Negative for nausea, vomiting, diarrhea, constipation, pain, heartburn  Genitourinary: Negative for dysuria, urinary frequency, hematuria  Musculoskeletal: As per HPI  Skin: Negative for skin rash, color changes  Positive for hair loss  Neuro: Negative for weakness, numbness, tingling, loss of consciousness  Positive for headaches  Psych: Negative for depression  Positive for anxiety    Heme/Lymph: Negative for easy bruising, bleeding, lymphadenopathy  Past Medical History:   Diagnosis Date    Anesthesia complication     desaturation of oxygen mostly at night- on O2 at 2L at hs-may wake up with asthma issue    Anxiety     Asthma     Argueta esophagus     DVT (deep venous thrombosis) (HCC)     during pregnancy    Hiatal hernia     Hypercholesteremia     Nasal polyps     Sinus disorder     Sinusitis, chronic        Past Surgical History:   Procedure Laterality Date     SECTION N/A     x 2    COLONOSCOPY      ESOPHAGOGASTRODUODENOSCOPY      HYSTERECTOMY      complete    NASAL POLYP SURGERY      x2    NASAL POLYP SURGERY  2018    OK COLSC FLX W/RMVL OF TUMOR POLYP LESION SNARE TQ N/A 2017    Procedure: COLONOSCOPYwith  snare polypectomy ;  Surgeon: Liliane Ravi MD;  Location: [facility]; Service: Gastroenterology    OK EGD TRANSORAL BIOPSY SINGLE/MULTIPLE N/A 2017    Procedure: ESOPHAGOGASTRODUODENOSCOPY (EGD)and biopsy ;  Surgeon: Liliane Ravi MD;  Location: [facility];   Service: Gastroenterology    TONSILLECTOMY N/A     VEIN LIGATION AND STRIPPING Bilateral        Social History     Socioeconomic History    Marital status: /Civil Union     Spouse name: Not on file    Number of children: Not on file    Years of education: Not on file    Highest education level: Not on file   Occupational History    Not on file   Social Needs    Financial resource strain: Not on file    Food insecurity:     Worry: Not on file     Inability: Not on file    Transportation needs:     Medical: Not on file     Non-medical: Not on file   Tobacco Use    Smoking status: Never Smoker    Smokeless tobacco: Never Used   Substance and Sexual Activity    Alcohol use: Yes     Frequency: 2-4 times a month     Drinks per session: 1 or 2    Drug use: No    Sexual activity: Yes     Partners: Male     Birth control/protection: Female Sterilization   Lifestyle    Physical activity:     Days per week: Not on file     Minutes per session: Not on file    Stress: Not on file   Relationships    Social connections:     Talks on phone: Not on file     Gets together: Not on file     Attends Judaism service: Not on file     Active member of club or organization: Not on file     Attends meetings of clubs or organizations: Not on file     Relationship status: Not on file    Intimate partner violence:     Fear of current or ex partner: Not on file     Emotionally abused: Not on file     Physically abused: Not on file     Forced sexual activity: Not on file   Other Topics Concern    Not on file   Social History Narrative    Not on file       Family History   Problem Relation Age of Onset    Heart disease Mother         CHF    Breast cancer additional onset Mother 52    Breast cancer Mother 52    Dysphagia Father     Breast cancer Maternal Aunt     Breast cancer Paternal Aunt        Allergies   Allergen Reactions    Livalo [Pitavastatin] Hives and Shortness Of Breath     Nausea    Dust Mite Extract     Mold Extract [Trichophyton]     Cefditoren Pivoxil Hives     N/V       Current Outpatient Medications:     DULoxetine (CYMBALTA) 30 mg delayed release capsule, Take 30 mg by mouth daily, Disp: , Rfl:     acetaminophen (TYLENOL) 325 mg tablet, Take 650 mg by mouth every 6 (six) hours as needed for mild pain, Disp: , Rfl:     albuterol (2 5 mg/3 mL) 0 083 % nebulizer solution, Take 1 vial (2 5 mg total) by nebulization every 6 (six) hours as needed for wheezing or shortness of breath, Disp: 120 vial, Rfl: 5    albuterol (PROAIR HFA) 90 mcg/act inhaler, Inhale 2 puffs every 6 (six) hours as needed for wheezing, Disp: 8 5 g, Rfl: 5    ALPRAZolam (XANAX) 0 5 mg tablet, , Disp: , Rfl: 0    ASMANEX, 30 METERED DOSES, 220 MCG/INH inhaler, , Disp: , Rfl: 0    aspirin 325 mg tablet, Take 325 mg by mouth 2 (two) times a day, Disp: , Rfl:     azithromycin (ZITHROMAX) 500 MG tablet, Take 500 mg by mouth daily, Disp: , Rfl: 0    benzonatate (TESSALON) 200 MG capsule, Take 1 capsule (200 mg total) by mouth 3 (three) times a day as needed for cough, Disp: 20 capsule, Rfl: 1    budesonide (PULMICORT) 0 5 mg/2 mL nebulizer solution, , Disp: , Rfl: 1    cetirizine (ZyrTEC) 10 mg tablet, Take 10 mg by mouth every morning  , Disp: , Rfl:     cholecalciferol (VITAMIN D3) 1,000 units tablet, Take 4,000 Units by mouth daily, Disp: , Rfl:     EPINEPHrine (EPIPEN) 0 3 mg/0 3 mL SOAJ, Inject 0 3 mL (0 3 mg total) into a muscle once for 1 dose, Disp: 0 3 mL, Rfl: 0    famciclovir (FAMVIR) 500 mg tablet, , Disp: , Rfl: 0    FASENRA, 30 mg every 56 days , Disp: , Rfl:     LACTOBACILLUS ACID-PECTIN PO, Take by mouth, Disp: , Rfl:     montelukast (SINGULAIR) 10 mg tablet, Take 10 mg by mouth daily at bedtime, Disp: , Rfl: 0    omeprazole (PriLOSEC) 20 mg delayed release capsule, Take 20 mg by mouth daily, Disp: , Rfl:     omeprazole (PriLOSEC) 20 mg delayed release capsule, Take 1 capsule (20 mg total) by mouth daily, Disp: 30 capsule, Rfl: 5    omeprazole (PriLOSEC) 40 MG capsule, Take 40 mg by mouth daily before breakfast Take 30 minutes prior, Disp: , Rfl: 0    OXYGEN-HELIUM IN, Inhale 2 L at hs via N/C, Disp: , Rfl:     predniSONE 10 mg tablet, 4 tabs daily x 3 D then 3 tabs daily x 3 D then 2 tabs daily x 3 D then 1 tab daily x 3 D, Disp: 30 tablet, Rfl: 0    promethazine-codeine (PHENERGAN WITH CODEINE) 6 25-10 mg/5 mL syrup, take 10 milliliters by mouth every 4 to 6 hours if needed for cough, Disp: , Rfl: 0    sodium chloride 0 9 % irrigation, , Disp: , Rfl: 0    traZODone (DESYREL) 100 mg tablet, , Disp: , Rfl: 0    WIXELA INHUB 250-50 MCG/DOSE inhaler, inhale 1 puff by mouth every 12 hours for 30 DAYS, Disp: , Rfl: 0    zileuton (ZYFLO CR) 600 MG, Take 1,200 mg by mouth 2 (two) times a day, Disp: , Rfl:   No current facility-administered medications for this visit       Facility-Administered Medications Ordered in Other Visits:     EPINEPHrine PF (ADRENALIN) 1 mg/mL injection 0 3 mg, 0 3 mg, Intramuscular, PRN, Benji Ramos DO      Objective:    Vitals:    11/27/19 0924   BP: 122/82   Pulse: 73   Weight: 64 4 kg (142 lb)       Physical Exam  General: Well appearing, well nourished, in no distress  Oriented x 3, normal mood and affect  Ambulating without difficulty  Skin: Good turgor, no rash, unusual bruising or prominent lesions  Hair: Normal texture and distribution  Nails: Normal color, no deformities  HEENT:  Head: Normocephalic, atraumatic  Eyes: Conjunctiva clear, sclera non-icteric, EOM intact  Nose: No external lesions, mucosa non-inflamed  Mouth: Mucous membranes moist, no mucosal lesions  Extremities: No amputations or deformities, cyanosis, edema  Musculoskeletal: No peripheral joint soft tissue swelling visualized  Neurologic: Alert and oriented  No focal neurological deficits appreciated  Psychiatric: Normal mood and affect  RENARD Fisher    Rheumatology

## 2019-11-27 NOTE — TELEPHONE ENCOUNTER
Please call patient and request she have labs done that I ordered after the office visit today  Thanks

## 2019-11-27 NOTE — PROGRESS NOTES
Pt resting with no complaints  Pt has Epipen with her that expires July 2020  Callbell within reach; will continue to monitor

## 2019-12-05 ENCOUNTER — OFFICE VISIT (OUTPATIENT)
Dept: PULMONOLOGY | Facility: CLINIC | Age: 64
End: 2019-12-05
Payer: COMMERCIAL

## 2019-12-05 VITALS
SYSTOLIC BLOOD PRESSURE: 110 MMHG | TEMPERATURE: 97.3 F | WEIGHT: 140.6 LBS | BODY MASS INDEX: 27.61 KG/M2 | HEART RATE: 75 BPM | HEIGHT: 60 IN | DIASTOLIC BLOOD PRESSURE: 66 MMHG | OXYGEN SATURATION: 99 %

## 2019-12-05 DIAGNOSIS — G47.34 NOCTURNAL HYPOXIA: ICD-10-CM

## 2019-12-05 DIAGNOSIS — J32.0 CHRONIC MAXILLARY SINUSITIS: ICD-10-CM

## 2019-12-05 DIAGNOSIS — J45.41 MODERATE PERSISTENT ASTHMA WITH ACUTE EXACERBATION: ICD-10-CM

## 2019-12-05 DIAGNOSIS — G47.33 OSA (OBSTRUCTIVE SLEEP APNEA): Primary | ICD-10-CM

## 2019-12-05 DIAGNOSIS — K21.9 GASTROESOPHAGEAL REFLUX DISEASE WITHOUT ESOPHAGITIS: ICD-10-CM

## 2019-12-05 PROCEDURE — 99215 OFFICE O/P EST HI 40 MIN: CPT | Performed by: INTERNAL MEDICINE

## 2019-12-05 NOTE — PROGRESS NOTES
Pulmonary Follow Up Note   Qasim Torrez 59 y o  female MRN: [id number]  12/5/2019      Assessment/Plan: Moderate persistent asthma with acute exacerbation   Overall, asthma control has significantly improved since the addition of Fasenra  I believe aspirin desensitization has also helped both in terms of preventing nasal polyps  from returning and also asthma control  Unfortunately, she continues to have episodic flare-ups requiring prednisone  The episodes fortunately are not as severe  As in the past   Ultimately, I would like to avoid any need for systemic corticosteroids  She has suffered significant side effects from the steroids over time related to peptic ulcer disease, ocular affect and weight gain  We briefly discussed the option of bronchial thermoplasty  I provided her with patient education information and we can discuss further at our next visit  She will be going on Medicare in May and would target treatment thereafter if she was shows to pursue it  Chronic sinusitis    Her chronic sinus disease has improved since having sinus surgery and aspirin desensitization  Her allergist recently discussed transitioning Cristal Hdez to an alternate biologic  Patient at this time would favor continuing the current regimen  Nocturnal hypoxia    She will continue with supplemental oxygen at 2 liters/minute during hours of sleep    Gastroesophageal reflux disease without esophagitis   Patient had a recent EGD that showed gastric ulcers  It was recommended that she stop aspirin, however she cannot do this in the setting of previous aspirin desensitization, as to stop taking the aspirin would significantly put her at risk for worsened asthma control again  Patient understands the risk and benefit of this decision        Visit orders:    Diagnoses and all orders for this visit:    WILBERT (obstructive sleep apnea)    Moderate persistent asthma with acute exacerbation    Chronic maxillary sinusitis    Gastroesophageal reflux disease without esophagitis    Nocturnal hypoxia    Other orders  -     mupirocin (BACTROBAN) 2 % ointment; apply to affected area twice a day  TO [address] [place] [address]      Return in about 4 months (around 4/5/2020)  History of Present Illness   HPI:  Heidi Diaz is a 59 y o  female who  Is here today for follow-up regarding moderate to severe persistent asthma  She has had 2 separate courses of prednisone for asthma exacerbation  Fortunately, her episodes have been less severe than prior episodes  She is using Dulera twice daily on most days  She admits to sometimes not taking it on days she is feeling well  She develops chest tightness and wheezing when she develops attack  Her sinus control has been much better  She did require 1 course of  Antibiotics since her last visit  She also recently underwent EGD which showed some gastric ulcerations  It was suggested that she stop aspirin therapy, however she cannot do that because of the need for long-term aspirin use after desensitization  Review of Systems   Constitutional: Negative for chills, fever and unexpected weight change  HENT: Negative for postnasal drip and sore throat  Eyes: Negative for visual disturbance  Respiratory:        As noted in HPI   Cardiovascular: Negative for chest pain  Gastrointestinal: Negative for abdominal pain, diarrhea and vomiting  Genitourinary: Negative for difficulty urinating  Skin: Negative for rash  Neurological: Negative for headaches  Hematological: Negative for adenopathy  Psychiatric/Behavioral: Negative  All other systems reviewed and are negative        Historical Information   Past Medical History:   Diagnosis Date    Anesthesia complication     desaturation of oxygen mostly at night- on O2 at 2L at hs-may wake up with asthma issue    Anxiety     Asthma     Argueta esophagus     DVT (deep venous thrombosis) (HCC)     during pregnancy    Hiatal hernia     Hypercholesteremia     Nasal polyps     Sinus disorder     Sinusitis, chronic      Past Surgical History:   Procedure Laterality Date     SECTION N/A     x 2    COLONOSCOPY      ESOPHAGOGASTRODUODENOSCOPY      HYSTERECTOMY      complete    NASAL POLYP SURGERY      x2    NASAL POLYP SURGERY  2018    [place] COLSC FLX W/RMVL OF TUMOR POLYP LESION SNARE TQ N/A 2017    Procedure: COLONOSCOPYwith  snare polypectomy ;  Surgeon: Jossie Dick MD;  Location: [facility]; Service: Gastroenterology    [place] EGD TRANSORAL BIOPSY SINGLE/MULTIPLE N/A 2017    Procedure: ESOPHAGOGASTRODUODENOSCOPY (EGD)and biopsy ;  Surgeon: Jossie Dick MD;  Location: [facility] GI [facility];   Service: Gastroenterology    TONSILLECTOMY N/A     VEIN LIGATION AND STRIPPING Bilateral      Family History   Problem Relation Age of Onset    Heart disease Mother         CHF    Breast cancer additional onset Mother 54    Breast cancer Mother 52    Dysphagia Father     Breast cancer Maternal Aunt     Breast cancer Paternal Aunt        Social History     Tobacco Use   Smoking Status Never Smoker   Smokeless Tobacco Never Used     Meds/Allergies     Current Outpatient Medications:     acetaminophen (TYLENOL) 325 mg tablet, Take 650 mg by mouth every 6 (six) hours as needed for mild pain, Disp: , Rfl:     albuterol (2 5 mg/3 mL) 0 083 % nebulizer solution, Take 1 vial (2 5 mg total) by nebulization every 6 (six) hours as needed for wheezing or shortness of breath, Disp: 120 vial, Rfl: 5    albuterol (PROAIR HFA) 90 mcg/act inhaler, Inhale 2 puffs every 6 (six) hours as needed for wheezing, Disp: 8 5 g, Rfl: 5    ALPRAZolam (XANAX) 0 5 mg tablet, , Disp: , Rfl: 0    ASMANEX, 30 METERED DOSES, 220 MCG/INH inhaler, , Disp: , Rfl: 0    aspirin 325 mg tablet, Take 325 mg by mouth 2 (two) times a day, Disp: , Rfl:     azithromycin (ZITHROMAX) 500 MG tablet, Take 500 mg by mouth daily, Disp: , Rfl: 0   budesonide (PULMICORT) 0 5 mg/2 mL nebulizer solution, , Disp: , Rfl: 1    cetirizine (ZyrTEC) 10 mg tablet, Take 10 mg by mouth every morning  , Disp: , Rfl:     cholecalciferol (VITAMIN D3) 1,000 units tablet, Take 4,000 Units by mouth daily, Disp: , Rfl:     DULoxetine (CYMBALTA) 30 mg delayed release capsule, Take 30 mg by mouth daily, Disp: , Rfl:     famciclovir (FAMVIR) 500 mg tablet, , Disp: , Rfl: 0    FASENRA, 30 mg every 56 days , Disp: , Rfl:     LACTOBACILLUS ACID-PECTIN PO, Take by mouth, Disp: , Rfl:     montelukast (SINGULAIR) 10 mg tablet, Take 10 mg by mouth daily at bedtime, Disp: , Rfl: 0    mupirocin (BACTROBAN) 2 % ointment, apply to affected area twice a day  TO LOWER NASAL AREA, Disp: , Rfl: 0    omeprazole (PriLOSEC) 40 MG capsule, Take 40 mg by mouth daily before breakfast Take 30 minutes prior, Disp: , Rfl: 0    predniSONE 10 mg tablet, 4 tabs daily x 3 D then 3 tabs daily x 3 D then 2 tabs daily x 3 D then 1 tab daily x 3 D, Disp: 30 tablet, Rfl: 0    sodium chloride 0 9 % irrigation, , Disp: , Rfl: 0    traZODone (DESYREL) 100 mg tablet, , Disp: , Rfl: 0    WIXELA INHUB 250-50 MCG/DOSE inhaler, inhale 1 puff by mouth every 12 hours for 30 DAYS, Disp: , Rfl: 0    zileuton (ZYFLO CR) 600 MG, Take 1,200 mg by mouth 2 (two) times a day, Disp: , Rfl:     benzonatate (TESSALON) 200 MG capsule, Take 1 capsule (200 mg total) by mouth 3 (three) times a day as needed for cough (Patient not taking: Reported on 12/5/2019), Disp: 20 capsule, Rfl: 1    EPINEPHrine (EPIPEN) 0 3 mg/0 3 mL SOAJ, Inject 0 3 mL (0 3 mg total) into a muscle once for 1 dose, Disp: 0 3 mL, Rfl: 0    OXYGEN-HELIUM IN, Inhale 2 L at hs via N/C, Disp: , Rfl:     promethazine-codeine (PHENERGAN WITH CODEINE) 6 25-10 mg/5 mL syrup, take 10 milliliters by mouth every 4 to 6 hours if needed for cough, Disp: , Rfl: 0  Allergies   Allergen Reactions    Livalo [Pitavastatin] Hives and Shortness Of Breath     Nausea  Dust Mite Extract     Mold Extract [Trichophyton]     Cefditoren Pivoxil Hives     N/V       Vitals: Blood pressure 110/66, pulse 75, temperature (!) 97 3 °F (36 3 °C), temperature source Tympanic, height 5' (1 524 m), weight 63 8 kg (140 lb 9 6 oz), SpO2 99 %  Body mass index is 27 46 kg/m²  Oxygen Therapy  SpO2: 99 %  Oxygen Therapy: None (Room air)    Physical Exam   Constitutional: She is oriented to person, place, and time  No distress  HENT:   Head: Normocephalic  Mouth/Throat: No oropharyngeal exudate  Eyes: Pupils are equal, round, and reactive to light  No scleral icterus  Neck: Neck supple  No JVD present  Cardiovascular: Normal rate and regular rhythm  Pulmonary/Chest: She has no wheezes  She has no rales  Abdominal: Soft  There is no tenderness  Musculoskeletal: She exhibits no edema  Lymphadenopathy:     She has no cervical adenopathy  Neurological: She is alert and oriented to person, place, and time  Skin: Skin is warm and dry  Psychiatric: She has a normal mood and affect  Labs: I have personally reviewed pertinent lab results  Lab Results   Component Value Date    WBC 6 76 07/15/2019    HGB 12 9 07/15/2019    HCT 39 4 07/15/2019    MCV 98 07/15/2019     07/15/2019     Lab Results   Component Value Date    GLUCOSE 165 (H) 05/08/2015    CALCIUM 9 1 07/01/2019     05/08/2015    K 3 8 07/01/2019    CO2 27 07/01/2019     (H) 07/01/2019    BUN 23 07/01/2019    CREATININE 0 70 07/01/2019     Lab Results   Component Value Date    IGE 64 0 08/08/2018     Lab Results   Component Value Date    ALT 33 07/01/2019    AST 27 07/01/2019    ALKPHOS 89 07/01/2019    BILITOT 0 48 05/08/2015     Imaging and other studies: I have personally reviewed pertinent reports     and I have personally reviewed pertinent films in PACS  Chest x-ray from 9/27/18 shows clear lungs    Pulmonary function testing:  Performed  7/24/19   FEV1/FVC ratio 76%   FEV1 101% predicted  FVC 102% predicted  No response to bronchodilators  TLC 98 % predicted  RV 94 % predicted  DLCO corrected for hemoglobin 91 % predicted     normal PFTs

## 2019-12-05 NOTE — ASSESSMENT & PLAN NOTE
Overall, asthma control has significantly improved since the addition of Fasenra  I believe aspirin desensitization has also helped both in terms of preventing nasal polyps  from returning and also asthma control  Unfortunately, she continues to have episodic flare-ups requiring prednisone  The episodes fortunately are not as severe  As in the past   Ultimately, I would like to avoid any need for systemic corticosteroids  She has suffered significant side effects from the steroids over time related to peptic ulcer disease, ocular affect and weight gain  We briefly discussed the option of bronchial thermoplasty  I provided her with patient education information and we can discuss further at our next visit  She will be going on Medicare in May and would target treatment thereafter if she was shows to pursue it

## 2019-12-05 NOTE — ASSESSMENT & PLAN NOTE
Her chronic sinus disease has improved since having sinus surgery and aspirin desensitization  Her allergist recently discussed transitioning Priyanka Oliver to an alternate biologic  Patient at this time would favor continuing the current regimen

## 2019-12-05 NOTE — ASSESSMENT & PLAN NOTE
Patient had a recent EGD that showed gastric ulcers  It was recommended that she stop aspirin, however she cannot do this in the setting of previous aspirin desensitization, as to stop taking the aspirin would significantly put her at risk for worsened asthma control again  Patient understands the risk and benefit of this decision

## 2019-12-05 NOTE — PROGRESS NOTES
Pulmonary Follow Up Note   Darell Aiken 59 y o  female MRN: [id number]  2019      Assessment/Plan: ***    No problem-specific Assessment & Plan notes found for this encounter  Visit orders:    {Assess/Plan SmartLinks:44981}    No follow-ups on file  History of Present Illness   HPI:  Darell Aiken is a 59 y o  female who***    Review of Systems   Constitutional: Negative for chills, fever and unexpected weight change  HENT: Negative for postnasal drip and sore throat  Eyes: Negative for visual disturbance  Respiratory:        As noted in HPI   Cardiovascular: Negative for chest pain  Gastrointestinal: Negative for abdominal pain, diarrhea and vomiting  Genitourinary: Negative for difficulty urinating  Musculoskeletal: Negative  Skin: Negative for rash  Neurological: Negative for headaches  Hematological: Negative for adenopathy  Psychiatric/Behavioral: Negative  All other systems reviewed and are negative  Historical Information   Past Medical History:   Diagnosis Date    Anesthesia complication     desaturation of oxygen mostly at night- on O2 at 2L at hs-may wake up with asthma issue    Anxiety     Asthma     Argueta esophagus     DVT (deep venous thrombosis) (HCC)     during pregnancy    Hiatal hernia     Hypercholesteremia     Nasal polyps     Sinus disorder     Sinusitis, chronic      Past Surgical History:   Procedure Laterality Date     SECTION N/A     x 2    COLONOSCOPY      ESOPHAGOGASTRODUODENOSCOPY      HYSTERECTOMY      complete    NASAL POLYP SURGERY      x2    NASAL POLYP SURGERY  2018    [place] COLSC FLX W/RMVL OF TUMOR POLYP LESION SNARE TQ N/A 2017    Procedure: COLONOSCOPYwith  snare polypectomy ;  Surgeon: Carisa oR MD;  Location: [place] [address] GI LAB;   Service: Gastroenterology    ME EGD TRANSORAL BIOPSY SINGLE/MULTIPLE N/A 2017    Procedure: ESOPHAGOGASTRODUODENOSCOPY (EGD)and biopsy ;  Surgeon: Carisa Ro MD; Location: [facility];   Service: Gastroenterology    TONSILLECTOMY N/A     VEIN LIGATION AND STRIPPING Bilateral      Family History   Problem Relation Age of Onset    Heart disease Mother         CHF    Breast cancer additional onset Mother 54    Breast cancer Mother 52    Dysphagia Father     Breast cancer Maternal Aunt     Breast cancer Paternal Aunt      Social History     Tobacco Use   Smoking Status Never Smoker   Smokeless Tobacco Never Used     Meds/Allergies     Current Outpatient Medications:     acetaminophen (TYLENOL) 325 mg tablet, Take 650 mg by mouth every 6 (six) hours as needed for mild pain, Disp: , Rfl:     albuterol (2 5 mg/3 mL) 0 083 % nebulizer solution, Take 1 vial (2 5 mg total) by nebulization every 6 (six) hours as needed for wheezing or shortness of breath, Disp: 120 vial, Rfl: 5    albuterol (PROAIR HFA) 90 mcg/act inhaler, Inhale 2 puffs every 6 (six) hours as needed for wheezing, Disp: 8 5 g, Rfl: 5    ALPRAZolam (XANAX) 0 5 mg tablet, , Disp: , Rfl: 0    ASMANEX, 30 METERED DOSES, 220 MCG/INH inhaler, , Disp: , Rfl: 0    aspirin 325 mg tablet, Take 325 mg by mouth 2 (two) times a day, Disp: , Rfl:     azithromycin (ZITHROMAX) 500 MG tablet, Take 500 mg by mouth daily, Disp: , Rfl: 0    budesonide (PULMICORT) 0 5 mg/2 mL nebulizer solution, , Disp: , Rfl: 1    cetirizine (ZyrTEC) 10 mg tablet, Take 10 mg by mouth every morning  , Disp: , Rfl:     cholecalciferol (VITAMIN D3) 1,000 units tablet, Take 4,000 Units by mouth daily, Disp: , Rfl:     DULoxetine (CYMBALTA) 30 mg delayed release capsule, Take 30 mg by mouth daily, Disp: , Rfl:     famciclovir (FAMVIR) 500 mg tablet, , Disp: , Rfl: 0    FASENRA, 30 mg every 56 days , Disp: , Rfl:     LACTOBACILLUS ACID-PECTIN PO, Take by mouth, Disp: , Rfl:     montelukast (SINGULAIR) 10 mg tablet, Take 10 mg by mouth daily at bedtime, Disp: , Rfl: 0    mupirocin (BACTROBAN) 2 % ointment, apply to affected area twice a day  TO LOWER NASAL AREA, Disp: , Rfl: 0    omeprazole (PriLOSEC) 40 MG capsule, Take 40 mg by mouth daily before breakfast Take 30 minutes prior, Disp: , Rfl: 0    predniSONE 10 mg tablet, 4 tabs daily x 3 D then 3 tabs daily x 3 D then 2 tabs daily x 3 D then 1 tab daily x 3 D, Disp: 30 tablet, Rfl: 0    sodium chloride 0 9 % irrigation, , Disp: , Rfl: 0    traZODone (DESYREL) 100 mg tablet, , Disp: , Rfl: 0    WIXELA INHUB 250-50 MCG/DOSE inhaler, inhale 1 puff by mouth every 12 hours for 30 DAYS, Disp: , Rfl: 0    zileuton (ZYFLO CR) 600 MG, Take 1,200 mg by mouth 2 (two) times a day, Disp: , Rfl:     benzonatate (TESSALON) 200 MG capsule, Take 1 capsule (200 mg total) by mouth 3 (three) times a day as needed for cough (Patient not taking: Reported on 12/5/2019), Disp: 20 capsule, Rfl: 1    EPINEPHrine (EPIPEN) 0 3 mg/0 3 mL SOAJ, Inject 0 3 mL (0 3 mg total) into a muscle once for 1 dose, Disp: 0 3 mL, Rfl: 0    OXYGEN-HELIUM IN, Inhale 2 L at hs via N/C, Disp: , Rfl:     promethazine-codeine (PHENERGAN WITH CODEINE) 6 25-10 mg/5 mL syrup, take 10 milliliters by mouth every 4 to 6 hours if needed for cough, Disp: , Rfl: 0  Allergies   Allergen Reactions    Livalo [Pitavastatin] Hives and Shortness Of Breath     Nausea    Dust Mite Extract     Mold Extract [Trichophyton]     Cefditoren Pivoxil Hives     N/V     Vitals: Blood pressure 110/66, pulse 75, temperature (!) 97 3 °F (36 3 °C), temperature source Tympanic, height 5' (1 524 m), weight 63 8 kg (140 lb 9 6 oz), SpO2 99 %  Body mass index is 27 46 kg/m²  Oxygen Therapy  SpO2: 99 %  Oxygen Therapy: None (Room air)    Physical Exam   Constitutional: She is oriented to person, place, and time  No distress  HENT:   Head: Normocephalic  Mouth/Throat: No oropharyngeal exudate  Eyes: Pupils are equal, round, and reactive to light  No scleral icterus  Neck: Neck supple  No JVD present  Cardiovascular: Normal rate and regular rhythm  Pulmonary/Chest: She has no wheezes  She has no rales  Abdominal: Soft  There is no tenderness  Musculoskeletal: She exhibits no edema  Lymphadenopathy:     She has no cervical adenopathy  Neurological: She is alert and oriented to person, place, and time  Skin: Skin is warm and dry  Psychiatric: She has a normal mood and affect  Labs: I have personally reviewed pertinent lab results  Lab Results   Component Value Date    WBC 6 76 07/15/2019    HGB 12 9 07/15/2019    HCT 39 4 07/15/2019    MCV 98 07/15/2019     07/15/2019     Lab Results   Component Value Date    GLUCOSE 165 (H) 05/08/2015    CALCIUM 9 1 07/01/2019     05/08/2015    K 3 8 07/01/2019    CO2 27 07/01/2019     (H) 07/01/2019    BUN 23 07/01/2019    CREATININE 0 70 07/01/2019     Lab Results   Component Value Date    IGE 64 0 08/08/2018     Lab Results   Component Value Date    ALT 33 07/01/2019    AST 27 07/01/2019    ALKPHOS 89 07/01/2019    BILITOT 0 48 05/08/2015     Imaging and other studies: I have personally reviewed pertinent reports  and I have personally reviewed pertinent films in PACS CXR 9/27/19 - lungs clear    Pulmonary function testing:  Performed 7/24/19   FEV1/FVC ratio 76%   FEV1 101% predicted  % predicted  No response to bronchodilators  TLC 98 % predicted  RV 94 % predicted  DLCO corrected for hemoglobin 91 % predicted    Normal PFTs

## 2019-12-13 DIAGNOSIS — J20.9 ACUTE BRONCHITIS, UNSPECIFIED ORGANISM: ICD-10-CM

## 2019-12-16 RX ORDER — BENZONATATE 200 MG/1
CAPSULE ORAL
Qty: 20 CAPSULE | Refills: 0 | Status: SHIPPED | OUTPATIENT
Start: 2019-12-16 | End: 2020-07-02 | Stop reason: ALTCHOICE

## 2020-01-22 ENCOUNTER — HOSPITAL ENCOUNTER (OUTPATIENT)
Dept: INFUSION CENTER | Facility: CLINIC | Age: 65
Discharge: HOME | End: 2020-01-22
Payer: COMMERCIAL

## 2020-01-22 VITALS
OXYGEN SATURATION: 95 % | SYSTOLIC BLOOD PRESSURE: 121 MMHG | DIASTOLIC BLOOD PRESSURE: 57 MMHG | TEMPERATURE: 97.6 F | RESPIRATION RATE: 18 BRPM | HEART RATE: 74 BPM

## 2020-01-22 DIAGNOSIS — J45.41 MODERATE PERSISTENT ASTHMA WITH ACUTE EXACERBATION: Primary | ICD-10-CM

## 2020-01-22 PROCEDURE — 96372 THER/PROPH/DIAG INJ SC/IM: CPT

## 2020-01-22 RX ORDER — EPINEPHRINE 1 MG/ML
0.3 INJECTION, SOLUTION, CONCENTRATE INTRAVENOUS AS NEEDED
Status: DISCONTINUED | OUTPATIENT
Start: 2020-01-22 | End: 2020-01-25 | Stop reason: HOSPADM

## 2020-01-22 RX ORDER — EPINEPHRINE 1 MG/ML
0.3 INJECTION, SOLUTION, CONCENTRATE INTRAVENOUS AS NEEDED
Status: CANCELLED | OUTPATIENT
Start: 2020-03-18

## 2020-01-22 RX ADMIN — BENRALIZUMAB 30 MG: 30 INJECTION, SOLUTION SUBCUTANEOUS at 09:59

## 2020-01-22 NOTE — PROGRESS NOTES
Patient here for injection of fasenra  Patient has Gina Rodney on her person that expires 07/2020  Patient resting with no complaints  Callbell within reach, will continue to monitor

## 2020-01-22 NOTE — PROGRESS NOTES
Patient tolerated injection and 30 minute observation period without complications  Patient given AVS  Patient aware to make next infusion appointment on her way out  Patient informed scheduling PCA that she felt slightly dizzy and vision felt to be "in a fog" and PCA informed RN  Brought patient back into infusion center to sit, checked vitals, gave patient juice and a snack  /57, HR 74, SPO2 95% on room air  Patient reported feeling better and back to normal and safe to leave appointment

## 2020-01-28 ENCOUNTER — TELEPHONE (OUTPATIENT)
Dept: PULMONOLOGY | Facility: CLINIC | Age: 65
End: 2020-01-28

## 2020-01-28 NOTE — TELEPHONE ENCOUNTER
[facility]  She got a letter in the mail saying that it needs to be renewed    She left a phone number of [phone] and a ID of [id number]

## 2020-02-24 NOTE — TELEPHONE ENCOUNTER
Called for an update and spoke with Ely Moscoso  She claimed they did not receive any of the information that I had sent over    Re-faxing information again today and will call pt with an update

## 2020-02-28 ENCOUNTER — OFFICE VISIT (OUTPATIENT)
Dept: PULMONOLOGY | Facility: CLINIC | Age: 65
End: 2020-02-28
Payer: COMMERCIAL

## 2020-02-28 VITALS
WEIGHT: 142 LBS | OXYGEN SATURATION: 97 % | TEMPERATURE: 97.9 F | BODY MASS INDEX: 27.88 KG/M2 | HEART RATE: 79 BPM | RESPIRATION RATE: 18 BRPM | SYSTOLIC BLOOD PRESSURE: 118 MMHG | HEIGHT: 60 IN | DIASTOLIC BLOOD PRESSURE: 70 MMHG

## 2020-02-28 DIAGNOSIS — G47.34 NOCTURNAL HYPOXIA: ICD-10-CM

## 2020-02-28 DIAGNOSIS — J45.50 SEVERE PERSISTENT ASTHMA WITHOUT COMPLICATION: Primary | ICD-10-CM

## 2020-02-28 PROCEDURE — 99215 OFFICE O/P EST HI 40 MIN: CPT | Performed by: INTERNAL MEDICINE

## 2020-02-28 NOTE — PROGRESS NOTES
Pulmonary Follow Up Note   Graciela Zimmerman 59 y o  female MRN: [id number]  3/3/2020      Assessment/Plan:     Severe persistent asthma without complication  Patient has longstanding severe persistent asthma  Her asthma control seemed to have improved after having nasal polyps addressed  The addition of Slaughter Borer has also been beneficial, however she is having some side effects of myalgias related to it  Her allergist had suggested possibly transitioning to [address], however I am not aware of any definitive benefit over Slaughter Borer  Patient does not feel strongly about changing regimen, so I will proceed with re-authorization of Slaughter Borer  I also readdressed the importance of maintaining on a good maintenance regimen  She must use the Advair twice daily as prescribed  She has been using it more as a rescue inhaler  If she continues to struggle with asthma control with regular use of Advair, then I would strongly consider treatment with bronchial thermoplasty  Patient is in agreement  She understands that 80% of patients will have improvement in their asthma control once a complete all 3 treatment sessions  She also understands that she would need to be on prednisone pre and postoperatively in an effort to reduce exacerbation rate  Patients often have a mild flare-up in her asthma after each treatment session  Eosinophilic asthma ([facility] [address] )  Since treatment with Slaughter Borer, patient's asthma control has improved as supported by decreased use of rescue medications  Ongoing use is medically necessary  Visit orders:    Diagnoses and all orders for this visit:    Severe persistent asthma without complication    Nocturnal hypoxia        Return in about 3 months (around 5/28/2020)  History of Present Illness   HPI:  Graciela Zimmerman is a 59 y o  female who  Is here today for follow-up regarding asthma and chronic sinusitis  Since her last visit with me, she has required antibiotics and steroids on 4 separate occasions    She finds that her symptoms tend to begin with bronchitis, followed by asthma exacerbation  Her sinus issues seem to been under better control since having polyps removed  She is on Gomes Nitin, with her next injection scheduled for March 18th  Patient states that she needs updated authorization from her insurance company  She recently saw her allergist, Dr Monica Adams who suggested possibly switching to Dupixant  She has no fever, chills or sweats  She admits that she is not using the generic Advair on a regular basis  She has been using it as needed 2-3 times per week  Patient is now seeing a rheumatologist for diffuse arthralgias and myalgias, possibly fibromyalgia  She is also questioning whether it could be related to Gomes Nitin,  As her symptoms seem to have worsened since starting that medication  Patient has episodes of heartburn  Review of Systems   Constitutional: Negative for chills, fever and unexpected weight change  HENT: Negative for postnasal drip and sore throat  Eyes: Negative for visual disturbance  Respiratory:        As noted in HPI   Cardiovascular: Negative for chest pain  Gastrointestinal: Negative for abdominal pain, diarrhea and vomiting  Genitourinary: Negative for difficulty urinating  Musculoskeletal: Positive for arthralgias and myalgias  Skin: Negative for rash  Neurological: Negative for headaches  Hematological: Negative for adenopathy  Psychiatric/Behavioral: Negative  All other systems reviewed and are negative          Historical Information   Past Medical History:   Diagnosis Date    Anesthesia complication     desaturation of oxygen mostly at night- on O2 at 2L at hs-may wake up with asthma issue    Anxiety     Asthma     Argueta esophagus     DVT (deep venous thrombosis) (HCC)     during pregnancy    Hiatal hernia     Hypercholesteremia     Nasal polyps     Sinus disorder     Sinusitis, chronic      Past Surgical History:   Procedure Laterality Date     SECTION N/A     x 2    COLONOSCOPY      ESOPHAGOGASTRODUODENOSCOPY      HYSTERECTOMY      complete    NASAL POLYP SURGERY      x2    NASAL POLYP SURGERY  2018    WA COLSC FLX W/RMVL OF TUMOR POLYP LESION SNARE TQ N/A 2017    Procedure: COLONOSCOPYwith  snare polypectomy ;  Surgeon: Mansi Rodriguez MD;  Location: [facility] GI [facility]; Service: Gastroenterology    WA EGD TRANSORAL BIOPSY SINGLE/MULTIPLE N/A 2017    Procedure: ESOPHAGOGASTRODUODENOSCOPY (EGD)and biopsy ;  Surgeon: Mansi Rodriguez MD;  Location: [facility] GI [facility];   Service: Gastroenterology    TONSILLECTOMY N/A     VEIN LIGATION AND STRIPPING Bilateral      Family History   Problem Relation Age of Onset    Heart disease Mother         CHF    Breast cancer additional onset Mother 54    Breast cancer Mother 52    Dysphagia Father     Breast cancer Maternal Aunt     Breast cancer Paternal Aunt        Social History     Tobacco Use   Smoking Status Never Smoker   Smokeless Tobacco Never Used         Meds/Allergies     Current Outpatient Medications:     acetaminophen (TYLENOL) 325 mg tablet, Take 650 mg by mouth every 6 (six) hours as needed for mild pain, Disp: , Rfl:     albuterol (PROAIR HFA) 90 mcg/act inhaler, Inhale 2 puffs every 6 (six) hours as needed for wheezing, Disp: 8 5 g, Rfl: 5    ALPRAZolam (XANAX) 0 5 mg tablet, , Disp: , Rfl: 0    aspirin 325 mg tablet, Take 325 mg by mouth 2 (two) times a day, Disp: , Rfl:     cholecalciferol (VITAMIN D3) 1,000 units tablet, Take 4,000 Units by mouth daily, Disp: , Rfl:     DULoxetine (CYMBALTA) 30 mg delayed release capsule, Take 30 mg by mouth daily, Disp: , Rfl:     FASENRA, 30 mg every 56 days , Disp: , Rfl:     montelukast (SINGULAIR) 10 mg tablet, Take 10 mg by mouth daily at bedtime, Disp: , Rfl: 0    omeprazole (PriLOSEC) 40 MG capsule, Take 40 mg by mouth daily before breakfast Take 30 minutes prior, Disp: , Rfl: 0    sodium chloride 0 9 % irrigation, , Disp: , Rfl: 0    albuterol (2 5 mg/3 mL) 0 083 % nebulizer solution, Take 1 vial (2 5 mg total) by nebulization every 6 (six) hours as needed for wheezing or shortness of breath (Patient not taking: Reported on 2/28/2020), Disp: 120 vial, Rfl: 5    ASMANEX, 30 METERED DOSES, 220 MCG/INH inhaler, , Disp: , Rfl: 0    benzonatate (TESSALON) 200 MG capsule, take 1 tablet by mouth three times a day if needed for cough (Patient not taking: Reported on 2/28/2020), Disp: 20 capsule, Rfl: 0    budesonide (PULMICORT) 0 5 mg/2 mL nebulizer solution, , Disp: , Rfl: 1    cetirizine (ZyrTEC) 10 mg tablet, Take 10 mg by mouth every morning  , Disp: , Rfl:     EPINEPHrine (EPIPEN) 0 3 mg/0 3 mL SOAJ, Inject 0 3 mL (0 3 mg total) into a muscle once for 1 dose, Disp: 0 3 mL, Rfl: 0    famciclovir (FAMVIR) 500 mg tablet, , Disp: , Rfl: 0    LACTOBACILLUS ACID-PECTIN PO, Take by mouth, Disp: , Rfl:     mupirocin (BACTROBAN) 2 % ointment, apply to affected area twice a day  TO LOWER NASAL AREA, Disp: , Rfl: 0    promethazine-codeine (PHENERGAN WITH CODEINE) 6 25-10 mg/5 mL syrup, take 10 milliliters by mouth every 4 to 6 hours if needed for cough, Disp: , Rfl: 0    traZODone (DESYREL) 100 mg tablet, , Disp: , Rfl: 0    WIXELA INHUB 250-50 MCG/DOSE inhaler, inhale 1 puff by mouth every 12 hours for 30 DAYS, Disp: , Rfl: 0    zileuton (ZYFLO CR) 600 MG, Take 1,200 mg by mouth 2 (two) times a day, Disp: , Rfl:   Allergies   Allergen Reactions    Livalo [Pitavastatin] Hives and Shortness Of Breath     Nausea    Dust Mite Extract     Mold Extract [Trichophyton]     Cefditoren Pivoxil Hives     N/V       Vitals: Blood pressure 118/70, pulse 79, temperature 97 9 °F (36 6 °C), temperature source Tympanic, resp  rate 18, height 5' (1 524 m), weight 64 4 kg (142 lb), SpO2 97 %  Body mass index is 27 73 kg/m²   Oxygen Therapy  SpO2: 97 %    Physical Exam   Physical Exam   Constitutional: She is oriented to person, place, and time  No distress  HENT:   Head: Normocephalic  Mouth/Throat: No oropharyngeal exudate  Eyes: Pupils are equal, round, and reactive to light  No scleral icterus  Neck: Neck supple  No JVD present  Cardiovascular: Normal rate and regular rhythm  Pulmonary/Chest: She has no wheezes  She has no rales  Abdominal: Soft  There is no tenderness  Musculoskeletal: She exhibits no edema  Lymphadenopathy:     She has no cervical adenopathy  Neurological: She is alert and oriented to person, place, and time  Skin: Skin is warm and dry  Psychiatric: She has a normal mood and affect  Labs: I have personally reviewed pertinent lab results  Lab Results   Component Value Date    WBC 6 76 07/15/2019    HGB 12 9 07/15/2019    HCT 39 4 07/15/2019    MCV 98 07/15/2019     07/15/2019     Lab Results   Component Value Date    GLUCOSE 165 (H) 05/08/2015    CALCIUM 9 1 07/01/2019     05/08/2015    K 3 8 07/01/2019    CO2 27 07/01/2019     (H) 07/01/2019    BUN 23 07/01/2019    CREATININE 0 70 07/01/2019     Lab Results   Component Value Date    IGE 64 0 08/08/2018     Lab Results   Component Value Date    ALT 33 07/01/2019    AST 27 07/01/2019    ALKPHOS 89 07/01/2019    BILITOT 0 48 05/08/2015       Imaging and other studies: I have personally reviewed pertinent reports  and I have personally reviewed pertinent films in PACS   High-resolution chest CT from 7/9/18 shows no evidence of interstitial lung disease  There is mild perihilar bronchial wall thickening    Pulmonary function testing:  Performed  7/24/19   FEV1/FVC ratio 76%   FEV1 101% predicted  % predicted  No response to bronchodilators  TLC 98 % predicted  RV 94 % predicted  DLCO corrected for hemoglobin 91 % predicted     PFTs are normal     PFT on 7/11/18   FEV1/ FVC ratio 62%   FEV1 50% of predicted   FVC 61% of predicted   significant improvement in FEV1 after bronchodilator administration,  62% predicted    Methacholine challenge testing from 5/13/16 showed moderate degree of airway reactivity

## 2020-02-28 NOTE — ASSESSMENT & PLAN NOTE
Patient has longstanding severe persistent asthma  Her asthma control seemed to have improved after having nasal polyps addressed  The addition of Calli Favre has also been beneficial, however she is having some side effects of myalgias related to it  Her allergist had suggested possibly transitioning to [address], however I am not aware of any definitive benefit over Calli Favre  Patient does not feel strongly about changing regimen, so I will proceed with re-authorization of Calli Favre  I also readdressed the importance of maintaining on a good maintenance regimen  She must use the Advair twice daily as prescribed  She has been using it more as a rescue inhaler  If she continues to struggle with asthma control with regular use of Advair, then I would strongly consider treatment with bronchial thermoplasty  Patient is in agreement  She understands that 80% of patients will have improvement in their asthma control once a complete all 3 treatment sessions  She also understands that she would need to be on prednisone pre and postoperatively in an effort to reduce exacerbation rate  Patients often have a mild flare-up in her asthma after each treatment session

## 2020-03-03 PROBLEM — J82.83 EOSINOPHILIC ASTHMA: Status: ACTIVE | Noted: 2020-03-03

## 2020-03-03 NOTE — ASSESSMENT & PLAN NOTE
Since treatment with Erick Go, patient's asthma control has improved as supported by decreased use of rescue medications  Ongoing use is medically necessary

## 2020-03-11 NOTE — TELEPHONE ENCOUNTER
Called [employer] and spoke with Jennifer cueto  They have attempted to contact pt to get consent to ship medication to Infusion center    I called pt today and left a VM informing her to please specialty pharmacy to give consent to ship medication and also stated on the VM that without her consent they are unable to shop [address] [place] [address]

## 2020-03-13 ENCOUNTER — NURSE TRIAGE (OUTPATIENT)
Dept: OTHER | Facility: OTHER | Age: 65
End: 2020-03-13

## 2020-03-13 ENCOUNTER — TELEPHONE (OUTPATIENT)
Dept: OTHER | Facility: OTHER | Age: 65
End: 2020-03-13

## 2020-03-13 ENCOUNTER — TELEPHONE (OUTPATIENT)
Dept: PULMONOLOGY | Facility: CLINIC | Age: 65
End: 2020-03-13

## 2020-03-13 ENCOUNTER — HOSPITAL ENCOUNTER (EMERGENCY)
Facility: HOSPITAL | Age: 65
Discharge: HOME | End: 2020-03-13
Attending: EMERGENCY MEDICINE
Payer: COMMERCIAL

## 2020-03-13 ENCOUNTER — APPOINTMENT (EMERGENCY)
Dept: RADIOLOGY | Facility: HOSPITAL | Age: 65
End: 2020-03-13
Payer: COMMERCIAL

## 2020-03-13 VITALS
TEMPERATURE: 99.1 F | OXYGEN SATURATION: 95 % | SYSTOLIC BLOOD PRESSURE: 121 MMHG | RESPIRATION RATE: 18 BRPM | DIASTOLIC BLOOD PRESSURE: 66 MMHG | HEART RATE: 80 BPM

## 2020-03-13 DIAGNOSIS — J06.9 URI (UPPER RESPIRATORY INFECTION): Primary | ICD-10-CM

## 2020-03-13 DIAGNOSIS — R50.9 FEBRILE RESPIRATORY ILLNESS: ICD-10-CM

## 2020-03-13 DIAGNOSIS — J45.50 SEVERE PERSISTENT ASTHMA: ICD-10-CM

## 2020-03-13 DIAGNOSIS — J98.9 FEBRILE RESPIRATORY ILLNESS: ICD-10-CM

## 2020-03-13 LAB
ALBUMIN SERPL BCP-MCNC: 3.4 G/DL (ref 3.5–5)
ALP SERPL-CCNC: 123 U/L (ref 46–116)
ALT SERPL W P-5'-P-CCNC: 40 U/L (ref 12–78)
ANION GAP SERPL CALCULATED.3IONS-SCNC: 6 MMOL/L (ref 4–13)
AST SERPL W P-5'-P-CCNC: 37 U/L (ref 5–45)
BASOPHILS # BLD AUTO: 0.01 THOUSANDS/ΜL (ref 0–0.1)
BASOPHILS NFR BLD AUTO: 0 % (ref 0–1)
BILIRUB SERPL-MCNC: 0.4 MG/DL (ref 0.2–1)
BUN SERPL-MCNC: 13 MG/DL (ref 5–25)
CALCIUM SERPL-MCNC: 8.8 MG/DL (ref 8.3–10.1)
CHLORIDE SERPL-SCNC: 104 MMOL/L (ref 100–108)
CO2 SERPL-SCNC: 27 MMOL/L (ref 21–32)
CREAT SERPL-MCNC: 0.74 MG/DL (ref 0.6–1.3)
EOSINOPHIL # BLD AUTO: 0 THOUSAND/ΜL (ref 0–0.61)
EOSINOPHIL NFR BLD AUTO: 0 % (ref 0–6)
ERYTHROCYTE [DISTWIDTH] IN BLOOD BY AUTOMATED COUNT: 11.8 % (ref 11.6–15.1)
FLUAV RNA NPH QL NAA+PROBE: NORMAL
FLUBV RNA NPH QL NAA+PROBE: NORMAL
GFR SERPL CREATININE-BSD FRML MDRD: 86 ML/MIN/1.73SQ M
GLUCOSE SERPL-MCNC: 121 MG/DL (ref 65–140)
HCT VFR BLD AUTO: 38.8 % (ref 34.8–46.1)
HGB BLD-MCNC: 12.6 G/DL (ref 11.5–15.4)
IMM GRANULOCYTES # BLD AUTO: 0.05 THOUSAND/UL (ref 0–0.2)
IMM GRANULOCYTES NFR BLD AUTO: 0 % (ref 0–2)
LYMPHOCYTES # BLD AUTO: 2.54 THOUSANDS/ΜL (ref 0.6–4.47)
LYMPHOCYTES NFR BLD AUTO: 19 % (ref 14–44)
MCH RBC QN AUTO: 32.1 PG (ref 26.8–34.3)
MCHC RBC AUTO-ENTMCNC: 32.5 G/DL (ref 31.4–37.4)
MCV RBC AUTO: 99 FL (ref 82–98)
MONOCYTES # BLD AUTO: 1.3 THOUSAND/ΜL (ref 0.17–1.22)
MONOCYTES NFR BLD AUTO: 10 % (ref 4–12)
NEUTROPHILS # BLD AUTO: 9.35 THOUSANDS/ΜL (ref 1.85–7.62)
NEUTS SEG NFR BLD AUTO: 71 % (ref 43–75)
NRBC BLD AUTO-RTO: 0 /100 WBCS
PLATELET # BLD AUTO: 217 THOUSANDS/UL (ref 149–390)
PMV BLD AUTO: 9.2 FL (ref 8.9–12.7)
POTASSIUM SERPL-SCNC: 3.5 MMOL/L (ref 3.5–5.3)
PROT SERPL-MCNC: 7.3 G/DL (ref 6.4–8.2)
RBC # BLD AUTO: 3.93 MILLION/UL (ref 3.81–5.12)
RSV RNA NPH QL NAA+PROBE: NORMAL
SODIUM SERPL-SCNC: 137 MMOL/L (ref 136–145)
TROPONIN I SERPL-MCNC: <0.02 NG/ML
WBC # BLD AUTO: 13.25 THOUSAND/UL (ref 4.31–10.16)

## 2020-03-13 PROCEDURE — 87631 RESP VIRUS 3-5 TARGETS: CPT | Performed by: EMERGENCY MEDICINE

## 2020-03-13 PROCEDURE — 36415 COLL VENOUS BLD VENIPUNCTURE: CPT

## 2020-03-13 PROCEDURE — 80053 COMPREHEN METABOLIC PANEL: CPT | Performed by: EMERGENCY MEDICINE

## 2020-03-13 PROCEDURE — 99284 EMERGENCY DEPT VISIT MOD MDM: CPT | Performed by: EMERGENCY MEDICINE

## 2020-03-13 PROCEDURE — 99285 EMERGENCY DEPT VISIT HI MDM: CPT

## 2020-03-13 PROCEDURE — 71046 X-RAY EXAM CHEST 2 VIEWS: CPT

## 2020-03-13 PROCEDURE — 84484 ASSAY OF TROPONIN QUANT: CPT | Performed by: EMERGENCY MEDICINE

## 2020-03-13 PROCEDURE — 93005 ELECTROCARDIOGRAM TRACING: CPT

## 2020-03-13 PROCEDURE — 85025 COMPLETE CBC W/AUTO DIFF WBC: CPT | Performed by: EMERGENCY MEDICINE

## 2020-03-13 RX ORDER — AZITHROMYCIN 250 MG/1
TABLET, FILM COATED ORAL
Qty: 4 TABLET | Refills: 0 | Status: SHIPPED | OUTPATIENT
Start: 2020-03-14 | End: 2020-03-17

## 2020-03-13 RX ORDER — AZITHROMYCIN 250 MG/1
500 TABLET, FILM COATED ORAL ONCE
Status: COMPLETED | OUTPATIENT
Start: 2020-03-13 | End: 2020-03-13

## 2020-03-13 RX ADMIN — AZITHROMYCIN 500 MG: 250 TABLET, FILM COATED ORAL at 23:17

## 2020-03-13 NOTE — TELEPHONE ENCOUNTER
ID got back to me and said patient is at low risk for Covid19 but she needs to be evaluated for other causes of the fever  He recommends ER or Urgent Care visit depending on severity of symptoms (possible needing admission), with letting them know ahead of time her symptoms for a smooth and rapid process  Let patient know  She will wait until her  gets home and she will call ahead of time where she is going

## 2020-03-13 NOTE — TELEPHONE ENCOUNTER
Reason for Disposition   [1] No COVID-19 EXPOSURE BUT [2] questions about   [1] MILD difficulty breathing (e g , minimal/no SOB at rest, SOB with walking, pulse <100) AND [2] NEW-onset or WORSE than normal    Answer Assessment - Initial Assessment Questions  1  PLACE of CONTACT: "Where were you when you were exposed to COVID-19  (coronavirus disease 2019)?" (e g , city, state, country)      None     2  TYPE of CONTACT: "How much contact was there?" (e g , live in same house, work in same office, same school)      None     3  DATE of CONTACT: "When did you have contact with a coronavirus patient?" (e g , days)      None     4  DURATION of CONTACT: "How long were you in contact with the COVID-19 (coronavirus disease) patient?" (e g , a few seconds, passed by person, a few minutes, live with the patient)      None     5  SYMPTOMS: "Do you have any symptoms?" (e g , fever, cough, breathing difficulty)      Fever noted 102 3 Orally       Sore  Throat  Mild cough  Winded when she talks and walk around  Can't really walk due to shortness  Spoke with PCP and the RN there stated that patient should go to the hospital     7  HIGH RISK: "Do you have any heart or lung problems? Do you have a weakened immune system?" (e g , CHF, COPD, asthma, HIV positive, chemotherapy, renal failure, diabetes mellitus, sickle cell anemia)      Asthma    Protocols used: CORONAVIRUS (COVID-19) EXPOSURE-ADULT-AH, BREATHING DIFFICULTY-ADULT-AH    I can hear that she can not get a full sentence out without taking a breathe in

## 2020-03-13 NOTE — TELEPHONE ENCOUNTER
Keara Rogers left a message and said that she had a high fever last night and is coughing  She would like a call back

## 2020-03-13 NOTE — TELEPHONE ENCOUNTER
Spoke with patient  She started last night with a high fever of 103  Today it is 101  She has a non productive cough, SOB with exertion, chest tightness and congestion  Denies wheezing  She is using her nebulizer and inhalers  She denies being out of the country or around anyone who has, as far as she knows  She is a  though and is around people all day  Sent tiger text to on call ID doctor for further direction

## 2020-03-14 NOTE — ED PROVIDER NOTES
History  Chief Complaint   Patient presents with    Shortness of Breath     pt c/o SOB with exertion for the past 2 days  hx asthma  pt reports fever of 102 9 last night  pt c/o dry cough  Patient is a 71-year-old female who presents to the emergency department relating that she had a "high fever 102 9 last night "  She relates that she had ongoing fevers today as well as sore throat and achiness  She has had cough since yesterday which is productive of white sputum  She relates that she was resting the majority of the day at home and did not have any chest discomfort or shortness of breath though notes that if she is speaking a lot or walking she does get a generalized chest tightness and shortness of breath  She estimates that she has had some degree of fever for the last 48 hours along with increased nasal congestion  Medical history significant for asthma  She did use her rescue inhaler yesterday but did not feel a need to use it today  A grandchild with whom she has spent time was treated a week ago for influenza  Patient has not had any recent travel or known exposure to anyone with known or presumed COVID  Pt  Notes she contacted her PCP's office & was referred in for evaluation  Prior to Admission Medications   Prescriptions Last Dose Informant Patient Reported? Taking?    ALPRAZolam (XANAX) 0 5 mg tablet  Self Yes No   ASMANEX, 30 METERED DOSES, 220 MCG/INH inhaler  Self Yes No   DULoxetine (CYMBALTA) 30 mg delayed release capsule  Self Yes No   Sig: Take 30 mg by mouth daily   EPINEPHrine (EPIPEN) 0 3 mg/0 3 mL SOAJ   No No   Sig: Inject 0 3 mL (0 3 mg total) into a muscle once for 1 dose   FASENRA  Self Yes No   Si mg every 56 days    LACTOBACILLUS ACID-PECTIN PO  Self Yes No   Sig: Take by mouth   WIXELA INHUB 250-50 MCG/DOSE inhaler  Self Yes No   Sig: inhale 1 puff by mouth every 12 hours for 30 DAYS   acetaminophen (TYLENOL) 325 mg tablet  Self Yes No   Sig: Take 650 mg by mouth every 6 (six) hours as needed for mild pain   albuterol (2 5 mg/3 mL) 0 083 % nebulizer solution  Self No No   Sig: Take 1 vial (2 5 mg total) by nebulization every 6 (six) hours as needed for wheezing or shortness of breath   Patient not taking: Reported on 2/28/2020   albuterol (PROAIR HFA) 90 mcg/act inhaler  Self No No   Sig: Inhale 2 puffs every 6 (six) hours as needed for wheezing   aspirin 325 mg tablet  Self Yes No   Sig: Take 325 mg by mouth 2 (two) times a day   benzonatate (TESSALON) 200 MG capsule   No No   Sig: take 1 tablet by mouth three times a day if needed for cough   Patient not taking: Reported on 2/28/2020   budesonide (PULMICORT) 0 5 mg/2 mL nebulizer solution  Self Yes No   cetirizine (ZyrTEC) 10 mg tablet  Self Yes No   Sig: Take 10 mg by mouth every morning     cholecalciferol (VITAMIN D3) 1,000 units tablet  Self Yes No   Sig: Take 4,000 Units by mouth daily   famciclovir (FAMVIR) 500 mg tablet  Self Yes No   montelukast (SINGULAIR) 10 mg tablet  Self Yes No   Sig: Take 10 mg by mouth daily at bedtime   mupirocin (BACTROBAN) 2 % ointment  Self Yes No   Sig: apply to affected area twice a day  TO LOWER NASAL AREA   omeprazole (PriLOSEC) 40 MG capsule  Self Yes No   Sig: Take 40 mg by mouth daily before breakfast Take 30 minutes prior   promethazine-codeine (PHENERGAN WITH CODEINE) 6 25-10 mg/5 mL syrup  Self Yes No   Sig: take 10 milliliters by mouth every 4 to 6 hours if needed for cough   sodium chloride 0 9 % irrigation  Self Yes No   traZODone (DESYREL) 100 mg tablet  Self Yes No   zileuton (ZYFLO CR) 600 MG  Self Yes No   Sig: Take 1,200 mg by mouth 2 (two) times a day      Facility-Administered Medications: None       Past Medical History:   Diagnosis Date    Anesthesia complication     desaturation of oxygen mostly at night- on O2 at 2L at hs-may wake up with asthma issue    Anxiety     Asthma     Argueta esophagus     DVT (deep venous thrombosis) (HCC)     during pregnancy  Hiatal hernia     Hypercholesteremia     Nasal polyps     Sinus disorder     Sinusitis, chronic        Past Surgical History:   Procedure Laterality Date     SECTION N/A     x 2    COLONOSCOPY      ESOPHAGOGASTRODUODENOSCOPY      HYSTERECTOMY      complete    NASAL POLYP SURGERY      x2    NASAL POLYP SURGERY  2018    AK COLSC FLX W/RMVL OF TUMOR POLYP LESION SNARE TQ N/A 2017    Procedure: COLONOSCOPYwith  snare polypectomy ;  Surgeon: Lizzeth Pineda MD;  Location: [facility]; Service: Gastroenterology    AK EGD TRANSORAL BIOPSY SINGLE/MULTIPLE N/A 2017    Procedure: ESOPHAGOGASTRODUODENOSCOPY (EGD)and biopsy ;  Surgeon: Lizzeth Pineda MD;  Location: [facility]; Service: Gastroenterology    TONSILLECTOMY N/A     VEIN LIGATION AND STRIPPING Bilateral        Family History   Problem Relation Age of Onset    Heart disease Mother         CHF    Breast cancer additional onset Mother 52    Breast cancer Mother 52    Dysphagia Father     Breast cancer Maternal Aunt     Breast cancer Paternal Aunt      I have reviewed and agree with the history as documented  E-Cigarette/Vaping    E-Cigarette Use Never User      E-Cigarette/Vaping Substances     Social History     Tobacco Use    Smoking status: Never Smoker    Smokeless tobacco: Never Used   Substance Use Topics    Alcohol use: Yes     Frequency: 2-4 times a month     Drinks per session: 1 or 2    Drug use: No       Review of Systems   HENT: Positive for congestion and rhinorrhea  Negative for ear pain  Gastrointestinal: Negative for nausea and vomiting  Skin: Negative for rash  All other systems reviewed and are negative  Physical Exam  Physical Exam   Constitutional: She is oriented to person, place, and time  She appears well-developed and well-nourished  HENT:   Head: Normocephalic  Right Ear: Tympanic membrane and ear canal normal    Nose: Rhinorrhea present     Mouth/Throat: Posterior oropharyngeal erythema (mild injection & mucoid material appreciated) present  L otic canal obscured by cerumen  Pulmonary/Chest: Effort normal  She has no wheezes  She has no rhonchi  She has no rales  BS slightly decreased throughout   Abdominal: Soft  There is no tenderness  Musculoskeletal:        Right lower leg: She exhibits no tenderness and no edema  Left lower leg: She exhibits no tenderness and no edema  Neurological: She is alert and oriented to person, place, and time  Skin: Skin is warm and dry  Psychiatric: She has a normal mood and affect  Her behavior is normal    Nursing note and vitals reviewed        Vital Signs  ED Triage Vitals [03/13/20 2028]   Temperature Pulse Respirations Blood Pressure SpO2   99 1 °F (37 3 °C) 89 18 117/59 94 %      Temp Source Heart Rate Source Patient Position - Orthostatic VS BP Location FiO2 (%)   Oral Monitor Sitting Left arm --      Pain Score       --           Vitals:    03/13/20 2028 03/13/20 2258   BP: 117/59 121/66   Pulse: 89 80   Patient Position - Orthostatic VS: Sitting Sitting         Visual Acuity      ED Medications  Medications   azithromycin (ZITHROMAX) tablet 500 mg (500 mg Oral Given 3/13/20 2317)       Diagnostic Studies  Results Reviewed     Procedure Component Value Units Date/Time    Influenza A/B and RSV PCR [[id number]]  (Normal) Collected:  03/13/20 2119    Lab Status:  Final result Specimen:  Nasopharyngeal from Nose Updated:  03/13/20 2222     INFLUENZA A PCR None Detected     INFLUENZA B PCR None Detected     RSV PCR None Detected    Troponin I [[id number]]  (Normal) Collected:  03/13/20 2119    Lab Status:  Final result Specimen:  Blood from Arm, Right Updated:  03/13/20 2148     Troponin I <0 02 ng/mL     Comprehensive metabolic panel [[id number]]  (Abnormal) Collected:  03/13/20 2119    Lab Status:  Final result Specimen:  Blood from Arm, Right Updated:  03/13/20 2146     Sodium 137 mmol/L      Potassium 3 5 mmol/L      Chloride 104 mmol/L      CO2 27 mmol/L      ANION GAP 6 mmol/L      BUN 13 mg/dL      Creatinine 0 74 mg/dL      Glucose 121 mg/dL      Calcium 8 8 mg/dL      AST 37 U/L      ALT 40 U/L      Alkaline Phosphatase 123 U/L      Total Protein 7 3 g/dL      Albumin 3 4 g/dL      Total Bilirubin 0 40 mg/dL      eGFR 86 ml/min/1 73sq m     Narrative:       National Kidney Disease Foundation guidelines for Chronic Kidney Disease (CKD):     Stage 1 with normal or high GFR (GFR > 90 mL/min/1 73 square meters)    Stage 2 Mild CKD (GFR = 60-89 mL/min/1 73 square meters)    Stage 3A Moderate CKD (GFR = 45-59 mL/min/1 73 square meters)    Stage 3B Moderate CKD (GFR = 30-44 mL/min/1 73 square meters)    Stage 4 Severe CKD (GFR = 15-29 mL/min/1 73 square meters)    Stage 5 End Stage CKD (GFR <15 mL/min/1 73 square meters)  Note: GFR calculation is accurate only with a steady state creatinine    CBC and differential [[id number]]  (Abnormal) Collected:  03/13/20 2119    Lab Status:  Final result Specimen:  Blood from Arm, Right Updated:  03/13/20 2128     WBC 13 25 Thousand/uL      RBC 3 93 Million/uL      Hemoglobin 12 6 g/dL      Hematocrit 38 8 %      MCV 99 fL      MCH 32 1 pg      MCHC 32 5 g/dL      RDW 11 8 %      MPV 9 2 fL      Platelets 201 Thousands/uL      nRBC 0 /100 WBCs      Neutrophils Relative 71 %      Immat GRANS % 0 %      Lymphocytes Relative 19 %      Monocytes Relative 10 %      Eosinophils Relative 0 %      Basophils Relative 0 %      Neutrophils Absolute 9 35 Thousands/µL      Immature Grans Absolute 0 05 Thousand/uL      Lymphocytes Absolute 2 54 Thousands/µL      Monocytes Absolute 1 30 Thousand/µL      Eosinophils Absolute 0 00 Thousand/µL      Basophils Absolute 0 01 Thousands/µL                  XR chest 2 views   Final Result by Iva Velasquez MD (03/14 1120)      No acute cardiopulmonary disease              Workstation performed: [id number]                    Procedures  ECG 12 Lead Documentation Only  Date/Time: 3/13/2020 9:42 PM  Performed by: William Hay MD  Authorized by: William Hay MD     ECG reviewed by me, the ED Provider: yes    Patient location:  ED and bedside  Previous ECG:     Previous ECG:  Compared to current    Comparison ECG info:  5/9/18    Comparison to cardiac monitor: No    Rate:     ECG rate:  82    ECG rate assessment: normal    Rhythm:     Rhythm: sinus rhythm    Ectopy:     Ectopy: none    QRS:     QRS axis:  Normal    QRS intervals:  Normal  Conduction:     Conduction: normal    ST segments:     ST segments:  Normal             ED Course       ED Course as of Mar 14 1711   Fri Mar 13, 2020   2143 First Nurse orders initiated prior to my evaluating patient  Patient headed to x-ray now  I discussed use of albuterol nebulizer treatment to assist with breathing  She notes that she does not feel short of breath at rest and would prefer to use her inhaler at home  I indicated that there is a good chance her flu test would be positive given recent exposure in which case we would cover her with Tamiflu  I indicated that if this was negative we would continue supportive care including consideration of steroid  She notes that she has been on steroids numerous times and would prefer to avoid this as well  0796 Study results reviewed with patient  Influenza testing is negative  Patient does have mild leukocytosis and slight peribronchial thickening is appreciated on chest x-ray  This very well may be of viral etiology  Given her persistent asthma will cover with azithromycin in consideration for bacterial infection  Patient has not had recent travel or known exposures to COVID 19 to prompt testing  She is aware to reseek medical attention for worsening especially if she develops worsening cough/dyspnea                                  MDM      Disposition  Final diagnoses:   URI (upper respiratory infection)   Severe persistent asthma Febrile respiratory illness     Time reflects when diagnosis was documented in both MDM as applicable and the Disposition within this note     Time User Action Codes Description Comment    3/13/2020 10:46 PM Maite Severino A Add [J06 9] URI (upper respiratory infection)     3/13/2020 10:48 PM Keithia Susyerman A Add [J45 50] Severe persistent asthma     3/13/2020 10:48 PM Maite Severino Add [J98 9,  R50 9] Febrile respiratory illness       ED Disposition     ED Disposition Condition Date/Time Comment    Discharge Stable Fri Mar 13, 2020 10:46 PM Graciela Zimmerman discharge to home/self care              Follow-up Information     Follow up With Specialties Details Why Contact Info Additional Information    Carlos Eduardo Rose, DO Family Medicine Schedule an appointment as soon as possible for a visit  For re-evaluation in 3-5 days [address]       [address] Emergency Department Emergency Medicine Go to  As needed, If symptoms worsen [address] [facility] [address], [address], [place], [place], [address]          Discharge Medication List as of 3/13/2020 10:50 PM      START taking these medications    Details   azithromycin (ZITHROMAX) 250 mg tablet Take 1 tablet orally daily, Normal         CONTINUE these medications which have NOT CHANGED    Details   acetaminophen (TYLENOL) 325 mg tablet Take 650 mg by mouth every 6 (six) hours as needed for mild pain, Historical Med      albuterol (2 5 mg/3 mL) 0 083 % nebulizer solution Take 1 vial (2 5 mg total) by nebulization every 6 (six) hours as needed for wheezing or shortness of breath, Starting Mon 9/9/2019, Normal      albuterol (PROAIR HFA) 90 mcg/act inhaler Inhale 2 puffs every 6 (six) hours as needed for wheezing, Starting Fri 9/27/2019, Normal      ALPRAZolam (XANAX) 0 5 mg tablet Starting Tue 7/23/2019, Historical Med ASMANEX, 30 METERED DOSES, 220 MCG/INH inhaler Starting Thu 9/12/2019, Historical Med      aspirin 325 mg tablet Take 325 mg by mouth 2 (two) times a day, Historical Med      benzonatate (TESSALON) 200 MG capsule take 1 tablet by mouth three times a day if needed for cough, Normal      budesonide (PULMICORT) 0 5 mg/2 mL nebulizer solution Starting Mon 8/26/2019, Historical Med      cetirizine (ZyrTEC) 10 mg tablet Take 10 mg by mouth every morning  , Historical Med      cholecalciferol (VITAMIN D3) 1,000 units tablet Take 4,000 Units by mouth daily, Historical Med      DULoxetine (CYMBALTA) 30 mg delayed release capsule Take 30 mg by mouth daily, Historical Med      EPINEPHrine (EPIPEN) 0 3 mg/0 3 mL SOAJ Inject 0 3 mL (0 3 mg total) into a muscle once for 1 dose, Starting Mon 7/15/2019, Normal      famciclovir (FAMVIR) 500 mg tablet Starting Fri 10/25/2019, Historical Med      FASENRA 30 mg every 56 days , Starting Tue 10/9/2018, Historical Med      LACTOBACILLUS ACID-PECTIN PO Take by mouth, Historical Med      montelukast (SINGULAIR) 10 mg tablet Take 10 mg by mouth daily at bedtime, Starting Thu 9/12/2019, Historical Med      mupirocin (BACTROBAN) 2 % ointment apply to affected area twice a day  TO LOWER NASAL AREA, Historical Med      omeprazole (PriLOSEC) 40 MG capsule Take 40 mg by mouth daily before breakfast Take 30 minutes prior, Starting Wed 10/30/2019, Historical Med      promethazine-codeine (PHENERGAN WITH CODEINE) 6 25-10 mg/5 mL syrup take 10 milliliters by mouth every 4 to 6 hours if needed for cough, Historical Med      sodium chloride 0 9 % irrigation Starting Wed 10/23/2019, Historical Med      traZODone (DESYREL) 100 mg tablet Starting Tue 7/23/2019, Historical Med      WIXELA INHUB 250-50 MCG/DOSE inhaler inhale 1 puff by mouth every 12 hours for 30 DAYS, Historical Med      zileuton (ZYFLO CR) 600 MG Take 1,200 mg by mouth 2 (two) times a day, Starting Wed 11/28/2018, Historical Med No discharge procedures on file      PDMP Review     None          ED Provider  Electronically Signed by           Alice Munroe MD  03/14/20 2284

## 2020-03-15 LAB
ATRIAL RATE: 82 BPM
P AXIS: 73 DEGREES
PR INTERVAL: 168 MS
QRS AXIS: 36 DEGREES
QRSD INTERVAL: 80 MS
QT INTERVAL: 350 MS
QTC INTERVAL: 408 MS
T WAVE AXIS: 66 DEGREES
VENTRICULAR RATE: 82 BPM

## 2020-03-15 PROCEDURE — 93010 ELECTROCARDIOGRAM REPORT: CPT | Performed by: INTERNAL MEDICINE

## 2020-03-18 ENCOUNTER — TELEPHONE (OUTPATIENT)
Dept: PULMONOLOGY | Facility: CLINIC | Age: 65
End: 2020-03-18

## 2020-03-18 ENCOUNTER — HOSPITAL ENCOUNTER (OUTPATIENT)
Dept: INFUSION CENTER | Facility: CLINIC | Age: 65
Discharge: HOME | End: 2020-03-18

## 2020-03-18 NOTE — TELEPHONE ENCOUNTER
Patient called this morning regarding her fesenra  Infusion , she is supposed to receive this today and  They want to know is she is well enough  To get her infusion , she was in the hospital last week and still has some congestion    Patient would like a call regarding what to do

## 2020-03-18 NOTE — TELEPHONE ENCOUNTER
Per Andie Dey I have called patient and informed her that she should cancel for today and reschedule for a few weeks out  Patient verbalized her understanding

## 2020-03-18 NOTE — TELEPHONE ENCOUNTER
Patient LM asking if it is ok to get her infusion today  She has been sick and was in the ER last week

## 2020-04-02 ENCOUNTER — TELEPHONE (OUTPATIENT)
Dept: PULMONOLOGY | Facility: CLINIC | Age: 65
End: 2020-04-02

## 2020-04-02 RX ORDER — EPINEPHRINE 1 MG/ML
0.3 INJECTION, SOLUTION, CONCENTRATE INTRAVENOUS AS NEEDED
Status: CANCELLED | OUTPATIENT
Start: 2020-04-07

## 2020-04-07 ENCOUNTER — HOSPITAL ENCOUNTER (OUTPATIENT)
Dept: INFUSION CENTER | Facility: CLINIC | Age: 65
Discharge: HOME | End: 2020-04-07
Payer: COMMERCIAL

## 2020-04-07 VITALS
SYSTOLIC BLOOD PRESSURE: 118 MMHG | DIASTOLIC BLOOD PRESSURE: 57 MMHG | RESPIRATION RATE: 18 BRPM | OXYGEN SATURATION: 94 % | TEMPERATURE: 97.7 F | HEART RATE: 84 BPM

## 2020-04-07 DIAGNOSIS — J45.50 SEVERE PERSISTENT ASTHMA WITHOUT COMPLICATION: Primary | ICD-10-CM

## 2020-04-07 PROCEDURE — 96372 THER/PROPH/DIAG INJ SC/IM: CPT

## 2020-04-07 RX ORDER — EPINEPHRINE 1 MG/ML
0.3 INJECTION, SOLUTION, CONCENTRATE INTRAVENOUS AS NEEDED
Status: CANCELLED | OUTPATIENT
Start: 2020-06-02

## 2020-04-07 RX ORDER — EPINEPHRINE 1 MG/ML
0.3 INJECTION, SOLUTION, CONCENTRATE INTRAVENOUS AS NEEDED
Status: DISCONTINUED | OUTPATIENT
Start: 2020-04-07 | End: 2020-04-10 | Stop reason: HOSPADM

## 2020-04-07 RX ADMIN — BENRALIZUMAB 30 MG: 30 INJECTION, SOLUTION SUBCUTANEOUS at 13:45

## 2020-04-08 ENCOUNTER — TELEPHONE (OUTPATIENT)
Dept: PULMONOLOGY | Facility: CLINIC | Age: 65
End: 2020-04-08

## 2020-05-02 ENCOUNTER — HOSPITAL ENCOUNTER (INPATIENT)
Facility: HOSPITAL | Age: 65
LOS: 2 days | Discharge: HOME | DRG: 084 | End: 2020-05-04
Attending: SURGERY | Admitting: SURGERY
Payer: COMMERCIAL

## 2020-05-02 ENCOUNTER — APPOINTMENT (EMERGENCY)
Dept: RADIOLOGY | Facility: HOSPITAL | Age: 65
DRG: 084 | End: 2020-05-02
Payer: COMMERCIAL

## 2020-05-02 ENCOUNTER — APPOINTMENT (INPATIENT)
Dept: RADIOLOGY | Facility: HOSPITAL | Age: 65
DRG: 084 | End: 2020-05-02
Payer: COMMERCIAL

## 2020-05-02 DIAGNOSIS — I60.9 SUBARACHNOID HEMORRHAGE (HCC): Primary | ICD-10-CM

## 2020-05-02 LAB
BASE EXCESS BLDA CALC-SCNC: 1 MMOL/L (ref -2–3)
CA-I BLD-SCNC: 1.17 MMOL/L (ref 1.12–1.32)
GLUCOSE SERPL-MCNC: 91 MG/DL (ref 65–140)
HCO3 BLDA-SCNC: 25.5 MMOL/L (ref 24–30)
HCT VFR BLD CALC: 35 % (ref 34.8–46.1)
HGB BLDA-MCNC: 11.9 G/DL (ref 11.5–15.4)
PCO2 BLD: 27 MMOL/L (ref 21–32)
PCO2 BLD: 41.1 MM HG (ref 42–50)
PH BLD: 7.4 [PH] (ref 7.3–7.4)
PO2 BLD: 48 MM HG (ref 35–45)
POTASSIUM BLD-SCNC: 3.5 MMOL/L (ref 3.5–5.3)
SAO2 % BLD FROM PO2: 84 % (ref 60–85)
SODIUM BLD-SCNC: 140 MMOL/L (ref 136–145)
SPECIMEN SOURCE: ABNORMAL

## 2020-05-02 PROCEDURE — 84295 ASSAY OF SERUM SODIUM: CPT

## 2020-05-02 PROCEDURE — NC001 PR NO CHARGE: Performed by: EMERGENCY MEDICINE

## 2020-05-02 PROCEDURE — 82947 ASSAY GLUCOSE BLOOD QUANT: CPT

## 2020-05-02 PROCEDURE — 99285 EMERGENCY DEPT VISIT HI MDM: CPT

## 2020-05-02 PROCEDURE — 82803 BLOOD GASES ANY COMBINATION: CPT

## 2020-05-02 PROCEDURE — 73090 X-RAY EXAM OF FOREARM: CPT

## 2020-05-02 PROCEDURE — 99222 1ST HOSP IP/OBS MODERATE 55: CPT | Performed by: SURGERY

## 2020-05-02 PROCEDURE — 71045 X-RAY EXAM CHEST 1 VIEW: CPT

## 2020-05-02 PROCEDURE — 72125 CT NECK SPINE W/O DYE: CPT

## 2020-05-02 PROCEDURE — 73030 X-RAY EXAM OF SHOULDER: CPT

## 2020-05-02 PROCEDURE — 82330 ASSAY OF CALCIUM: CPT

## 2020-05-02 PROCEDURE — 85014 HEMATOCRIT: CPT

## 2020-05-02 PROCEDURE — 84132 ASSAY OF SERUM POTASSIUM: CPT

## 2020-05-02 PROCEDURE — 73200 CT UPPER EXTREMITY W/O DYE: CPT

## 2020-05-02 PROCEDURE — 70450 CT HEAD/BRAIN W/O DYE: CPT

## 2020-05-02 PROCEDURE — 73110 X-RAY EXAM OF WRIST: CPT

## 2020-05-02 RX ORDER — OXYCODONE HYDROCHLORIDE 10 MG/1
10 TABLET ORAL EVERY 4 HOURS PRN
Status: DISCONTINUED | OUTPATIENT
Start: 2020-05-02 | End: 2020-05-03

## 2020-05-02 RX ORDER — ACETAMINOPHEN 325 MG/1
650 TABLET ORAL EVERY 6 HOURS PRN
Status: DISCONTINUED | OUTPATIENT
Start: 2020-05-02 | End: 2020-05-04 | Stop reason: HOSPADM

## 2020-05-02 RX ORDER — HYDROMORPHONE HCL/PF 1 MG/ML
0.5 SYRINGE (ML) INJECTION
Status: DISCONTINUED | OUTPATIENT
Start: 2020-05-02 | End: 2020-05-03

## 2020-05-02 RX ORDER — ASPIRIN 325 MG
325 TABLET ORAL DAILY
Status: ON HOLD | COMMUNITY
End: 2020-05-04 | Stop reason: SDUPTHER

## 2020-05-02 RX ORDER — ONDANSETRON 2 MG/ML
4 INJECTION INTRAMUSCULAR; INTRAVENOUS EVERY 6 HOURS PRN
Status: DISCONTINUED | OUTPATIENT
Start: 2020-05-02 | End: 2020-05-04 | Stop reason: HOSPADM

## 2020-05-02 RX ORDER — OXYCODONE HYDROCHLORIDE 5 MG/1
5 TABLET ORAL EVERY 4 HOURS PRN
Status: DISCONTINUED | OUTPATIENT
Start: 2020-05-02 | End: 2020-05-03

## 2020-05-02 RX ADMIN — DESMOPRESSIN ACETATE 19.6 MCG: 4 INJECTION INTRAVENOUS at 20:42

## 2020-05-02 RX ADMIN — OXYCODONE HYDROCHLORIDE 10 MG: 10 TABLET ORAL at 22:23

## 2020-05-02 RX ADMIN — LEVETIRACETAM 500 MG: 100 INJECTION, SOLUTION INTRAVENOUS at 21:07

## 2020-05-03 LAB
ANION GAP SERPL CALCULATED.3IONS-SCNC: 3 MMOL/L (ref 4–13)
BUN SERPL-MCNC: 15 MG/DL (ref 5–25)
CALCIUM SERPL-MCNC: 8.5 MG/DL (ref 8.3–10.1)
CHLORIDE SERPL-SCNC: 108 MMOL/L (ref 100–108)
CO2 SERPL-SCNC: 28 MMOL/L (ref 21–32)
CREAT SERPL-MCNC: 0.71 MG/DL (ref 0.6–1.3)
ERYTHROCYTE [DISTWIDTH] IN BLOOD BY AUTOMATED COUNT: 12.1 % (ref 11.6–15.1)
GFR SERPL CREATININE-BSD FRML MDRD: 90 ML/MIN/1.73SQ M
GLUCOSE SERPL-MCNC: 144 MG/DL (ref 65–140)
HCT VFR BLD AUTO: 34.8 % (ref 34.8–46.1)
HGB BLD-MCNC: 11.4 G/DL (ref 11.5–15.4)
MCH RBC QN AUTO: 32.4 PG (ref 26.8–34.3)
MCHC RBC AUTO-ENTMCNC: 32.8 G/DL (ref 31.4–37.4)
MCV RBC AUTO: 99 FL (ref 82–98)
PLATELET # BLD AUTO: 185 THOUSANDS/UL (ref 149–390)
PMV BLD AUTO: 9.4 FL (ref 8.9–12.7)
POTASSIUM SERPL-SCNC: 3.9 MMOL/L (ref 3.5–5.3)
RBC # BLD AUTO: 3.52 MILLION/UL (ref 3.81–5.12)
SODIUM SERPL-SCNC: 139 MMOL/L (ref 136–145)
WBC # BLD AUTO: 7.38 THOUSAND/UL (ref 4.31–10.16)

## 2020-05-03 PROCEDURE — 97163 PT EVAL HIGH COMPLEX 45 MIN: CPT

## 2020-05-03 PROCEDURE — 97166 OT EVAL MOD COMPLEX 45 MIN: CPT

## 2020-05-03 PROCEDURE — 99255 IP/OBS CONSLTJ NEW/EST HI 80: CPT | Performed by: PHYSICIAN ASSISTANT

## 2020-05-03 PROCEDURE — 99231 SBSQ HOSP IP/OBS SF/LOW 25: CPT | Performed by: SURGERY

## 2020-05-03 PROCEDURE — 80048 BASIC METABOLIC PNL TOTAL CA: CPT | Performed by: SURGERY

## 2020-05-03 PROCEDURE — 85027 COMPLETE CBC AUTOMATED: CPT | Performed by: SURGERY

## 2020-05-03 RX ORDER — GINSENG 100 MG
1 CAPSULE ORAL 2 TIMES DAILY
Status: DISCONTINUED | OUTPATIENT
Start: 2020-05-03 | End: 2020-05-04 | Stop reason: HOSPADM

## 2020-05-03 RX ORDER — OXYCODONE HYDROCHLORIDE 5 MG/1
2.5 TABLET ORAL EVERY 4 HOURS PRN
Status: DISCONTINUED | OUTPATIENT
Start: 2020-05-03 | End: 2020-05-04 | Stop reason: HOSPADM

## 2020-05-03 RX ORDER — SODIUM CHLORIDE, SODIUM GLUCONATE, SODIUM ACETATE, POTASSIUM CHLORIDE, MAGNESIUM CHLORIDE, SODIUM PHOSPHATE, DIBASIC, AND POTASSIUM PHOSPHATE .53; .5; .37; .037; .03; .012; .00082 G/100ML; G/100ML; G/100ML; G/100ML; G/100ML; G/100ML; G/100ML
1000 INJECTION, SOLUTION INTRAVENOUS ONCE
Status: COMPLETED | OUTPATIENT
Start: 2020-05-03 | End: 2020-05-03

## 2020-05-03 RX ORDER — LORAZEPAM 2 MG/ML
0.5 INJECTION INTRAMUSCULAR ONCE
Status: COMPLETED | OUTPATIENT
Start: 2020-05-03 | End: 2020-05-03

## 2020-05-03 RX ORDER — OXYCODONE HYDROCHLORIDE 5 MG/1
5 TABLET ORAL EVERY 4 HOURS PRN
Status: DISCONTINUED | OUTPATIENT
Start: 2020-05-03 | End: 2020-05-04 | Stop reason: HOSPADM

## 2020-05-03 RX ADMIN — OXYCODONE HYDROCHLORIDE 5 MG: 5 TABLET ORAL at 20:58

## 2020-05-03 RX ADMIN — LORAZEPAM 0.5 MG: 2 INJECTION INTRAMUSCULAR; INTRAVENOUS at 07:11

## 2020-05-03 RX ADMIN — BACITRACIN 1 SMALL APPLICATION: 500 OINTMENT TOPICAL at 18:20

## 2020-05-03 RX ADMIN — ONDANSETRON 4 MG: 2 INJECTION INTRAMUSCULAR; INTRAVENOUS at 21:03

## 2020-05-03 RX ADMIN — BACITRACIN 1 SMALL APPLICATION: 500 OINTMENT TOPICAL at 11:23

## 2020-05-03 RX ADMIN — OXYCODONE HYDROCHLORIDE 5 MG: 5 TABLET ORAL at 15:39

## 2020-05-03 RX ADMIN — SODIUM CHLORIDE, SODIUM GLUCONATE, SODIUM ACETATE, POTASSIUM CHLORIDE, MAGNESIUM CHLORIDE, SODIUM PHOSPHATE, DIBASIC, AND POTASSIUM PHOSPHATE 1000 ML: .53; .5; .37; .037; .03; .012; .00082 INJECTION, SOLUTION INTRAVENOUS at 11:22

## 2020-05-03 RX ADMIN — OXYCODONE HYDROCHLORIDE 10 MG: 10 TABLET ORAL at 07:10

## 2020-05-03 RX ADMIN — LEVETIRACETAM 500 MG: 100 INJECTION, SOLUTION INTRAVENOUS at 09:02

## 2020-05-03 RX ADMIN — ACETAMINOPHEN 650 MG: 325 TABLET ORAL at 09:17

## 2020-05-03 RX ADMIN — LEVETIRACETAM 500 MG: 100 INJECTION, SOLUTION INTRAVENOUS at 20:47

## 2020-05-03 RX ADMIN — ACETAMINOPHEN 650 MG: 325 TABLET ORAL at 20:58

## 2020-05-03 RX ADMIN — HYDROMORPHONE HYDROCHLORIDE 0.5 MG: 1 INJECTION, SOLUTION INTRAMUSCULAR; INTRAVENOUS; SUBCUTANEOUS at 00:44

## 2020-05-03 RX ADMIN — OXYCODONE HYDROCHLORIDE 10 MG: 10 TABLET ORAL at 03:01

## 2020-05-04 ENCOUNTER — APPOINTMENT (INPATIENT)
Dept: RADIOLOGY | Facility: HOSPITAL | Age: 65
DRG: 084 | End: 2020-05-04
Payer: COMMERCIAL

## 2020-05-04 VITALS
OXYGEN SATURATION: 93 % | WEIGHT: 144.18 LBS | HEART RATE: 82 BPM | SYSTOLIC BLOOD PRESSURE: 113 MMHG | HEIGHT: 60 IN | BODY MASS INDEX: 28.31 KG/M2 | RESPIRATION RATE: 16 BRPM | TEMPERATURE: 99.5 F | DIASTOLIC BLOOD PRESSURE: 63 MMHG

## 2020-05-04 LAB
ERYTHROCYTE [DISTWIDTH] IN BLOOD BY AUTOMATED COUNT: 12 % (ref 11.6–15.1)
HCT VFR BLD AUTO: 32.6 % (ref 34.8–46.1)
HGB BLD-MCNC: 10.3 G/DL (ref 11.5–15.4)
MCH RBC QN AUTO: 32 PG (ref 26.8–34.3)
MCHC RBC AUTO-ENTMCNC: 31.6 G/DL (ref 31.4–37.4)
MCV RBC AUTO: 101 FL (ref 82–98)
PLATELET # BLD AUTO: 165 THOUSANDS/UL (ref 149–390)
PMV BLD AUTO: 9.9 FL (ref 8.9–12.7)
RBC # BLD AUTO: 3.22 MILLION/UL (ref 3.81–5.12)
WBC # BLD AUTO: 6.02 THOUSAND/UL (ref 4.31–10.16)

## 2020-05-04 PROCEDURE — 70450 CT HEAD/BRAIN W/O DYE: CPT

## 2020-05-04 PROCEDURE — NC001 PR NO CHARGE: Performed by: SURGERY

## 2020-05-04 PROCEDURE — 97535 SELF CARE MNGMENT TRAINING: CPT

## 2020-05-04 PROCEDURE — 97530 THERAPEUTIC ACTIVITIES: CPT

## 2020-05-04 PROCEDURE — 97116 GAIT TRAINING THERAPY: CPT

## 2020-05-04 PROCEDURE — 99233 SBSQ HOSP IP/OBS HIGH 50: CPT | Performed by: NURSE PRACTITIONER

## 2020-05-04 PROCEDURE — 99238 HOSP IP/OBS DSCHRG MGMT 30/<: CPT | Performed by: SURGERY

## 2020-05-04 PROCEDURE — 85027 COMPLETE CBC AUTOMATED: CPT | Performed by: PHYSICIAN ASSISTANT

## 2020-05-04 RX ORDER — ASPIRIN 325 MG
325 TABLET ORAL DAILY
Qty: 30 TABLET | Refills: 0 | Status: SHIPPED | OUTPATIENT
Start: 2020-05-04 | End: 2021-02-05 | Stop reason: ALTCHOICE

## 2020-05-04 RX ORDER — ONDANSETRON 4 MG/1
4 TABLET, FILM COATED ORAL EVERY 8 HOURS PRN
Qty: 20 TABLET | Refills: 0 | Status: SHIPPED | OUTPATIENT
Start: 2020-05-04 | End: 2020-07-02 | Stop reason: ALTCHOICE

## 2020-05-04 RX ORDER — MECLIZINE HCL 12.5 MG/1
12.5 TABLET ORAL EVERY 8 HOURS PRN
Status: DISCONTINUED | OUTPATIENT
Start: 2020-05-04 | End: 2020-05-04 | Stop reason: HOSPADM

## 2020-05-04 RX ORDER — LEVETIRACETAM 500 MG/1
500 TABLET ORAL EVERY 12 HOURS SCHEDULED
Qty: 12 TABLET | Refills: 0 | Status: SHIPPED | OUTPATIENT
Start: 2020-05-04 | End: 2020-07-02 | Stop reason: ALTCHOICE

## 2020-05-04 RX ORDER — MECLIZINE HCL 12.5 MG/1
12.5 TABLET ORAL EVERY 8 HOURS PRN
Qty: 30 TABLET | Refills: 0 | Status: SHIPPED | OUTPATIENT
Start: 2020-05-04 | End: 2021-02-08 | Stop reason: HOSPADM

## 2020-05-04 RX ORDER — METOCLOPRAMIDE HYDROCHLORIDE 5 MG/ML
5 INJECTION INTRAMUSCULAR; INTRAVENOUS ONCE
Status: COMPLETED | OUTPATIENT
Start: 2020-05-04 | End: 2020-05-04

## 2020-05-04 RX ADMIN — OXYCODONE HYDROCHLORIDE 5 MG: 5 TABLET ORAL at 08:32

## 2020-05-04 RX ADMIN — METOCLOPRAMIDE 5 MG: 5 INJECTION, SOLUTION INTRAMUSCULAR; INTRAVENOUS at 10:13

## 2020-05-04 RX ADMIN — OXYCODONE HYDROCHLORIDE 5 MG: 5 TABLET ORAL at 01:31

## 2020-05-04 RX ADMIN — ONDANSETRON 4 MG: 2 INJECTION INTRAMUSCULAR; INTRAVENOUS at 08:32

## 2020-05-04 RX ADMIN — MECLIZINE HYDROCHLORIDE 12.5 MG: 12.5 TABLET, FILM COATED ORAL at 10:13

## 2020-05-04 RX ADMIN — ONDANSETRON 4 MG: 2 INJECTION INTRAMUSCULAR; INTRAVENOUS at 01:49

## 2020-05-04 RX ADMIN — BACITRACIN 1 SMALL APPLICATION: 500 OINTMENT TOPICAL at 08:32

## 2020-05-12 ENCOUNTER — TELEPHONE (OUTPATIENT)
Dept: SURGERY | Facility: CLINIC | Age: 65
End: 2020-05-12

## 2020-05-13 ENCOUNTER — TELEPHONE (OUTPATIENT)
Dept: NEUROSURGERY | Facility: CLINIC | Age: 65
End: 2020-05-13

## 2020-05-14 ENCOUNTER — OFFICE VISIT (OUTPATIENT)
Dept: SURGERY | Facility: CLINIC | Age: 65
End: 2020-05-14
Payer: COMMERCIAL

## 2020-05-14 VITALS
BODY MASS INDEX: 27.19 KG/M2 | WEIGHT: 144 LBS | TEMPERATURE: 97.5 F | SYSTOLIC BLOOD PRESSURE: 104 MMHG | HEART RATE: 73 BPM | DIASTOLIC BLOOD PRESSURE: 68 MMHG | HEIGHT: 61 IN

## 2020-05-14 DIAGNOSIS — M25.511 RIGHT SHOULDER PAIN, UNSPECIFIED CHRONICITY: ICD-10-CM

## 2020-05-14 DIAGNOSIS — S06.0X9D CONCUSSION WITH LOSS OF CONSCIOUSNESS, SUBSEQUENT ENCOUNTER: ICD-10-CM

## 2020-05-14 DIAGNOSIS — I60.9 SUBARACHNOID HEMORRHAGE (HCC): Primary | ICD-10-CM

## 2020-05-14 PROBLEM — S06.0X9A CONCUSSION WITH LOSS OF CONSCIOUSNESS: Status: ACTIVE | Noted: 2020-05-14

## 2020-05-14 PROCEDURE — 99214 OFFICE O/P EST MOD 30 MIN: CPT | Performed by: NURSE PRACTITIONER

## 2020-05-14 RX ORDER — MECLIZINE HCL 12.5 MG/1
12.5 TABLET ORAL EVERY 8 HOURS PRN
Qty: 10 TABLET | Refills: 0 | Status: SHIPPED | OUTPATIENT
Start: 2020-05-14 | End: 2021-02-15 | Stop reason: SDUPTHER

## 2020-05-20 ENCOUNTER — EVALUATION (OUTPATIENT)
Dept: PHYSICAL THERAPY | Facility: CLINIC | Age: 65
End: 2020-05-20
Payer: COMMERCIAL

## 2020-05-20 DIAGNOSIS — I60.9 SUBARACHNOID HEMORRHAGE (HCC): ICD-10-CM

## 2020-05-20 DIAGNOSIS — S06.0X9A CONCUSSION WITH LOSS OF CONSCIOUSNESS, INITIAL ENCOUNTER: Primary | ICD-10-CM

## 2020-05-20 DIAGNOSIS — R42 DIZZINESS: ICD-10-CM

## 2020-05-20 DIAGNOSIS — G44.309 POST-TRAUMATIC HEADACHE, NOT INTRACTABLE, UNSPECIFIED CHRONICITY PATTERN: ICD-10-CM

## 2020-05-20 PROCEDURE — 97163 PT EVAL HIGH COMPLEX 45 MIN: CPT

## 2020-05-27 ENCOUNTER — APPOINTMENT (OUTPATIENT)
Dept: PHYSICAL THERAPY | Facility: CLINIC | Age: 65
End: 2020-05-27
Payer: COMMERCIAL

## 2020-05-29 ENCOUNTER — TELEPHONE (OUTPATIENT)
Dept: INFUSION CENTER | Facility: CLINIC | Age: 65
End: 2020-05-29

## 2020-06-01 ENCOUNTER — OFFICE VISIT (OUTPATIENT)
Dept: PHYSICAL THERAPY | Facility: CLINIC | Age: 65
End: 2020-06-01
Payer: COMMERCIAL

## 2020-06-01 DIAGNOSIS — G44.309 POST-TRAUMATIC HEADACHE, NOT INTRACTABLE, UNSPECIFIED CHRONICITY PATTERN: ICD-10-CM

## 2020-06-01 DIAGNOSIS — R42 DIZZINESS: ICD-10-CM

## 2020-06-01 DIAGNOSIS — S06.0X9A CONCUSSION WITH LOSS OF CONSCIOUSNESS, INITIAL ENCOUNTER: Primary | ICD-10-CM

## 2020-06-01 DIAGNOSIS — I60.9 SUBARACHNOID HEMORRHAGE (HCC): ICD-10-CM

## 2020-06-01 PROCEDURE — 97112 NEUROMUSCULAR REEDUCATION: CPT

## 2020-06-01 PROCEDURE — 97140 MANUAL THERAPY 1/> REGIONS: CPT

## 2020-06-02 ENCOUNTER — HOSPITAL ENCOUNTER (OUTPATIENT)
Dept: CT IMAGING | Facility: HOSPITAL | Age: 65
Discharge: HOME | End: 2020-06-02
Payer: COMMERCIAL

## 2020-06-02 ENCOUNTER — HOSPITAL ENCOUNTER (OUTPATIENT)
Dept: INFUSION CENTER | Facility: CLINIC | Age: 65
Discharge: HOME | End: 2020-06-02
Payer: COMMERCIAL

## 2020-06-02 VITALS
RESPIRATION RATE: 18 BRPM | TEMPERATURE: 98 F | HEART RATE: 85 BPM | SYSTOLIC BLOOD PRESSURE: 112 MMHG | OXYGEN SATURATION: 98 % | DIASTOLIC BLOOD PRESSURE: 64 MMHG

## 2020-06-02 DIAGNOSIS — J45.50 SEVERE PERSISTENT ASTHMA WITHOUT COMPLICATION: Primary | ICD-10-CM

## 2020-06-02 DIAGNOSIS — I60.9 SUBARACHNOID HEMORRHAGE (HCC): ICD-10-CM

## 2020-06-02 PROCEDURE — 70450 CT HEAD/BRAIN W/O DYE: CPT

## 2020-06-02 PROCEDURE — 96372 THER/PROPH/DIAG INJ SC/IM: CPT

## 2020-06-02 RX ORDER — EPINEPHRINE 1 MG/ML
0.3 INJECTION, SOLUTION, CONCENTRATE INTRAVENOUS AS NEEDED
Status: CANCELLED | OUTPATIENT
Start: 2020-07-28

## 2020-06-02 RX ORDER — EPINEPHRINE 1 MG/ML
0.3 INJECTION, SOLUTION, CONCENTRATE INTRAVENOUS AS NEEDED
Status: DISCONTINUED | OUTPATIENT
Start: 2020-06-02 | End: 2020-06-05 | Stop reason: HOSPADM

## 2020-06-02 RX ADMIN — BENRALIZUMAB 30 MG: 30 INJECTION, SOLUTION SUBCUTANEOUS at 10:46

## 2020-06-03 ENCOUNTER — OFFICE VISIT (OUTPATIENT)
Dept: PHYSICAL THERAPY | Facility: CLINIC | Age: 65
End: 2020-06-03
Payer: COMMERCIAL

## 2020-06-03 ENCOUNTER — TELEPHONE (OUTPATIENT)
Dept: NEUROLOGY | Facility: CLINIC | Age: 65
End: 2020-06-03

## 2020-06-03 DIAGNOSIS — R42 DIZZINESS: ICD-10-CM

## 2020-06-03 DIAGNOSIS — I60.9 SUBARACHNOID HEMORRHAGE (HCC): ICD-10-CM

## 2020-06-03 DIAGNOSIS — S06.0X9A CONCUSSION WITH LOSS OF CONSCIOUSNESS, INITIAL ENCOUNTER: Primary | ICD-10-CM

## 2020-06-03 PROCEDURE — 97112 NEUROMUSCULAR REEDUCATION: CPT

## 2020-06-03 PROCEDURE — 97140 MANUAL THERAPY 1/> REGIONS: CPT

## 2020-06-05 ENCOUNTER — TELEPHONE (OUTPATIENT)
Dept: NEUROLOGY | Facility: CLINIC | Age: 65
End: 2020-06-05

## 2020-06-09 ENCOUNTER — OFFICE VISIT (OUTPATIENT)
Dept: PHYSICAL THERAPY | Facility: CLINIC | Age: 65
End: 2020-06-09
Payer: COMMERCIAL

## 2020-06-09 DIAGNOSIS — R42 DIZZINESS: ICD-10-CM

## 2020-06-09 DIAGNOSIS — S06.0X9A CONCUSSION WITH LOSS OF CONSCIOUSNESS, INITIAL ENCOUNTER: Primary | ICD-10-CM

## 2020-06-09 DIAGNOSIS — I60.9 SUBARACHNOID HEMORRHAGE (HCC): ICD-10-CM

## 2020-06-09 PROCEDURE — 97112 NEUROMUSCULAR REEDUCATION: CPT

## 2020-06-09 PROCEDURE — 97140 MANUAL THERAPY 1/> REGIONS: CPT

## 2020-06-10 ENCOUNTER — OFFICE VISIT (OUTPATIENT)
Dept: PHYSICAL THERAPY | Facility: CLINIC | Age: 65
End: 2020-06-10
Payer: COMMERCIAL

## 2020-06-10 DIAGNOSIS — S06.0X9A CONCUSSION WITH LOSS OF CONSCIOUSNESS, INITIAL ENCOUNTER: Primary | ICD-10-CM

## 2020-06-10 DIAGNOSIS — I60.9 SUBARACHNOID HEMORRHAGE (HCC): ICD-10-CM

## 2020-06-10 DIAGNOSIS — R42 DIZZINESS: ICD-10-CM

## 2020-06-10 PROCEDURE — 97112 NEUROMUSCULAR REEDUCATION: CPT

## 2020-06-11 ENCOUNTER — TELEPHONE (OUTPATIENT)
Dept: NEUROLOGY | Facility: CLINIC | Age: 65
End: 2020-06-11

## 2020-06-16 ENCOUNTER — TRANSCRIBE ORDERS (OUTPATIENT)
Dept: LAB | Facility: CLINIC | Age: 65
End: 2020-06-16

## 2020-06-16 ENCOUNTER — APPOINTMENT (OUTPATIENT)
Dept: PHYSICAL THERAPY | Facility: CLINIC | Age: 65
End: 2020-06-16
Payer: COMMERCIAL

## 2020-06-16 ENCOUNTER — APPOINTMENT (OUTPATIENT)
Dept: LAB | Facility: CLINIC | Age: 65
End: 2020-06-16
Payer: COMMERCIAL

## 2020-06-16 DIAGNOSIS — Z79.52 CURRENT USE OF STEROID MEDICATION: ICD-10-CM

## 2020-06-16 DIAGNOSIS — Z13.9 SCREENING FOR UNSPECIFIED CONDITION: Primary | ICD-10-CM

## 2020-06-16 DIAGNOSIS — J45.20 INTERMITTENT ASTHMA WITHOUT COMPLICATION, UNSPECIFIED ASTHMA SEVERITY: ICD-10-CM

## 2020-06-16 DIAGNOSIS — Z87.09 HISTORY OF NASAL POLYPOSIS: ICD-10-CM

## 2020-06-16 DIAGNOSIS — D50.9 IRON DEFICIENCY ANEMIA, UNSPECIFIED IRON DEFICIENCY ANEMIA TYPE: ICD-10-CM

## 2020-06-16 LAB
ALBUMIN SERPL BCP-MCNC: 3.7 G/DL (ref 3.5–5)
ALP SERPL-CCNC: 80 U/L (ref 46–116)
ALT SERPL W P-5'-P-CCNC: 21 U/L (ref 12–78)
ANION GAP SERPL CALCULATED.3IONS-SCNC: 2 MMOL/L (ref 4–13)
AST SERPL W P-5'-P-CCNC: 15 U/L (ref 5–45)
BACTERIA UR QL AUTO: ABNORMAL /HPF
BASOPHILS # BLD AUTO: 0 THOUSANDS/ΜL (ref 0–0.1)
BASOPHILS NFR BLD AUTO: 0 % (ref 0–1)
BILIRUB SERPL-MCNC: 0.32 MG/DL (ref 0.2–1)
BILIRUB UR QL STRIP: NEGATIVE
BUN SERPL-MCNC: 18 MG/DL (ref 5–25)
CALCIUM SERPL-MCNC: 9.1 MG/DL (ref 8.3–10.1)
CHLORIDE SERPL-SCNC: 110 MMOL/L (ref 100–108)
CLARITY UR: CLEAR
CO2 SERPL-SCNC: 29 MMOL/L (ref 21–32)
COLOR UR: YELLOW
CREAT SERPL-MCNC: 0.7 MG/DL (ref 0.6–1.3)
CREAT UR-MCNC: 69.9 MG/DL
CRP SERPL QL: <3 MG/L
EOSINOPHIL # BLD AUTO: 0.01 THOUSAND/ΜL (ref 0–0.61)
EOSINOPHIL NFR BLD AUTO: 0 % (ref 0–6)
ERYTHROCYTE [DISTWIDTH] IN BLOOD BY AUTOMATED COUNT: 12.1 % (ref 11.6–15.1)
ERYTHROCYTE [SEDIMENTATION RATE] IN BLOOD: 6 MM/HOUR (ref 0–20)
FERRITIN SERPL-MCNC: 24 NG/ML (ref 8–388)
GFR SERPL CREATININE-BSD FRML MDRD: 91 ML/MIN/1.73SQ M
GLUCOSE P FAST SERPL-MCNC: 97 MG/DL (ref 65–99)
GLUCOSE UR STRIP-MCNC: NEGATIVE MG/DL
HCT VFR BLD AUTO: 39.3 % (ref 34.8–46.1)
HGB BLD-MCNC: 12.6 G/DL (ref 11.5–15.4)
HGB UR QL STRIP.AUTO: NEGATIVE
HYALINE CASTS #/AREA URNS LPF: ABNORMAL /LPF
IMM GRANULOCYTES # BLD AUTO: 0.01 THOUSAND/UL (ref 0–0.2)
IMM GRANULOCYTES NFR BLD AUTO: 0 % (ref 0–2)
IRON SERPL-MCNC: 72 UG/DL (ref 50–170)
KETONES UR STRIP-MCNC: NEGATIVE MG/DL
LEUKOCYTE ESTERASE UR QL STRIP: NEGATIVE
LYMPHOCYTES # BLD AUTO: 2.12 THOUSANDS/ΜL (ref 0.6–4.47)
LYMPHOCYTES NFR BLD AUTO: 49 % (ref 14–44)
MCH RBC QN AUTO: 31.8 PG (ref 26.8–34.3)
MCHC RBC AUTO-ENTMCNC: 32.1 G/DL (ref 31.4–37.4)
MCV RBC AUTO: 99 FL (ref 82–98)
MONOCYTES # BLD AUTO: 0.39 THOUSAND/ΜL (ref 0.17–1.22)
MONOCYTES NFR BLD AUTO: 9 % (ref 4–12)
NEUTROPHILS # BLD AUTO: 1.86 THOUSANDS/ΜL (ref 1.85–7.62)
NEUTS SEG NFR BLD AUTO: 42 % (ref 43–75)
NITRITE UR QL STRIP: NEGATIVE
NON-SQ EPI CELLS URNS QL MICRO: ABNORMAL /HPF
NRBC BLD AUTO-RTO: 0 /100 WBCS
PH UR STRIP.AUTO: 7.5 [PH]
PLATELET # BLD AUTO: 237 THOUSANDS/UL (ref 149–390)
PMV BLD AUTO: 10.2 FL (ref 8.9–12.7)
POTASSIUM SERPL-SCNC: 4.5 MMOL/L (ref 3.5–5.3)
PROT SERPL-MCNC: 6.9 G/DL (ref 6.4–8.2)
PROT UR STRIP-MCNC: NEGATIVE MG/DL
PROT UR-MCNC: 23 MG/DL
PROT/CREAT UR: 0.33 MG/G{CREAT} (ref 0–0.1)
RBC # BLD AUTO: 3.96 MILLION/UL (ref 3.81–5.12)
RBC #/AREA URNS AUTO: ABNORMAL /HPF
SODIUM SERPL-SCNC: 141 MMOL/L (ref 136–145)
SP GR UR STRIP.AUTO: 1.02 (ref 1–1.03)
UROBILINOGEN UR QL STRIP.AUTO: 0.2 E.U./DL
WBC # BLD AUTO: 4.39 THOUSAND/UL (ref 4.31–10.16)
WBC #/AREA URNS AUTO: ABNORMAL /HPF

## 2020-06-16 PROCEDURE — 82570 ASSAY OF URINE CREATININE: CPT | Performed by: INTERNAL MEDICINE

## 2020-06-16 PROCEDURE — 83540 ASSAY OF IRON: CPT

## 2020-06-16 PROCEDURE — 85025 COMPLETE CBC W/AUTO DIFF WBC: CPT

## 2020-06-16 PROCEDURE — 86140 C-REACTIVE PROTEIN: CPT

## 2020-06-16 PROCEDURE — 85652 RBC SED RATE AUTOMATED: CPT

## 2020-06-16 PROCEDURE — 81001 URINALYSIS AUTO W/SCOPE: CPT | Performed by: INTERNAL MEDICINE

## 2020-06-16 PROCEDURE — 82728 ASSAY OF FERRITIN: CPT

## 2020-06-16 PROCEDURE — 36415 COLL VENOUS BLD VENIPUNCTURE: CPT

## 2020-06-16 PROCEDURE — 84156 ASSAY OF PROTEIN URINE: CPT | Performed by: INTERNAL MEDICINE

## 2020-06-16 PROCEDURE — 80053 COMPREHEN METABOLIC PANEL: CPT

## 2020-06-17 ENCOUNTER — TELEPHONE (OUTPATIENT)
Dept: SURGERY | Facility: CLINIC | Age: 65
End: 2020-06-17

## 2020-06-19 ENCOUNTER — OFFICE VISIT (OUTPATIENT)
Dept: PHYSICAL THERAPY | Facility: CLINIC | Age: 65
End: 2020-06-19
Payer: COMMERCIAL

## 2020-06-19 DIAGNOSIS — R42 DIZZINESS: ICD-10-CM

## 2020-06-19 DIAGNOSIS — I60.9 SUBARACHNOID HEMORRHAGE (HCC): ICD-10-CM

## 2020-06-19 DIAGNOSIS — S06.0X9A CONCUSSION WITH LOSS OF CONSCIOUSNESS, INITIAL ENCOUNTER: Primary | ICD-10-CM

## 2020-06-19 PROCEDURE — 97112 NEUROMUSCULAR REEDUCATION: CPT

## 2020-06-22 ENCOUNTER — OFFICE VISIT (OUTPATIENT)
Dept: PHYSICAL THERAPY | Facility: CLINIC | Age: 65
End: 2020-06-22
Payer: COMMERCIAL

## 2020-06-22 DIAGNOSIS — S06.0X9A CONCUSSION WITH LOSS OF CONSCIOUSNESS, INITIAL ENCOUNTER: Primary | ICD-10-CM

## 2020-06-22 DIAGNOSIS — R42 DIZZINESS: ICD-10-CM

## 2020-06-22 DIAGNOSIS — I60.9 SUBARACHNOID HEMORRHAGE (HCC): ICD-10-CM

## 2020-06-22 DIAGNOSIS — G44.309 POST-TRAUMATIC HEADACHE, NOT INTRACTABLE, UNSPECIFIED CHRONICITY PATTERN: ICD-10-CM

## 2020-06-22 PROCEDURE — 97110 THERAPEUTIC EXERCISES: CPT | Performed by: PHYSICAL THERAPIST

## 2020-06-22 PROCEDURE — 97530 THERAPEUTIC ACTIVITIES: CPT | Performed by: PHYSICAL THERAPIST

## 2020-06-23 ENCOUNTER — APPOINTMENT (OUTPATIENT)
Dept: PHYSICAL THERAPY | Facility: CLINIC | Age: 65
End: 2020-06-23
Payer: COMMERCIAL

## 2020-06-26 ENCOUNTER — APPOINTMENT (OUTPATIENT)
Dept: LAB | Facility: CLINIC | Age: 65
End: 2020-06-26
Payer: COMMERCIAL

## 2020-06-26 ENCOUNTER — TRANSCRIBE ORDERS (OUTPATIENT)
Dept: LAB | Facility: CLINIC | Age: 65
End: 2020-06-26

## 2020-06-26 ENCOUNTER — APPOINTMENT (OUTPATIENT)
Dept: PHYSICAL THERAPY | Facility: CLINIC | Age: 65
End: 2020-06-26
Payer: COMMERCIAL

## 2020-06-26 DIAGNOSIS — Z11.59 SPECIAL SCREENING EXAMINATION FOR UNSPECIFIED VIRAL DISEASE: Primary | ICD-10-CM

## 2020-06-26 LAB — SARS-COV-2 IGG+IGM SERPL QL IA: NORMAL

## 2020-06-26 PROCEDURE — 86769 SARS-COV-2 COVID-19 ANTIBODY: CPT

## 2020-06-26 PROCEDURE — 36415 COLL VENOUS BLD VENIPUNCTURE: CPT

## 2020-06-30 ENCOUNTER — APPOINTMENT (OUTPATIENT)
Dept: PHYSICAL THERAPY | Facility: CLINIC | Age: 65
End: 2020-06-30
Payer: COMMERCIAL

## 2020-07-02 ENCOUNTER — OFFICE VISIT (OUTPATIENT)
Dept: PULMONOLOGY | Facility: CLINIC | Age: 65
End: 2020-07-02
Payer: COMMERCIAL

## 2020-07-02 VITALS
TEMPERATURE: 99.4 F | OXYGEN SATURATION: 97 % | SYSTOLIC BLOOD PRESSURE: 112 MMHG | WEIGHT: 148.8 LBS | HEIGHT: 65 IN | DIASTOLIC BLOOD PRESSURE: 68 MMHG | BODY MASS INDEX: 24.79 KG/M2 | RESPIRATION RATE: 20 BRPM | HEART RATE: 87 BPM

## 2020-07-02 DIAGNOSIS — J82.83 EOSINOPHILIC ASTHMA: Primary | ICD-10-CM

## 2020-07-02 DIAGNOSIS — J45.50 SEVERE PERSISTENT ASTHMA WITHOUT COMPLICATION: ICD-10-CM

## 2020-07-02 DIAGNOSIS — J45.50 SEVERE PERSISTENT ASTHMA, UNSPECIFIED WHETHER COMPLICATED: ICD-10-CM

## 2020-07-02 DIAGNOSIS — J32.0 CHRONIC MAXILLARY SINUSITIS: ICD-10-CM

## 2020-07-02 DIAGNOSIS — K21.9 GASTROESOPHAGEAL REFLUX DISEASE WITHOUT ESOPHAGITIS: ICD-10-CM

## 2020-07-02 DIAGNOSIS — J44.9 CHRONIC OBSTRUCTIVE PULMONARY DISEASE, UNSPECIFIED COPD TYPE (HCC): ICD-10-CM

## 2020-07-02 PROCEDURE — 99214 OFFICE O/P EST MOD 30 MIN: CPT | Performed by: INTERNAL MEDICINE

## 2020-07-02 RX ORDER — PROMETHAZINE HYDROCHLORIDE AND CODEINE PHOSPHATE 6.25; 1 MG/5ML; MG/5ML
5 SYRUP ORAL EVERY 6 HOURS PRN
Qty: 120 ML | Refills: 0 | Status: SHIPPED | OUTPATIENT
Start: 2020-07-02 | End: 2021-12-02

## 2020-07-02 RX ORDER — BUDESONIDE 0.5 MG/2ML
0.5 INHALANT ORAL 2 TIMES DAILY
Qty: 60 VIAL | Refills: 5 | Status: SHIPPED | OUTPATIENT
Start: 2020-07-02

## 2020-07-02 RX ORDER — MONTELUKAST SODIUM 10 MG/1
10 TABLET ORAL
Qty: 30 TABLET | Refills: 5 | Status: SHIPPED | OUTPATIENT
Start: 2020-07-02 | End: 2021-05-19 | Stop reason: SDUPTHER

## 2020-07-02 RX ORDER — EPINEPHRINE 0.3 MG/.3ML
0.3 INJECTION SUBCUTANEOUS ONCE
Qty: 0.3 ML | Refills: 3 | Status: SHIPPED | OUTPATIENT
Start: 2020-07-02 | End: 2020-07-02

## 2020-07-02 RX ORDER — ALBUTEROL SULFATE 2.5 MG/3ML
2.5 SOLUTION RESPIRATORY (INHALATION) EVERY 6 HOURS PRN
Qty: 120 VIAL | Refills: 5 | Status: SHIPPED | OUTPATIENT
Start: 2020-07-02 | End: 2020-11-03 | Stop reason: SINTOL
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
## 2020-07-02 NOTE — ASSESSMENT & PLAN NOTE
Patient's asthma has been poorly controlled, largely due to noncompliance with maintenance inhaler regimen  We have resumed generic Advair  She will also continue with Singulair and aspirin therapy  She completed aspirin desensitization in the past   I encouraged her to follow back up with her allergist   He had also briefly discussed bronchial thermoplasty, but she does not want to consider that option at this time

## 2020-07-06 ENCOUNTER — OFFICE VISIT (OUTPATIENT)
Dept: RHEUMATOLOGY | Facility: CLINIC | Age: 65
End: 2020-07-06
Payer: COMMERCIAL

## 2020-07-06 VITALS
DIASTOLIC BLOOD PRESSURE: 67 MMHG | WEIGHT: 147.4 LBS | BODY MASS INDEX: 24.53 KG/M2 | SYSTOLIC BLOOD PRESSURE: 116 MMHG | HEART RATE: 93 BPM | TEMPERATURE: 98.4 F

## 2020-07-06 DIAGNOSIS — Z87.09 HISTORY OF NASAL POLYPOSIS: ICD-10-CM

## 2020-07-06 DIAGNOSIS — M85.80 OSTEOPENIA, UNSPECIFIED LOCATION: ICD-10-CM

## 2020-07-06 DIAGNOSIS — M79.7 FIBROMYALGIA: Primary | ICD-10-CM

## 2020-07-06 DIAGNOSIS — R82.90 ABNORMAL URINALYSIS: ICD-10-CM

## 2020-07-06 DIAGNOSIS — J45.20 INTERMITTENT ASTHMA WITHOUT COMPLICATION, UNSPECIFIED ASTHMA SEVERITY: ICD-10-CM

## 2020-07-06 DIAGNOSIS — M15.0 PRIMARY GENERALIZED (OSTEO)ARTHRITIS: ICD-10-CM

## 2020-07-06 DIAGNOSIS — E55.9 VITAMIN D INSUFFICIENCY: ICD-10-CM

## 2020-07-06 PROCEDURE — 99214 OFFICE O/P EST MOD 30 MIN: CPT | Performed by: INTERNAL MEDICINE

## 2020-07-06 RX ORDER — EPINEPHRINE 0.3 MG/.3ML
INJECTION SUBCUTANEOUS
COMMUNITY
Start: 2020-07-02 | End: 2021-10-25 | Stop reason: SDUPTHER
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
## 2020-07-09 ENCOUNTER — CONSULT (OUTPATIENT)
Dept: NEUROLOGY | Facility: CLINIC | Age: 65
End: 2020-07-09
Payer: COMMERCIAL

## 2020-07-09 VITALS
WEIGHT: 147 LBS | SYSTOLIC BLOOD PRESSURE: 120 MMHG | HEIGHT: 65 IN | TEMPERATURE: 97.9 F | HEART RATE: 86 BPM | BODY MASS INDEX: 24.49 KG/M2 | DIASTOLIC BLOOD PRESSURE: 70 MMHG

## 2020-07-09 DIAGNOSIS — S06.9X1D MILD TRAUMATIC BRAIN INJURY, WITH LOSS OF CONSCIOUSNESS OF 30 MINUTES OR LESS, SUBSEQUENT ENCOUNTER: Primary | ICD-10-CM

## 2020-07-09 DIAGNOSIS — I60.9 SUBARACHNOID HEMORRHAGE (HCC): ICD-10-CM

## 2020-07-09 DIAGNOSIS — G44.319 ACUTE POST-TRAUMATIC HEADACHE, NOT INTRACTABLE: ICD-10-CM

## 2020-07-09 DIAGNOSIS — F43.22 ADJUSTMENT DISORDER WITH ANXIETY: ICD-10-CM

## 2020-07-09 PROBLEM — S06.0X9A CONCUSSION WITH LOSS OF CONSCIOUSNESS: Status: RESOLVED | Noted: 2020-05-14 | Resolved: 2020-07-09

## 2020-07-09 PROCEDURE — 99245 OFF/OP CONSLTJ NEW/EST HI 55: CPT | Performed by: PSYCHIATRY & NEUROLOGY

## 2020-07-09 NOTE — PATIENT INSTRUCTIONS
If headaches or at least fibromyalgia continue,   - could discuss with rheumatology low dose amitriptyline anywhere from 10 mg to 50 mg   - back up option would be gabapentin  - if targeting anxiety more could consider venlafaxine       [employer] - Download this free Loi on your phone (they will offer subscription, but you do not have to do this)  - I recommend listening to the first approximately 5 or so lectures (more if you want) that are about 10-20 minutes long on the neuroscience of pain  - They discuss how pain works in the brain and steps to try and cure chronic pain    I recommend these free lectures from [employer]  "The basic neuroscience of pain"  "Pain is more than just tissue damage"  "How pain becomes chronic"  "What does the pain mean to you"  "What to do next"        Headache/migraine treatment:   Abortive medications (for immediate treatment of a headache): Ok to take ibuprofen or acetaminophen for headaches, but try to limit the amount and frequency that you are taking to avoid medication overuse/rebound headache  Ideally no more than 3 days per week  Self-Monitoring:  - Headache calendar  Each day frandy a number from 0-10 indicating if there was a headache and how bad it was  This can be used to monitor gradual improvement and is helpful to make medication adjustments  Lifestyle Recommendations:  - Maintain good sleep hygiene  Going to bed and waking up at consistent times, avoiding excessive daytime naps, avoiding caffeinated beverages in the evening, avoid excessive stimulation in the evening and generally using bed primarily for sleeping  One hour before bedtime would recommend turning lights down lower, decreasing your activity (may read quietly, listen to music at a low volume)  When you get into bed, should eliminate all technology (no texting, emailing, playing with your phone, iPad or tablet in bed)  - Maintain good hydration   Drink  2L of fluid a day (4 typical small water bottles)  - Maintain good nutrition  In particular don't skip meals and eat balanced meals regularly  Education and Follow-up  - Please contact us if any questions or concerns arise  Of course, try to protect yourself from head injuries, and if any new concerning symptoms or significant blow to the head or body go to the emergency department    - Follow up with neurology if needed

## 2020-07-09 NOTE — PROGRESS NOTES
Review of Systems   Constitutional: Negative  Negative for appetite change and fever  HENT: Negative  Negative for hearing loss, tinnitus, trouble swallowing and voice change  Eyes: Negative  Negative for photophobia and pain  Respiratory: Negative  Negative for shortness of breath  Cardiovascular: Negative  Negative for palpitations  Gastrointestinal: Negative  Negative for nausea and vomiting  Endocrine: Negative  Negative for cold intolerance  Genitourinary: Negative  Negative for dysuria, frequency and urgency  Musculoskeletal: Negative  Negative for myalgias and neck pain  Skin: Negative  Negative for rash  Neurological: Positive for headaches  Negative for dizziness, tremors, seizures, syncope, facial asymmetry, speech difficulty, weakness, light-headedness and numbness  Hematological: Negative  Does not bruise/bleed easily  Psychiatric/Behavioral: Negative  Negative for confusion, hallucinations and sleep disturbance

## 2020-07-15 ENCOUNTER — TELEPHONE (OUTPATIENT)
Dept: PULMONOLOGY | Facility: CLINIC | Age: 65
End: 2020-07-15

## 2020-07-15 NOTE — TELEPHONE ENCOUNTER
[facility] LM saying the need a refill on patients Fasenra  I am not sure how to order this

## 2020-07-16 NOTE — TELEPHONE ENCOUNTER
Called specialty pharmacy at [phone] and provided a verbal for fasenra    They will call pt to get consent to ship medication to infusion center

## 2020-07-28 ENCOUNTER — HOSPITAL ENCOUNTER (OUTPATIENT)
Dept: INFUSION CENTER | Facility: CLINIC | Age: 65
Discharge: HOME | End: 2020-07-28
Payer: COMMERCIAL

## 2020-07-28 VITALS
DIASTOLIC BLOOD PRESSURE: 68 MMHG | OXYGEN SATURATION: 94 % | TEMPERATURE: 98.8 F | SYSTOLIC BLOOD PRESSURE: 122 MMHG | RESPIRATION RATE: 18 BRPM | HEART RATE: 77 BPM

## 2020-07-28 DIAGNOSIS — J45.50 SEVERE PERSISTENT ASTHMA WITHOUT COMPLICATION: Primary | ICD-10-CM

## 2020-07-28 PROCEDURE — 96372 THER/PROPH/DIAG INJ SC/IM: CPT

## 2020-07-28 RX ORDER — EPINEPHRINE 1 MG/ML
0.3 INJECTION, SOLUTION, CONCENTRATE INTRAVENOUS AS NEEDED
Status: CANCELLED | OUTPATIENT
Start: 2020-09-22

## 2020-07-28 RX ORDER — EPINEPHRINE 1 MG/ML
0.3 INJECTION, SOLUTION, CONCENTRATE INTRAVENOUS AS NEEDED
Status: DISCONTINUED | OUTPATIENT
Start: 2020-07-28 | End: 2020-07-31 | Stop reason: HOSPADM

## 2020-07-28 RX ADMIN — BENRALIZUMAB 30 MG: 30 INJECTION, SOLUTION SUBCUTANEOUS at 11:28

## 2020-07-28 NOTE — PROGRESS NOTES
Patient here for fasenra injection  Patient has epi pen on her person that expires 12/2020  Vitals stable  Callbell within reach of patient, will continue to monitor

## 2020-07-28 NOTE — PROGRESS NOTES
Patient tolerated injection given in RUE  Patient tolerated 30 minute observation  Patient has no complaints at this time  Provided patient appointment list  Reviewed next appointments with patient

## 2020-08-12 NOTE — TELEPHONE ENCOUNTER
I Called pt and left a Vm informing her that her fasenra was approved from 2/2/2019   Through March of 3/2021

## 2020-09-22 ENCOUNTER — HOSPITAL ENCOUNTER (OUTPATIENT)
Dept: INFUSION CENTER | Facility: CLINIC | Age: 65
Discharge: HOME | End: 2020-09-22
Payer: COMMERCIAL

## 2020-09-22 VITALS
DIASTOLIC BLOOD PRESSURE: 67 MMHG | TEMPERATURE: 97.2 F | HEART RATE: 82 BPM | SYSTOLIC BLOOD PRESSURE: 127 MMHG | RESPIRATION RATE: 20 BRPM

## 2020-09-22 DIAGNOSIS — J45.50 SEVERE PERSISTENT ASTHMA WITHOUT COMPLICATION: Primary | ICD-10-CM

## 2020-09-22 PROCEDURE — 96372 THER/PROPH/DIAG INJ SC/IM: CPT

## 2020-09-22 RX ORDER — EPINEPHRINE 1 MG/ML
0.3 INJECTION, SOLUTION, CONCENTRATE INTRAVENOUS AS NEEDED
Status: DISCONTINUED | OUTPATIENT
Start: 2020-09-22 | End: 2020-09-25 | Stop reason: HOSPADM

## 2020-09-22 RX ORDER — DOXYCYCLINE HYCLATE 100 MG/1
100 CAPSULE ORAL EVERY 12 HOURS SCHEDULED
COMMUNITY
End: 2020-11-03 | Stop reason: ALTCHOICE

## 2020-09-22 RX ORDER — EPINEPHRINE 1 MG/ML
0.3 INJECTION, SOLUTION, CONCENTRATE INTRAVENOUS AS NEEDED
Status: CANCELLED | OUTPATIENT
Start: 2020-11-17 | Stop reason: HOSPADM

## 2020-09-22 RX ADMIN — BENRALIZUMAB 30 MG: 30 INJECTION, SOLUTION SUBCUTANEOUS at 09:14

## 2020-09-22 NOTE — PROGRESS NOTES
Pt  Tolerated Fasenra SC injection in [place] w/out adverse reaction  Nurse will observe pt  X 30 min post injection

## 2020-11-03 ENCOUNTER — OFFICE VISIT (OUTPATIENT)
Dept: PULMONOLOGY | Facility: CLINIC | Age: 65
End: 2020-11-03
Payer: COMMERCIAL

## 2020-11-03 VITALS
RESPIRATION RATE: 18 BRPM | BODY MASS INDEX: 24.66 KG/M2 | SYSTOLIC BLOOD PRESSURE: 120 MMHG | HEIGHT: 65 IN | HEART RATE: 94 BPM | DIASTOLIC BLOOD PRESSURE: 70 MMHG | OXYGEN SATURATION: 96 % | WEIGHT: 148 LBS | TEMPERATURE: 97.5 F

## 2020-11-03 DIAGNOSIS — Z23 NEED FOR INFLUENZA VACCINATION: ICD-10-CM

## 2020-11-03 DIAGNOSIS — G47.33 OSA (OBSTRUCTIVE SLEEP APNEA): ICD-10-CM

## 2020-11-03 DIAGNOSIS — J33.9 NASAL POLYPS: ICD-10-CM

## 2020-11-03 DIAGNOSIS — J32.0 CHRONIC MAXILLARY SINUSITIS: ICD-10-CM

## 2020-11-03 DIAGNOSIS — G47.34 NOCTURNAL HYPOXIA: ICD-10-CM

## 2020-11-03 DIAGNOSIS — J45.50 SEVERE PERSISTENT ASTHMA WITHOUT COMPLICATION: Primary | ICD-10-CM

## 2020-11-03 PROCEDURE — 90662 IIV NO PRSV INCREASED AG IM: CPT

## 2020-11-03 PROCEDURE — 99214 OFFICE O/P EST MOD 30 MIN: CPT | Performed by: INTERNAL MEDICINE

## 2020-11-03 PROCEDURE — 90471 IMMUNIZATION ADMIN: CPT

## 2020-11-03 RX ORDER — LEVALBUTEROL 1.25 MG/.5ML
1.25 SOLUTION, CONCENTRATE RESPIRATORY (INHALATION) EVERY 8 HOURS PRN
Qty: 90 VIAL | Refills: 5 | Status: SHIPPED | OUTPATIENT
Start: 2020-11-03 | End: 2020-11-06 | Stop reason: SDUPTHER

## 2020-11-06 DIAGNOSIS — J45.50 SEVERE PERSISTENT ASTHMA WITHOUT COMPLICATION: ICD-10-CM

## 2020-11-06 RX ORDER — LEVALBUTEROL 1.25 MG/.5ML
1.25 SOLUTION, CONCENTRATE RESPIRATORY (INHALATION) EVERY 8 HOURS PRN
Qty: 90 VIAL | Refills: 5 | Status: SHIPPED | OUTPATIENT
Start: 2020-11-06 | End: 2021-12-16 | Stop reason: ALTCHOICE

## 2020-11-13 ENCOUNTER — TRANSCRIBE ORDERS (OUTPATIENT)
Dept: LAB | Facility: CLINIC | Age: 65
End: 2020-11-13

## 2020-11-13 ENCOUNTER — APPOINTMENT (OUTPATIENT)
Dept: LAB | Facility: CLINIC | Age: 65
End: 2020-11-13
Payer: COMMERCIAL

## 2020-11-13 DIAGNOSIS — I51.9 MYXEDEMA HEART DISEASE: ICD-10-CM

## 2020-11-13 DIAGNOSIS — E55.9 AVITAMINOSIS D: ICD-10-CM

## 2020-11-13 DIAGNOSIS — R73.01 IMPAIRED FASTING GLUCOSE: ICD-10-CM

## 2020-11-13 DIAGNOSIS — E03.9 MYXEDEMA HEART DISEASE: ICD-10-CM

## 2020-11-13 DIAGNOSIS — E78.00 PURE HYPERCHOLESTEROLEMIA: Primary | ICD-10-CM

## 2020-11-13 DIAGNOSIS — Z11.59 SCREENING EXAMINATION FOR POLIOMYELITIS: ICD-10-CM

## 2020-11-13 LAB
25(OH)D3 SERPL-MCNC: 39.4 NG/ML (ref 30–100)
ALBUMIN SERPL BCP-MCNC: 3.9 G/DL (ref 3.5–5)
ALP SERPL-CCNC: 94 U/L (ref 46–116)
ALT SERPL W P-5'-P-CCNC: 26 U/L (ref 12–78)
ANION GAP SERPL CALCULATED.3IONS-SCNC: 2 MMOL/L (ref 4–13)
AST SERPL W P-5'-P-CCNC: 20 U/L (ref 5–45)
BASOPHILS # BLD AUTO: 0.01 THOUSANDS/ΜL (ref 0–0.1)
BASOPHILS NFR BLD AUTO: 0 % (ref 0–1)
BILIRUB SERPL-MCNC: 0.4 MG/DL (ref 0.2–1)
BUN SERPL-MCNC: 17 MG/DL (ref 5–25)
CALCIUM SERPL-MCNC: 9.5 MG/DL (ref 8.3–10.1)
CHLORIDE SERPL-SCNC: 108 MMOL/L (ref 100–108)
CHOLEST SERPL-MCNC: 187 MG/DL (ref 50–200)
CO2 SERPL-SCNC: 32 MMOL/L (ref 21–32)
CREAT SERPL-MCNC: 0.72 MG/DL (ref 0.6–1.3)
EOSINOPHIL # BLD AUTO: 0 THOUSAND/ΜL (ref 0–0.61)
EOSINOPHIL NFR BLD AUTO: 0 % (ref 0–6)
ERYTHROCYTE [DISTWIDTH] IN BLOOD BY AUTOMATED COUNT: 12.2 % (ref 11.6–15.1)
EST. AVERAGE GLUCOSE BLD GHB EST-MCNC: 123 MG/DL
GFR SERPL CREATININE-BSD FRML MDRD: 88 ML/MIN/1.73SQ M
GLUCOSE P FAST SERPL-MCNC: 97 MG/DL (ref 65–99)
HBA1C MFR BLD: 5.9 %
HCT VFR BLD AUTO: 42.3 % (ref 34.8–46.1)
HCV AB SER QL: NORMAL
HDLC SERPL-MCNC: 62 MG/DL
HGB BLD-MCNC: 13.6 G/DL (ref 11.5–15.4)
IMM GRANULOCYTES # BLD AUTO: 0.01 THOUSAND/UL (ref 0–0.2)
IMM GRANULOCYTES NFR BLD AUTO: 0 % (ref 0–2)
LDLC SERPL CALC-MCNC: 107 MG/DL (ref 0–100)
LYMPHOCYTES # BLD AUTO: 2.23 THOUSANDS/ΜL (ref 0.6–4.47)
LYMPHOCYTES NFR BLD AUTO: 40 % (ref 14–44)
MCH RBC QN AUTO: 32.2 PG (ref 26.8–34.3)
MCHC RBC AUTO-ENTMCNC: 32.2 G/DL (ref 31.4–37.4)
MCV RBC AUTO: 100 FL (ref 82–98)
MONOCYTES # BLD AUTO: 0.43 THOUSAND/ΜL (ref 0.17–1.22)
MONOCYTES NFR BLD AUTO: 8 % (ref 4–12)
NEUTROPHILS # BLD AUTO: 2.86 THOUSANDS/ΜL (ref 1.85–7.62)
NEUTS SEG NFR BLD AUTO: 52 % (ref 43–75)
NONHDLC SERPL-MCNC: 125 MG/DL
NRBC BLD AUTO-RTO: 0 /100 WBCS
PLATELET # BLD AUTO: 263 THOUSANDS/UL (ref 149–390)
PMV BLD AUTO: 9.7 FL (ref 8.9–12.7)
POTASSIUM SERPL-SCNC: 4.1 MMOL/L (ref 3.5–5.3)
PROT SERPL-MCNC: 7.4 G/DL (ref 6.4–8.2)
RBC # BLD AUTO: 4.22 MILLION/UL (ref 3.81–5.12)
SODIUM SERPL-SCNC: 142 MMOL/L (ref 136–145)
TRIGL SERPL-MCNC: 91 MG/DL
TSH SERPL DL<=0.05 MIU/L-ACNC: 3.51 UIU/ML (ref 0.36–3.74)
WBC # BLD AUTO: 5.54 THOUSAND/UL (ref 4.31–10.16)

## 2020-11-13 PROCEDURE — 86665 EPSTEIN-BARR CAPSID VCA: CPT

## 2020-11-13 PROCEDURE — 80053 COMPREHEN METABOLIC PANEL: CPT

## 2020-11-13 PROCEDURE — 80061 LIPID PANEL: CPT

## 2020-11-13 PROCEDURE — 36415 COLL VENOUS BLD VENIPUNCTURE: CPT

## 2020-11-13 PROCEDURE — 83036 HEMOGLOBIN GLYCOSYLATED A1C: CPT

## 2020-11-13 PROCEDURE — 86664 EPSTEIN-BARR NUCLEAR ANTIGEN: CPT

## 2020-11-13 PROCEDURE — 86663 EPSTEIN-BARR ANTIBODY: CPT

## 2020-11-13 PROCEDURE — 86618 LYME DISEASE ANTIBODY: CPT

## 2020-11-13 PROCEDURE — 84443 ASSAY THYROID STIM HORMONE: CPT

## 2020-11-13 PROCEDURE — 85025 COMPLETE CBC W/AUTO DIFF WBC: CPT

## 2020-11-13 PROCEDURE — 86803 HEPATITIS C AB TEST: CPT

## 2020-11-13 PROCEDURE — 82306 VITAMIN D 25 HYDROXY: CPT

## 2020-11-13 RX ORDER — EPINEPHRINE 1 MG/ML
0.3 INJECTION, SOLUTION, CONCENTRATE INTRAVENOUS ONCE
Status: CANCELLED | OUTPATIENT
Start: 2020-11-17

## 2020-11-14 LAB
B BURGDOR IGG+IGM SER-ACNC: 13
EBV NA IGG SER IA-ACNC: 102 U/ML (ref 0–17.9)
EBV VCA IGG SER IA-ACNC: >600 U/ML (ref 0–17.9)
EBV VCA IGM SER IA-ACNC: <36 U/ML (ref 0–35.9)
INTERPRETATION: ABNORMAL

## 2020-11-17 ENCOUNTER — HOSPITAL ENCOUNTER (OUTPATIENT)
Dept: INFUSION CENTER | Facility: CLINIC | Age: 65
Discharge: HOME | End: 2020-11-17
Payer: COMMERCIAL

## 2020-11-17 VITALS
HEART RATE: 82 BPM | DIASTOLIC BLOOD PRESSURE: 61 MMHG | OXYGEN SATURATION: 94 % | RESPIRATION RATE: 20 BRPM | SYSTOLIC BLOOD PRESSURE: 123 MMHG | TEMPERATURE: 97.4 F

## 2020-11-17 DIAGNOSIS — J45.50 SEVERE PERSISTENT ASTHMA WITHOUT COMPLICATION: Primary | ICD-10-CM

## 2020-11-17 PROCEDURE — 96372 THER/PROPH/DIAG INJ SC/IM: CPT

## 2020-11-17 RX ORDER — EPINEPHRINE 1 MG/ML
0.3 INJECTION, SOLUTION, CONCENTRATE INTRAVENOUS ONCE
Status: DISCONTINUED | OUTPATIENT
Start: 2020-11-17 | End: 2020-11-20 | Stop reason: HOSPADM

## 2020-11-17 RX ORDER — EPINEPHRINE 1 MG/ML
0.3 INJECTION, SOLUTION, CONCENTRATE INTRAVENOUS ONCE
Status: CANCELLED | OUTPATIENT
Start: 2021-01-12

## 2020-11-17 RX ADMIN — BENRALIZUMAB 30 MG: 30 INJECTION, SOLUTION SUBCUTANEOUS at 08:37

## 2021-01-12 ENCOUNTER — HOSPITAL ENCOUNTER (OUTPATIENT)
Dept: INFUSION CENTER | Facility: CLINIC | Age: 66
Discharge: HOME | End: 2021-01-12
Payer: COMMERCIAL

## 2021-01-12 VITALS
TEMPERATURE: 97.2 F | HEART RATE: 85 BPM | OXYGEN SATURATION: 97 % | RESPIRATION RATE: 18 BRPM | SYSTOLIC BLOOD PRESSURE: 117 MMHG | DIASTOLIC BLOOD PRESSURE: 58 MMHG

## 2021-01-12 DIAGNOSIS — J45.50 SEVERE PERSISTENT ASTHMA WITHOUT COMPLICATION: Primary | ICD-10-CM

## 2021-01-12 PROCEDURE — 96372 THER/PROPH/DIAG INJ SC/IM: CPT

## 2021-01-12 RX ORDER — EPINEPHRINE 1 MG/ML
0.3 INJECTION, SOLUTION, CONCENTRATE INTRAVENOUS ONCE
Status: CANCELLED | OUTPATIENT
Start: 2021-03-09

## 2021-01-12 RX ORDER — EPINEPHRINE 1 MG/ML
0.3 INJECTION, SOLUTION, CONCENTRATE INTRAVENOUS ONCE AS NEEDED
Status: DISCONTINUED | OUTPATIENT
Start: 2021-01-12 | End: 2021-01-15 | Stop reason: HOSPADM

## 2021-01-12 RX ADMIN — BENRALIZUMAB 30 MG: 30 INJECTION, SOLUTION SUBCUTANEOUS at 10:54

## 2021-01-28 ENCOUNTER — TELEPHONE (OUTPATIENT)
Dept: GASTROENTEROLOGY | Facility: CLINIC | Age: 66
End: 2021-01-28

## 2021-01-28 NOTE — TELEPHONE ENCOUNTER
Patient left message asking if she was due for her egd/colon  Returned her call and left message she isn't due for egd until November and the colonoscopy was due in 6/2020, however she stated she had a cologuard done 1 1/2 yrs ago that was normal  Told her the colonoscopy stated to be done 3-5 yrs so if she wanted to wait until November to do w/egd, that would be 4 yrs  Told her to call and let us know what she wanted to do

## 2021-01-29 DIAGNOSIS — Z11.59 SCREENING FOR VIRAL DISEASE: ICD-10-CM

## 2021-01-29 PROCEDURE — U0003 INFECTIOUS AGENT DETECTION BY NUCLEIC ACID (DNA OR RNA); SEVERE ACUTE RESPIRATORY SYNDROME CORONAVIRUS 2 (SARS-COV-2) (CORONAVIRUS DISEASE [COVID-19]), AMPLIFIED PROBE TECHNIQUE, MAKING USE OF HIGH THROUGHPUT TECHNOLOGIES AS DESCRIBED BY CMS-2020-01-R: HCPCS | Performed by: FAMILY MEDICINE

## 2021-01-29 PROCEDURE — U0005 INFEC AGEN DETEC AMPLI PROBE: HCPCS | Performed by: FAMILY MEDICINE

## 2021-01-31 LAB — SARS-COV-2 RNA RESP QL NAA+PROBE: POSITIVE

## 2021-02-05 ENCOUNTER — APPOINTMENT (EMERGENCY)
Dept: RADIOLOGY | Facility: HOSPITAL | Age: 66
DRG: 177 | End: 2021-02-05
Payer: COMMERCIAL

## 2021-02-05 ENCOUNTER — HOSPITAL ENCOUNTER (INPATIENT)
Facility: HOSPITAL | Age: 66
LOS: 3 days | Discharge: HOME | DRG: 177 | End: 2021-02-08
Attending: EMERGENCY MEDICINE | Admitting: FAMILY MEDICINE
Payer: COMMERCIAL

## 2021-02-05 DIAGNOSIS — J32.9 CHRONIC SINUSITIS: ICD-10-CM

## 2021-02-05 DIAGNOSIS — K22.70 BARRETT ESOPHAGUS: ICD-10-CM

## 2021-02-05 DIAGNOSIS — R09.02 HYPOXIA: ICD-10-CM

## 2021-02-05 DIAGNOSIS — U07.1 COVID-19: Primary | ICD-10-CM

## 2021-02-05 LAB
ALBUMIN SERPL BCP-MCNC: 3.4 G/DL (ref 3.5–5)
ALP SERPL-CCNC: 94 U/L (ref 46–116)
ALT SERPL W P-5'-P-CCNC: 29 U/L (ref 12–78)
ANION GAP SERPL CALCULATED.3IONS-SCNC: 8 MMOL/L (ref 4–13)
AST SERPL W P-5'-P-CCNC: 26 U/L (ref 5–45)
BASOPHILS # BLD AUTO: 0 THOUSANDS/ΜL (ref 0–0.1)
BASOPHILS NFR BLD AUTO: 0 % (ref 0–1)
BILIRUB SERPL-MCNC: 0.29 MG/DL (ref 0.2–1)
BUN SERPL-MCNC: 9 MG/DL (ref 5–25)
CALCIUM ALBUM COR SERPL-MCNC: 8.9 MG/DL (ref 8.3–10.1)
CALCIUM SERPL-MCNC: 8.4 MG/DL (ref 8.3–10.1)
CHLORIDE SERPL-SCNC: 103 MMOL/L (ref 100–108)
CO2 SERPL-SCNC: 25 MMOL/L (ref 21–32)
CREAT SERPL-MCNC: 0.72 MG/DL (ref 0.6–1.3)
EOSINOPHIL # BLD AUTO: 0 THOUSAND/ΜL (ref 0–0.61)
EOSINOPHIL NFR BLD AUTO: 0 % (ref 0–6)
ERYTHROCYTE [DISTWIDTH] IN BLOOD BY AUTOMATED COUNT: 12 % (ref 11.6–15.1)
GFR SERPL CREATININE-BSD FRML MDRD: 88 ML/MIN/1.73SQ M
GLUCOSE SERPL-MCNC: 111 MG/DL (ref 65–140)
HCT VFR BLD AUTO: 40.5 % (ref 34.8–46.1)
HGB BLD-MCNC: 13.6 G/DL (ref 11.5–15.4)
IMM GRANULOCYTES # BLD AUTO: 0 THOUSAND/UL (ref 0–0.2)
IMM GRANULOCYTES NFR BLD AUTO: 0 % (ref 0–2)
LACTATE SERPL-SCNC: 0.8 MMOL/L (ref 0.5–2)
LYMPHOCYTES # BLD AUTO: 0.93 THOUSANDS/ΜL (ref 0.6–4.47)
LYMPHOCYTES NFR BLD AUTO: 26 % (ref 14–44)
MCH RBC QN AUTO: 32 PG (ref 26.8–34.3)
MCHC RBC AUTO-ENTMCNC: 33.6 G/DL (ref 31.4–37.4)
MCV RBC AUTO: 95 FL (ref 82–98)
MONOCYTES # BLD AUTO: 0.36 THOUSAND/ΜL (ref 0.17–1.22)
MONOCYTES NFR BLD AUTO: 10 % (ref 4–12)
NEUTROPHILS # BLD AUTO: 2.24 THOUSANDS/ΜL (ref 1.85–7.62)
NEUTS SEG NFR BLD AUTO: 64 % (ref 43–75)
NRBC BLD AUTO-RTO: 0 /100 WBCS
PLATELET # BLD AUTO: 151 THOUSANDS/UL (ref 149–390)
PMV BLD AUTO: 10.1 FL (ref 8.9–12.7)
POTASSIUM SERPL-SCNC: 3.3 MMOL/L (ref 3.5–5.3)
PROCALCITONIN SERPL-MCNC: <0.05 NG/ML
PROT SERPL-MCNC: 6.8 G/DL (ref 6.4–8.2)
RBC # BLD AUTO: 4.25 MILLION/UL (ref 3.81–5.12)
SODIUM SERPL-SCNC: 136 MMOL/L (ref 136–145)
WBC # BLD AUTO: 3.53 THOUSAND/UL (ref 4.31–10.16)

## 2021-02-05 PROCEDURE — 85025 COMPLETE CBC W/AUTO DIFF WBC: CPT | Performed by: EMERGENCY MEDICINE

## 2021-02-05 PROCEDURE — 87040 BLOOD CULTURE FOR BACTERIA: CPT | Performed by: EMERGENCY MEDICINE

## 2021-02-05 PROCEDURE — 84145 PROCALCITONIN (PCT): CPT | Performed by: EMERGENCY MEDICINE

## 2021-02-05 PROCEDURE — 80053 COMPREHEN METABOLIC PANEL: CPT | Performed by: EMERGENCY MEDICINE

## 2021-02-05 PROCEDURE — 93005 ELECTROCARDIOGRAM TRACING: CPT

## 2021-02-05 PROCEDURE — 71045 X-RAY EXAM CHEST 1 VIEW: CPT

## 2021-02-05 PROCEDURE — 99285 EMERGENCY DEPT VISIT HI MDM: CPT

## 2021-02-05 PROCEDURE — 96374 THER/PROPH/DIAG INJ IV PUSH: CPT

## 2021-02-05 PROCEDURE — 96361 HYDRATE IV INFUSION ADD-ON: CPT

## 2021-02-05 PROCEDURE — 99223 1ST HOSP IP/OBS HIGH 75: CPT | Performed by: FAMILY MEDICINE

## 2021-02-05 PROCEDURE — 83605 ASSAY OF LACTIC ACID: CPT | Performed by: EMERGENCY MEDICINE

## 2021-02-05 PROCEDURE — 99285 EMERGENCY DEPT VISIT HI MDM: CPT | Performed by: EMERGENCY MEDICINE

## 2021-02-05 PROCEDURE — 96375 TX/PRO/DX INJ NEW DRUG ADDON: CPT

## 2021-02-05 PROCEDURE — 36415 COLL VENOUS BLD VENIPUNCTURE: CPT | Performed by: EMERGENCY MEDICINE

## 2021-02-05 PROCEDURE — XW033E5 INTRODUCTION OF REMDESIVIR ANTI-INFECTIVE INTO PERIPHERAL VEIN, PERCUTANEOUS APPROACH, NEW TECHNOLOGY GROUP 5: ICD-10-PCS | Performed by: FAMILY MEDICINE

## 2021-02-05 RX ORDER — MONTELUKAST SODIUM 10 MG/1
10 TABLET ORAL
Status: DISCONTINUED | OUTPATIENT
Start: 2021-02-05 | End: 2021-02-08 | Stop reason: HOSPADM

## 2021-02-05 RX ORDER — ONDANSETRON 2 MG/ML
4 INJECTION INTRAMUSCULAR; INTRAVENOUS ONCE
Status: COMPLETED | OUTPATIENT
Start: 2021-02-05 | End: 2021-02-05

## 2021-02-05 RX ORDER — ONDANSETRON 2 MG/ML
4 INJECTION INTRAMUSCULAR; INTRAVENOUS EVERY 4 HOURS PRN
Status: DISCONTINUED | OUTPATIENT
Start: 2021-02-05 | End: 2021-02-08 | Stop reason: HOSPADM

## 2021-02-05 RX ORDER — MELATONIN
2000 DAILY
Status: DISCONTINUED | OUTPATIENT
Start: 2021-02-06 | End: 2021-02-08 | Stop reason: HOSPADM

## 2021-02-05 RX ORDER — ACETAMINOPHEN 325 MG/1
650 TABLET ORAL EVERY 6 HOURS PRN
Status: DISCONTINUED | OUTPATIENT
Start: 2021-02-05 | End: 2021-02-08 | Stop reason: HOSPADM

## 2021-02-05 RX ORDER — KETOROLAC TROMETHAMINE 30 MG/ML
15 INJECTION, SOLUTION INTRAMUSCULAR; INTRAVENOUS ONCE
Status: COMPLETED | OUTPATIENT
Start: 2021-02-05 | End: 2021-02-05

## 2021-02-05 RX ORDER — ASPIRIN 325 MG
325 TABLET ORAL DAILY
Status: DISCONTINUED | OUTPATIENT
Start: 2021-02-06 | End: 2021-02-08 | Stop reason: HOSPADM

## 2021-02-05 RX ORDER — FAMOTIDINE 20 MG/1
20 TABLET, FILM COATED ORAL 2 TIMES DAILY
Status: DISCONTINUED | OUTPATIENT
Start: 2021-02-06 | End: 2021-02-08 | Stop reason: HOSPADM

## 2021-02-05 RX ORDER — ECHINACEA PURPUREA EXTRACT 125 MG
1 TABLET ORAL
Status: DISCONTINUED | OUTPATIENT
Start: 2021-02-05 | End: 2021-02-08 | Stop reason: HOSPADM

## 2021-02-05 RX ORDER — DULOXETIN HYDROCHLORIDE 30 MG/1
30 CAPSULE, DELAYED RELEASE ORAL DAILY
Status: DISCONTINUED | OUTPATIENT
Start: 2021-02-06 | End: 2021-02-08 | Stop reason: HOSPADM

## 2021-02-05 RX ORDER — MECLIZINE HCL 12.5 MG/1
12.5 TABLET ORAL EVERY 8 HOURS PRN
Status: DISCONTINUED | OUTPATIENT
Start: 2021-02-05 | End: 2021-02-06

## 2021-02-05 RX ORDER — DEXAMETHASONE SODIUM PHOSPHATE 4 MG/ML
6 INJECTION, SOLUTION INTRA-ARTICULAR; INTRALESIONAL; INTRAMUSCULAR; INTRAVENOUS; SOFT TISSUE EVERY 24 HOURS
Status: DISCONTINUED | OUTPATIENT
Start: 2021-02-05 | End: 2021-02-08 | Stop reason: HOSPADM

## 2021-02-05 RX ORDER — ALBUTEROL SULFATE 90 UG/1
2 AEROSOL, METERED RESPIRATORY (INHALATION) ONCE
Status: COMPLETED | OUTPATIENT
Start: 2021-02-05 | End: 2021-02-05

## 2021-02-05 RX ORDER — MULTIVITAMIN/IRON/FOLIC ACID 18MG-0.4MG
1 TABLET ORAL DAILY
Status: DISCONTINUED | OUTPATIENT
Start: 2021-02-13 | End: 2021-02-08 | Stop reason: HOSPADM

## 2021-02-05 RX ORDER — TRAZODONE HYDROCHLORIDE 100 MG/1
100 TABLET ORAL
Status: DISCONTINUED | OUTPATIENT
Start: 2021-02-05 | End: 2021-02-08 | Stop reason: HOSPADM

## 2021-02-05 RX ORDER — LORATADINE 10 MG/1
10 TABLET ORAL DAILY
Status: DISCONTINUED | OUTPATIENT
Start: 2021-02-06 | End: 2021-02-08 | Stop reason: HOSPADM

## 2021-02-05 RX ORDER — ASCORBIC ACID 500 MG
1000 TABLET ORAL EVERY 12 HOURS SCHEDULED
Status: DISCONTINUED | OUTPATIENT
Start: 2021-02-05 | End: 2021-02-08 | Stop reason: HOSPADM

## 2021-02-05 RX ORDER — DEXAMETHASONE SODIUM PHOSPHATE 4 MG/ML
6 INJECTION, SOLUTION INTRA-ARTICULAR; INTRALESIONAL; INTRAMUSCULAR; INTRAVENOUS; SOFT TISSUE ONCE
Status: COMPLETED | OUTPATIENT
Start: 2021-02-05 | End: 2021-02-05

## 2021-02-05 RX ORDER — DOXYCYCLINE HYCLATE 100 MG/1
100 CAPSULE ORAL EVERY 12 HOURS SCHEDULED
Status: DISCONTINUED | OUTPATIENT
Start: 2021-02-05 | End: 2021-02-06

## 2021-02-05 RX ORDER — ZINC SULFATE 50(220)MG
220 CAPSULE ORAL DAILY
Status: DISCONTINUED | OUTPATIENT
Start: 2021-02-06 | End: 2021-02-08 | Stop reason: HOSPADM

## 2021-02-05 RX ADMIN — ALBUTEROL SULFATE 2 PUFF: 90 AEROSOL, METERED RESPIRATORY (INHALATION) at 18:12

## 2021-02-05 RX ADMIN — SALINE NASAL SPRAY 1 SPRAY: 1.5 SOLUTION NASAL at 21:43

## 2021-02-05 RX ADMIN — SODIUM CHLORIDE 1000 ML: 0.9 INJECTION, SOLUTION INTRAVENOUS at 18:06

## 2021-02-05 RX ADMIN — KETOROLAC TROMETHAMINE 15 MG: 30 INJECTION, SOLUTION INTRAMUSCULAR; INTRAVENOUS at 18:12

## 2021-02-05 RX ADMIN — DEXAMETHASONE SODIUM PHOSPHATE 6 MG: 4 INJECTION, SOLUTION INTRAMUSCULAR; INTRAVENOUS at 21:43

## 2021-02-05 RX ADMIN — ONDANSETRON 4 MG: 2 INJECTION INTRAMUSCULAR; INTRAVENOUS at 18:12

## 2021-02-05 RX ADMIN — DOXYCYCLINE 100 MG: 100 CAPSULE ORAL at 21:43

## 2021-02-05 NOTE — ED PROVIDER NOTES
History  Chief Complaint   Patient presents with    Fever - 9 weeks to 74 years     Pt arrives with complaint of being covid positive and having a bad fever  Pt states that she has been anywhere from 101 to high 103 since monday  Pt also complains of dizziness and headache  Pt states that she is also having vomiting and unable to keep anythign down  59-year-old female presents today for evaluation of fevers, myalgias, dizziness, headache, nausea and vomiting  Patient states he was diagnosed with COVID 1 week ago  She has been checking oxygen saturation at home and states that it has never dipped below 90%  She presents today at encouragement of her PCP because she felt like she was dehydrated  History provided by:  Patient  Fever - 9 weeks to 74 years  Max temp prior to arrival:  103  Severity:  Moderate  Onset quality:  Sudden  Duration:  1 week  Timing:  Constant  Progression:  Unchanged  Chronicity:  New  Relieved by:  None tried  Worsened by:  Nothing  Ineffective treatments:  None tried  Associated symptoms: congestion, cough, headaches, myalgias, nausea and vomiting    Associated symptoms: no chest pain and no confusion        Prior to Admission Medications   Prescriptions Last Dose Informant Patient Reported? Taking? ALPRAZolam (XANAX) 0 5 mg tablet  Self Yes No   Sig: as needed    DULoxetine (CYMBALTA) 30 mg delayed release capsule  Self Yes No   Sig: Take 30 mg by mouth daily   EPINEPHrine (EPIPEN) 0 3 mg/0 3 mL SOAJ   No No   Sig: Inject 0 3 mL (0 3 mg total) into a muscle once for 1 dose   EPINEPHrine (EPIPEN) 0 3 mg/0 3 mL SOAJ   Yes No   Sig: inject 0 3 milliliters ( 0 3 milligrams ) intramuscularly in OUTE     (REFER TO PRESCRIPTION NOTES)  FASENRA  Self Yes No   Si mg every 56 days    LACTOBACILLUS ACID-PECTIN PO  Self Yes No   Sig: Take by mouth   WIXELA INHUB 250-50 MCG/DOSE inhaler   No No   Sig: Inhale 1 puff 2 (two) times a day Rinse mouth after use     acetaminophen (TYLENOL) 325 mg tablet  Self Yes No   Sig: Take 650 mg by mouth every 6 (six) hours as needed for mild pain   albuterol (PROAIR HFA) 90 mcg/act inhaler  Self No No   Sig: Inhale 2 puffs every 6 (six) hours as needed for wheezing   aspirin 325 mg tablet   No No   Sig: Take 1 tablet (325 mg total) by mouth daily Do not take this medication for 2 weeks     budesonide (PULMICORT) 0 5 mg/2 mL nebulizer solution   No No   Sig: Take 1 vial (0 5 mg total) by nebulization 2 (two) times a day   cetirizine (ZyrTEC) 10 mg tablet  Self Yes No   Sig: Take 10 mg by mouth every morning     cholecalciferol (VITAMIN D3) 1,000 units tablet  Self Yes No   Sig: Take 4,000 Units by mouth daily   diclofenac sodium (VOLTAREN) 1 %   No No   Sig: Apply 2 g topically 3 (three) times a day as needed (Pain)   famciclovir (FAMVIR) 500 mg tablet  Self Yes No   levalbuterol (XOPENEX) 1 25 mg/0 5 mL nebulizer solution   No No   Sig: Take 0 5 mL (1 25 mg total) by nebulization every 8 (eight) hours as needed for wheezing   meclizine (ANTIVERT) 12 5 MG tablet   No No   Sig: Take 1 tablet (12 5 mg total) by mouth every 8 (eight) hours as needed for dizziness or nausea   meclizine (ANTIVERT) 12 5 MG tablet   No No   Sig: Take 1 tablet (12 5 mg total) by mouth every 8 (eight) hours as needed for dizziness   montelukast (SINGULAIR) 10 mg tablet   No No   Sig: Take 1 tablet (10 mg total) by mouth daily at bedtime   mupirocin (BACTROBAN) 2 % ointment  Self Yes No   Sig: apply to affected area twice a day  TO LOWER NASAL AREA   omeprazole (PriLOSEC) 40 MG capsule  Self Yes No   Sig: Take 40 mg by mouth daily before breakfast Take 30 minutes prior   promethazine-codeine (PHENERGAN WITH CODEINE) 6 25-10 mg/5 mL syrup   No No   Sig: Take 5 mL by mouth every 6 (six) hours as needed for cough   sodium chloride 0 9 % irrigation  Self Yes No   traZODone (DESYREL) 100 mg tablet  Self Yes No      Facility-Administered Medications: None       Past Medical History: Diagnosis Date    Anesthesia complication     desaturation of oxygen mostly at night- on O2 at 2L at hs-may wake up with asthma issue    Anxiety     Asthma     Argueta esophagus     DVT (deep vein thrombosis) in pregnancy     DVT (deep venous thrombosis) (HCC)     during pregnancy    Hiatal hernia     Hypercholesteremia     Nasal polyps     Sinus disorder     Sinusitis, chronic        Past Surgical History:   Procedure Laterality Date     SECTION N/A     x 2     SECTION      COLONOSCOPY      ESOPHAGOGASTRODUODENOSCOPY      HYSTERECTOMY      complete    HYSTERECTOMY      NASAL POLYP SURGERY      x2    NASAL POLYP SURGERY  2018    [place] COLSC FLX W/RMVL OF TUMOR POLYP LESION SNARE TQ N/A 2017    Procedure: COLONOSCOPYwith  snare polypectomy ;  Surgeon: Rachna Moscoso MD;  Location: [facility]; Service: Gastroenterology    [place] EGD TRANSORAL BIOPSY SINGLE/MULTIPLE N/A 2017    Procedure: ESOPHAGOGASTRODUODENOSCOPY (EGD)and biopsy ;  Surgeon: Rachna Moscoso MD;  Location: [facility]; Service: Gastroenterology    TONSILLECTOMY N/A     TONSILLECTOMY      VEIN LIGATION AND STRIPPING Bilateral        Family History   Problem Relation Age of Onset    Heart disease Mother         CHF    Breast cancer additional onset Mother 52    Breast cancer Mother 52    Dysphagia Father     Breast cancer Maternal Aunt     Breast cancer Paternal Aunt      I have reviewed and agree with the history as documented  E-Cigarette/Vaping    E-Cigarette Use Never User      E-Cigarette/Vaping Substances     Social History     Tobacco Use    Smoking status: Never Smoker    Smokeless tobacco: Never Used   Substance Use Topics    Alcohol use: Yes     Frequency: 2-4 times a month     Binge frequency: Weekly     Comment: social    Drug use: Never       Review of Systems   Constitutional: Positive for fever  HENT: Positive for congestion      Eyes: Negative for visual disturbance  Respiratory: Positive for cough  Cardiovascular: Negative for chest pain  Gastrointestinal: Positive for nausea and vomiting  Negative for abdominal pain  Genitourinary: Negative for difficulty urinating  Musculoskeletal: Positive for myalgias  Allergic/Immunologic: Negative for immunocompromised state  Neurological: Positive for headaches  Psychiatric/Behavioral: Negative for confusion  Physical Exam  Physical Exam  Vitals signs and nursing note reviewed  Constitutional:       Appearance: She is well-developed  HENT:      Head: Normocephalic and atraumatic  Mouth/Throat:      Mouth: Mucous membranes are moist       Pharynx: Uvula midline  Tonsils: No tonsillar exudate  Eyes:      Pupils: Pupils are equal, round, and reactive to light  Neck:      Musculoskeletal: Normal range of motion and neck supple  Cardiovascular:      Rate and Rhythm: Normal rate and regular rhythm  Pulmonary:      Effort: Pulmonary effort is normal       Breath sounds: Normal breath sounds  Abdominal:      General: Bowel sounds are normal       Palpations: Abdomen is soft  Tenderness: There is no abdominal tenderness  There is no guarding or rebound  Musculoskeletal:         General: No tenderness or deformity  Skin:     General: Skin is warm and dry  Capillary Refill: Capillary refill takes less than 2 seconds  Neurological:      General: No focal deficit present  Mental Status: She is alert and oriented to person, place, and time  Comments: Patient moving all extremities equally, no focal neuro deficits noted         Psychiatric:         Mood and Affect: Mood normal          Behavior: Behavior normal          Vital Signs  ED Triage Vitals [02/05/21 1710]   Temperature Pulse Respirations Blood Pressure SpO2   98 6 °F (37 °C) 90 18 127/61 94 %      Temp Source Heart Rate Source Patient Position - Orthostatic VS BP Location FiO2 (%)   Oral Monitor Sitting Left arm --      Pain Score       5           Vitals:    02/05/21 1710 02/05/21 1736 02/05/21 1900   BP: 127/61 130/62 108/55   Pulse: 90 89 78   Patient Position - Orthostatic VS: Sitting Sitting Lying         Visual Acuity      ED Medications  Medications   albuterol (PROVENTIL HFA,VENTOLIN HFA) inhaler 2 puff (2 puffs Inhalation Given 2/5/21 1812)   sodium chloride 0 9 % bolus 1,000 mL (1,000 mL Intravenous New Bag 2/5/21 1806)   ondansetron (ZOFRAN) injection 4 mg (4 mg Intravenous Given 2/5/21 1812)   ketorolac (TORADOL) injection 15 mg (15 mg Intravenous Given 2/5/21 1812)       Diagnostic Studies  Results Reviewed     Procedure Component Value Units Date/Time    Lactic acid [[id number]]  (Normal) Collected: 02/05/21 1803    Lab Status: Final result Specimen: Blood from Arm, Left Updated: 02/05/21 1854     LACTIC ACID 0 8 mmol/L     Narrative:      Result may be elevated if tourniquet was used during collection      Comprehensive metabolic panel [[id number]]  (Abnormal) Collected: 02/05/21 1803    Lab Status: Final result Specimen: Blood from Arm, Left Updated: 02/05/21 1848     Sodium 136 mmol/L      Potassium 3 3 mmol/L      Chloride 103 mmol/L      CO2 25 mmol/L      ANION GAP 8 mmol/L      BUN 9 mg/dL      Creatinine 0 72 mg/dL      Glucose 111 mg/dL      Calcium 8 4 mg/dL      Corrected Calcium 8 9 mg/dL      AST 26 U/L      ALT 29 U/L      Alkaline Phosphatase 94 U/L      Total Protein 6 8 g/dL      Albumin 3 4 g/dL      Total Bilirubin 0 29 mg/dL      eGFR 88 ml/min/1 73sq m     Narrative:      [place] guidelines for Chronic Kidney Disease (CKD):     Stage 1 with normal or high GFR (GFR > 90 mL/min/1 73 square meters)    Stage 2 Mild CKD (GFR = 60-89 mL/min/1 73 square meters)    Stage 3A Moderate CKD (GFR = 45-59 mL/min/1 73 square meters)    Stage 3B Moderate CKD (GFR = 30-44 mL/min/1 73 square meters)    Stage 4 Severe CKD (GFR = 15-29 mL/min/1 73 square meters)    Stage 5 End Stage CKD (GFR <15 mL/min/1 73 square meters)  Note: GFR calculation is accurate only with a steady state creatinine    CBC and differential [[id number]]  (Abnormal) Collected: 02/05/21 1803    Lab Status: Final result Specimen: Blood from Arm, Left Updated: 02/05/21 1825     WBC 3 53 Thousand/uL      RBC 4 25 Million/uL      Hemoglobin 13 6 g/dL      Hematocrit 40 5 %      MCV 95 fL      MCH 32 0 pg      MCHC 33 6 g/dL      RDW 12 0 %      MPV 10 1 fL      Platelets 660 Thousands/uL      nRBC 0 /100 WBCs      Neutrophils Relative 64 %      Immat GRANS % 0 %      Lymphocytes Relative 26 %      Monocytes Relative 10 %      Eosinophils Relative 0 %      Basophils Relative 0 %      Neutrophils Absolute 2 24 Thousands/µL      Immature Grans Absolute 0 00 Thousand/uL      Lymphocytes Absolute 0 93 Thousands/µL      Monocytes Absolute 0 36 Thousand/µL      Eosinophils Absolute 0 00 Thousand/µL      Basophils Absolute 0 00 Thousands/µL     Procalcitonin with AM Reflex [[id number]] Collected: 02/05/21 1803    Lab Status: In process Specimen: Blood from Arm, Left Updated: 02/05/21 1821    Blood culture #1 [[id number]] Collected: 02/05/21 1811    Lab Status: In process Specimen: Blood from Arm, Right Updated: 02/05/21 1820    Blood culture #2 [[id number]] Collected: 02/05/21 1803    Lab Status: In process Specimen: Blood from Arm, Left Updated: 02/05/21 1820                 XR chest 1 view portable   ED Interpretation by Jamaica Guzman DO (02/05 1841)   Right lower lobe pneumonia                 Procedures  ECG 12 Lead Documentation Only    Date/Time: 2/5/2021 6:19 PM  Performed by: Jamaica Guzman DO  Authorized by: Jose Antonio Gould DO     Indications / Diagnosis:  Shortness of breath  ECG reviewed by me, the ED Provider: yes    Patient location:  ED  Previous ECG:     Previous ECG:  Compared to current  Comments:      Normal sinus rhythm at 77 beats per minute    Normal axis, normal intervals, no ST T wave abnormalities suggestive of ischemia  QTC is normal   No significant change compared to prior from 03/13/2020  ED Course                                           MDM  Number of Diagnoses or Management Options  COVID-19: new and requires workup  Hypoxia:   Diagnosis management comments: 7:05 PM  CXR shows patchy infiltrate in the Right lower lobe  WBC low which is consistent with COVID 19  Ambulatory sat was as low as 80%  Will start Decadron and call hospitalist for admission  7:07 PM  MDM: Patient presents to the Emergency Department and was diagnosed with acute hypoxia due to COVID 19 pneumonia  This is a new problem that will require additional planned work-up in a hospitalized setting  Clinical laboratory testing, radiology imaging, and medical testing (EKG) were ordered  I independently reviewed the radiologic imaging, EKG, and laboratory studies  This case is considered high risk secondary to the above listed disease process that poses a threat to bodily function that requires further diagnostic testing and management which may include the administration of parenteral controlled substances  Discussed with RAYMOND  We had a detailed discussion of the patient's condition and case,  including need for admission  Accepts to his/her service  Bed request/bridging orders placed             Amount and/or Complexity of Data Reviewed  Clinical lab tests: ordered and reviewed  Tests in the radiology section of CPT®: ordered and reviewed  Tests in the medicine section of CPT®: ordered and reviewed  Review and summarize past medical records: yes  Discuss the patient with other providers: yes  Independent visualization of images, tracings, or specimens: yes    Risk of Complications, Morbidity, and/or Mortality  Presenting problems: high  Diagnostic procedures: high  Management options: high    Patient Progress  Patient progress: stable      Disposition  Final diagnoses:   COVID-19   Hypoxia     Time reflects when diagnosis was documented in both MDM as applicable and the Disposition within this note     Time User Action Codes Description Comment    2/5/2021  5:55 PM Jignesh Au Add [U07 1] COVID-19     2/5/2021  7:07 PM Sandy Gould Add [R09 02] Hypoxia       ED Disposition     ED Disposition Condition Date/Time Comment    Admit Stable Fri Feb 5, 2021  7:07 PM Case was discussed with RAYMOND and the patient's admission status was agreed to be Admission Status: inpatient status to the service of Dr Alicja Boss   Follow-up Information    None         Patient's Medications   Discharge Prescriptions    No medications on file     No discharge procedures on file      PDMP Review       Value Time User    PDMP Reviewed  Yes 7/2/2020  8:57 AM Michael Blas DO          ED Provider  Electronically Signed by           Brenda Prajapati DO  02/05/21 3942

## 2021-02-05 NOTE — ED NOTES
O2 started at 94 when sitting on bed, when getting up to walk around room O2 got as low as [place] 74  02/05/21 4299

## 2021-02-06 PROBLEM — R42 DIZZINESS: Status: ACTIVE | Noted: 2021-02-06

## 2021-02-06 LAB
ABO GROUP BLD: NORMAL
ALBUMIN SERPL BCP-MCNC: 2.9 G/DL (ref 3.5–5)
ALP SERPL-CCNC: 87 U/L (ref 46–116)
ALT SERPL W P-5'-P-CCNC: 30 U/L (ref 12–78)
ANION GAP SERPL CALCULATED.3IONS-SCNC: 9 MMOL/L (ref 4–13)
AST SERPL W P-5'-P-CCNC: 26 U/L (ref 5–45)
BASOPHILS # BLD AUTO: 0 THOUSANDS/ΜL (ref 0–0.1)
BASOPHILS # BLD AUTO: 0 THOUSANDS/ΜL (ref 0–0.1)
BASOPHILS NFR BLD AUTO: 0 % (ref 0–1)
BASOPHILS NFR BLD AUTO: 0 % (ref 0–1)
BILIRUB SERPL-MCNC: 0.23 MG/DL (ref 0.2–1)
BUN SERPL-MCNC: 9 MG/DL (ref 5–25)
CALCIUM ALBUM COR SERPL-MCNC: 9.1 MG/DL (ref 8.3–10.1)
CALCIUM SERPL-MCNC: 8.2 MG/DL (ref 8.3–10.1)
CHLORIDE SERPL-SCNC: 106 MMOL/L (ref 100–108)
CO2 SERPL-SCNC: 24 MMOL/L (ref 21–32)
CREAT SERPL-MCNC: 0.75 MG/DL (ref 0.6–1.3)
CRP SERPL QL: 26.6 MG/L
D DIMER PPP FEU-MCNC: 0.79 UG/ML FEU
EOSINOPHIL # BLD AUTO: 0 THOUSAND/ΜL (ref 0–0.61)
EOSINOPHIL # BLD AUTO: 0 THOUSAND/ΜL (ref 0–0.61)
EOSINOPHIL NFR BLD AUTO: 0 % (ref 0–6)
EOSINOPHIL NFR BLD AUTO: 0 % (ref 0–6)
ERYTHROCYTE [DISTWIDTH] IN BLOOD BY AUTOMATED COUNT: 12 % (ref 11.6–15.1)
ERYTHROCYTE [DISTWIDTH] IN BLOOD BY AUTOMATED COUNT: 12.1 % (ref 11.6–15.1)
FERRITIN SERPL-MCNC: 187 NG/ML (ref 8–388)
GFR SERPL CREATININE-BSD FRML MDRD: 84 ML/MIN/1.73SQ M
GLUCOSE SERPL-MCNC: 179 MG/DL (ref 65–140)
HCT VFR BLD AUTO: 36.4 % (ref 34.8–46.1)
HCT VFR BLD AUTO: 38.4 % (ref 34.8–46.1)
HGB BLD-MCNC: 11.8 G/DL (ref 11.5–15.4)
HGB BLD-MCNC: 12.4 G/DL (ref 11.5–15.4)
IMM GRANULOCYTES # BLD AUTO: 0.01 THOUSAND/UL (ref 0–0.2)
IMM GRANULOCYTES # BLD AUTO: 0.01 THOUSAND/UL (ref 0–0.2)
IMM GRANULOCYTES NFR BLD AUTO: 1 % (ref 0–2)
IMM GRANULOCYTES NFR BLD AUTO: 1 % (ref 0–2)
LYMPHOCYTES # BLD AUTO: 0.52 THOUSANDS/ΜL (ref 0.6–4.47)
LYMPHOCYTES # BLD AUTO: 0.59 THOUSANDS/ΜL (ref 0.6–4.47)
LYMPHOCYTES NFR BLD AUTO: 28 % (ref 14–44)
LYMPHOCYTES NFR BLD AUTO: 37 % (ref 14–44)
MCH RBC QN AUTO: 31.6 PG (ref 26.8–34.3)
MCH RBC QN AUTO: 31.7 PG (ref 26.8–34.3)
MCHC RBC AUTO-ENTMCNC: 32.3 G/DL (ref 31.4–37.4)
MCHC RBC AUTO-ENTMCNC: 32.4 G/DL (ref 31.4–37.4)
MCV RBC AUTO: 98 FL (ref 82–98)
MCV RBC AUTO: 98 FL (ref 82–98)
MONOCYTES # BLD AUTO: 0.09 THOUSAND/ΜL (ref 0.17–1.22)
MONOCYTES # BLD AUTO: 0.21 THOUSAND/ΜL (ref 0.17–1.22)
MONOCYTES NFR BLD AUTO: 13 % (ref 4–12)
MONOCYTES NFR BLD AUTO: 5 % (ref 4–12)
NEUTROPHILS # BLD AUTO: 0.77 THOUSANDS/ΜL (ref 1.85–7.62)
NEUTROPHILS # BLD AUTO: 1.23 THOUSANDS/ΜL (ref 1.85–7.62)
NEUTS SEG NFR BLD AUTO: 49 % (ref 43–75)
NEUTS SEG NFR BLD AUTO: 66 % (ref 43–75)
NRBC BLD AUTO-RTO: 0 /100 WBCS
NRBC BLD AUTO-RTO: 0 /100 WBCS
NT-PROBNP SERPL-MCNC: 132 PG/ML
PLATELET # BLD AUTO: 123 THOUSANDS/UL (ref 149–390)
PLATELET # BLD AUTO: 136 THOUSANDS/UL (ref 149–390)
PMV BLD AUTO: 9.8 FL (ref 8.9–12.7)
PMV BLD AUTO: 9.9 FL (ref 8.9–12.7)
POTASSIUM SERPL-SCNC: 3.7 MMOL/L (ref 3.5–5.3)
PROCALCITONIN SERPL-MCNC: 0.11 NG/ML
PROT SERPL-MCNC: 6.2 G/DL (ref 6.4–8.2)
RBC # BLD AUTO: 3.72 MILLION/UL (ref 3.81–5.12)
RBC # BLD AUTO: 3.93 MILLION/UL (ref 3.81–5.12)
RH BLD: POSITIVE
SODIUM SERPL-SCNC: 139 MMOL/L (ref 136–145)
WBC # BLD AUTO: 1.58 THOUSAND/UL (ref 4.31–10.16)
WBC # BLD AUTO: 1.85 THOUSAND/UL (ref 4.31–10.16)

## 2021-02-06 PROCEDURE — 86140 C-REACTIVE PROTEIN: CPT | Performed by: FAMILY MEDICINE

## 2021-02-06 PROCEDURE — 84145 PROCALCITONIN (PCT): CPT | Performed by: EMERGENCY MEDICINE

## 2021-02-06 PROCEDURE — 85379 FIBRIN DEGRADATION QUANT: CPT | Performed by: FAMILY MEDICINE

## 2021-02-06 PROCEDURE — 86900 BLOOD TYPING SEROLOGIC ABO: CPT | Performed by: FAMILY MEDICINE

## 2021-02-06 PROCEDURE — 86901 BLOOD TYPING SEROLOGIC RH(D): CPT | Performed by: FAMILY MEDICINE

## 2021-02-06 PROCEDURE — 36415 COLL VENOUS BLD VENIPUNCTURE: CPT | Performed by: FAMILY MEDICINE

## 2021-02-06 PROCEDURE — 82728 ASSAY OF FERRITIN: CPT | Performed by: FAMILY MEDICINE

## 2021-02-06 PROCEDURE — 80053 COMPREHEN METABOLIC PANEL: CPT | Performed by: FAMILY MEDICINE

## 2021-02-06 PROCEDURE — 83880 ASSAY OF NATRIURETIC PEPTIDE: CPT | Performed by: FAMILY MEDICINE

## 2021-02-06 PROCEDURE — 99232 SBSQ HOSP IP/OBS MODERATE 35: CPT | Performed by: FAMILY MEDICINE

## 2021-02-06 PROCEDURE — 85025 COMPLETE CBC W/AUTO DIFF WBC: CPT | Performed by: FAMILY MEDICINE

## 2021-02-06 RX ORDER — FLUTICASONE PROPIONATE 110 UG/1
2 AEROSOL, METERED RESPIRATORY (INHALATION)
Status: DISCONTINUED | OUTPATIENT
Start: 2021-02-06 | End: 2021-02-08 | Stop reason: HOSPADM

## 2021-02-06 RX ORDER — ALBUTEROL SULFATE 90 UG/1
2 AEROSOL, METERED RESPIRATORY (INHALATION) EVERY 6 HOURS PRN
Status: DISCONTINUED | OUTPATIENT
Start: 2021-02-06 | End: 2021-02-08 | Stop reason: HOSPADM

## 2021-02-06 RX ORDER — FLUTICASONE PROPIONATE 50 MCG
1 SPRAY, SUSPENSION (ML) NASAL DAILY
Status: DISCONTINUED | OUTPATIENT
Start: 2021-02-06 | End: 2021-02-08 | Stop reason: HOSPADM

## 2021-02-06 RX ORDER — MECLIZINE HYDROCHLORIDE 25 MG/1
25 TABLET ORAL EVERY 8 HOURS SCHEDULED
Status: DISCONTINUED | OUTPATIENT
Start: 2021-02-06 | End: 2021-02-08 | Stop reason: HOSPADM

## 2021-02-06 RX ADMIN — REMDESIVIR 100 MG: 100 INJECTION, POWDER, LYOPHILIZED, FOR SOLUTION INTRAVENOUS at 23:09

## 2021-02-06 RX ADMIN — FLUTICASONE PROPIONATE 2 PUFF: 110 AEROSOL, METERED RESPIRATORY (INHALATION) at 13:24

## 2021-02-06 RX ADMIN — MECLIZINE HYDROCHLORIDE 25 MG: 25 TABLET ORAL at 21:26

## 2021-02-06 RX ADMIN — MECLIZINE 12.5 MG: 12.5 TABLET ORAL at 09:50

## 2021-02-06 RX ADMIN — FAMOTIDINE 20 MG: 20 TABLET ORAL at 17:13

## 2021-02-06 RX ADMIN — DEXAMETHASONE SODIUM PHOSPHATE 6 MG: 4 INJECTION, SOLUTION INTRA-ARTICULAR; INTRALESIONAL; INTRAMUSCULAR; INTRAVENOUS; SOFT TISSUE at 23:09

## 2021-02-06 RX ADMIN — REMDESIVIR 200 MG: 100 INJECTION, POWDER, LYOPHILIZED, FOR SOLUTION INTRAVENOUS at 00:39

## 2021-02-06 RX ADMIN — ENOXAPARIN SODIUM 30 MG: 30 INJECTION SUBCUTANEOUS at 09:40

## 2021-02-06 RX ADMIN — OXYCODONE HYDROCHLORIDE AND ACETAMINOPHEN 1000 MG: 500 TABLET ORAL at 09:38

## 2021-02-06 RX ADMIN — FLUTICASONE PROPIONATE 2 PUFF: 110 AEROSOL, METERED RESPIRATORY (INHALATION) at 21:25

## 2021-02-06 RX ADMIN — FLUTICASONE PROPIONATE 1 SPRAY: 50 SPRAY, METERED NASAL at 13:24

## 2021-02-06 RX ADMIN — FAMOTIDINE 20 MG: 20 TABLET ORAL at 09:38

## 2021-02-06 RX ADMIN — MECLIZINE HYDROCHLORIDE 25 MG: 25 TABLET ORAL at 14:12

## 2021-02-06 RX ADMIN — Medication 2000 UNITS: at 09:39

## 2021-02-06 RX ADMIN — OXYCODONE HYDROCHLORIDE AND ACETAMINOPHEN 1000 MG: 500 TABLET ORAL at 21:26

## 2021-02-06 RX ADMIN — MONTELUKAST 10 MG: 10 TABLET, FILM COATED ORAL at 00:24

## 2021-02-06 RX ADMIN — ASPIRIN 325 MG ORAL TABLET 325 MG: 325 PILL ORAL at 09:37

## 2021-02-06 RX ADMIN — ENOXAPARIN SODIUM 30 MG: 30 INJECTION SUBCUTANEOUS at 21:28

## 2021-02-06 RX ADMIN — ACETAMINOPHEN 650 MG: 325 TABLET, FILM COATED ORAL at 01:48

## 2021-02-06 RX ADMIN — MONTELUKAST 10 MG: 10 TABLET, FILM COATED ORAL at 21:26

## 2021-02-06 RX ADMIN — OXYCODONE HYDROCHLORIDE AND ACETAMINOPHEN 1000 MG: 500 TABLET ORAL at 00:24

## 2021-02-06 RX ADMIN — DOXYCYCLINE 100 MG: 100 CAPSULE ORAL at 09:38

## 2021-02-06 RX ADMIN — SODIUM CHLORIDE 1000 ML: 0.9 INJECTION, SOLUTION INTRAVENOUS at 13:24

## 2021-02-06 RX ADMIN — ENOXAPARIN SODIUM 30 MG: 30 INJECTION SUBCUTANEOUS at 00:35

## 2021-02-06 RX ADMIN — ZINC SULFATE 220 MG (50 MG) CAPSULE 220 MG: CAPSULE at 09:38

## 2021-02-06 RX ADMIN — LORATADINE 10 MG: 10 TABLET ORAL at 09:38

## 2021-02-06 NOTE — H&P
H&P- Mateusz Matos 1955, 72 y o  female MRN: [id number]    Unit/Bed#: ED 29 Encounter: [id number]    Primary Care Provider: Donald Adams, DO   Date and time admitted to hospital: 2/5/2021  5:34 PM        * COVID-19  Assessment & Plan  COVID-19  Patient was diagnosed about a  She still been some fevers  Patient was just very tired  She does complain of some muscle aches  Patient on pathway  Her oxygen saturations did drop to 80% with ambulation  Will put the patient on remdesivir and steroids  I did explain to her that is an FDA approved medication side effects that he could potentially have  She is agreeable to continue with medication  Will put the patient on a PPI given her history of Argueta's reflux  Will put the patient on the vitamin supplements as well  Await official read from the chest x-ray  Will start antibiotics  If procalcitonin level is negative x2 can discontinue    Chronic sinusitis  Assessment & Plan  Patient does have a history of chronic sinusitis  She has had multiple nasal surgeries in the past     Severe persistent asthma without complication  Assessment & Plan  Patient has mild intermittent asthma  Not severe persistent  VTE Prophylaxis: Enoxaparin (Lovenox)  / sequential compression device   Code Status:  Full code  POLST: POLST is not applicable to this patient      Anticipated Length of Stay:  Patient will be admitted on an Inpatient basis with an anticipated length of stay of  greater than 2 midnights  Justification for Hospital Stay:  Patient is going to require greater than 2 midnights secondary to having acute hypoxemic respiratory failure with ambulation in need of IV steroids and remdesivir in a COVID positive female with underlying asthma    Total Time for Visit, including Counseling / Coordination of Care: 45 minutes  Greater than 50% of this total time spent on direct patient counseling and coordination of care  Chief Complaint:   Weakness  Muscle pain  Dizziness    History of Present Illness:    Nataliya Teague is a 72 y o  female who presents with fevers, muscle aches, dizziness headache and nausea  This is a very pleasant 77-year-old female patient who was diagnosed with COVID about a week ago  Patient states that she got test because she had a temperature of a 104°  Patient had a COVID test done at that time and did take precautions  She complaint of headaches  She also states that she had some dizziness associated with it as well  Patient also states she has some back discomfort and also discomfort in the back of her thighs  She thinks it is from laying around secondary to her not really being as mobile as she normally is  ER was monitoring the patient and her oxygen levels at home were okay but while she was here she did ambulate and her oxygen levels did drop to about 80%  When asked about any dyspnea on exertion patient states that she is limited as far as exertion goes but it is more due to dizziness  Patient is also had a decreased appetite  She has had no loss of sense of taste but when he comes to smell the patient states she has had multiple  nasal surgeries and therefore that it's never been that great       Review of Systems:    Review of Systems   Constitutional: Positive for activity change, appetite change, chills, fatigue and fever  Respiratory: Positive for cough and shortness of breath  Gastrointestinal: Positive for nausea  Musculoskeletal: Positive for back pain  Neurological: Positive for dizziness, weakness, light-headedness and headaches  All other systems reviewed and are negative        Past Medical and Surgical History:     Past Medical History:   Diagnosis Date    Anesthesia complication     desaturation of oxygen mostly at night- on O2 at 2L at hs-may wake up with asthma issue    Anxiety     Asthma     Argueta esophagus     DVT (deep vein thrombosis) in pregnancy     DVT (deep venous thrombosis) ([facility] [address] )     during pregnancy    Hiatal hernia     Hypercholesteremia     Nasal polyps     Sinus disorder     Sinusitis, chronic        Past Surgical History:   Procedure Laterality Date     SECTION N/A     x 2     SECTION      COLONOSCOPY      ESOPHAGOGASTRODUODENOSCOPY      HYSTERECTOMY      complete    HYSTERECTOMY      NASAL POLYP SURGERY      x2    NASAL POLYP SURGERY  2018    CT COLSC FLX W/RMVL OF TUMOR POLYP LESION SNARE TQ N/A 2017    Procedure: COLONOSCOPYwith  snare polypectomy ;  Surgeon: Sammie Casiano MD;  Location: [facility]; Service: Gastroenterology    CT EGD TRANSORAL BIOPSY SINGLE/MULTIPLE N/A 2017    Procedure: ESOPHAGOGASTRODUODENOSCOPY (EGD)and biopsy ;  Surgeon: Sammie Casiano MD;  Location: [facility]; Service: Gastroenterology    TONSILLECTOMY N/A     TONSILLECTOMY      VEIN LIGATION AND STRIPPING Bilateral        Meds/Allergies:    Prior to Admission medications    Medication Sig Start Date End Date Taking? Authorizing Provider   acetaminophen (TYLENOL) 325 mg tablet Take 650 mg by mouth every 6 (six) hours as needed for mild pain    Historical Provider, MD   albuterol (PROAIR HFA) 90 mcg/act inhaler Inhale 2 puffs every 6 (six) hours as needed for wheezing 19   Brendan Johsnon DO   ALPRAZolam Cielo Lock) 0 5 mg tablet as needed  19   Historical Provider, MD   aspirin 325 mg tablet Take 1 tablet (325 mg total) by mouth daily Do not take this medication for 2 weeks   20   Zoran Moreland MD   budesonide (PULMICORT) 0 5 mg/2 mL nebulizer solution Take 1 vial (0 5 mg total) by nebulization 2 (two) times a day 20   Brendan Johnson DO   cetirizine (ZyrTEC) 10 mg tablet Take 10 mg by mouth every morning      Historical Provider, MD   cholecalciferol (VITAMIN D3) 1,000 units tablet Take 4,000 Units by mouth daily    Historical Provider, MD   diclofenac sodium (VOLTAREN) 1 % Apply 2 g topically 3 (three) times a day as needed (Pain) 7/6/20   Teresa Malcolm MD   DULoxetine (CYMBALTA) 30 mg delayed release capsule Take 30 mg by mouth daily    Historical Provider, MD   EPINEPHrine (EPIPEN) 0 3 mg/0 3 mL SOAJ Inject 0 3 mL (0 3 mg total) into a muscle once for 1 dose 7/2/20 7/2/20  Nettie Tam DO   EPINEPHrine (EPIPEN) 0 3 mg/0 3 mL SOAJ inject 0 3 milliliters ( 0 3 milligrams ) intramuscularly in   [address]     (REFER TO PRESCRIPTION NOTES)   7/2/20   Historical Provider, MD   famciclovir [facility]) 500 mg tablet  10/25/19   Historical Provider, MD   FASENRA 30 mg every 56 days  10/9/18   Historical Provider, MD   LACTOBACILLUS ACID-PECTIN PO Take by mouth    Historical Provider, MD   levalbuterol (XOPENEX) 1 25 mg/0 5 mL nebulizer solution Take 0 5 mL (1 25 mg total) by nebulization every 8 (eight) hours as needed for wheezing 11/6/20   LEIF Valdes   meclizine (ANTIVERT) 12 5 MG tablet Take 1 tablet (12 5 mg total) by mouth every 8 (eight) hours as needed for dizziness or nausea 5/14/20   LEIF Lyons   meclizine (ANTIVERT) 12 5 MG tablet Take 1 tablet (12 5 mg total) by mouth every 8 (eight) hours as needed for dizziness 5/4/20   Viktoriya MD Mukesh   montelukast (SINGULAIR) 10 mg tablet Take 1 tablet (10 mg total) by mouth daily at bedtime 7/2/20   Angelique Tucker DO   mupirocin (BACTROBAN) 2 % ointment apply to affected area twice a day  TO LOWER NASAL AREA 12/4/19   Historical Provider, MD   omeprazole (PriLOSEC) 40 MG capsule Take 40 mg by mouth daily before breakfast Take 30 minutes prior 10/30/19   Historical Provider, MD   promethazine-codeine (PHENERGAN WITH CODEINE) 6 25-10 mg/5 mL syrup Take 5 mL by mouth every 6 (six) hours as needed for cough 7/2/20   Angelique Tucker DO   sodium chloride 0 9 % irrigation  10/23/19   Historical Provider, MD   traZODone (DESYREL) 100 mg tablet  7/23/19   Historical Provider, MD Quan Flanagan 250-50 MCG/DOSE inhaler Inhale 1 puff 2 (two) times a day Rinse mouth after use  7/2/20   Isaac Gaucher, DO     I have reviewed home medications with a medical source (PCP, Pharmacy, other)  Allergies:    Allergies   Allergen Reactions    Livalo [Pitavastatin] Hives and Shortness Of Breath     Nausea    Cefditoren Nausea Only and Vomiting    Dust Mite Extract     Mold Extract [Trichophyton]     Cefditoren Pivoxil Hives     N/V       Social History:     Marital Status: /Civil Union     Patient Pre-hospital Living Situation:  Lives with her   Patient Pre-hospital Level of Mobility:  Independent  Patient Pre-hospital Diet Restrictions:  None  Substance Use History:   Social History     Substance and Sexual Activity   Alcohol Use Yes    Frequency: 2-4 times a month    Binge frequency: Weekly    Comment: social     Social History     Tobacco Use   Smoking Status Never Smoker   Smokeless Tobacco Never Used     Social History     Substance and Sexual Activity   Drug Use Never       Family History:    Family History   Problem Relation Age of Onset    Heart disease Mother         CHF    Breast cancer additional onset Mother 52    Breast cancer Mother 52    Dysphagia Father     Breast cancer Maternal Aunt     Breast cancer Paternal Aunt        Physical Exam:     Vitals:   Blood Pressure: 108/55 (02/05/21 1900)  Pulse: 78 (02/05/21 1900)  Temperature: 98 6 °F (37 °C) (02/05/21 1710)  Temp Source: Oral (02/05/21 1710)  Respirations: 16 (02/05/21 1900)  Height: 5' (152 4 cm) (02/05/21 1710)  Weight - Scale: 67 1 kg (148 lb) (02/05/21 1710)  SpO2: 94 % (02/05/21 1900)    Physical Exam    (   General Appearance:    Alert, cooperative, no distress, appears stated age   Head:    Normocephalic, without obvious abnormality, atraumatic   Eyes:    PERRL, conjunctiva/corneas clear, EOM's intact,             Nose:   Nares normal, septum midline, mucosa normal   Throat:   Lips, mucosa, and tongue normal; teeth and gums normal   Neck:   Supple, symmetrical, no adenopathy;        thyroid:  No enlargement/tenderness/nodules; no carotid    bruit or JVD   Back:     Symmetric, no curvature, ROM normal, no CVA tenderness   Lungs:    Crackles noted at bases       Heart:    Regular rate and rhythm, S1 and S2 normal, no murmur, rub    or gallop   Abdomen:     Soft, non-tender, bowel sounds active all four quadrants,     no masses, no organomegaly           Extremities:   Extremities normal, atraumatic, no cyanosis or edema   Pulses:   2+ and symmetric all extremities   Skin:   Skin color, texture, turgor normal, no rashes or lesions   Lymph nodes:   Cervical, supraclavicular, and axillary nodes normal   Neurologic:   CNII-XII intact  Normal strength, sensation and reflexes       throughout       Additional Data:     Lab Results: I have personally reviewed pertinent reports  Results from last 7 days   Lab Units 02/05/21  1803   WBC Thousand/uL 3 53*   HEMOGLOBIN g/dL 13 6   HEMATOCRIT % 40 5   PLATELETS Thousands/uL 151   NEUTROS PCT % 64   LYMPHS PCT % 26   MONOS PCT % 10   EOS PCT % 0     Results from last 7 days   Lab Units 02/05/21  1803   SODIUM mmol/L 136   POTASSIUM mmol/L 3 3*   CHLORIDE mmol/L 103   CO2 mmol/L 25   BUN mg/dL 9   CREATININE mg/dL 0 72   ANION GAP mmol/L 8   CALCIUM mg/dL 8 4   ALBUMIN g/dL 3 4*   TOTAL BILIRUBIN mg/dL 0 29   ALK PHOS U/L 94   ALT U/L 29   AST U/L 26   GLUCOSE RANDOM mg/dL 111                 Results from last 7 days   Lab Units 02/05/21  1803   LACTIC ACID mmol/L 0 8       Imaging: I have personally reviewed pertinent reports  XR chest 1 view portable   ED Interpretation by Autumn North DO (02/05 1841)   Right lower lobe pneumonia      Final Result by Bess Cho MD (02/05 1915)      No acute cardiopulmonary disease  Workstation performed: [id number]           ·     [employer] [facility] [employer] Records Reviewed: Yes     ** Please Note: This note has been constructed using a voice recognition system   **

## 2021-02-06 NOTE — ASSESSMENT & PLAN NOTE
Patient does have a history of chronic sinusitis    She has had multiple nasal surgeries in the past

## 2021-02-06 NOTE — ASSESSMENT & PLAN NOTE
· Continue home meclizine, may consider giving 25 mg Q8H scheduled  · BP on low side   Will give 1L NS bolus and check orthostatic vital signs

## 2021-02-06 NOTE — ASSESSMENT & PLAN NOTE
Patient does have a history of chronic sinusitis and nasal polyps    She has had multiple nasal surgeries in the past   · Claritin and Singulair  · Flonase daily for nasal congestion

## 2021-02-06 NOTE — ASSESSMENT & PLAN NOTE
COVID-19  Patient was diagnosed about a  She still been some fevers  Patient was just very tired  She does complain of some muscle aches  Patient on pathway  Her oxygen saturations did drop to 80% with ambulation  Will put the patient on remdesivir and steroids  I did explain to her that is an FDA approved medication side effects that he could potentially have  She is agreeable to continue with medication  Will put the patient on a PPI given her history of Argueta's reflux  Will put the patient on the vitamin supplements as well

## 2021-02-06 NOTE — ASSESSMENT & PLAN NOTE
· Flovent 2 puffs BID  · Albuterol 2 puffs Q6H PRN  · Claritin and Singulair daily  · Holding nebulizers due to COVID-19 aerisolization

## 2021-02-06 NOTE — PROGRESS NOTES
Progress Note - Darell Aiken 1955, 72 y o  female MRN: [id number]    Unit/Bed#: S -01 Encounter: [id number]    Primary Care Provider: Paul Yu DO   Date and time admitted to hospital: 2/5/2021  5:34 PM        * COVID-19  Assessment & Plan    Lab Results   Component Value Date    SARSCOV2 Positive (A) 01/29/2021       Patient presenting with symptoms of COVID-19 SYMPTOMS: Fever (99 0* or greater), cough, shortness of breath, anorexia, fatigue, myalgias, Nasal congestion, 02 Sat decreased by 3% or greater since last taken, New onset of GI issues (nausea, vomiting, diarrhea) and General malaise (headache, feeling rundown, achy)    Workup:  · Imaging:  Xr Chest 1 View Portable    Result Date: 2/5/2021  Impression: No acute cardiopulmonary disease  Workstation performed: [id number]       · Labs:     Recent Labs     02/06/21  0033 02/06/21  0456   FERRITIN 187  --    CRP  --  26 6*   DDIMER  --  0 79*         · Oxygen:  Saturating 97% on 2 liters/min via nasal cannula    COVID Stage: MILD    Plan:  · Labs:  · Blood type and screen  · Cardiac markers - troponin WNL, CK WNL, BNP minimally elevated  · Inflammatory Markers - CRP elevated, D-Dimer elevated, Ferritin WNL  · CMP tomorrow a m   · CBC with diff tomorrow a m  · Meds  · Vitamin D3 2000 units daily  · Vitamin C 1000 BID  · Zinc 220 mg x 7 days then multivitamin  · Remdesevir 200 mg IV 1 day, then 100 mg IV daily x4 days  · Famotidine 20 mg PO b i d  for acid suppressive therapy  · Dexamethasone 6 mg IV daily times 10 days  · Will consider convalescent plasma based on clinical course and treatment response    · Anticoag:  Therapeutic Anticoagulation per guidelines (category C)  · Non-Pharmacologic interventions  · PT and OT eval and treat  · Self proning if able  · Incentive Spirometry      Severe persistent asthma without complication  Assessment & Plan  · Flovent 2 puffs BID  · Albuterol 2 puffs Q6H PRN  · Claritin and Singulair daily  · Holding nebulizers due to COVID-19 aerisolization    Chronic sinusitis  Assessment & Plan  Patient does have a history of chronic sinusitis and nasal polyps  She has had multiple nasal surgeries in the past   · Claritin and Singulair  · Flonase daily for nasal congestion    Dizziness  Assessment & Plan  · Continue home meclizine, may consider giving 25 mg Q8H scheduled  · BP on low side  Will give 1L NS bolus and check orthostatic vital signs      VTE Pharmacologic Prophylaxis:   Pharmacologic: Enoxaparin (Lovenox)  Mechanical VTE Prophylaxis in Place: No    Discussions with Specialists or Other Care Team Provider: Attending    Education and Discussions with Family / Patient: Attempted to call  Jeff Jorgensen  Current Length of Stay: 1 day(s)    Current Patient Status: Inpatient     Discharge Plan / Estimated Discharge Date: Likely home in 48-72 hours    Code Status: Level 1 - Full Code      Subjective:   Pt reports mild SOB as well as nasal congestion, wheezing, headache, tinnitus, mild abdominal pain, and dizziness with standing  She denies chest pain and N/V/D/C  Objective:     Vitals:   Temp (24hrs), Av 6 °F (37 °C), Min:98 5 °F (36 9 °C), Max:98 6 °F (37 °C)    Temp:  [98 5 °F (36 9 °C)-98 6 °F (37 °C)] 98 5 °F (36 9 °C)  HR:  [64-90] 69  Resp:  [16-18] 18  BP: ()/(53-62) 96/55  SpO2:  [93 %-97 %] 93 %  Body mass index is 28 9 kg/m²  Input and Output Summary (last 24 hours): Intake/Output Summary (Last 24 hours) at 2021 1154  Last data filed at 2021 0124  Gross per 24 hour   Intake 1250 ml   Output --   Net 1250 ml       Physical Exam:     Physical Exam  Vitals signs reviewed  Constitutional:       General: She is not in acute distress  Appearance: Normal appearance  She is well-developed  She is not diaphoretic  HENT:      Head: Normocephalic and atraumatic  Eyes:      Extraocular Movements: Extraocular movements intact  Neck:      Musculoskeletal: Neck supple  Cardiovascular:      Rate and Rhythm: Normal rate and regular rhythm  Pulses: Normal pulses  Heart sounds: Normal heart sounds  No murmur  No friction rub  No gallop  Pulmonary:      Effort: Pulmonary effort is normal  No respiratory distress  Breath sounds: Normal breath sounds  No stridor  No wheezing, rhonchi or rales  Abdominal:      General: Bowel sounds are normal  There is no distension  Palpations: Abdomen is soft  Tenderness: There is no abdominal tenderness  There is no guarding  Musculoskeletal: Normal range of motion  Right lower leg: No edema  Left lower leg: No edema  Skin:     General: Skin is warm and dry  Findings: No rash  Neurological:      Mental Status: She is alert and oriented to person, place, and time  Psychiatric:         Mood and Affect: Mood normal          Behavior: Behavior normal          Additional Data:     Labs:    Results from last 7 days   Lab Units 02/06/21  0456   WBC Thousand/uL 1 85*   HEMOGLOBIN g/dL 12 4   HEMATOCRIT % 38 4   PLATELETS Thousands/uL 123*   NEUTROS PCT % 66   LYMPHS PCT % 28   MONOS PCT % 5   EOS PCT % 0     Results from last 7 days   Lab Units 02/06/21  0456   POTASSIUM mmol/L 3 7   CHLORIDE mmol/L 106   CO2 mmol/L 24   BUN mg/dL 9   CREATININE mg/dL 0 75   CALCIUM mg/dL 8 2*   ALK PHOS U/L 87   ALT U/L 30   AST U/L 26           * I Have Reviewed All Lab Data Listed Above  * Additional Pertinent Lab Tests Reviewed: [address] Admission Reviewed    Imaging:    Imaging Reports Reviewed Today Include: CXR  Imaging Personally Reviewed by Myself Includes:  CXR    Recent Cultures (last 7 days):     Results from last 7 days   Lab Units 02/05/21  1811 02/05/21  1803   BLOOD CULTURE  Received in Microbiology Lab  Culture in Progress  Received in Microbiology Lab  Culture in Progress         Last 24 Hours Medication List:   Current Facility-Administered Medications   Medication Dose Route Frequency Provider Last Rate    acetaminophen  650 mg Oral Q6H PRN Grazyna Blakely MD      albuterol  2 puff Inhalation Q6H PRN Ariella Melgar MD      ascorbic acid  1,000 mg Oral Q12H [address] Grazyna Blakely MD      aspirin  325 mg Oral Daily Grazyna Blakely MD      cholecalciferol  2,000 Units Oral Daily Grazyna Blakely MD      dexamethasone  6 mg Intravenous Q24H Grazyna Blakely MD      doxycycline hyclate  100 mg Oral Q12H [address] Grazyna Blakely MD      DULoxetine  30 mg Oral Daily Grazyna Blakely MD      enoxaparin  30 mg Subcutaneous Q12H [address] Grazyna Blakely MD      famotidine  20 mg Oral BID Grazyna Blakely MD      fluticasone  1 spray Each Nare Daily Ariella Melgar MD      fluticasone  2 puff Inhalation BID Ariella Melgar MD      loratadine  10 mg Oral Daily Grazyna Blakely MD      meclizine  12 5 mg Oral Q8H PRN Grazyna Blakely MD      montelukast  10 mg Oral HS Grazyna Blakely MD      zinc sulfate  220 mg Oral Daily Grazyna Blakely MD      Followed by   Latasha Nieves ON 2/13/2021] multivitamin-minerals  1 tablet Oral Daily Grazyna Blakely MD      ondansetron  4 mg Intravenous Q4H PRN MD Umair Guevara ON 2/7/2021] remdesivir  100 mg Intravenous Q24H Grazyna Blakely MD      sodium chloride  1 spray Each Nare Q1H PRN Elizabeth Staton PA-C      sodium chloride  1,000 mL Intravenous Once Ariella Melgar MD      traZODone  100 mg Oral HS PRN Grazyna Blakely MD          Today, Patient Was Seen By: Ariella Melgar MD

## 2021-02-06 NOTE — DISCHARGE INSTRUCTIONS
COVID-19 Convalescent Plasma Donation    You were treated for Novel Coronavirus (COVID-19)  Please schedule follow-up with your Primary Care Physician as outlined in your discharge paperwork  Once you have fully recovered from COVID-19, you may be able to help patients currently fighting the infection by donating plasma  This is the same process as donating blood  As you recover from the infection, your body is making antibodies targeting COVID-19  When a person becomes ill with COVID-19, it can take some time to develop the antibodies needed to combat the disease  In patients who become seriously ill, they may not make antibodies quickly enough to fight the disease  By collecting plasma from recovered patients and giving it to seriously ill patients, we hope we can provide a boost to the sick patients' immune systems and help stimulate recovery  This is particularly important in patients who may already have a suppressed immune system  In order to be a donor, your symptoms must be completely resolved for 14 - 28 days  To learn more about convalescent plasma donation, please visit [facility]'s website at [email or web address]    If you are willing to be a donor, please contact David Chan at [facility] ([phone]) to schedule a brief telephone call  A member of our team will also reach out to you in a few weeks to answer any questions you may have related to convalescent plasma donation  [address]    Your healthcare provider and/or public health staff have evaluated you and have determined that you do not need to remain in the hospital at this time  At this time you can be isolated at home where you will be monitored by staff from your local or state health department   You should carefully follow the prevention and isolation steps below until a healthcare provider or local or state health department says that you can return to your normal activities  Stay home except to get medical care    People who are mildly ill with COVID-19 are able to isolate at home during their illness  You should restrict activities outside your home, except for getting medical care  Do not go to work, school, or public areas  Avoid using public transportation, ride-sharing, or taxis  Separate yourself from other people and animals in your home    People: As much as possible, you should stay in a specific room and away from other people in your home  Also, you should use a separate bathroom, if available  Animals: You should restrict contact with pets and other animals while you are sick with COVID-19, just like you would around other people  Although there have not been reports of pets or other animals becoming sick with COVID-19, it is still recommended that people sick with COVID-19 limit contact with animals until more information is known about the virus  When possible, have another member of your household care for your animals while you are sick  If you are sick with COVID-19, avoid contact with your pet, including petting, snuggling, being kissed or licked, and sharing food  If you must care for your pet or be around animals while you are sick, wash your hands before and after you interact with pets and wear a facemask  See COVID-19 and Animals for more information  Call ahead before visiting your doctor    If you have a medical appointment, call the healthcare provider and tell them that you have or may have COVID-19  This will help the healthcare providers office take steps to keep other people from getting infected or exposed  Wear a facemask    You should wear a facemask when you are around other people (e g , sharing a room or vehicle) or pets and before you enter a healthcare providers office   If you are not able to wear a facemask (for example, because it causes trouble breathing), then people who live with you should not stay in the same room with you, or they should wear a facemask if they enter your room  Cover your coughs and sneezes    Cover your mouth and nose with a tissue when you cough or sneeze  Throw used tissues in a lined trash can  Immediately wash your hands with soap and water for at least 20 seconds or, if soap and water are not available, clean your hands with an alcohol-based hand  that contains at least 60% alcohol  Clean your hands often    Wash your hands often with soap and water for at least 20 seconds, especially after blowing your nose, coughing, or sneezing; going to the bathroom; and before eating or preparing food  If soap and water are not readily available, use an alcohol-based hand  with at least 60% alcohol, covering all surfaces of your hands and rubbing them together until they feel dry  Soap and water are the best option if hands are visibly dirty  Avoid touching your eyes, nose, and mouth with unwashed hands  Avoid sharing personal household items    You should not share dishes, drinking glasses, cups, eating utensils, towels, or bedding with other people or pets in your home  After using these items, they should be washed thoroughly with soap and water  Clean all high-touch surfaces everyday    High touch surfaces include counters, tabletops, doorknobs, bathroom fixtures, toilets, phones, keyboards, tablets, and bedside tables  Also, clean any surfaces that may have blood, stool, or body fluids on them  Use a household cleaning spray or wipe, according to the label instructions  Labels contain instructions for safe and effective use of the cleaning product including precautions you should take when applying the product, such as wearing gloves and making sure you have good ventilation during use of the product  Monitor your symptoms    Seek prompt medical attention if your illness is worsening (e g , difficulty breathing)   Before seeking care, call your healthcare provider and tell them that you have, or are being evaluated for, COVID-19  Put on a facemask before you enter the facility  These steps will help the healthcare providers office to keep other people in the office or waiting room from getting infected or exposed  Ask your healthcare provider to call the local or [facility] health department  Persons who are placed under active monitoring or facilitated self-monitoring should follow instructions provided by their local health department or occupational health professionals, as appropriate  If you have a medical emergency and need to call 911, notify the dispatch personnel that you have, or are being evaluated for COVID-19  If possible, put on a facemask before emergency medical services arrive  Discontinuing home isolation    Patients with confirmed COVID-19 should remain under home isolation precautions until the following conditions are met:   - They have had no fever for at least 24 hours (that is one full day of no fever without the use medicine that reduces fevers)  AND  - other symptoms have improved (for example, when their cough or shortness of breath have improved)  AND  - If had mild or moderate illness, at least 10 days have passed since their symptoms first appeared or if severe illness (needed oxygen) or immunosuppressed, at least 20 days have passed since symptoms first appeared  Patients with confirmed COVID-19 should also notify close contacts (including their workplace) and ask that they self-quarantine  Currently, close contact is defined as being within 6 feet for 15 minutes or more from the period 24 hours starting 48 hours before symptom onset to the time at which the patient went into isolation  Close contacts of patients diagnosed with COVID-19 should be instructed by the patient to self-quarantine for 14 days from the last time of their last contact with the patient       Source: [employer]

## 2021-02-06 NOTE — NUTRITION
02/06/21 1328   Assessment   Timepoint Initial  (poor po)   Labs & Meds   Labs/Meds Review Lab values reviewed; Meds reviewed  (, alb 2 9 (L), CRP 26 6 (H), WBC 1 85 (L); meds- Vit C, pepcid, MVI, remdesivir, IVF blous 1L, zinc)   Feeding Route   PO Independent   Adequacy of Intake   Nutrition Modality PO  (regular)   Current Meal Intake 25-50%;50-75%   Estimated Calorie Intake 25-50%   Estimated Protein Intake  25-50%   Estimated Fluid Intake %  (IVF bolus providing 1L, oral fluid intake adequate)   Estimated calorie intake compared to estimated need pt reports that her appetite is getting better and reported doing "good" for lunch today  noted pt only ate about 25% of breakfast meal  does not wish to have any nutritional supplements at this time   Nutrition Prognosis   Nutrition Concerns Lack of appetite; No edema documented; No skin issues documented  (however appetite reported to be improving)   Comorbid Concerns Other (Comment)  (asthma, barretts esophagus, anxiety, GERD, SAH)   Nutrition Precautions & Barriers to Adequate Nutrition None   Nutrition Considerations Nutrition Educ not indicated at this time   PES Statement   Problem Intake   Oral or Nutritional Support Intake (2) Inadequate oral intake NI-2 1   Related to Decreased appetite   As evidenced by: Intake < estimated needs   Patient Nutrition Goals   Goal Adequate hydration; Adequate intake;Meet PO needs   Goal Status initiated   Timeframe to complete goal by next f/u   Recommendations/Interventions   Summary Pt triggered for poor po  Reports her intake has been improving since admission  Denies need for nutritional supplements at this time  Continue with current diet order, Please re-weigh patient as she reports a UBW of 130#  current weight is recorded at 148#     Malnutrition/BMI Present No   Interventions Diet: continued as ordered   Nutrition Recommendations Continue diet as ordered   Nutrition Complexity Risk   Nutrition complexity level Moderate risk   Follow up date 02/12/21

## 2021-02-06 NOTE — ED NOTES
Pt Oxygen saturations came down to 88% while sleeping, 2L NC oxygen placed on pt per order        Carly De La Paz RN  02/06/21 8587

## 2021-02-06 NOTE — UTILIZATION REVIEW
Initial Clinical Review    Admission: Date/Time/Statement:   Admission Orders (From admission, onward)     Ordered        02/05/21 1909  Inpatient Admission  Once                   Orders Placed This Encounter   Procedures    Inpatient Admission     Standing Status:   Standing     Number of Occurrences:   1     Order Specific Question:   Level of Care     Answer:   Med Surg [16]     Order Specific Question:   Estimated length of stay     Answer:   More than 2 Midnights     Order Specific Question:   Certification     Answer:   I certify that inpatient services are medically necessary for this patient for a duration of greater than two midnights  See H&P and MD Progress Notes for additional information about the patient's course of treatment  ED Arrival Information     Expected Arrival Acuity Means of Arrival Escorted By Service Admission Type    - 2/5/2021 16:40 Urgent Walk-In Self General Medicine Urgent    Arrival Complaint    CHILLS/FEVER COVID +        Chief Complaint   Patient presents with    Fever - 9 weeks to 74 years     Pt arrives with complaint of being covid positive and having a bad fever  Pt states that she has been anywhere from 101 to high 103 since monday  Pt also complains of dizziness and headache  Pt states that she is also having vomiting and unable to keep anythign down  Assessment/Plan:  73 yo female presents to ED from home due to fevers, muscle aches, dizziness, HA, nausea, known COVID + since last week  O2 sats decreased to 80% with ambulation in ED  Again in ED,  O2 sat decreased to 84% and patient was placed on O2 NC  Exam notes crackles at bases WBC 3 53, K 3 3 CRP 26 6, D Dimer 0 79  Admitted as Inpatient To med surg for COVID infection  Start Remdesivir, Decadron, Famotidine, Zinc, Vit D, Vit C , antibiotics  If procalc negative x 2, can DC abx  Self prone if able, incentive spirometry,  Continue Meclizine for dizziness     2/6   Antibiotics DC'd  BP low, give 1 L NSS, check orthostatic signs  Pt c/o HA, dizziness, continue meclizine No visual disturbance  If does not improve check CT head  Continue remdesivir  O2 sat 93-96 on RA   ED Triage Vitals [02/05/21 1710]   Temperature Pulse Respirations Blood Pressure SpO2   98 6 °F (37 °C) 90 18 127/61 94 %      Temp Source Heart Rate Source Patient Position - Orthostatic VS BP Location FiO2 (%)   Oral Monitor Sitting Left arm --      Pain Score       5          Wt Readings from Last 1 Encounters:   02/06/21 67 1 kg (148 lb)     Additional Vital Signs:  Date/Time  Temp  Pulse  Resp  BP  MAP (mmHg)  SpO2   Nasal Cannula O2 Flow Rate (L/min)  O2 Device  Patient Position - Orthostatic VS   02/06/21 1443  97 7 °F (36 5 °C)  77  18  105/52  75  96 %   --  None (Room air)  Lying   02/06/21 1232  --  --  --  103/58  76  --   --  --  Standing for 3 minutes - Orthostatic VS   02/06/21 1231  --  --  --  107/58  77  --   --  --  Standing - Orthostatic VS   02/06/21 1230  --  --  --  108/57  77  --   --  --  Sitting - Orthostatic VS   02/06/21 1229  --  --  --  106/59  79  --   --  --  Lying - Orthostatic VS   02/06/21 0700  98 5 °F (36 9 °C)  69  18  96/55  --  93 %   --  None (Room air)  Sitting   02/06/21 0445  --  64  18  100/57  --  97 %   2 L/min  Nasal cannula  Lying   02/06/21 0153  --  68  18  102/58  --  97 %   2 L/min  Nasal cannula  Lying   02/06/21 0023  --  71  18  101/53  --  95 %   --  None (Room air)         Pertinent Labs/Diagnostic Test Results:     2/5 IMPRESSION:     No acute cardiopulmonary disease       EKG 2/5  Normal sinus rhythm at 77 beats per minute  Normal axis, normal intervals, no ST T wave abnormalities suggestive of ischemia  QTC is normal   No significant change compared to prior from 03/13/2020        Results from last 7 days   Lab Units 02/06/21  1410 02/06/21  0456 02/05/21  1803   WBC Thousand/uL 1 58* 1 85* 3 53*   HEMOGLOBIN g/dL 11 8 12 4 13 6   HEMATOCRIT % 36 4 38 4 40 5   PLATELETS Thousands/uL 136* 123* 151 NEUTROS ABS Thousands/µL 0 77* 1 23* 2 24         Results from last 7 days   Lab Units 02/06/21  0456 02/05/21  1803   SODIUM mmol/L 139 136   POTASSIUM mmol/L 3 7 3 3*   CHLORIDE mmol/L 106 103   CO2 mmol/L 24 25   ANION GAP mmol/L 9 8   BUN mg/dL 9 9   CREATININE mg/dL 0 75 0 72   EGFR ml/min/1 73sq m 84 88   CALCIUM mg/dL 8 2* 8 4     Results from last 7 days   Lab Units 02/06/21  0456 02/05/21  1803   AST U/L 26 26   ALT U/L 30 29   ALK PHOS U/L 87 94   TOTAL PROTEIN g/dL 6 2* 6 8   ALBUMIN g/dL 2 9* 3 4*   TOTAL BILIRUBIN mg/dL 0 23 0 29         Results from last 7 days   Lab Units 02/06/21  0456 02/05/21  1803   GLUCOSE RANDOM mg/dL 179* 111             No results found for: BETA-HYDROXYBUTYRATE                       Results from last 7 days   Lab Units 02/06/21  0456   D-DIMER QUANTITATIVE ug/ml FEU 0 79*             Results from last 7 days   Lab Units 02/06/21  0457 02/05/21  1803   PROCALCITONIN ng/ml 0 11 <0 05     Results from last 7 days   Lab Units 02/05/21  1803   LACTIC ACID mmol/L 0 8             Results from last 7 days   Lab Units 02/06/21  0033   NT-PRO BNP pg/mL 132*     Results from last 7 days   Lab Units 02/06/21  0033   FERRITIN ng/mL 187             Results from last 7 days   Lab Units 02/06/21  0456   CRP mg/L 26 6*           Results from last 7 days   Lab Units 02/05/21  1811 02/05/21  1803   BLOOD CULTURE  Received in Microbiology Lab  Culture in Progress  Received in Microbiology Lab  Culture in Progress                 ED Treatment:   Medication Administration from 02/05/2021 1640 to 02/06/2021 8290       Date/Time Order Dose Route Action Comments     02/05/2021 1812 albuterol (PROVENTIL HFA,VENTOLIN HFA) inhaler 2 puff 2 puff Inhalation Given      02/05/2021 1906 sodium chloride 0 9 % bolus 1,000 mL 0 mL Intravenous Stopped      02/05/2021 1806 sodium chloride 0 9 % bolus 1,000 mL 1,000 mL Intravenous New Bag      02/05/2021 1812 ondansetron (ZOFRAN) injection 4 mg 4 mg Intravenous Given      02/05/2021 1812 ketorolac (TORADOL) injection 15 mg 15 mg Intravenous Given      02/05/2021 2143 dexamethasone (DECADRON) injection 6 mg 6 mg Intravenous Given      02/06/2021 0024 montelukast (SINGULAIR) tablet 10 mg 10 mg Oral Given      02/06/2021 0148 acetaminophen (TYLENOL) tablet 650 mg 650 mg Oral Given      02/06/2021 0024 ascorbic acid (VITAMIN C) tablet 1,000 mg 1,000 mg Oral Given      02/06/2021 0026 dexamethasone (DECADRON) injection 6 mg 6 mg Intravenous Not Given Given @ 2143     02/06/2021 0035 enoxaparin (LOVENOX) subcutaneous injection 30 mg 30 mg Subcutaneous Given      02/06/2021 0039 remdesivir (Veklury) 200 mg in sodium chloride 0 9 % 250 mL IVPB 200 mg Intravenous New Bag      02/05/2021 2143 doxycycline hyclate (VIBRAMYCIN) capsule 100 mg 100 mg Oral Given      02/05/2021 2143 sodium chloride (OCEAN) 0 65 % nasal spray 1 spray 1 spray Each Nare Given         Past Medical History:   Diagnosis Date    Anesthesia complication     desaturation of oxygen mostly at night- on O2 at 2L at hs-may wake up with asthma issue    Anxiety     Asthma     Argueta esophagus     DVT (deep vein thrombosis) in pregnancy     DVT (deep venous thrombosis) (HCC)     during pregnancy    Hiatal hernia     Hypercholesteremia     Nasal polyps     Sinus disorder     Sinusitis, chronic      Present on Admission:   COVID-19   Severe persistent asthma without complication   Chronic sinusitis   Dizziness      Admitting Diagnosis: Chills [R68 83]  Hypoxia [R09 02]  Fever [R50 9]  Lab test positive for detection of COVID-19 virus [U07 1]  COVID-19 [U07 1]  Age/Sex: 72 y o  female  Admission Orders:  Scheduled Medications:  ascorbic acid, 1,000 mg, Oral, Q12H [address]  aspirin, 325 mg, Oral, Daily  cholecalciferol, 2,000 Units, Oral, Daily  dexamethasone, 6 mg, Intravenous, Q24H  DULoxetine, 30 mg, Oral, Daily  enoxaparin, 30 mg, Subcutaneous, Q12H CORNELIO  famotidine, 20 mg, Oral, BID  fluticasone, 1 spray, Each Nare, Daily  fluticasone, 2 puff, Inhalation, BID  loratadine, 10 mg, Oral, Daily  meclizine, 25 mg, Oral, Q8H CORNELIO  montelukast, 10 mg, Oral, HS  zinc sulfate, 220 mg, Oral, Daily    Followed by  Kamran Jesus ON 2/13/2021] multivitamin-minerals, 1 tablet, Oral, Daily  [START ON 2/7/2021] remdesivir, 100 mg, Intravenous, Q24H      Continuous IV Infusions:     PRN Meds:  acetaminophen, 650 mg, Oral, Q6H PRN  albuterol, 2 puff, Inhalation, Q6H PRN  ondansetron, 4 mg, Intravenous, Q4H PRN  sodium chloride, 1 spray, Each Nare, Q1H PRN  traZODone, 100 mg, Oral, HS PRN       Up with assist, incentive spiromerty    CRP, CBC, CMP, D DImer 2/7    Contact and airborne isolation         None    Network Utilization Review Department  ATTENTION: Please call with any questions or concerns to [phone] and carefully listen to the prompts so that you are directed to the right person  All voicemails are confidential   Sadia Calvert all requests for admission clinical reviews, approved or denied determinations and any other requests to dedicated fax number below belonging to the campus where the patient is receiving treatment   List of dedicated fax numbers for the Facilities:  [address] DENIALS (Administrative/Medical Necessity) [phone]   [address] (Maternity/NICU/Pediatrics) [phone]   [address] [address] [facility] Irena Mercado 7296 (  Winsome Villanueva "Aiyana" 103) [address] [place] [address] Gricel Fabrizio Coe [address]81 [address] [place]) [address] [place] [address] [phone]

## 2021-02-06 NOTE — PLAN OF CARE
Problem: Potential for Falls  Goal: Patient will remain free of falls  Description: INTERVENTIONS:  - Assess patient frequently for physical needs  -  Identify cognitive and physical deficits and behaviors that affect risk of falls  -  [place] fall precautions as indicated by assessment   - Educate patient/family on patient safety including physical limitations  - Instruct patient to call for assistance with activity based on assessment  - Modify environment to reduce risk of injury  - Consider OT/PT consult to assist with strengthening/mobility  Outcome: Progressing     Problem: Nutrition/Hydration-ADULT  Goal: Nutrient/Hydration intake appropriate for improving, restoring or maintaining nutritional needs  Description: Monitor and assess patient's nutrition/hydration status for malnutrition  Collaborate with interdisciplinary team and initiate plan and interventions as ordered  Monitor patient's weight and dietary intake as ordered or per policy  Utilize nutrition screening tool and intervene as necessary  Determine patient's food preferences and provide high-protein, high-caloric foods as appropriate       INTERVENTIONS:  - Monitor oral intake, urinary output, labs, and treatment plans  - Assess nutrition and hydration status and recommend course of action  - Evaluate amount of meals eaten  - Assist patient with eating if necessary   - Allow adequate time for meals  - Recommend/ encourage appropriate diets, oral nutritional supplements, and vitamin/mineral supplements  - Order, calculate, and assess calorie counts as needed  - Recommend, monitor, and adjust tube feedings and TPN/PPN based on assessed needs  - Assess need for intravenous fluids  - Provide specific nutrition/hydration education as appropriate  - Include patient/family/caregiver in decisions related to nutrition  Outcome: Progressing

## 2021-02-06 NOTE — ASSESSMENT & PLAN NOTE
Lab Results   Component Value Date    SARSCOV2 Positive (A) 01/29/2021       Patient presenting with symptoms of COVID-19 SYMPTOMS: Fever (99 0* or greater), cough, shortness of breath, anorexia, fatigue, myalgias, Nasal congestion, 02 Sat decreased by 3% or greater since last taken, New onset of GI issues (nausea, vomiting, diarrhea) and General malaise (headache, feeling rundown, achy)    Workup:  · Imaging:  Xr Chest 1 View Portable    Result Date: 2/5/2021  Impression: No acute cardiopulmonary disease  Workstation performed: [id number]       · Labs:     Recent Labs     02/06/21  0033 02/06/21  0456   FERRITIN 187  --    CRP  --  26 6*   DDIMER  --  0 79*         · Oxygen:  Saturating 97% on 2 liters/min via nasal cannula    COVID Stage: MILD    Plan:  · Labs:  · Blood type and screen  · Cardiac markers - troponin WNL, CK WNL, BNP minimally elevated  · Inflammatory Markers - CRP elevated, D-Dimer elevated, Ferritin WNL  · CMP tomorrow a m   · CBC with diff tomorrow a m  · Meds  · Vitamin D3 2000 units daily  · Vitamin C 1000 BID  · Zinc 220 mg x 7 days then multivitamin  · Remdesevir 200 mg IV 1 day, then 100 mg IV daily x4 days  · Famotidine 20 mg PO b i d  for acid suppressive therapy  · Dexamethasone 6 mg IV daily times 10 days  · Will consider convalescent plasma based on clinical course and treatment response    · Anticoag:  Therapeutic Anticoagulation per guidelines (category C)  · Non-Pharmacologic interventions  · PT and OT eval and treat  · Self proning if able  · Incentive Spirometry

## 2021-02-06 NOTE — PLAN OF CARE
Problem: Potential for Falls  Goal: Patient will remain free of falls  Description: INTERVENTIONS:  - Assess patient frequently for physical needs  -  Identify cognitive and physical deficits and behaviors that affect risk of falls  -  [place] fall precautions as indicated by assessment   - Educate patient/family on patient safety including physical limitations  - Instruct patient to call for assistance with activity based on assessment  - Modify environment to reduce risk of injury  - Consider OT/PT consult to assist with strengthening/mobility  Outcome: Progressing     Problem: Nutrition/Hydration-ADULT  Goal: Nutrient/Hydration intake appropriate for improving, restoring or maintaining nutritional needs  Description: Monitor and assess patient's nutrition/hydration status for malnutrition  Collaborate with interdisciplinary team and initiate plan and interventions as ordered  Monitor patient's weight and dietary intake as ordered or per policy  Utilize nutrition screening tool and intervene as necessary  Determine patient's food preferences and provide high-protein, high-caloric foods as appropriate       INTERVENTIONS:  - Monitor oral intake, urinary output, labs, and treatment plans  - Assess nutrition and hydration status and recommend course of action  - Evaluate amount of meals eaten  - Assist patient with eating if necessary   - Allow adequate time for meals  - Recommend/ encourage appropriate diets, oral nutritional supplements, and vitamin/mineral supplements  - Order, calculate, and assess calorie counts as needed  - Recommend, monitor, and adjust tube feedings and TPN/PPN based on assessed needs  - Assess need for intravenous fluids  - Provide specific nutrition/hydration education as appropriate  - Include patient/family/caregiver in decisions related to nutrition  Outcome: Progressing

## 2021-02-07 ENCOUNTER — APPOINTMENT (INPATIENT)
Dept: CT IMAGING | Facility: HOSPITAL | Age: 66
DRG: 177 | End: 2021-02-07
Payer: COMMERCIAL

## 2021-02-07 PROBLEM — R10.13 EPIGASTRIC DISCOMFORT: Status: ACTIVE | Noted: 2021-02-07

## 2021-02-07 LAB
ALBUMIN SERPL BCP-MCNC: 2.7 G/DL (ref 3.5–5)
ALP SERPL-CCNC: 85 U/L (ref 46–116)
ALT SERPL W P-5'-P-CCNC: 55 U/L (ref 12–78)
ANION GAP SERPL CALCULATED.3IONS-SCNC: 14 MMOL/L (ref 4–13)
AST SERPL W P-5'-P-CCNC: 55 U/L (ref 5–45)
BASOPHILS # BLD AUTO: 0 THOUSANDS/ΜL (ref 0–0.1)
BASOPHILS NFR BLD AUTO: 0 % (ref 0–1)
BILIRUB SERPL-MCNC: 0.2 MG/DL (ref 0.2–1)
BUN SERPL-MCNC: 11 MG/DL (ref 5–25)
CALCIUM ALBUM COR SERPL-MCNC: 9.3 MG/DL (ref 8.3–10.1)
CALCIUM SERPL-MCNC: 8.3 MG/DL (ref 8.3–10.1)
CHLORIDE SERPL-SCNC: 108 MMOL/L (ref 100–108)
CO2 SERPL-SCNC: 22 MMOL/L (ref 21–32)
CREAT SERPL-MCNC: 0.75 MG/DL (ref 0.6–1.3)
CRP SERPL QL: 21.4 MG/L
D DIMER PPP FEU-MCNC: 0.64 UG/ML FEU
EOSINOPHIL # BLD AUTO: 0 THOUSAND/ΜL (ref 0–0.61)
EOSINOPHIL NFR BLD AUTO: 0 % (ref 0–6)
ERYTHROCYTE [DISTWIDTH] IN BLOOD BY AUTOMATED COUNT: 12.3 % (ref 11.6–15.1)
GFR SERPL CREATININE-BSD FRML MDRD: 84 ML/MIN/1.73SQ M
GLUCOSE SERPL-MCNC: 160 MG/DL (ref 65–140)
HCT VFR BLD AUTO: 35.7 % (ref 34.8–46.1)
HGB BLD-MCNC: 11.6 G/DL (ref 11.5–15.4)
IMM GRANULOCYTES # BLD AUTO: 0.01 THOUSAND/UL (ref 0–0.2)
IMM GRANULOCYTES NFR BLD AUTO: 0 % (ref 0–2)
LIPASE SERPL-CCNC: 126 U/L (ref 73–393)
LYMPHOCYTES # BLD AUTO: 0.72 THOUSANDS/ΜL (ref 0.6–4.47)
LYMPHOCYTES NFR BLD AUTO: 23 % (ref 14–44)
MCH RBC QN AUTO: 32.1 PG (ref 26.8–34.3)
MCHC RBC AUTO-ENTMCNC: 32.5 G/DL (ref 31.4–37.4)
MCV RBC AUTO: 99 FL (ref 82–98)
MONOCYTES # BLD AUTO: 0.13 THOUSAND/ΜL (ref 0.17–1.22)
MONOCYTES NFR BLD AUTO: 4 % (ref 4–12)
NEUTROPHILS # BLD AUTO: 2.22 THOUSANDS/ΜL (ref 1.85–7.62)
NEUTS SEG NFR BLD AUTO: 73 % (ref 43–75)
NRBC BLD AUTO-RTO: 0 /100 WBCS
PLATELET # BLD AUTO: 147 THOUSANDS/UL (ref 149–390)
PMV BLD AUTO: 10.5 FL (ref 8.9–12.7)
POTASSIUM SERPL-SCNC: 3.5 MMOL/L (ref 3.5–5.3)
PROT SERPL-MCNC: 5.8 G/DL (ref 6.4–8.2)
RBC # BLD AUTO: 3.61 MILLION/UL (ref 3.81–5.12)
SODIUM SERPL-SCNC: 144 MMOL/L (ref 136–145)
WBC # BLD AUTO: 3.08 THOUSAND/UL (ref 4.31–10.16)

## 2021-02-07 PROCEDURE — 70450 CT HEAD/BRAIN W/O DYE: CPT

## 2021-02-07 PROCEDURE — 85025 COMPLETE CBC W/AUTO DIFF WBC: CPT | Performed by: FAMILY MEDICINE

## 2021-02-07 PROCEDURE — G1004 CDSM NDSC: HCPCS

## 2021-02-07 PROCEDURE — 99232 SBSQ HOSP IP/OBS MODERATE 35: CPT | Performed by: FAMILY MEDICINE

## 2021-02-07 PROCEDURE — 85379 FIBRIN DEGRADATION QUANT: CPT | Performed by: FAMILY MEDICINE

## 2021-02-07 PROCEDURE — 80053 COMPREHEN METABOLIC PANEL: CPT | Performed by: FAMILY MEDICINE

## 2021-02-07 PROCEDURE — 86140 C-REACTIVE PROTEIN: CPT | Performed by: FAMILY MEDICINE

## 2021-02-07 PROCEDURE — 83690 ASSAY OF LIPASE: CPT | Performed by: FAMILY MEDICINE

## 2021-02-07 RX ADMIN — OXYCODONE HYDROCHLORIDE AND ACETAMINOPHEN 1000 MG: 500 TABLET ORAL at 08:19

## 2021-02-07 RX ADMIN — MECLIZINE HYDROCHLORIDE 25 MG: 25 TABLET ORAL at 21:53

## 2021-02-07 RX ADMIN — DEXAMETHASONE SODIUM PHOSPHATE 6 MG: 4 INJECTION, SOLUTION INTRA-ARTICULAR; INTRALESIONAL; INTRAMUSCULAR; INTRAVENOUS; SOFT TISSUE at 23:13

## 2021-02-07 RX ADMIN — LORATADINE 10 MG: 10 TABLET ORAL at 08:22

## 2021-02-07 RX ADMIN — FAMOTIDINE 20 MG: 20 TABLET ORAL at 08:19

## 2021-02-07 RX ADMIN — Medication 2000 UNITS: at 08:20

## 2021-02-07 RX ADMIN — FLUTICASONE PROPIONATE 2 PUFF: 110 AEROSOL, METERED RESPIRATORY (INHALATION) at 08:22

## 2021-02-07 RX ADMIN — FAMOTIDINE 20 MG: 20 TABLET ORAL at 16:25

## 2021-02-07 RX ADMIN — FLUTICASONE PROPIONATE 1 SPRAY: 50 SPRAY, METERED NASAL at 08:22

## 2021-02-07 RX ADMIN — MECLIZINE HYDROCHLORIDE 25 MG: 25 TABLET ORAL at 14:45

## 2021-02-07 RX ADMIN — ACETAMINOPHEN 650 MG: 325 TABLET, FILM COATED ORAL at 16:25

## 2021-02-07 RX ADMIN — FLUTICASONE PROPIONATE 2 PUFF: 110 AEROSOL, METERED RESPIRATORY (INHALATION) at 21:54

## 2021-02-07 RX ADMIN — MECLIZINE HYDROCHLORIDE 25 MG: 25 TABLET ORAL at 05:30

## 2021-02-07 RX ADMIN — ENOXAPARIN SODIUM 30 MG: 30 INJECTION SUBCUTANEOUS at 08:20

## 2021-02-07 RX ADMIN — DULOXETINE HYDROCHLORIDE 30 MG: 30 CAPSULE, DELAYED RELEASE ORAL at 08:21

## 2021-02-07 RX ADMIN — ZINC SULFATE 220 MG (50 MG) CAPSULE 220 MG: CAPSULE at 08:19

## 2021-02-07 RX ADMIN — OXYCODONE HYDROCHLORIDE AND ACETAMINOPHEN 1000 MG: 500 TABLET ORAL at 21:53

## 2021-02-07 RX ADMIN — ENOXAPARIN SODIUM 30 MG: 30 INJECTION SUBCUTANEOUS at 21:53

## 2021-02-07 RX ADMIN — REMDESIVIR 100 MG: 100 INJECTION, POWDER, LYOPHILIZED, FOR SOLUTION INTRAVENOUS at 23:19

## 2021-02-07 RX ADMIN — MONTELUKAST 10 MG: 10 TABLET, FILM COATED ORAL at 21:53

## 2021-02-07 NOTE — ASSESSMENT & PLAN NOTE
Lab Results   Component Value Date    SARSCOV2 Positive (A) 01/29/2021       Patient presenting with symptoms of COVID-19 SYMPTOMS: Fever (99 0* or greater), cough, shortness of breath, anorexia, fatigue, myalgias, Nasal congestion, 02 Sat decreased by 3% or greater since last taken, New onset of GI issues (nausea, vomiting, diarrhea) and General malaise (headache, feeling rundown, achy)    Workup:  · Imaging:  Xr Chest 1 View Portable    Result Date: 2/5/2021  Impression: No acute cardiopulmonary disease  Workstation performed: [id number]       · Labs:     Recent Labs     02/06/21  0033 02/06/21  0456 02/07/21  0518   FERRITIN 187  --   --    CRP  --  26 6* 21 4*   DDIMER  --  0 79* 0 64*         · Oxygen:  Saturating 97% on 2 liters/min via nasal cannula    COVID Stage: MILD    Plan:  · Labs:  · Blood type and screen  · Cardiac markers - troponin WNL, CK WNL, BNP minimally elevated  · Inflammatory Markers - CRP elevated, D-Dimer elevated, Ferritin WNL  · CMP tomorrow a m   · CBC with diff tomorrow a m  · Meds  · Vitamin D3 2000 units daily  · Vitamin C 1000 BID  · Zinc 220 mg x 7 days then multivitamin  · Remdesevir 200 mg IV 1 day, then 100 mg IV daily x4 days  · Famotidine 20 mg PO b i d  for acid suppressive therapy  · Dexamethasone 6 mg IV daily times 10 days  · Will consider convalescent plasma based on clinical course and treatment response    · Anticoag:  Therapeutic Anticoagulation per guidelines (category C)  · Non-Pharmacologic interventions  · PT and OT eval and treat  · Self proning if able  · Incentive Spirometry

## 2021-02-07 NOTE — PROGRESS NOTES
Progress Note - Jc Salas 1955, 72 y o  female MRN: [id number]    Unit/Bed#: S -01 Encounter: [id number]    Primary Care Provider: Robert Bennett DO   Date and time admitted to hospital: 2/5/2021  5:34 PM        * COVID-19  Assessment & Plan    Lab Results   Component Value Date    SARSCOV2 Positive (A) 01/29/2021       Patient presenting with symptoms of COVID-19 SYMPTOMS: Fever (99 0* or greater), cough, shortness of breath, anorexia, fatigue, myalgias, Nasal congestion, 02 Sat decreased by 3% or greater since last taken, New onset of GI issues (nausea, vomiting, diarrhea) and General malaise (headache, feeling rundown, achy)    Workup:  · Imaging:  Xr Chest 1 View Portable    Result Date: 2/5/2021  Impression: No acute cardiopulmonary disease  Workstation performed: [id number]       · Labs:     Recent Labs     02/06/21  0033 02/06/21  0456 02/07/21  0518   FERRITIN 187  --   --    CRP  --  26 6* 21 4*   DDIMER  --  0 79* 0 64*         · Oxygen:  Saturating 97% on 2 liters/min via nasal cannula    COVID Stage: MILD    Plan:  · Labs:  · Blood type and screen  · Cardiac markers - troponin WNL, CK WNL, BNP minimally elevated  · Inflammatory Markers - CRP elevated, D-Dimer elevated, Ferritin WNL  · CMP tomorrow a m   · CBC with diff tomorrow a m  · Meds  · Vitamin D3 2000 units daily  · Vitamin C 1000 BID  · Zinc 220 mg x 7 days then multivitamin  · Remdesevir 200 mg IV 1 day, then 100 mg IV daily x4 days  · Famotidine 20 mg PO b i d  for acid suppressive therapy  · Dexamethasone 6 mg IV daily times 10 days  · Will consider convalescent plasma based on clinical course and treatment response    · Anticoag:  Therapeutic Anticoagulation per guidelines (category C)  · Non-Pharmacologic interventions  · PT and OT eval and treat  · Self proning if able  · Incentive Spirometry      Epigastric discomfort  Assessment & Plan  Patient concerned about epigastric discomfort been going on for while but the patient does have some tenderness in the epigastric region  Will check a lipase  Continue with a proton pump inhibitor  Patient may need an ultrasound as an outpatient  Patient has a slight elevation in the LFTs  Check another CMP in the morning  Will stop further treatment with remdesivir if continues to increase  Dizziness  Assessment & Plan  · Continue home meclizine, may consider giving 25 mg Q8H scheduled  · Patient still with some dizziness  Continue with the meclizine scheduled  Given that the patient had a subarachnoid hemorrhage and did have some right-sided headache will check a CT scan since it is not significantly improved    Chronic sinusitis  Assessment & Plan  Patient does have a history of chronic sinusitis and nasal polyps  She has had multiple nasal surgeries in the past   · Claritin and Singulair  · Flonase daily for nasal congestion    Severe persistent asthma without complication  Assessment & Plan  · Flovent 2 puffs BID  · Albuterol 2 puffs Q6H PRN  · Claritin and Singulair daily  · Holding nebulizers due to COVID-19 aerisolization        VTE Pharmacologic Prophylaxis:   Pharmacologic: Enoxaparin (Lovenox)  Mechanical VTE Prophylaxis in Place: Yes    Patient Centered Rounds: I have performed bedside rounds with nursing staff today  Time Spent for Care: 20 minutes  More than 50% of total time spent on counseling and coordination of care as described above  Current Length of Stay: 2 day(s)    Current Patient Status: Inpatient   Certification Statement: The patient will continue to require additional inpatient hospital stay due to Having epigastric discomfort as well as still needing IV dexamethasone and remdesivir    Discharge Plan:  Anticipate discharge tomorrow    Code Status: Level 1 - Full Code      Subjective:   Patient seen examined  Patient states that she does have some epigastric discomfort and it radiates the right side    She states under her right rib and radiates the back   This is been going on for while  Patient also states that she still has dizziness and little bit of a headache although slightly improved  Given her history of subarachnoid hemorrhage she is little concerned  Objective:     Vitals:   Temp (24hrs), Av 8 °F (36 6 °C), Min:97 7 °F (36 5 °C), Max:97 9 °F (36 6 °C)    Temp:  [97 7 °F (36 5 °C)-97 9 °F (36 6 °C)] 97 8 °F (36 6 °C)  HR:  [74-77] 74  Resp:  [17-22] 22  BP: (101-118)/(51-60) 118/60  SpO2:  [96 %-98 %] 98 %  Body mass index is 28 9 kg/m²  Input and Output Summary (last 24 hours):     No intake or output data in the 24 hours ending 21 1026    Physical Exam:     Physical Exam  (   General Appearance:    Alert, cooperative, no distress, appears stated age                               Lungs:     Clear to auscultation bilaterally, respirations unlabored       Heart:    Regular rate and rhythm, S1 and S2 normal, no murmur, rub    or gallop   Abdomen:     Soft, non-tender, bowel sounds active all four quadrants,     no masses, no organomegaly           Extremities:   Extremities normal, atraumatic, no cyanosis or edema       Additional Data:     Labs:    Results from last 7 days   Lab Units 21  0518   WBC Thousand/uL 3 08*   HEMOGLOBIN g/dL 11 6   HEMATOCRIT % 35 7   PLATELETS Thousands/uL 147*   NEUTROS PCT % 73   LYMPHS PCT % 23   MONOS PCT % 4   EOS PCT % 0     Results from last 7 days   Lab Units 21  0518   SODIUM mmol/L 144   POTASSIUM mmol/L 3 5   CHLORIDE mmol/L 108   CO2 mmol/L 22   BUN mg/dL 11   CREATININE mg/dL 0 75   ANION GAP mmol/L 14*   CALCIUM mg/dL 8 3   ALBUMIN g/dL 2 7*   TOTAL BILIRUBIN mg/dL 0 20   ALK PHOS U/L 85   ALT U/L 55   AST U/L 55*   GLUCOSE RANDOM mg/dL 160*                 Results from last 7 days   Lab Units 21  0457 21  1803   LACTIC ACID mmol/L  --  0 8   PROCALCITONIN ng/ml 0 11 <0 05           * I Have Reviewed All Lab Data Listed Above    * Additional Pertinent Lab Tests Reviewed: [place] 66 Admission Reviewed        Recent Cultures (last 7 days):     Results from last 7 days   Lab Units 02/05/21  1811 02/05/21  1803   BLOOD CULTURE  No Growth at 24 hrs  No Growth at 24 hrs  Last 24 Hours Medication List:   Current Facility-Administered Medications   Medication Dose Route Frequency Provider Last Rate    acetaminophen  650 mg Oral Q6H PRN Zenia Gonzalez MD      albuterol  2 puff Inhalation Q6H PRN Vinay Fairchild MD      ascorbic acid  1,000 mg Oral Q12H [address] Zenia Gonzalez MD      aspirin  325 mg Oral Daily Zenia Gonzalez MD      cholecalciferol  2,000 Units Oral Daily Zenia Gonzalez MD      dexamethasone  6 mg Intravenous Q24H Zenia Gonzalez MD      DULoxetine  30 mg Oral Daily Zenia Gonzalez MD      enoxaparin  30 mg Subcutaneous Q12H [address] Zenia Gonzalez MD      famotidine  20 mg Oral BID Zenia Gonzalez MD      fluticasone  1 spray Each Nare Daily Vinaytushar Fairchild MD      fluticasone  2 puff Inhalation BID Vinay Fairchild MD      loratadine  10 mg Oral Daily Zenia Gonzalez MD      meclizine  25 mg Oral [facility] Zenia Gonzalez MD      montelukast  10 mg Oral HS Zenia Gonzalez MD      zinc sulfate  220 mg Oral Daily Zenia Gonzalez MD      Followed by   Sandrine Garsia ON 2/13/2021] multivitamin-minerals  1 tablet Oral Daily Zenia Gonzalez MD      ondansetron  4 mg Intravenous Q4H PRN Zenia Gonzalez MD     [facility] remdesivir  100 mg Intravenous Q24H Zenia Gonzalez MD      sodium chloride  1 spray Each Nare Q1H PRN Elizabeth Staton PA-C      traZODone  100 mg Oral HS PRN Zenia Gonzalez MD          Today, Patient Was Seen By: Zenia Gonzalez MD    ** Please Note: Dictation voice to text software may have been used in the creation of this document   **

## 2021-02-07 NOTE — ASSESSMENT & PLAN NOTE
· Continue home meclizine, may consider giving 25 mg Q8H scheduled  · Patient still with some dizziness  Continue with the meclizine scheduled    Given that the patient had a subarachnoid hemorrhage and did have some right-sided headache will check a CT scan since it is not significantly improved

## 2021-02-07 NOTE — ASSESSMENT & PLAN NOTE
Patient concerned about epigastric discomfort been going on for while but the patient does have some tenderness in the epigastric region  Will check a lipase  Continue with a proton pump inhibitor  Patient may need an ultrasound as an outpatient

## 2021-02-07 NOTE — PLAN OF CARE
Problem: Potential for Falls  Goal: Patient will remain free of falls  Description: INTERVENTIONS:  - Assess patient frequently for physical needs  -  Identify cognitive and physical deficits and behaviors that affect risk of falls  -  [place] fall precautions as indicated by assessment   - Educate patient/family on patient safety including physical limitations  - Instruct patient to call for assistance with activity based on assessment  - Modify environment to reduce risk of injury  - Consider OT/PT consult to assist with strengthening/mobility  Outcome: Progressing     Problem: Nutrition/Hydration-ADULT  Goal: Nutrient/Hydration intake appropriate for improving, restoring or maintaining nutritional needs  Description: Monitor and assess patient's nutrition/hydration status for malnutrition  Collaborate with interdisciplinary team and initiate plan and interventions as ordered  Monitor patient's weight and dietary intake as ordered or per policy  Utilize nutrition screening tool and intervene as necessary  Determine patient's food preferences and provide high-protein, high-caloric foods as appropriate       INTERVENTIONS:  - Monitor oral intake, urinary output, labs, and treatment plans  - Assess nutrition and hydration status and recommend course of action  - Evaluate amount of meals eaten  - Assist patient with eating if necessary   - Allow adequate time for meals  - Recommend/ encourage appropriate diets, oral nutritional supplements, and vitamin/mineral supplements  - Order, calculate, and assess calorie counts as needed  - Recommend, monitor, and adjust tube feedings and TPN/PPN based on assessed needs  - Assess need for intravenous fluids  - Provide specific nutrition/hydration education as appropriate  - Include patient/family/caregiver in decisions related to nutrition  Outcome: Progressing

## 2021-02-07 NOTE — PLAN OF CARE
Problem: Potential for Falls  Goal: Patient will remain free of falls  Description: INTERVENTIONS:  - Assess patient frequently for physical needs  -  Identify cognitive and physical deficits and behaviors that affect risk of falls  -  [place] fall precautions as indicated by assessment   - Educate patient/family on patient safety including physical limitations  - Instruct patient to call for assistance with activity based on assessment  - Modify environment to reduce risk of injury  - Consider OT/PT consult to assist with strengthening/mobility  Outcome: Progressing     Problem: Nutrition/Hydration-ADULT  Goal: Nutrient/Hydration intake appropriate for improving, restoring or maintaining nutritional needs  Description: Monitor and assess patient's nutrition/hydration status for malnutrition  Collaborate with interdisciplinary team and initiate plan and interventions as ordered  Monitor patient's weight and dietary intake as ordered or per policy  Utilize nutrition screening tool and intervene as necessary  Determine patient's food preferences and provide high-protein, high-caloric foods as appropriate       INTERVENTIONS:  - Monitor oral intake, urinary output, labs, and treatment plans  - Assess nutrition and hydration status and recommend course of action  - Evaluate amount of meals eaten  - Assist patient with eating if necessary   - Allow adequate time for meals  - Recommend/ encourage appropriate diets, oral nutritional supplements, and vitamin/mineral supplements  - Order, calculate, and assess calorie counts as needed  - Recommend, monitor, and adjust tube feedings and TPN/PPN based on assessed needs  - Assess need for intravenous fluids  - Provide specific nutrition/hydration education as appropriate  - Include patient/family/caregiver in decisions related to nutrition  Outcome: Progressing

## 2021-02-08 VITALS
TEMPERATURE: 97.9 F | SYSTOLIC BLOOD PRESSURE: 128 MMHG | BODY MASS INDEX: 29.06 KG/M2 | HEIGHT: 60 IN | DIASTOLIC BLOOD PRESSURE: 60 MMHG | WEIGHT: 148 LBS | RESPIRATION RATE: 18 BRPM | HEART RATE: 71 BPM | OXYGEN SATURATION: 97 %

## 2021-02-08 PROBLEM — R42 DIZZINESS: Status: RESOLVED | Noted: 2021-02-06 | Resolved: 2021-02-08

## 2021-02-08 LAB
ALBUMIN SERPL BCP-MCNC: 3 G/DL (ref 3.5–5)
ALP SERPL-CCNC: 90 U/L (ref 46–116)
ALT SERPL W P-5'-P-CCNC: 47 U/L (ref 12–78)
ANION GAP SERPL CALCULATED.3IONS-SCNC: 10 MMOL/L (ref 4–13)
AST SERPL W P-5'-P-CCNC: 38 U/L (ref 5–45)
BILIRUB SERPL-MCNC: 0.16 MG/DL (ref 0.2–1)
BUN SERPL-MCNC: 14 MG/DL (ref 5–25)
CALCIUM ALBUM COR SERPL-MCNC: 9.2 MG/DL (ref 8.3–10.1)
CALCIUM SERPL-MCNC: 8.4 MG/DL (ref 8.3–10.1)
CHLORIDE SERPL-SCNC: 109 MMOL/L (ref 100–108)
CO2 SERPL-SCNC: 25 MMOL/L (ref 21–32)
CREAT SERPL-MCNC: 0.7 MG/DL (ref 0.6–1.3)
CRP SERPL QL: 12.3 MG/L
D DIMER PPP FEU-MCNC: 0.46 UG/ML FEU
GFR SERPL CREATININE-BSD FRML MDRD: 91 ML/MIN/1.73SQ M
GLUCOSE SERPL-MCNC: 169 MG/DL (ref 65–140)
POTASSIUM SERPL-SCNC: 3.8 MMOL/L (ref 3.5–5.3)
PROT SERPL-MCNC: 6.3 G/DL (ref 6.4–8.2)
SODIUM SERPL-SCNC: 144 MMOL/L (ref 136–145)

## 2021-02-08 PROCEDURE — 85379 FIBRIN DEGRADATION QUANT: CPT | Performed by: FAMILY MEDICINE

## 2021-02-08 PROCEDURE — 99239 HOSP IP/OBS DSCHRG MGMT >30: CPT | Performed by: FAMILY MEDICINE

## 2021-02-08 PROCEDURE — 86140 C-REACTIVE PROTEIN: CPT | Performed by: FAMILY MEDICINE

## 2021-02-08 PROCEDURE — 80053 COMPREHEN METABOLIC PANEL: CPT | Performed by: FAMILY MEDICINE

## 2021-02-08 RX ORDER — ASPIRIN 325 MG
325 TABLET ORAL DAILY
Qty: 30 TABLET | Refills: 0 | Status: SHIPPED | OUTPATIENT
Start: 2021-02-09 | End: 2021-02-09 | Stop reason: HOSPADM

## 2021-02-08 RX ORDER — MELATONIN
2000 DAILY
Qty: 10 TABLET | Refills: 0 | Status: SHIPPED | OUTPATIENT
Start: 2021-02-09 | End: 2021-12-02

## 2021-02-08 RX ORDER — FLUTICASONE PROPIONATE 50 MCG
1 SPRAY, SUSPENSION (ML) NASAL DAILY
Qty: 1 BOTTLE | Refills: 0 | Status: SHIPPED | OUTPATIENT
Start: 2021-02-09 | End: 2021-12-16 | Stop reason: ALTCHOICE

## 2021-02-08 RX ORDER — MULTIVITAMIN/IRON/FOLIC ACID 18MG-0.4MG
1 TABLET ORAL DAILY
Qty: 30 TABLET | Refills: 0 | Status: SHIPPED | OUTPATIENT
Start: 2021-02-13

## 2021-02-08 RX ORDER — PREDNISONE 20 MG/1
40 TABLET ORAL DAILY
Qty: 2 TABLET | Refills: 0
Start: 2021-02-08 | End: 2021-02-09 | Stop reason: HOSPADM

## 2021-02-08 RX ORDER — FAMOTIDINE 20 MG/1
20 TABLET, FILM COATED ORAL 2 TIMES DAILY
Qty: 30 TABLET | Refills: 0 | Status: SHIPPED | OUTPATIENT
Start: 2021-02-08 | End: 2021-12-16 | Stop reason: ALTCHOICE

## 2021-02-08 RX ORDER — LORATADINE 10 MG/1
10 TABLET ORAL DAILY
Qty: 30 TABLET | Refills: 0 | Status: SHIPPED | OUTPATIENT
Start: 2021-02-09 | End: 2021-12-02

## 2021-02-08 RX ADMIN — DULOXETINE HYDROCHLORIDE 30 MG: 30 CAPSULE, DELAYED RELEASE ORAL at 09:23

## 2021-02-08 RX ADMIN — ZINC SULFATE 220 MG (50 MG) CAPSULE 220 MG: CAPSULE at 09:23

## 2021-02-08 RX ADMIN — OXYCODONE HYDROCHLORIDE AND ACETAMINOPHEN 1000 MG: 500 TABLET ORAL at 09:22

## 2021-02-08 RX ADMIN — MECLIZINE HYDROCHLORIDE 25 MG: 25 TABLET ORAL at 05:47

## 2021-02-08 RX ADMIN — LORATADINE 10 MG: 10 TABLET ORAL at 09:22

## 2021-02-08 RX ADMIN — ENOXAPARIN SODIUM 30 MG: 30 INJECTION SUBCUTANEOUS at 09:23

## 2021-02-08 RX ADMIN — Medication 2000 UNITS: at 09:22

## 2021-02-08 RX ADMIN — FLUTICASONE PROPIONATE 2 PUFF: 110 AEROSOL, METERED RESPIRATORY (INHALATION) at 09:23

## 2021-02-08 RX ADMIN — FLUTICASONE PROPIONATE 1 SPRAY: 50 SPRAY, METERED NASAL at 09:23

## 2021-02-08 RX ADMIN — MECLIZINE HYDROCHLORIDE 25 MG: 25 TABLET ORAL at 13:07

## 2021-02-08 RX ADMIN — FAMOTIDINE 20 MG: 20 TABLET ORAL at 09:23

## 2021-02-08 NOTE — PLAN OF CARE
Problem: Potential for Falls  Goal: Patient will remain free of falls  Description: INTERVENTIONS:  - Assess patient frequently for physical needs  -  Identify cognitive and physical deficits and behaviors that affect risk of falls  -  [place] fall precautions as indicated by assessment   - Educate patient/family on patient safety including physical limitations  - Instruct patient to call for assistance with activity based on assessment  - Modify environment to reduce risk of injury  - Consider OT/PT consult to assist with strengthening/mobility  Outcome: Progressing     Problem: Nutrition/Hydration-ADULT  Goal: Nutrient/Hydration intake appropriate for improving, restoring or maintaining nutritional needs  Description: Monitor and assess patient's nutrition/hydration status for malnutrition  Collaborate with interdisciplinary team and initiate plan and interventions as ordered  Monitor patient's weight and dietary intake as ordered or per policy  Utilize nutrition screening tool and intervene as necessary  Determine patient's food preferences and provide high-protein, high-caloric foods as appropriate       INTERVENTIONS:  - Monitor oral intake, urinary output, labs, and treatment plans  - Assess nutrition and hydration status and recommend course of action  - Evaluate amount of meals eaten  - Assist patient with eating if necessary   - Allow adequate time for meals  - Recommend/ encourage appropriate diets, oral nutritional supplements, and vitamin/mineral supplements  - Order, calculate, and assess calorie counts as needed  - Recommend, monitor, and adjust tube feedings and TPN/PPN based on assessed needs  - Assess need for intravenous fluids  - Provide specific nutrition/hydration education as appropriate  - Include patient/family/caregiver in decisions related to nutrition  Outcome: Progressing     Problem: RESPIRATORY - ADULT  Goal: Achieves optimal ventilation and oxygenation  Description: INTERVENTIONS:  - Assess for changes in respiratory status  - Assess for changes in mentation and behavior  - Position to facilitate oxygenation and minimize respiratory effort  - Oxygen administered by appropriate delivery if ordered  - Initiate smoking cessation education as indicated  - Encourage broncho-pulmonary hygiene including cough, deep breathe, Incentive Spirometry  - Assess the need for suctioning and aspirate as needed  - Assess and instruct to report SOB or any respiratory difficulty  - Respiratory Therapy support as indicated  Outcome: Progressing

## 2021-02-08 NOTE — QUICK NOTE
[facility] Hospitalist Service Attending Physician Supervision Note - Discharge Summary    I have seen and examined Bandar Chacon personally and have reviewed the medical record independently  I have reviewed all components of the note performed and documented by the resident physician  I personally performed all the required components for patient evaluation and examined the patient  I was the supervising / collaborating physician on 2/8/2021 in the morning   I agree with the resident's findings and plan of care with the following additions / exceptions:      Patient is doing well today  She may have had a little irritation from the IV in the left arm  Does not look like a cellulitis  I told her to do warm compresses and just monitor  If the patient gets more redness and erythema or fevers I would have it checked out by her family doctor  No indication for antibiotics  Patient is doing well  I would favor couple more days of p o  Steroids as an outpatient  I do not feel other medications are indicated at this point  Patient had symptoms which started over 10 days ago  I did tell her to follow-up with her family doctor for right upper quadrant ultrasound since she has been having this pain for a couple months  I did check a lipase here and that was negative  CT scan of the head was unremarkable as well  The patient has not had any hypoxia while she has been here  Time Spent for Discharge: 30 minutes  More than 50% of total time spent on counseling and coordination of care as described above  I attest that this information is true, accurate, and complete to the best of my knowledge  See resident note for additional information regarding hospital course and significant findings as well as plan of care going forth      Sadia Kirk MD

## 2021-02-08 NOTE — UTILIZATION REVIEW
Initial Clinical Review    Admission: Date/Time/Statement:   Admission Orders (From admission, onward)     Ordered        02/05/21 1909  Inpatient Admission  Once                   Orders Placed This Encounter   Procedures    Inpatient Admission     Standing Status:   Standing     Number of Occurrences:   1     Order Specific Question:   Level of Care     Answer:   Med Surg [16]     Order Specific Question:   Estimated length of stay     Answer:   More than 2 Midnights     Order Specific Question:   Certification     Answer:   I certify that inpatient services are medically necessary for this patient for a duration of greater than two midnights  See H&P and MD Progress Notes for additional information about the patient's course of treatment  ED Arrival Information     Expected Arrival Acuity Means of Arrival Escorted By Service Admission Type    - 2/5/2021 16:40 Urgent Walk-In Self General Medicine Urgent    Arrival Complaint    CHILLS/FEVER COVID +        Chief Complaint   Patient presents with    Fever - 9 weeks to 74 years     Pt arrives with complaint of being covid positive and having a bad fever  Pt states that she has been anywhere from 101 to high 103 since monday  Pt also complains of dizziness and headache  Pt states that she is also having vomiting and unable to keep anythign down  Assessment/Plan:  71 yo female presents to ED from home due to fevers, muscle aches, dizziness, HA, nausea, known COVID + since last week  O2 sats decreased to 80% with ambulation in ED  Again in ED,  O2 sat decreased to 84% and patient was placed on O2 NC  Exam notes crackles at bases WBC 3 53, K 3 3 CRP 26 6, D Dimer 0 79  Admitted as Inpatient To med surg for COVID infection  Start Remdesivir, Decadron, Famotidine, Zinc, Vit D, Vit C , antibiotics  If procalc negative x 2, can DC abx  Self prone if able, incentive spirometry,  Continue Meclizine for dizziness     2/6   Antibiotics DC'd  BP low, give 1 L NSS, check orthostatic signs  Pt c/o HA, dizziness, continue meclizine No visual disturbance  If does not improve check CT head  Continue remdesivir  O2 sat 93-96 on RA   ED Triage Vitals [02/05/21 1710]   Temperature Pulse Respirations Blood Pressure SpO2   98 6 °F (37 °C) 90 18 127/61 94 %      Temp Source Heart Rate Source Patient Position - Orthostatic VS BP Location FiO2 (%)   Oral Monitor Sitting Left arm --      Pain Score       5          Wt Readings from Last 1 Encounters:   02/06/21 67 1 kg (148 lb)     Additional Vital Signs:  Date/Time  Temp  Pulse  Resp  BP  MAP (mmHg)  SpO2   Nasal Cannula O2 Flow Rate (L/min)  O2 Device  Patient Position - Orthostatic VS   02/06/21 1443  97 7 °F (36 5 °C)  77  18  105/52  75  96 %   --  None (Room air)  Lying   02/06/21 1232  --  --  --  103/58  76  --   --  --  Standing for 3 minutes - Orthostatic VS   02/06/21 1231  --  --  --  107/58  77  --   --  --  Standing - Orthostatic VS   02/06/21 1230  --  --  --  108/57  77  --   --  --  Sitting - Orthostatic VS   02/06/21 1229  --  --  --  106/59  79  --   --  --  Lying - Orthostatic VS   02/06/21 0700  98 5 °F (36 9 °C)  69  18  96/55  --  93 %   --  None (Room air)  Sitting   02/06/21 0445  --  64  18  100/57  --  97 %   2 L/min  Nasal cannula  Lying   02/06/21 0153  --  68  18  102/58  --  97 %   2 L/min  Nasal cannula  Lying   02/06/21 0023  --  71  18  101/53  --  95 %   --  None (Room air)         Pertinent Labs/Diagnostic Test Results:     2/5 IMPRESSION:     No acute cardiopulmonary disease       EKG 2/5  Normal sinus rhythm at 77 beats per minute  Normal axis, normal intervals, no ST T wave abnormalities suggestive of ischemia  QTC is normal   No significant change compared to prior from 03/13/2020        Results from last 7 days   Lab Units 02/07/21  0518 02/06/21  1410 02/06/21  0456 02/05/21  1803   WBC Thousand/uL 3 08* 1 58* 1 85* 3 53*   HEMOGLOBIN g/dL 11 6 11 8 12 4 13 6   HEMATOCRIT % 35 7 36 4 38 4 40 5 PLATELETS Thousands/uL 147* 136* 123* 151   NEUTROS ABS Thousands/µL 2 22 0 77* 1 23* 2 24         Results from last 7 days   Lab Units 02/08/21  0525 02/07/21  0518 02/06/21  0456 02/05/21  1803   SODIUM mmol/L 144 144 139 136   POTASSIUM mmol/L 3 8 3 5 3 7 3 3*   CHLORIDE mmol/L 109* 108 106 103   CO2 mmol/L 25 22 24 25   ANION GAP mmol/L 10 14* 9 8   BUN mg/dL 14 11 9 9   CREATININE mg/dL 0 70 0 75 0 75 0 72   EGFR ml/min/1 73sq m 91 84 84 88   CALCIUM mg/dL 8 4 8 3 8 2* 8 4     Results from last 7 days   Lab Units 02/08/21  0525 02/07/21  0518 02/06/21  0456 02/05/21  1803   AST U/L 38 55* 26 26   ALT U/L 47 55 30 29   ALK PHOS U/L 90 85 87 94   TOTAL PROTEIN g/dL 6 3* 5 8* 6 2* 6 8   ALBUMIN g/dL 3 0* 2 7* 2 9* 3 4*   TOTAL BILIRUBIN mg/dL 0 16* 0 20 0 23 0 29         Results from last 7 days   Lab Units 02/08/21  0525 02/07/21  0518 02/06/21  0456 02/05/21  1803   GLUCOSE RANDOM mg/dL 169* 160* 179* 111             No results found for: BETA-HYDROXYBUTYRATE                       Results from last 7 days   Lab Units 02/08/21  0525 02/07/21  0518 02/06/21  0456   D-DIMER QUANTITATIVE ug/ml FEU 0 46 0 64* 0 79*             Results from last 7 days   Lab Units 02/06/21  0457 02/05/21  1803   PROCALCITONIN ng/ml 0 11 <0 05     Results from last 7 days   Lab Units 02/05/21  1803   LACTIC ACID mmol/L 0 8             Results from last 7 days   Lab Units 02/06/21  0033   NT-PRO BNP pg/mL 132*     Results from last 7 days   Lab Units 02/06/21  0033   FERRITIN ng/mL 187         Results from last 7 days   Lab Units 02/07/21  0947   LIPASE u/L 126     Results from last 7 days   Lab Units 02/08/21  0525 02/07/21  0518 02/06/21  0456   CRP mg/L 12 3* 21 4* 26 6*           Results from last 7 days   Lab Units 02/05/21  1811 02/05/21  1803   BLOOD CULTURE  No Growth at 48 hrs  No Growth at 48 hrs                 ED Treatment:   Medication Administration from 02/05/2021 1640 to 02/06/2021 1314       Date/Time Order Dose Route Action Comments     02/05/2021 1812 albuterol (PROVENTIL HFA,VENTOLIN HFA) inhaler 2 puff 2 puff Inhalation Given      02/05/2021 1906 sodium chloride 0 9 % bolus 1,000 mL 0 mL Intravenous Stopped      02/05/2021 1806 sodium chloride 0 9 % bolus 1,000 mL 1,000 mL Intravenous New Bag      02/05/2021 1812 ondansetron (ZOFRAN) injection 4 mg 4 mg Intravenous Given      02/05/2021 1812 ketorolac (TORADOL) injection 15 mg 15 mg Intravenous Given      02/05/2021 2143 dexamethasone (DECADRON) injection 6 mg 6 mg Intravenous Given      02/06/2021 0024 montelukast (SINGULAIR) tablet 10 mg 10 mg Oral Given      02/06/2021 0148 acetaminophen (TYLENOL) tablet 650 mg 650 mg Oral Given      02/06/2021 0024 ascorbic acid (VITAMIN C) tablet 1,000 mg 1,000 mg Oral Given      02/06/2021 0026 dexamethasone (DECADRON) injection 6 mg 6 mg Intravenous Not Given Given @ 2143     02/06/2021 0035 enoxaparin (LOVENOX) subcutaneous injection 30 mg 30 mg Subcutaneous Given      02/06/2021 0039 remdesivir (Veklury) 200 mg in sodium chloride 0 9 % 250 mL IVPB 200 mg Intravenous New Bag      02/05/2021 2143 doxycycline hyclate (VIBRAMYCIN) capsule 100 mg 100 mg Oral Given      02/05/2021 2143 sodium chloride (OCEAN) 0 65 % nasal spray 1 spray 1 spray Each Nare Given         Past Medical History:   Diagnosis Date    Anesthesia complication     desaturation of oxygen mostly at night- on O2 at 2L at hs-may wake up with asthma issue    Anxiety     Asthma     Argueta esophagus     DVT (deep vein thrombosis) in pregnancy     DVT (deep venous thrombosis) (HCC)     during pregnancy    Hiatal hernia     Hypercholesteremia     Nasal polyps     Sinus disorder     Sinusitis, chronic      Present on Admission:   COVID-19   Severe persistent asthma without complication   Chronic sinusitis   (Resolved) Dizziness   Epigastric discomfort      Admitting Diagnosis: Chills [R68 83]  Hypoxia [R09 02]  Fever [R50 9]  Lab test positive for detection of COVID-19 virus [U07 1]  COVID-19 [U07 1]  Age/Sex: 72 y o  female  Admission Orders:  Scheduled Medications:  ascorbic acid, 1,000 mg, Oral, Q12H CORNELIO  aspirin, 325 mg, Oral, Daily  cholecalciferol, 2,000 Units, Oral, Daily  dexamethasone, 6 mg, Intravenous, Q24H  DULoxetine, 30 mg, Oral, Daily  enoxaparin, 30 mg, Subcutaneous, Q12H CORNELIO  famotidine, 20 mg, Oral, BID  fluticasone, 1 spray, Each Nare, Daily  fluticasone, 2 puff, Inhalation, BID  loratadine, 10 mg, Oral, Daily  meclizine, 25 mg, Oral, Q8H CORNELIO  montelukast, 10 mg, Oral, HS  zinc sulfate, 220 mg, Oral, Daily    Followed by  Alice Rios ON 2/13/2021] multivitamin-minerals, 1 tablet, Oral, Daily  remdesivir, 100 mg, Intravenous, Q24H      Continuous IV Infusions:     PRN Meds:  acetaminophen, 650 mg, Oral, Q6H PRN  albuterol, 2 puff, Inhalation, Q6H PRN  ondansetron, 4 mg, Intravenous, Q4H PRN  sodium chloride, 1 spray, Each Nare, Q1H PRN  traZODone, 100 mg, Oral, HS PRN       Up with assist, incentive spiromerty    CRP, CBC, CMP, D DImer 2/7    Contact and airborne isolation         None    Network Utilization Review Department  ATTENTION: Please call with any questions or concerns to [phone] and carefully listen to the prompts so that you are directed to the right person  All voicemails are confidential   Aquilino Parra all requests for admission clinical reviews, approved or denied determinations and any other requests to dedicated fax number below belonging to the campus where the patient is receiving treatment   List of dedicated fax numbers for the Facilities:  [address] DENIALS (Administrative/Medical Necessity) [phone]   [address] (Maternity/NICU/Pediatrics) 261 [facility],[address] [place] 2000 [facility] ([address]) [address] [place] [address] [address] [phone]   [address] 951 [phone]

## 2021-02-08 NOTE — DISCHARGE INSTR - AVS FIRST PAGE
Dear Bandar Chacon,     It was our pleasure to care for you here at [facility]  It is our hope that we were always able to exceed the expected standards for your care during your stay  You were hospitalized due to [address] +  You were cared for on the 3rd floor by Thais Diaz MD under the service of Sadia Kirk MD with the [facility]ist [facility] who covers for your primary care physician (PCP), Alexia Torres DO, while you were hospitalized  If you have any questions or concerns related to this hospitalization, you may contact us at [phone]  For follow up as well as any medication refills, we recommend that you follow up with your primary care physician  A registered nurse will reach out to you by phone within a few days after your discharge to answer any additional questions that you may have after going home  However, at this time we provide for you here, the most important instructions / recommendations at discharge:     · Notable Medication Adjustments -   · Multivitamin, Vit D, famotidine  · 2 days of 40mg PO Prednisone ordered  · Testing Required after Discharge -   · None  · Important follow up information -   · Please f/u with pulmonology at next appt 2/15/21  · Other Instructions -   · Please monitor L arm IV site, warm compresses to area for relief  Please call hospital or follow up with PCP should site cause further concern, worsen or any new, acute symptoms arise  · Please review this entire after visit summary as additional general instructions including medication list, appointments, activity, diet, any pertinent wound care, and other additional recommendations from your care team that may be provided for you        Sincerely,     Thais Diaz MD

## 2021-02-09 LAB
ATRIAL RATE: 77 BPM
P AXIS: 33 DEGREES
PR INTERVAL: 168 MS
QRS AXIS: 16 DEGREES
QRSD INTERVAL: 72 MS
QT INTERVAL: 356 MS
QTC INTERVAL: 402 MS
T WAVE AXIS: 55 DEGREES
VENTRICULAR RATE: 77 BPM

## 2021-02-09 PROCEDURE — 93010 ELECTROCARDIOGRAM REPORT: CPT | Performed by: INTERNAL MEDICINE

## 2021-02-09 RX ORDER — PREDNISONE 20 MG/1
40 TABLET ORAL DAILY
Qty: 4 TABLET | Refills: 0 | Status: SHIPPED | OUTPATIENT
Start: 2021-02-09 | End: 2021-02-11

## 2021-02-09 NOTE — ASSESSMENT & PLAN NOTE
Lab Results   Component Value Date    SARSCOV2 Positive (A) 01/29/2021       Patient presenting with symptoms of COVID-19 SYMPTOMS: Fever (99 0* or greater), cough, shortness of breath, anorexia, fatigue, myalgias, Nasal congestion, 02 Sat decreased by 3% or greater since last taken, New onset of GI issues (nausea, vomiting, diarrhea) and General malaise (headache, feeling rundown, achy)    Workup:  · Imaging:  Xr Chest 1 View Portable    Result Date: 2/5/2021  Impression: No acute cardiopulmonary disease  Workstation performed: [id number]       · Labs:     Recent Labs     02/06/21  0033 02/06/21  0456 02/07/21  0518 02/08/21  0525   FERRITIN 187  --   --   --    CRP  --  26 6* 21 4* 12 3*   DDIMER  --  0 79* 0 64* 0 46         · Oxygen:  Saturating 97% on RA, has maintained this    COVID Stage: MILD    Plan:  · Labs:  · Blood type and screen  · Cardiac markers - troponin WNL, CK WNL, BNP minimally elevated  · Inflammatory Markers - CRP elevated, D-Dimer elevated, Ferritin WNL  · Ddimer 0 46 WNL , peaked at 0 7, no inidcation for   · Meds  · Vitamin D3 2000 units daily  · Vitamin C 1000 BID  · Zinc 220 mg x 7 days then multivitamin  · Received 2 days of Remdesevir  · Famotidine 20 mg PO b i d  for acid suppressive therapy  · D/c on prednisone 40mg PO for 2 days    · Anticoag:  Therapeutic Anticoagulation per guidelines (category C)  · Non-Pharmacologic interventions, no need for d/c anticoag therapy

## 2021-02-09 NOTE — ASSESSMENT & PLAN NOTE
· Flovent 2 puffs BID  · Albuterol 2 puffs Q6H PRN  · Claritin and Singulair daily  · D/c today, continue meds at home

## 2021-02-09 NOTE — DISCHARGE SUMMARY
Discharge- Nataliya Teague 1955, 72 y o  female MRN: [id number]    Unit/Bed#: S -01 Encounter: [id number]    Primary Care Provider: Dimas Adair DO   Date and time admitted to hospital: 2/5/2021  5:34 PM        * COVID-19  Assessment & Plan    Lab Results   Component Value Date    SARSCOV2 Positive (A) 01/29/2021       Patient presenting with symptoms of COVID-19 SYMPTOMS: Fever (99 0* or greater), cough, shortness of breath, anorexia, fatigue, myalgias, Nasal congestion, 02 Sat decreased by 3% or greater since last taken, New onset of GI issues (nausea, vomiting, diarrhea) and General malaise (headache, feeling rundown, achy)    Workup:  · Imaging:  Xr Chest 1 View Portable    Result Date: 2/5/2021  Impression: No acute cardiopulmonary disease  Workstation performed: [id number]       · Labs:     Recent Labs     02/06/21  0033 02/06/21  0456 02/07/21  0518 02/08/21  0525   FERRITIN 187  --   --   --    CRP  --  26 6* 21 4* 12 3*   DDIMER  --  0 79* 0 64* 0 46         · Oxygen:  Saturating 97% on RA, has maintained this    COVID Stage: MILD    Plan:  · Labs:  · Blood type and screen  · Cardiac markers - troponin WNL, CK WNL, BNP minimally elevated  · Inflammatory Markers - CRP elevated, D-Dimer elevated, Ferritin WNL  · Ddimer 0 46 WNL , peaked at 0 7, no inidcation for   · Meds  · Vitamin D3 2000 units daily  · Vitamin C 1000 BID  · Zinc 220 mg x 7 days then multivitamin  · Received 2 days of Remdesevir  · Famotidine 20 mg PO b i d  for acid suppressive therapy  · D/c on prednisone 40mg PO for 2 days    · Anticoag: Therapeutic Anticoagulation per guidelines (category C)  · Non-Pharmacologic interventions, no need for d/c anticoag therapy       Epigastric discomfort  Assessment & Plan  Patient concerned about epigastric discomfort been going on for while but the patient does have some tenderness in the epigastric region      No longer with epigastric tenderness  Continuing PPI  Lipase unremarkable  Counseled on f/u with PCP, should new acute or worsening symptoms arise, call office, hospital, or return to ED if she feels necessary    Severe persistent asthma without complication  Assessment & Plan  · Flovent 2 puffs BID  · Albuterol 2 puffs Q6H PRN  · Claritin and Singulair daily  · D/c today, continue meds at home        Discharging Resident Physician: Callie Finnegan MD  Attending: No att  providers found  PCP: Kenn Bryan DO  Admission Date: 2/5/2021  Discharge Date: 02/08/21    Disposition:     Home    Reason for Admission: COVID pneumonia    Consultations During Hospital Stay:  · None    Procedures Performed:     · None    Significant Findings / Test Results:     · None    Incidental Findings:   · None     Test Results Pending at Discharge (will require follow up): · None     Outpatient Tests Requested:  · None    Complications:  None    Hospital Course:     Chance Lozano is a 72 y o  female patient who originally presented to the hospital on 2/5/2021 due to COVID+ Pneumonia  Patient admitted d/t o2 sat decreasing to 80% upon ambulation  She was diagnosed with COVID 1/29  Was monitoring O2 at home but when sats decreased to 80% with ambulation she presented to ED  She also was concerned about epigastric pain as she has hx of barretts esophagus  She was treated with 2 days of remdesivir, vitamins, IV steroids, antacid agents  Max o2 requirment was 2L NC on admission however has been on RA since 2/7/21  The muscle aches, fevers, dizziness, headaches has resolved  She has residual mild cough but feels improved  Was given instructions regarding home discharge w/ covid and plasma donation  Condition at Discharge: good     Discharge Day Visit / Exam:     Subjective:  Feels well and ready for d/c! Small area inside of L arm near IV site w/ slight redness and felt a few nodular areas  Review of Systems   Constitutional: Negative for fever     Respiratory: Positive for cough (mild but improving)  Negative for shortness of breath  Gastrointestinal: Negative for abdominal pain  Musculoskeletal: Negative for myalgias  Skin: Positive for color change (IV site L ac)  Neurological: Negative for dizziness and weakness  All other systems reviewed and are negative  Vitals: Blood Pressure: 128/60 (02/08/21 0700)  Pulse: 71 (02/08/21 0700)  Temperature: 97 9 °F (36 6 °C) (02/08/21 0700)  Temp Source: Oral (02/08/21 0700)  Respirations: 18 (02/08/21 0700)  Height: 5' (152 4 cm) (02/06/21 1324)  Weight - Scale: 67 1 kg (148 lb) (02/06/21 1324)  SpO2: 97 % (02/08/21 0700)  Exam:   Physical Exam  Vitals signs and nursing note reviewed  HENT:      Head: Normocephalic and atraumatic  Mouth/Throat:      Mouth: Mucous membranes are moist    Eyes:      General: No scleral icterus  Right eye: No discharge  Left eye: No discharge  Conjunctiva/sclera: Conjunctivae normal    Cardiovascular:      Rate and Rhythm: Normal rate and regular rhythm  Pulses: Normal pulses  Heart sounds: No murmur  Pulmonary:      Effort: Pulmonary effort is normal  No respiratory distress  Breath sounds: No wheezing  Abdominal:      General: Abdomen is flat  Bowel sounds are normal  There is no distension  Musculoskeletal:      Right lower leg: No edema  Left lower leg: No edema  Skin:     General: Skin is warm  Capillary Refill: Capillary refill takes less than 2 seconds  Coloration: Skin is not jaundiced  Comments: Site of first IV L arm AC - does not appear cellulitic, mild redness present, no significant discoloration, bruising, no tenderness to palpation  Neurological:      Mental Status: She is alert and oriented to person, place, and time     Psychiatric:         Mood and Affect: Mood normal          Behavior: Behavior normal        Discussion with Family: Patient    Discharge instructions/Information to patient and family:   See after visit summary for information provided to patient and family  Provisions for Follow-Up Care:  See after visit summary for information related to follow-up care and any pertinent home health orders  Please f/u w/ [facility] pulm at appt 2/11/21     Planned Readmission: No     Discharge Medications:  See after visit summary for reconciled discharge medications provided to patient and family      On front page instructions     ** Please Note: This note has been constructed using a voice recognition system **      Julianna Matthews MD  Family Medicine PGY-1  2/8/2021  9:38 PM

## 2021-02-09 NOTE — ASSESSMENT & PLAN NOTE
Patient concerned about epigastric discomfort been going on for while but the patient does have some tenderness in the epigastric region      No longer with epigastric tenderness  Continuing PPI  Lipase unremarkable  Counseled on f/u with PCP, should new acute or worsening symptoms arise, call office, hospital, or return to ED if she feels necessary

## 2021-02-10 LAB
BACTERIA BLD CULT: NORMAL
BACTERIA BLD CULT: NORMAL

## 2021-02-15 ENCOUNTER — OFFICE VISIT (OUTPATIENT)
Dept: PULMONOLOGY | Facility: CLINIC | Age: 66
End: 2021-02-15
Payer: COMMERCIAL

## 2021-02-15 VITALS
WEIGHT: 136 LBS | OXYGEN SATURATION: 99 % | TEMPERATURE: 96.8 F | DIASTOLIC BLOOD PRESSURE: 64 MMHG | HEIGHT: 60 IN | HEART RATE: 85 BPM | SYSTOLIC BLOOD PRESSURE: 100 MMHG | BODY MASS INDEX: 26.7 KG/M2

## 2021-02-15 DIAGNOSIS — J45.50 SEVERE PERSISTENT ASTHMA WITHOUT COMPLICATION: Primary | ICD-10-CM

## 2021-02-15 DIAGNOSIS — S06.0X9D CONCUSSION WITH LOSS OF CONSCIOUSNESS, SUBSEQUENT ENCOUNTER: ICD-10-CM

## 2021-02-15 DIAGNOSIS — Z86.16 HISTORY OF COVID-19: ICD-10-CM

## 2021-02-15 DIAGNOSIS — R42 DIZZINESS: ICD-10-CM

## 2021-02-15 PROCEDURE — 99214 OFFICE O/P EST MOD 30 MIN: CPT | Performed by: INTERNAL MEDICINE

## 2021-02-15 RX ORDER — MECLIZINE HCL 12.5 MG/1
12.5 TABLET ORAL EVERY 8 HOURS PRN
Qty: 30 TABLET | Refills: 1 | Status: SHIPPED | OUTPATIENT
Start: 2021-02-15 | End: 2021-12-02

## 2021-02-15 RX ORDER — ALBUTEROL SULFATE 90 UG/1
2 AEROSOL, METERED RESPIRATORY (INHALATION) EVERY 6 HOURS PRN
Qty: 8.5 G | Refills: 5 | Status: SHIPPED | OUTPATIENT
Start: 2021-02-15 | End: 2022-02-22

## 2021-02-15 NOTE — PROGRESS NOTES
Pulmonary Follow Up Note   Bandar Chacon 72 y o  female MRN: [id number]  2/15/2021      Assessment/Plan:     Severe persistent asthma without complication   Patient has achieved improved asthma control since the addition of Darlys Gubler  Her next injection is due for early March  I instructed her to call me the week prior if she has any concerns about whether she should get it on the heels of recovery from [facility]  She should continue with generic Advair twice daily and albuterol as needed  History of COVID-19    Patient tested positive for COVID on 01/29  She briefly required hospitalization  There is no evidence of pneumonia on chest x-ray  She continues to have excessive fatigue  I reassured her that it could take weeks to months to fully recover  She finds this frustrating, as she has been out of work and is questioning whether /when she can go back  I suggested she stay out at least until the beginning of March and assess her symptoms at that time  Visit orders:    Diagnoses and all orders for this visit:    Severe persistent asthma without complication  -     albuterol (ProAir HFA) 90 mcg/act inhaler; Inhale 2 puffs every 6 (six) hours as needed for wheezing    History of COVID-19    Concussion with loss of consciousness, subsequent encounter  -     meclizine (ANTIVERT) 12 5 MG tablet; Take 1 tablet (12 5 mg total) by mouth every 8 (eight) hours as needed for dizziness or nausea    Dizziness        Return in about 3 months (around 5/15/2021)  History of Present Illness   HPI:  Bandar Chacon is a 72 y o  female who has history of severe persistent asthma, recently diagnosed with COVID-19 on 01/29  She was hospitalized from 02/05 through 2/8  She has been slow to recover  Her most notable complaint is ongoing fatigue  She has an occasional cough but no significant sputum production or wheezing  She has generalized weakness  She has occasional dizziness and has been taking meclizine as needed  She is requesting a refill for that medication  She has been compliant with her inhaler regimen generic Advair and ProAir as needed  She has chronic sinus congestion  She complains of head fullness  She is due for her next Fasenra injection the first week of March  I have reviewed the details of her hospitalization, specifically the H&P and discharge summary    Review of Systems   Constitutional: Positive for fatigue  Negative for chills, fever and unexpected weight change  HENT: Positive for congestion and postnasal drip  Negative for sore throat  Eyes: Positive for visual disturbance  Respiratory:        As noted in HPI   Cardiovascular: Negative for chest pain and leg swelling  Gastrointestinal: Negative for abdominal pain, diarrhea and vomiting  Musculoskeletal: Negative for arthralgias  Skin: Negative for rash  Neurological: Positive for dizziness and weakness  Negative for headaches  Hematological: Negative for adenopathy  Psychiatric/Behavioral: Negative  All other systems reviewed and are negative        Medical, Family and Social history reviewed and updated as appropriate    Historical Information   Past Medical History:   Diagnosis Date    Anesthesia complication     desaturation of oxygen mostly at night- on O2 at 2L at hs-may wake up with asthma issue    Anxiety     Asthma     Argueta esophagus     DVT (deep vein thrombosis) in pregnancy     DVT (deep venous thrombosis) (HCC)     during pregnancy    Hiatal hernia     Hypercholesteremia     Nasal polyps     Sinus disorder     Sinusitis, chronic      Past Surgical History:   Procedure Laterality Date     SECTION N/A     x 2     SECTION      COLONOSCOPY      ESOPHAGOGASTRODUODENOSCOPY      HYSTERECTOMY      complete    HYSTERECTOMY      NASAL POLYP SURGERY      x2    NASAL POLYP SURGERY  2018    HI COLSC FLX W/RMVL OF TUMOR POLYP LESION SNARE TQ N/A 2017    Procedure: COLONOSCOPYwith  snare polypectomy ;  Surgeon: Hope Munguia MD;  Location: [place] [address] [facility]; Service: Gastroenterology    ID EGD TRANSORAL BIOPSY SINGLE/MULTIPLE N/A 6/6/2017    Procedure: ESOPHAGOGASTRODUODENOSCOPY (EGD)and biopsy ;  Surgeon: Hope Munguia MD;  Location: [place] [address] [facility]; Service: Gastroenterology    TONSILLECTOMY N/A     TONSILLECTOMY      VEIN LIGATION AND STRIPPING Bilateral      Family History   Problem Relation Age of Onset    Heart disease Mother         CHF    Breast cancer additional onset Mother 54    Breast cancer Mother 52    Dysphagia Father     Breast cancer Maternal Aunt     Breast cancer Paternal Aunt        Social History     Tobacco Use   Smoking Status Never Smoker   Smokeless Tobacco Never Used         Meds/Allergies     Current Outpatient Medications:     acetaminophen (TYLENOL) 325 mg tablet, Take 650 mg by mouth every 6 (six) hours as needed for mild pain, Disp: , Rfl:     albuterol (PROAIR HFA) 90 mcg/act inhaler, Inhale 2 puffs every 6 (six) hours as needed for wheezing, Disp: 8 5 g, Rfl: 5    ALPRAZolam (XANAX) 0 5 mg tablet, as needed , Disp: , Rfl: 0    budesonide (PULMICORT) 0 5 mg/2 mL nebulizer solution, Take 1 vial (0 5 mg total) by nebulization 2 (two) times a day, Disp: 60 vial, Rfl: 5    cetirizine (ZyrTEC) 10 mg tablet, Take 10 mg by mouth every morning  , Disp: , Rfl:     cholecalciferol (VITAMIN D3) 1,000 units tablet, Take 4,000 Units by mouth daily, Disp: , Rfl:     cholecalciferol (VITAMIN D3) 1,000 units tablet, Take 2 tablets (2,000 Units total) by mouth daily, Disp: 10 tablet, Rfl: 0    diclofenac sodium (VOLTAREN) 1 %, Apply 2 g topically 3 (three) times a day as needed (Pain), Disp: 1 Tube, Rfl: 2    EPINEPHrine (EPIPEN) 0 3 mg/0 3 mL SOAJ, inject 0 3 milliliters ( 0 3 milligrams ) intramuscularly in OUTE     (REFER TO PRESCRIPTION NOTES)  , Disp: , Rfl:     famciclovir (FAMVIR) 500 mg tablet, , Disp: , Rfl: 0    famotidine (PEPCID) 20 mg tablet, Take 1 tablet (20 mg total) by mouth 2 (two) times a day, Disp: 30 tablet, Rfl: 0    FASENRA, 30 mg every 56 days , Disp: , Rfl:     LACTOBACILLUS ACID-PECTIN PO, Take by mouth, Disp: , Rfl:     levalbuterol (XOPENEX) 1 25 mg/0 5 mL nebulizer solution, Take 0 5 mL (1 25 mg total) by nebulization every 8 (eight) hours as needed for wheezing, Disp: 90 vial, Rfl: 5    loratadine (CLARITIN) 10 mg tablet, Take 1 tablet (10 mg total) by mouth daily, Disp: 30 tablet, Rfl: 0    montelukast (SINGULAIR) 10 mg tablet, Take 1 tablet (10 mg total) by mouth daily at bedtime, Disp: 30 tablet, Rfl: 5    multivitamin-minerals (CENTRUM ADULTS) tablet, Take 1 tablet by mouth daily, Disp: 30 tablet, Rfl: 0    mupirocin (BACTROBAN) 2 % ointment, apply to affected area twice a day  TO LOWER NASAL AREA, Disp: , Rfl: 0    omeprazole (PriLOSEC) 40 MG capsule, Take 40 mg by mouth daily before breakfast Take 30 minutes prior, Disp: , Rfl: 0    sodium chloride 0 9 % irrigation, Irrigate with as directed daily Irrigation in nose per nick, Disp: , Rfl: 0    traZODone (DESYREL) 100 mg tablet, Take 100 mg by mouth daily at bedtime as needed for sleep , Disp: , Rfl: 0    WIXELA INHUB 250-50 MCG/DOSE inhaler, Inhale 1 puff 2 (two) times a day Rinse mouth after use , Disp: 1 Inhaler, Rfl: 5    albuterol (ProAir HFA) 90 mcg/act inhaler, Inhale 2 puffs every 6 (six) hours as needed for wheezing, Disp: 8 5 g, Rfl: 5    EPINEPHrine (EPIPEN) 0 3 mg/0 3 mL SOAJ, Inject 0 3 mL (0 3 mg total) into a muscle once for 1 dose, Disp: 0 3 mL, Rfl: 3    fluticasone (FLONASE) 50 mcg/act nasal spray, 1 spray into each nostril daily (Patient not taking: Reported on 2/15/2021), Disp: 1 Bottle, Rfl: 0    meclizine (ANTIVERT) 12 5 MG tablet, Take 1 tablet (12 5 mg total) by mouth every 8 (eight) hours as needed for dizziness or nausea, Disp: 30 tablet, Rfl: 1    promethazine-codeine (PHENERGAN WITH CODEINE) 6 25-10 mg/5 mL syrup, Take 5 mL by mouth every 6 (six) hours as needed for cough (Patient not taking: Reported on 2/8/2021), Disp: 120 mL, Rfl: 0  Allergies   Allergen Reactions    Livalo [Pitavastatin] Hives and Shortness Of Breath     Nausea    Cefditoren Nausea Only and Vomiting    Cefditoren Pivoxil Hives     N/V    Dust Mite Extract Sneezing    Mold Extract [Trichophyton] Sneezing       Vitals: Blood pressure 100/64, pulse 85, temperature (!) 96 8 °F (36 °C), temperature source Temporal, height 5' (1 524 m), weight 61 7 kg (136 lb), SpO2 99 %, not currently breastfeeding  Body mass index is 26 56 kg/m²  Oxygen Therapy  SpO2: 99 %  Oxygen Therapy: None (Room air)    Physical Exam   Physical Exam  Constitutional:       General: She is not in acute distress  Appearance: She is not toxic-appearing  HENT:      Head: Normocephalic  Nose: Congestion present  Mouth/Throat:      Pharynx: No oropharyngeal exudate  Eyes:      General: No scleral icterus  Pupils: Pupils are equal, round, and reactive to light  Neck:      Musculoskeletal: Neck supple  Vascular: No JVD  Cardiovascular:      Rate and Rhythm: Normal rate and regular rhythm  Pulmonary:      Breath sounds: No wheezing or rhonchi  Abdominal:      Palpations: Abdomen is soft  Tenderness: There is no abdominal tenderness  Lymphadenopathy:      Cervical: No cervical adenopathy  Skin:     General: Skin is warm and dry  Neurological:      Mental Status: She is alert and oriented to person, place, and time  Motor: Weakness present  Psychiatric:         Mood and Affect: Mood normal          Behavior: Behavior normal          Labs: I have personally reviewed pertinent lab results    Lab Results   Component Value Date    WBC 3 08 (L) 02/07/2021    HGB 11 6 02/07/2021    HCT 35 7 02/07/2021    MCV 99 (H) 02/07/2021     (L) 02/07/2021     Lab Results   Component Value Date    GLUCOSE 91 05/02/2020    CALCIUM 8 4 02/08/2021     05/08/2015    K 3 8 02/08/2021    CO2 25 02/08/2021     (H) 02/08/2021    BUN 14 02/08/2021    CREATININE 0 70 02/08/2021     Lab Results   Component Value Date    IGE 64 0 08/08/2018     Lab Results   Component Value Date    ALT 47 02/08/2021    AST 38 02/08/2021    ALKPHOS 90 02/08/2021    BILITOT 0 48 05/08/2015     Imaging and other studies: I have personally reviewed pertinent reports  and I have personally reviewed pertinent films in PACS   Chest x-ray on 2/5/21 shows  Clear lungs    Pulmonary function testing:  Performed  7/24/19 and personally interpreted  FEV1/FVC ratio 76%   FEV1 101% predicted  % predicted  TLC 98 % predicted  RV 94 % predicted  DLCO corrected for hemoglobin 91 % predicted     normal PFTs

## 2021-02-15 NOTE — ASSESSMENT & PLAN NOTE
Patient tested positive for COVID on 01/29  She briefly required hospitalization  There is no evidence of pneumonia on chest x-ray  She continues to have excessive fatigue  I reassured her that it could take weeks to months to fully recover  She finds this frustrating, as she has been out of work and is questioning whether /when she can go back  I suggested she stay out at least until the beginning of March and assess her symptoms at that time

## 2021-02-15 NOTE — ASSESSMENT & PLAN NOTE
Patient has achieved improved asthma control since the addition of Calli Favre  Her next injection is due for early March  I instructed her to call me the week prior if she has any concerns about whether she should get it on the heels of recovery from [facility]  She should continue with generic Advair twice daily and albuterol as needed

## 2021-02-17 NOTE — UTILIZATION REVIEW
Notification of Discharge  This is a Notification of Discharge from our facility [address]  Please be advised that this patient has been discharge from our facility  Below you will find the admission and discharge date and time including the patients disposition  PRESENTATION DATE: 2/5/2021  5:34 PM  OBS ADMISSION DATE:   IP ADMISSION DATE: 2/5/21 1909   DISCHARGE DATE: 2/8/2021  2:04 PM  DISPOSITION: Home/Self Care Home/Self Care   Admission Orders listed below:  Admission Orders (From admission, onward)     Ordered        02/05/21 1909  Inpatient Admission  Once                   Please contact the UR Department if additional information is required to close this patient's authorization/case  [email or web address] Utilization Review Department  Main: [phone] x carefully listen to the prompts  All voicemails are confidential   [email or web address]  Send all requests for admission clinical reviews, approved or denied determinations and any other requests to dedicated fax number below belonging to the campus where the patient is receiving treatment   List of dedicated fax numbers:  [address] DENIALS (Administrative/Medical Necessity) [phone]   [address] (Maternity/NICU/Pediatrics) [phone]   [facility] [place] [phone]   Marilyn Comes [phone]   [facility] [phone]   [address] [phone]   [facility]  [phone]   [address] [facility] [address]  [address] [facility] [address] [facility] [phone]

## 2021-02-25 NOTE — PROGRESS NOTES
Assessment and Plan:   Ms Charis Quigley a 24-year-old female with history significant for fibromyalgia, primary generalized osteoarthritis, osteopenia, recurrent nasal polyposis, chronic sinus disease, and history of chronic steroid use, who presents for follow up      - Diamond presents today for follow-up of diffuse arthralgias, which have become more pronounced following the discontinuation of steroids that were primarily used for respiratory issues and a history of asthma   Although she did experience improvement of her joint pains with the prednisone, it did not eliminate her arthralgias entirely  Ramila Lee is currently trying to manage her symptoms with aspirin twice daily as well as Tylenol 6-8 times daily, but she continues to remain symptomatic at certain regions   I suspect her presentation is likely secondary to primary generalized osteoarthritis and fibromyalgia   She did trial Cymbalta but had side effects so it was discontinued   I advised her to continue with conservative measures for the osteoarthritis and fibromyalgia, including initiating an exercise regimen and physical therapy   She would like to hold off on a formal referral at this time   I also advised her in the future I would recommend a referral to Sports Medicine for evaluation of the left arm pain   She would like to hold off on this for now as well   To continue managing her symptoms secondary to the non inflammatory conditions, she will continue with the Tylenol and Janit Pollen also prescribed her topical Voltaren gel to use 2-3 times daily as needed      - In addition I have previously discussed with her my concerns for vasculitic conditions such as eosinophilic granulomatosis with poly angiitis, but my suspicion for this diagnosis is low as there is no ongoing peripheral eosinophilia, and there has also been stability noted in the nasal polyposis as well as with the asthma, without any recent requirement for oral steroids   In addition, there are no features at this time suggestive or concerning for an active vasculitis based on her symptoms or physical examination   I requested she make me aware if she has to ever have a recurrent skin rash, as I would like to biopsy this      - I also reviewed the nasal polyp histopathology reports, which did not show any evidence for vasculitis, granulomas or necrosis, but 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   She does have a negative anti neutrophilic cytoplasmic antibody, but if there is a concern for an underlying condition such as eosinophilic granulomatosis with poly angiitis, the presence of this antibody would not be required, and is frequently not seen   She does not appear to have peripheral eosinophilia on her most recent CBC (has been present in the past), and there are no concerning findings noted on the CT of her chest   At this time clinically there does not appear to be concerns for a vasculitic process that could be associated with the upper respiratory tract issues that she has been dealing with       - I will plan to see her back in the office in 6 months  Plan:  Diagnoses and all orders for this visit:    Fibromyalgia    Primary generalized (osteo)arthritis    Osteopenia, unspecified location    History of nasal polyposis    Intermittent asthma without complication, unspecified asthma severity    Other orders  -     C-reactive protein; Future  -     Sedimentation rate, automated; Future  -     Urinalysis with microscopic  -     Protein / creatinine ratio, urine      Activities as tolerated  Exercise: try to maintain a low impact exercise regimen as much as possible  Walk for 30 minutes a day for at least 3 days a week  Continue other medications as prescribed by PCP and other specialists         RTC in       HPI      The following portions of the patient's history were reviewed and updated as appropriate: allergies, current medications, past family history, past medical history, past social history, past surgical history and problem list       Review of Systems  Constitutional: Negative for weight change, fevers, chills, night sweats, fatigue  ENT/Mouth: Negative for hearing changes, ear pain, nasal congestion, sinus pain, hoarseness, sore throat, rhinorrhea, swallowing difficulty  Eyes: Negative for pain, redness, discharge, vision changes  Cardiovascular: Negative for chest pain, SOB, palpitations  Respiratory: Negative for cough, sputum, wheezing, dyspnea  Gastrointestinal: Negative for nausea, vomiting, diarrhea, constipation, pain, heartburn  Genitourinary: Negative for dysuria, urinary frequency, hematuria  Musculoskeletal: As per HPI  Skin: Negative for skin rash, color changes  Neuro: Negative for weakness, numbness, tingling, loss of consciousness  Psych: Negative for anxiety, depression  Heme/Lymph: Negative for easy bruising, bleeding, lymphadenopathy  Past Medical History:   Diagnosis Date    Anesthesia complication     desaturation of oxygen mostly at night- on O2 at 2L at hs-may wake up with asthma issue    Anxiety     Asthma     Argueta esophagus     DVT (deep vein thrombosis) in pregnancy     DVT (deep venous thrombosis) (HCC)     during pregnancy    Hiatal hernia     Hypercholesteremia     Nasal polyps     Sinus disorder     Sinusitis, chronic        Past Surgical History:   Procedure Laterality Date     SECTION N/A     x 2     SECTION      COLONOSCOPY      ESOPHAGOGASTRODUODENOSCOPY      HYSTERECTOMY      complete    HYSTERECTOMY      NASAL POLYP SURGERY      x2    NASAL POLYP SURGERY  2018    AL COLSC FLX W/RMVL OF TUMOR POLYP LESION SNARE TQ N/A 2017    Procedure: COLONOSCOPYwith  snare polypectomy ;  Surgeon: Rachna Moscoso MD;  Location: [facility]; Service: Gastroenterology    AL EGD TRANSORAL BIOPSY SINGLE/MULTIPLE N/A 2017    Procedure: ESOPHAGOGASTRODUODENOSCOPY (EGD)and biopsy  ; Surgeon: Rosamaria Johnson MD;  Location: [facility];   Service: Gastroenterology    TONSILLECTOMY N/A     TONSILLECTOMY      VEIN LIGATION AND STRIPPING Bilateral        Social History     Socioeconomic History    Marital status: /Civil Union     Spouse name: Not on file    Number of children: Not on file    Years of education: Not on file    Highest education level: Not on file   Occupational History    Not on file   Social Needs    Financial resource strain: Not on file    Food insecurity     Worry: Not on file     Inability: Not on file    Transportation needs     Medical: Not on file     Non-medical: Not on file   Tobacco Use    Smoking status: Never Smoker    Smokeless tobacco: Never Used   Substance and Sexual Activity    Alcohol use: Yes     Frequency: 2-4 times a month     Binge frequency: Weekly     Comment: social    Drug use: Never    Sexual activity: Yes     Partners: Male     Birth control/protection: Female Sterilization   Lifestyle    Physical activity     Days per week: Not on file     Minutes per session: Not on file    Stress: Not on file   Relationships    Social connections     Talks on phone: Not on file     Gets together: Not on file     Attends Congregation service: Not on file     Active member of club or organization: Not on file     Attends meetings of clubs or organizations: Not on file     Relationship status: Not on file    Intimate partner violence     Fear of current or ex partner: Not on file     Emotionally abused: Not on file     Physically abused: Not on file     Forced sexual activity: Not on file   Other Topics Concern    Not on file   Social History Narrative    ** Merged History Encounter **            Family History   Problem Relation Age of Onset    Heart disease Mother         CHF    Breast cancer additional onset Mother 52    Breast cancer Mother 52    Dysphagia Father     Breast cancer Maternal Aunt     Breast cancer Paternal Aunt Allergies   Allergen Reactions    Livalo [Pitavastatin] Hives and Shortness Of Breath     Nausea    Cefditoren Nausea Only and Vomiting    Cefditoren Pivoxil Hives     N/V    Dust Mite Extract Sneezing    Mold Extract [Trichophyton] Sneezing       Current Outpatient Medications:     acetaminophen (TYLENOL) 325 mg tablet, Take 650 mg by mouth every 6 (six) hours as needed for mild pain, Disp: , Rfl:     albuterol (PROAIR HFA) 90 mcg/act inhaler, Inhale 2 puffs every 6 (six) hours as needed for wheezing, Disp: 8 5 g, Rfl: 5    albuterol (ProAir HFA) 90 mcg/act inhaler, Inhale 2 puffs every 6 (six) hours as needed for wheezing, Disp: 8 5 g, Rfl: 5    ALPRAZolam (XANAX) 0 5 mg tablet, as needed , Disp: , Rfl: 0    budesonide (PULMICORT) 0 5 mg/2 mL nebulizer solution, Take 1 vial (0 5 mg total) by nebulization 2 (two) times a day, Disp: 60 vial, Rfl: 5    cetirizine (ZyrTEC) 10 mg tablet, Take 10 mg by mouth every morning  , Disp: , Rfl:     cholecalciferol (VITAMIN D3) 1,000 units tablet, Take 4,000 Units by mouth daily, Disp: , Rfl:     cholecalciferol (VITAMIN D3) 1,000 units tablet, Take 2 tablets (2,000 Units total) by mouth daily, Disp: 10 tablet, Rfl: 0    diclofenac sodium (VOLTAREN) 1 %, Apply 2 g topically 3 (three) times a day as needed (Pain), Disp: 1 Tube, Rfl: 2    EPINEPHrine (EPIPEN) 0 3 mg/0 3 mL SOAJ, Inject 0 3 mL (0 3 mg total) into a muscle once for 1 dose, Disp: 0 3 mL, Rfl: 3    EPINEPHrine (EPIPEN) 0 3 mg/0 3 mL SOAJ, inject 0 3 milliliters ( 0 3 milligrams ) intramuscularly in OUTE     (REFER TO PRESCRIPTION NOTES)  , Disp: , Rfl:     famciclovir (FAMVIR) 500 mg tablet, , Disp: , Rfl: 0    famotidine (PEPCID) 20 mg tablet, Take 1 tablet (20 mg total) by mouth 2 (two) times a day, Disp: 30 tablet, Rfl: 0    FASENRA, 30 mg every 56 days , Disp: , Rfl:     fluticasone (FLONASE) 50 mcg/act nasal spray, 1 spray into each nostril daily (Patient not taking: Reported on 2/15/2021), Disp: 1 Bottle, Rfl: 0    LACTOBACILLUS ACID-PECTIN PO, Take by mouth, Disp: , Rfl:     levalbuterol (XOPENEX) 1 25 mg/0 5 mL nebulizer solution, Take 0 5 mL (1 25 mg total) by nebulization every 8 (eight) hours as needed for wheezing, Disp: 90 vial, Rfl: 5    loratadine (CLARITIN) 10 mg tablet, Take 1 tablet (10 mg total) by mouth daily, Disp: 30 tablet, Rfl: 0    meclizine (ANTIVERT) 12 5 MG tablet, Take 1 tablet (12 5 mg total) by mouth every 8 (eight) hours as needed for dizziness or nausea, Disp: 30 tablet, Rfl: 1    montelukast (SINGULAIR) 10 mg tablet, Take 1 tablet (10 mg total) by mouth daily at bedtime, Disp: 30 tablet, Rfl: 5    multivitamin-minerals (CENTRUM ADULTS) tablet, Take 1 tablet by mouth daily, Disp: 30 tablet, Rfl: 0    mupirocin (BACTROBAN) 2 % ointment, apply to affected area twice a day  TO LOWER NASAL AREA, Disp: , Rfl: 0    omeprazole (PriLOSEC) 40 MG capsule, Take 40 mg by mouth daily before breakfast Take 30 minutes prior, Disp: , Rfl: 0    promethazine-codeine (PHENERGAN WITH CODEINE) 6 25-10 mg/5 mL syrup, Take 5 mL by mouth every 6 (six) hours as needed for cough (Patient not taking: Reported on 2/8/2021), Disp: 120 mL, Rfl: 0    sodium chloride 0 9 % irrigation, Irrigate with as directed daily Irrigation in nose per patinet, Disp: , Rfl: 0    traZODone (DESYREL) 100 mg tablet, Take 100 mg by mouth daily at bedtime as needed for sleep , Disp: , Rfl: 0    WIXELA INHUB 250-50 MCG/DOSE inhaler, Inhale 1 puff 2 (two) times a day Rinse mouth after use , Disp: 1 Inhaler, Rfl: 5      Objective: There were no vitals filed for this visit  Physical Exam  General: Well appearing, well nourished, in no distress  Oriented x 3, normal mood and affect  Ambulating without difficulty  Skin: Good turgor, no rash, unusual bruising or prominent lesions  Hair: Normal texture and distribution  Nails: Normal color, no deformities    HEENT:  Head: Normocephalic, atraumatic  Eyes: Conjunctiva clear, sclera non-icteric, EOM intact  Extremities: No amputations or deformities, cyanosis, edema  Musculoskeletal:   Neurologic: Alert and oriented  No focal neurological deficits appreciated  Psychiatric: Normal mood and affect  RENARD [place]

## 2021-02-26 DIAGNOSIS — K22.70 BARRETT'S ESOPHAGUS WITHOUT DYSPLASIA: Primary | ICD-10-CM

## 2021-02-26 DIAGNOSIS — K21.9 GASTROESOPHAGEAL REFLUX DISEASE WITHOUT ESOPHAGITIS: Primary | ICD-10-CM

## 2021-02-26 RX ORDER — OMEPRAZOLE 40 MG/1
40 CAPSULE, DELAYED RELEASE ORAL DAILY
Qty: 30 CAPSULE | Refills: 0 | Status: SHIPPED | OUTPATIENT
Start: 2021-02-26 | End: 2021-12-02

## 2021-02-26 RX ORDER — OMEPRAZOLE 40 MG/1
40 CAPSULE, DELAYED RELEASE ORAL
Qty: 90 CAPSULE | Refills: 2 | OUTPATIENT
Start: 2021-02-26

## 2021-02-26 NOTE — TELEPHONE ENCOUNTER
Patient not seen in over 1 year  30 day supply sent to pharmacy  Triaged scheduling for follow up appointment  No further renewal meds without appointment

## 2021-03-01 ENCOUNTER — TELEPHONE (OUTPATIENT)
Dept: GASTROENTEROLOGY | Facility: CLINIC | Age: 66
End: 2021-03-01

## 2021-03-01 ENCOUNTER — TELEMEDICINE (OUTPATIENT)
Dept: RHEUMATOLOGY | Facility: CLINIC | Age: 66
End: 2021-03-01
Payer: COMMERCIAL

## 2021-03-01 DIAGNOSIS — Z87.09 HISTORY OF NASAL POLYPOSIS: ICD-10-CM

## 2021-03-01 DIAGNOSIS — M85.80 OSTEOPENIA, UNSPECIFIED LOCATION: ICD-10-CM

## 2021-03-01 DIAGNOSIS — J45.20 INTERMITTENT ASTHMA WITHOUT COMPLICATION, UNSPECIFIED ASTHMA SEVERITY: ICD-10-CM

## 2021-03-01 DIAGNOSIS — M79.7 FIBROMYALGIA: Primary | ICD-10-CM

## 2021-03-01 DIAGNOSIS — M15.0 PRIMARY GENERALIZED (OSTEO)ARTHRITIS: ICD-10-CM

## 2021-03-01 DIAGNOSIS — Z86.16 HISTORY OF COVID-19: ICD-10-CM

## 2021-03-01 PROCEDURE — 99442 PR PHYS/QHP TELEPHONE EVALUATION 11-20 MIN: CPT | Performed by: INTERNAL MEDICINE
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
## 2021-03-01 NOTE — TELEPHONE ENCOUNTER
Called and spoke to pt whom informed that she did speak to someone and they told her that she is not do for FLIP unitl November  I informed her that she would need an appt to get more refills on her medication and the the call either dropped or pt hung up on me  Will call her again in two weeks to try to schedule an appt if do not hear back from her

## 2021-03-01 NOTE — TELEPHONE ENCOUNTER
----- Message from [facility] sent at 2/26/2021  4:30 PM EST -----  Regarding: Follow up  Schedule follow up  Patient not seen in over 1 year will sent 3o day supply medication, no further renewal without follow up appointment   Thanks

## 2021-03-01 NOTE — TELEPHONE ENCOUNTER
Pt I scheduled for virtual visit on 3/16/21 at 4pm with Dr Ryan Mccoy, however, teams bookings was not working for me to add pt  Will try again later or tomorrow

## 2021-03-04 ENCOUNTER — TELEPHONE (OUTPATIENT)
Dept: INFUSION CENTER | Facility: CLINIC | Age: 66
End: 2021-03-04

## 2021-03-04 NOTE — TELEPHONE ENCOUNTER
Received voicemail from Isabela Zuniga at [place] [address] regarding patient's [place] delivery  She stated Rakesh will not be delivered to [facility] tomorrow 3/5/21 due to  prior authorization  Pulmonary office,    Please call [facility] back with P A information if you have it  If you do not have an active P  A please initiate and let [facility] center know as patient is scheduled on Monday 3/8/21  If we cannot get patient's medication we will have to reschedule her appt      Thank you,    Eileen Trujillo

## 2021-03-05 NOTE — TELEPHONE ENCOUNTER
[employer] and initiated a expedited prior Auth    Pending approval  Called Infusion center and notified [facility] as charmaine

## 2021-03-05 NOTE — TELEPHONE ENCOUNTER
Received call from Diane Headley at [facility]) pulmonary office  Urgent P A initiated today 3/5/21  Mirna Levin appt is 3/9/21 and we will keep appt scheduled  If Tamela Weiss not approved she will call infusion back to cancel

## 2021-03-08 ENCOUNTER — TELEPHONE (OUTPATIENT)
Dept: PULMONOLOGY | Facility: CLINIC | Age: 66
End: 2021-03-08

## 2021-03-08 ENCOUNTER — DOCUMENTATION (OUTPATIENT)
Dept: PULMONOLOGY | Facility: CLINIC | Age: 66
End: 2021-03-08

## 2021-03-08 NOTE — TELEPHONE ENCOUNTER
Patient calling saying she was told by her family doctor that her white blood cell count is low from her last blood work  She is scheduled to get her Jane Bickers injection tomorrow and is wondering if she should still get it being her WBC is low  Please advise

## 2021-03-08 NOTE — TELEPHONE ENCOUNTER
Patient's white count has been low since her COVID-19 infection  I called her back and left a message  I told her as long as she is feeling well she can proceed with her injection tomorrow  She should call our office with any questions or concerns

## 2021-03-09 ENCOUNTER — HOSPITAL ENCOUNTER (OUTPATIENT)
Dept: INFUSION CENTER | Facility: CLINIC | Age: 66
End: 2021-03-09

## 2021-03-11 ENCOUNTER — TELEPHONE (OUTPATIENT)
Dept: INFUSION CENTER | Facility: CLINIC | Age: 66
End: 2021-03-11

## 2021-03-11 DIAGNOSIS — U07.1 2019 NOVEL CORONAVIRUS DISEASE (COVID-19): ICD-10-CM

## 2021-03-11 PROCEDURE — U0003 INFECTIOUS AGENT DETECTION BY NUCLEIC ACID (DNA OR RNA); SEVERE ACUTE RESPIRATORY SYNDROME CORONAVIRUS 2 (SARS-COV-2) (CORONAVIRUS DISEASE [COVID-19]), AMPLIFIED PROBE TECHNIQUE, MAKING USE OF HIGH THROUGHPUT TECHNOLOGIES AS DESCRIBED BY CMS-2020-01-R: HCPCS | Performed by: FAMILY MEDICINE

## 2021-03-11 PROCEDURE — U0005 INFEC AGEN DETEC AMPLI PROBE: HCPCS | Performed by: FAMILY MEDICINE

## 2021-03-11 NOTE — TELEPHONE ENCOUNTER
I spoke with Ld Mendez today to cancel her appointment tomorrow 3/12 due to lack of drug from the outside pharmacy  Unfortunately we do not have an expected delivery date  I suggested she reach out to the ordering providers office who coordinates with the pharmacy to obtain an expected delivery date before we reschedule as this is the second time that we have rescheduled this appointment due to lack of drug  Patient is comfortable with this plan

## 2021-03-12 ENCOUNTER — LAB (OUTPATIENT)
Dept: LAB | Facility: CLINIC | Age: 66
End: 2021-03-12
Payer: COMMERCIAL

## 2021-03-12 ENCOUNTER — HOSPITAL ENCOUNTER (OUTPATIENT)
Dept: INFUSION CENTER | Facility: CLINIC | Age: 66
End: 2021-03-12

## 2021-03-12 ENCOUNTER — TRANSCRIBE ORDERS (OUTPATIENT)
Dept: LAB | Facility: CLINIC | Age: 66
End: 2021-03-12

## 2021-03-12 DIAGNOSIS — D72.819 LEUKOPENIA, UNSPECIFIED TYPE: Primary | ICD-10-CM

## 2021-03-12 DIAGNOSIS — D72.819 LEUKOPENIA, UNSPECIFIED TYPE: ICD-10-CM

## 2021-03-12 LAB
BASOPHILS # BLD AUTO: 0.01 THOUSANDS/ΜL (ref 0–0.1)
BASOPHILS NFR BLD AUTO: 0 % (ref 0–1)
EOSINOPHIL # BLD AUTO: 0 THOUSAND/ΜL (ref 0–0.61)
EOSINOPHIL NFR BLD AUTO: 0 % (ref 0–6)
ERYTHROCYTE [DISTWIDTH] IN BLOOD BY AUTOMATED COUNT: 12.8 % (ref 11.6–15.1)
HCT VFR BLD AUTO: 36.6 % (ref 34.8–46.1)
HGB BLD-MCNC: 11.9 G/DL (ref 11.5–15.4)
IMM GRANULOCYTES # BLD AUTO: 0.02 THOUSAND/UL (ref 0–0.2)
IMM GRANULOCYTES NFR BLD AUTO: 0 % (ref 0–2)
LYMPHOCYTES # BLD AUTO: 2.33 THOUSANDS/ΜL (ref 0.6–4.47)
LYMPHOCYTES NFR BLD AUTO: 31 % (ref 14–44)
MCH RBC QN AUTO: 32.2 PG (ref 26.8–34.3)
MCHC RBC AUTO-ENTMCNC: 32.5 G/DL (ref 31.4–37.4)
MCV RBC AUTO: 99 FL (ref 82–98)
MONOCYTES # BLD AUTO: 0.6 THOUSAND/ΜL (ref 0.17–1.22)
MONOCYTES NFR BLD AUTO: 8 % (ref 4–12)
NEUTROPHILS # BLD AUTO: 4.46 THOUSANDS/ΜL (ref 1.85–7.62)
NEUTS SEG NFR BLD AUTO: 61 % (ref 43–75)
NRBC BLD AUTO-RTO: 0 /100 WBCS
PLATELET # BLD AUTO: 262 THOUSANDS/UL (ref 149–390)
PMV BLD AUTO: 9.9 FL (ref 8.9–12.7)
RBC # BLD AUTO: 3.7 MILLION/UL (ref 3.81–5.12)
SARS-COV-2 RNA RESP QL NAA+PROBE: NEGATIVE
WBC # BLD AUTO: 7.42 THOUSAND/UL (ref 4.31–10.16)

## 2021-03-12 PROCEDURE — 85025 COMPLETE CBC W/AUTO DIFF WBC: CPT

## 2021-03-12 PROCEDURE — 36415 COLL VENOUS BLD VENIPUNCTURE: CPT

## 2021-03-16 NOTE — PROGRESS NOTES
Faxed clinical information to [facility], and undated patient regarding prior authorization

## 2021-03-16 NOTE — PROGRESS NOTES
Patient calling would like an update regarding her Gomes Nitin prior auth   Patient states the Infusion center doesn't have her Fasenra medication  Patient states she hasn't had Gomes Nitin for two weeks

## 2021-03-22 NOTE — PROGRESS NOTES
Called insurance for an update  Was told they did not receive clinicals that were requested  I informed them that clinicals were faxed on 3/16/21   I re faxed all clinicals and office notes today to the fax number provided to me [phone]

## 2021-03-23 NOTE — PROGRESS NOTES
Prior Auth for Francisca Ramos was submitted and is pending approval   Pt expresses interest in possibly trying [address] please advise

## 2021-03-23 NOTE — PROGRESS NOTES
Patient calling saying she knows there has been issues with the paperwork for Mireya Taveras and she states if they doesn't work out, she wanted to discuss maybe trying [address]  She has heard it helps with polyps and that is what she has  If she needs to come in for an appt to discuss, she is willing to do that as well  Please advise

## 2021-03-23 NOTE — PROGRESS NOTES
Patient calling again saying her Mireya Taveras paperwork did not go through  She is interested in trying [address] and would like to discuss

## 2021-03-26 NOTE — PROGRESS NOTES
Patient calling asking for an update on her injection as he got a letter stating it was denied  She expressed interest in dupixent   Please advise

## 2021-03-29 NOTE — PROGRESS NOTES
I attached Dr Martin Kaiser in a previous note to advise on starting pt on Dupixent  Awaiting reply  Received update stating Juju Courts has been denied again  Uploaded denial letter in patiients chart

## 2021-03-30 NOTE — PROGRESS NOTES
Called patient she is going to stop into the [place] office to sign the [facility] enrollment forms

## 2021-03-31 DIAGNOSIS — J45.50 SEVERE PERSISTENT ASTHMA WITHOUT COMPLICATION: Primary | ICD-10-CM

## 2021-03-31 RX ORDER — AZITHROMYCIN 250 MG/1
250 TABLET, FILM COATED ORAL EVERY 24 HOURS
Qty: 6 TABLET | Refills: 0 | Status: SHIPPED | OUTPATIENT
Start: 2021-03-31 | End: 2021-04-05

## 2021-03-31 RX ORDER — PREDNISONE 20 MG/1
40 TABLET ORAL DAILY
Qty: 10 TABLET | Refills: 0 | Status: SHIPPED | OUTPATIENT
Start: 2021-03-31 | End: 2021-05-19 | Stop reason: ALTCHOICE

## 2021-04-07 NOTE — PROGRESS NOTES
Called pt back advised her I resubmitted the auth directly to insurance on Monday and I am waiting to hear back from them

## 2021-04-13 NOTE — PROGRESS NOTES
Patient calling stating she received a denial letter for [address] and wants to know where to go from here  Please contact patient   [phone]

## 2021-04-13 NOTE — PROGRESS NOTES
Called pt back advised her I spoke with Dr Madeline Banda and we will try to get Free Drug for her for the [facility]

## 2021-04-16 ENCOUNTER — APPOINTMENT (OUTPATIENT)
Dept: LAB | Facility: CLINIC | Age: 66
End: 2021-04-16
Payer: COMMERCIAL

## 2021-04-16 DIAGNOSIS — Z11.59 SPECIAL SCREENING EXAMINATION FOR UNSPECIFIED VIRAL DISEASE: Primary | ICD-10-CM

## 2021-04-16 PROCEDURE — 86769 SARS-COV-2 COVID-19 ANTIBODY: CPT

## 2021-04-16 PROCEDURE — 36415 COLL VENOUS BLD VENIPUNCTURE: CPT

## 2021-04-18 LAB
SARS-COV-2 IGG SERPL QL IA: REACTIVE
SARS-COV-2 IGG+IGM SERPL QL IA: REACTIVE

## 2021-05-18 RX ORDER — SACCHAROMYCES BOULARDII 250 MG
CAPSULE ORAL
COMMUNITY
Start: 2021-03-31 | End: 2021-12-02

## 2021-05-19 ENCOUNTER — OFFICE VISIT (OUTPATIENT)
Dept: PULMONOLOGY | Facility: CLINIC | Age: 66
End: 2021-05-19
Payer: COMMERCIAL

## 2021-05-19 VITALS
SYSTOLIC BLOOD PRESSURE: 122 MMHG | HEART RATE: 73 BPM | DIASTOLIC BLOOD PRESSURE: 70 MMHG | HEIGHT: 60 IN | WEIGHT: 147.4 LBS | TEMPERATURE: 98 F | BODY MASS INDEX: 28.94 KG/M2 | RESPIRATION RATE: 16 BRPM | OXYGEN SATURATION: 98 %

## 2021-05-19 DIAGNOSIS — R10.84 GENERALIZED ABDOMINAL PAIN: ICD-10-CM

## 2021-05-19 DIAGNOSIS — R10.13 EPIGASTRIC DISCOMFORT: ICD-10-CM

## 2021-05-19 DIAGNOSIS — J45.50 SEVERE PERSISTENT ASTHMA, UNSPECIFIED WHETHER COMPLICATED: ICD-10-CM

## 2021-05-19 DIAGNOSIS — J45.50 SEVERE PERSISTENT ASTHMA WITHOUT COMPLICATION: Primary | ICD-10-CM

## 2021-05-19 DIAGNOSIS — Z86.16 HISTORY OF COVID-19: ICD-10-CM

## 2021-05-19 PROCEDURE — 99214 OFFICE O/P EST MOD 30 MIN: CPT | Performed by: INTERNAL MEDICINE

## 2021-05-19 PROCEDURE — 1124F ACP DISCUSS-NO DSCNMKR DOCD: CPT | Performed by: INTERNAL MEDICINE

## 2021-05-19 RX ORDER — MONTELUKAST SODIUM 10 MG/1
10 TABLET ORAL
Qty: 30 TABLET | Refills: 5 | Status: SHIPPED | OUTPATIENT
Start: 2021-05-19 | End: 2021-10-25 | Stop reason: SDUPTHER

## 2021-05-19 NOTE — ASSESSMENT & PLAN NOTE
She will continue with Advair twice daily and albuterol as needed  We will submit paperwork on her behalf in order to get Asmita Hampton covered at no cost to her  Ideally, she would rather be on Dupixent, however that was denied by her insurance company  I encouraged her to follow-up with her allergist and/or ENT to see if they would be able to try submitting for coverage on their part

## 2021-05-19 NOTE — ASSESSMENT & PLAN NOTE
She had a mild case of COVID in January/February  She has mostly recovered, but continues to have some signs and symptoms of "long haul" syndrome  I did encourage her to move forward with getting the COVID vaccine

## 2021-05-19 NOTE — PROGRESS NOTES
Pulmonary Follow Up Note   Autumn Romano 77 y o  female MRN: [id number]  5/19/2021      Assessment/Plan:     Severe persistent asthma without complication   She will continue with Advair twice daily and albuterol as needed  We will submit paperwork on her behalf in order to get Rakesh covered at no cost to her  Ideally, she would rather be on Dupixent, however that was denied by her insurance company  I encouraged her to follow-up with her allergist and/or ENT to see if they would be able to try submitting for coverage on their part  Epigastric discomfort    Patient complains of epigastric and right upper quadrant pain  At her request, I have ordered an abdominal ultrasound and will defer any ongoing workup to her primary care physician  History of COVID-19    She had a mild case of COVID in January/February  She has mostly recovered, but continues to have some signs and symptoms of "long haul" syndrome  I did encourage her to move forward with getting the COVID vaccine  Visit orders:    Diagnoses and all orders for this visit:    Severe persistent asthma without complication    Severe persistent asthma, unspecified whether complicated  -     Rafat Jensen 250-50 MCG/DOSE inhaler; Inhale 1 puff 2 (two) times a day Rinse mouth after use  -     montelukast (SINGULAIR) 10 mg tablet; Take 1 tablet (10 mg total) by mouth daily at bedtime    History of COVID-19    Generalized abdominal pain  -     US abdomen complete; Future    Epigastric discomfort    Other orders  -     clarithromycin (BIAXIN XL) 500 MG 24 hr tablet; TAKE 2 TABLETS BY MOUTH DAILY WITH FOOD  -     Florastor 250 MG capsule; TAKE 1 CAPSULE BY MOUTH TWO TIMES A DAY        History of Present Illness   HPI:  Autumn Romano is a 77 y o  female who  Is here today for follow-up regarding severe persistent asthma  Her pulmonary status has been overall stable    She is using Advair at least once per day, but sometimes forgets the second dose of the day   She has not needed her rescue inhaler on any regular basis  She continues to recover from [place] from back in February  She still has excessive fatigue and "brain fog"  She has some sinus congestion, though but it has been stable  She has not gone back to ENT  She has also not followed up with her allergist   We tried getting approval for Dupixent, but was denied  We are now in the process of trying to get her back on Fasenra  She feels that her asthma control was better with the injections  She has been off of prednisone  Patient complains of right upper quadrant and midepigastric pain  Her heartburn is well controlled  No vomiting or diarrhea  Review of Systems   Constitutional: Negative for chills, fever and unexpected weight change  HENT: Positive for postnasal drip and sinus pressure  Negative for sore throat  Eyes: Negative for visual disturbance  Respiratory:        As noted in HPI   Cardiovascular: Negative for chest pain  Gastrointestinal: Positive for abdominal pain  Negative for diarrhea and vomiting  Musculoskeletal: Negative for arthralgias  Skin: Negative for rash  Neurological: Negative for headaches  Hematological: Negative for adenopathy  Psychiatric/Behavioral: Negative  All other systems reviewed and are negative          Medical, Family and Social history reviewed and updated as appropriate    Historical Information   Past Medical History:   Diagnosis Date    Anesthesia complication     desaturation of oxygen mostly at night- on O2 at 2L at hs-may wake up with asthma issue    Anxiety     Asthma     Argueta esophagus     DVT (deep vein thrombosis) in pregnancy     DVT (deep venous thrombosis) (HCC)     during pregnancy    Hiatal hernia     Hypercholesteremia     Nasal polyps     Sinus disorder     Sinusitis, chronic      Past Surgical History:   Procedure Laterality Date     SECTION N/A     x 2     SECTION      COLONOSCOPY      ESOPHAGOGASTRODUODENOSCOPY      HYSTERECTOMY      complete    HYSTERECTOMY      NASAL POLYP SURGERY      x2    NASAL POLYP SURGERY  02/12/2018    AR COLSC FLX W/RMVL OF TUMOR POLYP LESION SNARE TQ N/A 6/6/2017    Procedure: COLONOSCOPYwith  snare polypectomy ;  Surgeon: Patrizia Todd MD;  Location: [place] [address] [facility]; Service: Gastroenterology    AR EGD TRANSORAL BIOPSY SINGLE/MULTIPLE N/A 6/6/2017    Procedure: ESOPHAGOGASTRODUODENOSCOPY (EGD)and biopsy ;  Surgeon: Patrizia Todd MD;  Location: [place] [address] [facility];   Service: Gastroenterology    TONSILLECTOMY N/A     TONSILLECTOMY      VEIN LIGATION AND STRIPPING Bilateral      Family History   Problem Relation Age of Onset    Heart disease Mother         CHF    Breast cancer additional onset Mother 54    Breast cancer Mother 52    Dysphagia Father     Breast cancer Maternal Aunt     Breast cancer Paternal Aunt        Social History     Tobacco Use   Smoking Status Never Smoker   Smokeless Tobacco Never Used     Meds/Allergies     Current Outpatient Medications:     acetaminophen (TYLENOL) 325 mg tablet, Take 650 mg by mouth every 6 (six) hours as needed for mild pain, Disp: , Rfl:     albuterol (PROAIR HFA) 90 mcg/act inhaler, Inhale 2 puffs every 6 (six) hours as needed for wheezing, Disp: 8 5 g, Rfl: 5    albuterol (ProAir HFA) 90 mcg/act inhaler, Inhale 2 puffs every 6 (six) hours as needed for wheezing, Disp: 8 5 g, Rfl: 5    ALPRAZolam (XANAX) 0 5 mg tablet, as needed , Disp: , Rfl: 0    budesonide (PULMICORT) 0 5 mg/2 mL nebulizer solution, Take 1 vial (0 5 mg total) by nebulization 2 (two) times a day, Disp: 60 vial, Rfl: 5    cetirizine (ZyrTEC) 10 mg tablet, Take 10 mg by mouth every morning  , Disp: , Rfl:     cholecalciferol (VITAMIN D3) 1,000 units tablet, Take 4,000 Units by mouth daily, Disp: , Rfl:     cholecalciferol (VITAMIN D3) 1,000 units tablet, Take 2 tablets (2,000 Units total) by mouth daily, Disp: 10 tablet, Rfl: 0   clarithromycin (BIAXIN XL) 500 MG 24 hr tablet, TAKE 2 TABLETS BY MOUTH DAILY WITH FOOD, Disp: , Rfl:     diclofenac sodium (VOLTAREN) 1 %, Apply 2 g topically 3 (three) times a day as needed (Pain), Disp: 1 Tube, Rfl: 2    EPINEPHrine (EPIPEN) 0 3 mg/0 3 mL SOAJ, inject 0 3 milliliters ( 0 3 milligrams ) intramuscularly in OUTE     (REFER TO PRESCRIPTION NOTES)  , Disp: , Rfl:     famciclovir (FAMVIR) 500 mg tablet, , Disp: , Rfl: 0    famotidine (PEPCID) 20 mg tablet, Take 1 tablet (20 mg total) by mouth 2 (two) times a day, Disp: 30 tablet, Rfl: 0    FASENRA, 30 mg every 56 days , Disp: , Rfl:     Florastor 250 MG capsule, TAKE 1 CAPSULE BY MOUTH TWO TIMES A DAY, Disp: , Rfl:     fluticasone (FLONASE) 50 mcg/act nasal spray, 1 spray into each nostril daily, Disp: 1 Bottle, Rfl: 0    LACTOBACILLUS ACID-PECTIN PO, Take by mouth, Disp: , Rfl:     levalbuterol (XOPENEX) 1 25 mg/0 5 mL nebulizer solution, Take 0 5 mL (1 25 mg total) by nebulization every 8 (eight) hours as needed for wheezing, Disp: 90 vial, Rfl: 5    loratadine (CLARITIN) 10 mg tablet, Take 1 tablet (10 mg total) by mouth daily, Disp: 30 tablet, Rfl: 0    meclizine (ANTIVERT) 12 5 MG tablet, Take 1 tablet (12 5 mg total) by mouth every 8 (eight) hours as needed for dizziness or nausea, Disp: 30 tablet, Rfl: 1    montelukast (SINGULAIR) 10 mg tablet, Take 1 tablet (10 mg total) by mouth daily at bedtime, Disp: 30 tablet, Rfl: 5    multivitamin-minerals (CENTRUM ADULTS) tablet, Take 1 tablet by mouth daily, Disp: 30 tablet, Rfl: 0    mupirocin (BACTROBAN) 2 % ointment, apply to affected area twice a day  TO LOWER NASAL AREA, Disp: , Rfl: 0    omeprazole (PriLOSEC) 40 MG capsule, Take 40 mg by mouth daily before breakfast Take 30 minutes prior, Disp: , Rfl: 0    promethazine-codeine (PHENERGAN WITH CODEINE) 6 25-10 mg/5 mL syrup, Take 5 mL by mouth every 6 (six) hours as needed for cough, Disp: 120 mL, Rfl: 0    sodium chloride 0 9 % irrigation, Irrigate with as directed daily Irrigation in nose per patinet, Disp: , Rfl: 0    traZODone (DESYREL) 100 mg tablet, Take 100 mg by mouth daily at bedtime as needed for sleep , Disp: , Rfl: 0    Wixela Inhub 250-50 MCG/DOSE inhaler, Inhale 1 puff 2 (two) times a day Rinse mouth after use , Disp: 1 each, Rfl: 5    EPINEPHrine (EPIPEN) 0 3 mg/0 3 mL SOAJ, Inject 0 3 mL (0 3 mg total) into a muscle once for 1 dose, Disp: 0 3 mL, Rfl: 3    omeprazole (PriLOSEC) 40 MG capsule, Take 1 capsule (40 mg total) by mouth daily, Disp: 30 capsule, Rfl: 0  Allergies   Allergen Reactions    Livalo [Pitavastatin] Hives and Shortness Of Breath     Nausea    Cefditoren Nausea Only and Vomiting    Cefditoren Pivoxil Hives     N/V    Dust Mite Extract Sneezing    Mold Extract [Trichophyton] Sneezing     Vitals: Blood pressure 122/70, pulse 73, temperature 98 °F (36 7 °C), temperature source Tympanic, resp  rate 16, height 5' (1 524 m), weight 66 9 kg (147 lb 6 4 oz), SpO2 98 %, not currently breastfeeding  Body mass index is 28 79 kg/m²  Oxygen Therapy  SpO2: 98 %    Physical Exam   Physical Exam  Constitutional:       General: She is not in acute distress  HENT:      Head: Normocephalic  Nose: Congestion present  Mouth/Throat:      Pharynx: No oropharyngeal exudate  Eyes:      General: No scleral icterus  Neck:      Musculoskeletal: Neck supple  Vascular: No JVD  Cardiovascular:      Rate and Rhythm: Normal rate and regular rhythm  Pulmonary:      Breath sounds: No wheezing or rhonchi  Abdominal:      Palpations: Abdomen is soft  Tenderness: There is abdominal tenderness (  Mild right upper quadrant tenderness to palpation)  Musculoskeletal:         General: No swelling  Skin:     General: Skin is warm and dry  Neurological:      Mental Status: She is alert and oriented to person, place, and time     Psychiatric:         Mood and Affect: Mood normal          Behavior: Behavior normal        Labs: I have personally reviewed pertinent lab results    Lab Results   Component Value Date    WBC 7 42 03/12/2021    HGB 11 9 03/12/2021    HCT 36 6 03/12/2021    MCV 99 (H) 03/12/2021     03/12/2021     Lab Results   Component Value Date    GLUCOSE 91 05/02/2020    CALCIUM 8 4 02/08/2021     05/08/2015    K 3 8 02/08/2021    CO2 25 02/08/2021     (H) 02/08/2021    BUN 14 02/08/2021    CREATININE 0 70 02/08/2021     Lab Results   Component Value Date    IGE 64 0 08/08/2018     Lab Results   Component Value Date    ALT 47 02/08/2021    AST 38 02/08/2021    ALKPHOS 90 02/08/2021    BILITOT 0 48 05/08/2015

## 2021-05-19 NOTE — ASSESSMENT & PLAN NOTE
Patient complains of epigastric and right upper quadrant pain  At her request, I have ordered an abdominal ultrasound and will defer any ongoing workup to her primary care physician

## 2021-05-24 ENCOUNTER — HOSPITAL ENCOUNTER (OUTPATIENT)
Dept: ULTRASOUND IMAGING | Facility: HOSPITAL | Age: 66
Discharge: HOME | End: 2021-05-24
Attending: INTERNAL MEDICINE
Payer: COMMERCIAL

## 2021-05-24 DIAGNOSIS — R10.84 GENERALIZED ABDOMINAL PAIN: ICD-10-CM

## 2021-05-24 PROCEDURE — 76700 US EXAM ABDOM COMPLETE: CPT

## 2021-05-26 NOTE — PROGRESS NOTES
Patient was called and made aware she has been approved for the free Drug Program for Jane Newsome  The patient stated she is going to an allergist and wants to wait to see if they can get the [address] approved and if they can not then she will take the fasenra  I advised her to call back once she made a decision

## 2021-07-11 NOTE — ED NOTES
PT eating without s/s of acute distress        Marychuy Hyatt RN  01/22/18 3928 Airway patent, Nasal mucosa clear. Mouth with normal mucosa.

## 2021-07-19 ENCOUNTER — TELEPHONE (OUTPATIENT)
Dept: INFUSION CENTER | Facility: CLINIC | Age: 66
End: 2021-07-19

## 2021-07-19 NOTE — TELEPHONE ENCOUNTER
Patient's Oriana East was delivered to AN infusion pharmacy but pt does not have scheduled appt  Called pt and left voicemail notifying that we received her medication and requested call back to schedule

## 2021-08-24 ENCOUNTER — TELEPHONE (OUTPATIENT)
Dept: GASTROENTEROLOGY | Facility: CLINIC | Age: 66
End: 2021-08-24

## 2021-08-24 NOTE — TELEPHONE ENCOUNTER
Recall call went out via [employer] in 2020 with no return calls from pt to schedule  Pt is due for a recall colon with Dr Temi Erickson for hx of adenomatous colon polyps  I lmom for pt to please call back to schedule a procedure with Dr Temi Erickson  Will call pt again in one week if do not hear back from her

## 2021-08-31 NOTE — TELEPHONE ENCOUNTER
I lmom for pt to please call back to schedule a procedure with Dr Juan Diego Langort  Will call pt again in two weeks if do not hear back from him

## 2021-09-07 ENCOUNTER — TELEPHONE (OUTPATIENT)
Dept: OBGYN CLINIC | Facility: CLINIC | Age: 66
End: 2021-09-07

## 2021-09-07 ENCOUNTER — PREP FOR PROCEDURE (OUTPATIENT)
Dept: GASTROENTEROLOGY | Facility: CLINIC | Age: 66
End: 2021-09-07

## 2021-09-07 DIAGNOSIS — K22.70 BARRETT'S ESOPHAGUS WITHOUT DYSPLASIA: ICD-10-CM

## 2021-09-07 DIAGNOSIS — Z86.010 HX OF ADENOMATOUS COLONIC POLYPS: Primary | ICD-10-CM

## 2021-09-07 NOTE — TELEPHONE ENCOUNTER
Patient called and scheduled Flip for December with Dr Brady Weiss  She was due for her EGD in November  Thanks!

## 2021-09-07 NOTE — TELEPHONE ENCOUNTER
Reviewed with patient  Denies any urinary issues  Will try coconut oil or monistat and to call if doesn't resolve

## 2021-10-25 ENCOUNTER — OFFICE VISIT (OUTPATIENT)
Dept: PULMONOLOGY | Facility: CLINIC | Age: 66
End: 2021-10-25
Payer: COMMERCIAL

## 2021-10-25 VITALS
SYSTOLIC BLOOD PRESSURE: 124 MMHG | OXYGEN SATURATION: 97 % | WEIGHT: 147 LBS | BODY MASS INDEX: 28.86 KG/M2 | HEIGHT: 60 IN | RESPIRATION RATE: 16 BRPM | DIASTOLIC BLOOD PRESSURE: 76 MMHG | HEART RATE: 81 BPM | TEMPERATURE: 96.5 F

## 2021-10-25 DIAGNOSIS — J33.9 NASAL POLYPS: ICD-10-CM

## 2021-10-25 DIAGNOSIS — J45.30 MILD PERSISTENT ASTHMA WITHOUT COMPLICATION: ICD-10-CM

## 2021-10-25 DIAGNOSIS — J45.50 SEVERE PERSISTENT ASTHMA WITHOUT COMPLICATION: Primary | ICD-10-CM

## 2021-10-25 DIAGNOSIS — J45.50 SEVERE PERSISTENT ASTHMA, UNSPECIFIED WHETHER COMPLICATED: ICD-10-CM

## 2021-10-25 DIAGNOSIS — J32.9 CHRONIC SINUSITIS, UNSPECIFIED LOCATION: ICD-10-CM

## 2021-10-25 PROBLEM — J44.9 CHRONIC OBSTRUCTIVE PULMONARY DISEASE, UNSPECIFIED COPD TYPE (HCC): Status: ACTIVE | Noted: 2021-10-25

## 2021-10-25 PROCEDURE — 99214 OFFICE O/P EST MOD 30 MIN: CPT | Performed by: INTERNAL MEDICINE

## 2021-10-25 RX ORDER — PAROXETINE 10 MG/1
10 TABLET, FILM COATED ORAL EVERY MORNING
COMMUNITY
Start: 2021-10-20

## 2021-10-25 RX ORDER — ALBUTEROL SULFATE 90 UG/1
2 AEROSOL, METERED RESPIRATORY (INHALATION) EVERY 6 HOURS PRN
Qty: 8.5 G | Refills: 5 | Status: SHIPPED | OUTPATIENT
Start: 2021-10-25 | End: 2021-12-02

## 2021-10-25 RX ORDER — EPINEPHRINE 0.3 MG/.3ML
0.3 INJECTION SUBCUTANEOUS ONCE
Qty: 0.6 ML | Refills: 0 | Status: SHIPPED | OUTPATIENT
Start: 2021-10-25 | End: 2021-12-08

## 2021-10-25 RX ORDER — MONTELUKAST SODIUM 10 MG/1
10 TABLET ORAL
Qty: 30 TABLET | Refills: 5 | Status: SHIPPED | OUTPATIENT
Start: 2021-10-25 | End: 2022-06-01

## 2021-10-26 ENCOUNTER — IMMUNIZATIONS (OUTPATIENT)
Dept: FAMILY MEDICINE CLINIC | Facility: HOSPITAL | Age: 66
End: 2021-10-26

## 2021-10-26 DIAGNOSIS — Z23 ENCOUNTER FOR IMMUNIZATION: Primary | ICD-10-CM

## 2021-10-26 PROCEDURE — 0011A COVID-19 MODERNA VACC 0.5 ML: CPT

## 2021-10-26 PROCEDURE — 91301 COVID-19 MODERNA VACC 0.5 ML: CPT

## 2021-11-03 ENCOUNTER — HOSPITAL ENCOUNTER (OUTPATIENT)
Dept: RADIOLOGY | Facility: HOSPITAL | Age: 66
Discharge: HOME | End: 2021-11-03
Payer: COMMERCIAL

## 2021-11-03 ENCOUNTER — HOSPITAL ENCOUNTER (OUTPATIENT)
Dept: RADIOLOGY | Facility: HOSPITAL | Age: 66
Discharge: HOME | End: 2021-11-03
Attending: FAMILY MEDICINE
Payer: COMMERCIAL

## 2021-11-03 VITALS — HEIGHT: 60 IN | BODY MASS INDEX: 28.07 KG/M2 | WEIGHT: 143 LBS

## 2021-11-03 DIAGNOSIS — N64.4 BREAST PAIN, RIGHT: ICD-10-CM

## 2021-11-03 PROCEDURE — G0279 TOMOSYNTHESIS, MAMMO: HCPCS

## 2021-11-03 PROCEDURE — 77066 DX MAMMO INCL CAD BI: CPT

## 2021-11-03 PROCEDURE — 76642 ULTRASOUND BREAST LIMITED: CPT

## 2021-11-12 DIAGNOSIS — J45.50 SEVERE PERSISTENT ASTHMA WITHOUT COMPLICATION: Primary | ICD-10-CM

## 2021-11-12 RX ORDER — EPINEPHRINE 1 MG/ML
0.3 INJECTION, SOLUTION, CONCENTRATE INTRAVENOUS ONCE
Status: CANCELLED | OUTPATIENT
Start: 2021-11-17 | End: 2021-11-17

## 2021-11-17 ENCOUNTER — HOSPITAL ENCOUNTER (OUTPATIENT)
Dept: INFUSION CENTER | Facility: CLINIC | Age: 66
Discharge: HOME | End: 2021-11-17
Payer: COMMERCIAL

## 2021-11-17 VITALS
OXYGEN SATURATION: 98 % | RESPIRATION RATE: 18 BRPM | HEART RATE: 68 BPM | DIASTOLIC BLOOD PRESSURE: 68 MMHG | TEMPERATURE: 97.8 F | SYSTOLIC BLOOD PRESSURE: 115 MMHG

## 2021-11-17 DIAGNOSIS — J45.50 SEVERE PERSISTENT ASTHMA WITHOUT COMPLICATION: Primary | ICD-10-CM

## 2021-11-17 PROCEDURE — 96372 THER/PROPH/DIAG INJ SC/IM: CPT

## 2021-11-17 RX ORDER — EPINEPHRINE 1 MG/ML
0.3 INJECTION, SOLUTION, CONCENTRATE INTRAVENOUS ONCE
Status: DISCONTINUED | OUTPATIENT
Start: 2021-11-17 | End: 2021-11-20 | Stop reason: HOSPADM

## 2021-11-17 RX ORDER — BENRALIZUMAB 30 MG/ML
INJECTION, SOLUTION SUBCUTANEOUS
Qty: 1 ML | Refills: 8 | Status: SHIPPED | OUTPATIENT
Start: 2021-11-17

## 2021-11-17 RX ORDER — EPINEPHRINE 1 MG/ML
0.3 INJECTION, SOLUTION, CONCENTRATE INTRAVENOUS ONCE
Status: CANCELLED | OUTPATIENT
Start: 2022-01-12 | End: 2022-01-12

## 2021-11-17 RX ADMIN — BENRALIZUMAB 30 MG: 30 INJECTION, SOLUTION SUBCUTANEOUS at 15:36

## 2021-11-30 ENCOUNTER — IMMUNIZATIONS (OUTPATIENT)
Dept: FAMILY MEDICINE CLINIC | Facility: HOSPITAL | Age: 66
End: 2021-11-30

## 2021-11-30 ENCOUNTER — TELEPHONE (OUTPATIENT)
Dept: GASTROENTEROLOGY | Facility: CLINIC | Age: 66
End: 2021-11-30

## 2021-11-30 DIAGNOSIS — Z23 ENCOUNTER FOR IMMUNIZATION: Primary | ICD-10-CM

## 2021-11-30 PROCEDURE — 91301 COVID-19 MODERNA VACC 0.5 ML: CPT

## 2021-11-30 PROCEDURE — 0012A COVID-19 MODERNA VACC 0.5 ML: CPT

## 2021-12-01 ENCOUNTER — TELEPHONE (OUTPATIENT)
Dept: PREADMISSION TESTING | Facility: HOSPITAL | Age: 66
End: 2021-12-01

## 2021-12-02 ENCOUNTER — TELEPHONE (OUTPATIENT)
Dept: GASTROENTEROLOGY | Facility: CLINIC | Age: 66
End: 2021-12-02

## 2021-12-02 VITALS — WEIGHT: 150 LBS | HEIGHT: 60 IN | BODY MASS INDEX: 29.45 KG/M2

## 2021-12-02 DIAGNOSIS — Z86.010 HISTORY OF ADENOMATOUS POLYP OF COLON: Primary | ICD-10-CM

## 2021-12-02 PROCEDURE — U0005 INFEC AGEN DETEC AMPLI PROBE: HCPCS | Performed by: INTERNAL MEDICINE

## 2021-12-02 PROCEDURE — U0003 INFECTIOUS AGENT DETECTION BY NUCLEIC ACID (DNA OR RNA); SEVERE ACUTE RESPIRATORY SYNDROME CORONAVIRUS 2 (SARS-COV-2) (CORONAVIRUS DISEASE [COVID-19]), AMPLIFIED PROBE TECHNIQUE, MAKING USE OF HIGH THROUGHPUT TECHNOLOGIES AS DESCRIBED BY CMS-2020-01-R: HCPCS | Performed by: INTERNAL MEDICINE

## 2021-12-02 RX ORDER — SOD SULF/POT CHLORIDE/MAG SULF 1.479 G
TABLET ORAL
Qty: 24 TABLET | Refills: 0 | Status: SHIPPED | OUTPATIENT
Start: 2021-12-02

## 2021-12-02 RX ORDER — CETIRIZINE HYDROCHLORIDE, PSEUDOEPHEDRINE HYDROCHLORIDE 5; 120 MG/1; MG/1
1 TABLET, FILM COATED, EXTENDED RELEASE ORAL 2 TIMES DAILY
COMMUNITY

## 2021-12-02 RX ORDER — LANOLIN ALCOHOL/MO/W.PET/CERES
1 CREAM (GRAM) TOPICAL EVERY MORNING
COMMUNITY

## 2021-12-02 NOTE — PRE-PROCEDURE INSTRUCTIONS
Pre-Surgery Instructions:   Medication Instructions    acetaminophen (TYLENOL) 325 mg tablet Instructed patient per Anesthesia Guidelines   albuterol (ProAir HFA) 90 mcg/act inhaler Use prn    ALPRAZolam (XANAX) 0 5 mg tablet Pt requests to take-(at least 2 hrs prior to arrival)    budesonide (PULMICORT) 0 5 mg/2 mL nebulizer solution Instructed patient per Anesthesia Guidelines   calcium citrate-vitamin D (CITRACAL+D) 315-200 MG-UNIT per tablet Instructed patient per Anesthesia Guidelines   cetirizine-pseudoephedrine (ZyrTEC-D) 5-120 MG per tablet Instructed patient per Anesthesia Guidelines   diclofenac sodium (VOLTAREN) 1 % Instructed patient per Anesthesia Guidelines   EPINEPHrine (EPIPEN) 0 3 mg/0 3 mL SOAJ Instructed patient per Anesthesia Guidelines   famciclovir (FAMVIR) 500 mg tablet Instructed patient per Anesthesia Guidelines   famotidine (PEPCID) 20 mg tablet Instructed patient per Anesthesia Guidelines   Fasenra subcutaneous injection Instructed patient per Anesthesia Guidelines   fluticasone (FLONASE) 50 mcg/act nasal spray use prn    levalbuterol (XOPENEX) 1 25 mg/0 5 mL nebulizer solution Instructed patient per Anesthesia Guidelines   montelukast (SINGULAIR) 10 mg tablet Instructed patient per Anesthesia Guidelines   multivitamin-minerals (CENTRUM ADULTS) tablet Instructed patient per Anesthesia Guidelines   mupirocin (BACTROBAN) 2 % ointment Instructed patient per Anesthesia Guidelines   omeprazole (PriLOSEC) 40 MG capsule Instructed patient per Anesthesia Guidelines   PARoxetine (PAXIL) 10 mg tablet Instructed patient per Anesthesia Guidelines   Probiotic Product (PROBIOTIC PO) Instructed patient per Anesthesia Guidelines   sodium chloride 0 9 % irrigation Instructed patient per Anesthesia Guidelines   traZODone (DESYREL) 100 mg tablet Instructed patient per Anesthesia Guidelines      Wixela Inhub 250-50 MCG/DOSE inhaler use the am of the procedures    Pt to follow Dr Miracle Wei instructions  # given for the office to get prep instructions  Pt to take wixela, requests xanax  Pt may use Pro Air,flonase if needed the am of the procedures    Blanka Aleman [phone]

## 2021-12-08 ENCOUNTER — HOSPITAL ENCOUNTER (OUTPATIENT)
Dept: GASTROENTEROLOGY | Facility: AMBULARY SURGERY CENTER | Age: 66
Setting detail: OUTPATIENT SURGERY
Discharge: HOME | End: 2021-12-08
Attending: INTERNAL MEDICINE
Payer: COMMERCIAL

## 2021-12-08 ENCOUNTER — ANESTHESIA (OUTPATIENT)
Dept: GASTROENTEROLOGY | Facility: AMBULARY SURGERY CENTER | Age: 66
End: 2021-12-08

## 2021-12-08 ENCOUNTER — ANESTHESIA EVENT (OUTPATIENT)
Dept: GASTROENTEROLOGY | Facility: AMBULARY SURGERY CENTER | Age: 66
End: 2021-12-08

## 2021-12-08 VITALS
WEIGHT: 145 LBS | OXYGEN SATURATION: 100 % | TEMPERATURE: 95.9 F | DIASTOLIC BLOOD PRESSURE: 72 MMHG | SYSTOLIC BLOOD PRESSURE: 118 MMHG | BODY MASS INDEX: 28.47 KG/M2 | HEART RATE: 82 BPM | HEIGHT: 60 IN | RESPIRATION RATE: 18 BRPM

## 2021-12-08 DIAGNOSIS — R10.13 EPIGASTRIC PAIN: Primary | ICD-10-CM

## 2021-12-08 DIAGNOSIS — R10.13 EPIGASTRIC DISCOMFORT: ICD-10-CM

## 2021-12-08 DIAGNOSIS — K22.70 BARRETT'S ESOPHAGUS WITHOUT DYSPLASIA: ICD-10-CM

## 2021-12-08 DIAGNOSIS — Z86.010 HX OF ADENOMATOUS COLONIC POLYPS: ICD-10-CM

## 2021-12-08 PROCEDURE — 88305 TISSUE EXAM BY PATHOLOGIST: CPT | Performed by: PATHOLOGY

## 2021-12-08 PROCEDURE — 88342 IMHCHEM/IMCYTCHM 1ST ANTB: CPT | Performed by: PATHOLOGY

## 2021-12-08 PROCEDURE — 43239 EGD BIOPSY SINGLE/MULTIPLE: CPT | Performed by: INTERNAL MEDICINE

## 2021-12-08 PROCEDURE — 88313 SPECIAL STAINS GROUP 2: CPT | Performed by: PATHOLOGY

## 2021-12-08 PROCEDURE — 45380 COLONOSCOPY AND BIOPSY: CPT | Performed by: INTERNAL MEDICINE

## 2021-12-08 PROCEDURE — 45385 COLONOSCOPY W/LESION REMOVAL: CPT | Performed by: INTERNAL MEDICINE

## 2021-12-08 RX ORDER — SODIUM CHLORIDE, SODIUM LACTATE, POTASSIUM CHLORIDE, CALCIUM CHLORIDE 600; 310; 30; 20 MG/100ML; MG/100ML; MG/100ML; MG/100ML
INJECTION, SOLUTION INTRAVENOUS CONTINUOUS PRN
Status: DISCONTINUED | OUTPATIENT
Start: 2021-12-08 | End: 2021-12-08

## 2021-12-08 RX ORDER — PROPOFOL 10 MG/ML
INJECTION, EMULSION INTRAVENOUS AS NEEDED
Status: DISCONTINUED | OUTPATIENT
Start: 2021-12-08 | End: 2021-12-08

## 2021-12-08 RX ORDER — SIMETHICONE 20 MG/.3ML
40 EMULSION ORAL ONCE
Status: COMPLETED | OUTPATIENT
Start: 2021-12-08 | End: 2021-12-08

## 2021-12-08 RX ORDER — SODIUM CHLORIDE, SODIUM LACTATE, POTASSIUM CHLORIDE, CALCIUM CHLORIDE 600; 310; 30; 20 MG/100ML; MG/100ML; MG/100ML; MG/100ML
125 INJECTION, SOLUTION INTRAVENOUS CONTINUOUS
Status: DISCONTINUED | OUTPATIENT
Start: 2021-12-08 | End: 2021-12-12 | Stop reason: HOSPADM

## 2021-12-08 RX ORDER — PROPOFOL 10 MG/ML
INJECTION, EMULSION INTRAVENOUS CONTINUOUS PRN
Status: DISCONTINUED | OUTPATIENT
Start: 2021-12-08 | End: 2021-12-08

## 2021-12-08 RX ADMIN — SODIUM CHLORIDE, SODIUM LACTATE, POTASSIUM CHLORIDE, AND CALCIUM CHLORIDE 125 ML/HR: .6; .31; .03; .02 INJECTION, SOLUTION INTRAVENOUS at 08:21

## 2021-12-08 RX ADMIN — SODIUM CHLORIDE, SODIUM LACTATE, POTASSIUM CHLORIDE, AND CALCIUM CHLORIDE: .6; .31; .03; .02 INJECTION, SOLUTION INTRAVENOUS at 08:56

## 2021-12-08 RX ADMIN — SIMETHICONE 40 MG: 20 SUSPENSION/ DROPS ORAL at 10:18

## 2021-12-08 RX ADMIN — PROPOFOL 180 MG: 10 INJECTION, EMULSION INTRAVENOUS at 09:03

## 2021-12-08 RX ADMIN — LIDOCAINE HYDROCHLORIDE 40 MG: 20 INJECTION, SOLUTION INTRAVENOUS at 09:03

## 2021-12-08 RX ADMIN — PROPOFOL 160 MCG/KG/MIN: 10 INJECTION, EMULSION INTRAVENOUS at 09:03

## 2021-12-09 ENCOUNTER — APPOINTMENT (OUTPATIENT)
Dept: LAB | Facility: CLINIC | Age: 66
End: 2021-12-09
Payer: COMMERCIAL

## 2021-12-09 DIAGNOSIS — E78.00 PURE HYPERCHOLESTEROLEMIA: Primary | ICD-10-CM

## 2021-12-09 DIAGNOSIS — R10.13 EPIGASTRIC DISCOMFORT: ICD-10-CM

## 2021-12-09 LAB
BASOPHILS # BLD AUTO: 0.01 THOUSANDS/ΜL (ref 0–0.1)
BASOPHILS NFR BLD AUTO: 0 % (ref 0–1)
EOSINOPHIL # BLD AUTO: 0.01 THOUSAND/ΜL (ref 0–0.61)
EOSINOPHIL NFR BLD AUTO: 0 % (ref 0–6)
ERYTHROCYTE [DISTWIDTH] IN BLOOD BY AUTOMATED COUNT: 11.6 % (ref 11.6–15.1)
HCT VFR BLD AUTO: 42 % (ref 34.8–46.1)
HGB BLD-MCNC: 13.7 G/DL (ref 11.5–15.4)
IMM GRANULOCYTES # BLD AUTO: 0.02 THOUSAND/UL (ref 0–0.2)
IMM GRANULOCYTES NFR BLD AUTO: 0 % (ref 0–2)
LIPASE SERPL-CCNC: 190 U/L (ref 73–393)
LYMPHOCYTES # BLD AUTO: 2.42 THOUSANDS/ΜL (ref 0.6–4.47)
LYMPHOCYTES NFR BLD AUTO: 36 % (ref 14–44)
MCH RBC QN AUTO: 32.8 PG (ref 26.8–34.3)
MCHC RBC AUTO-ENTMCNC: 32.6 G/DL (ref 31.4–37.4)
MCV RBC AUTO: 101 FL (ref 82–98)
MONOCYTES # BLD AUTO: 0.54 THOUSAND/ΜL (ref 0.17–1.22)
MONOCYTES NFR BLD AUTO: 8 % (ref 4–12)
NEUTROPHILS # BLD AUTO: 3.67 THOUSANDS/ΜL (ref 1.85–7.62)
NEUTS SEG NFR BLD AUTO: 56 % (ref 43–75)
NRBC BLD AUTO-RTO: 0 /100 WBCS
PLATELET # BLD AUTO: 272 THOUSANDS/UL (ref 149–390)
PMV BLD AUTO: 9.2 FL (ref 8.9–12.7)
RBC # BLD AUTO: 4.18 MILLION/UL (ref 3.81–5.12)
WBC # BLD AUTO: 6.67 THOUSAND/UL (ref 4.31–10.16)

## 2021-12-09 PROCEDURE — 83690 ASSAY OF LIPASE: CPT

## 2021-12-09 PROCEDURE — 85025 COMPLETE CBC W/AUTO DIFF WBC: CPT

## 2021-12-16 ENCOUNTER — ANNUAL EXAM (OUTPATIENT)
Dept: OBGYN CLINIC | Facility: CLINIC | Age: 66
End: 2021-12-16
Payer: COMMERCIAL

## 2021-12-16 VITALS
WEIGHT: 145 LBS | DIASTOLIC BLOOD PRESSURE: 60 MMHG | HEIGHT: 60 IN | BODY MASS INDEX: 28.47 KG/M2 | SYSTOLIC BLOOD PRESSURE: 112 MMHG

## 2021-12-16 DIAGNOSIS — B37.3 YEAST VAGINITIS: ICD-10-CM

## 2021-12-16 DIAGNOSIS — Z12.31 ENCOUNTER FOR SCREENING MAMMOGRAM FOR MALIGNANT NEOPLASM OF BREAST: ICD-10-CM

## 2021-12-16 DIAGNOSIS — Z12.4 PAP SMEAR FOR CERVICAL CANCER SCREENING: ICD-10-CM

## 2021-12-16 DIAGNOSIS — Z01.419 ENCOUNTER FOR ANNUAL ROUTINE GYNECOLOGICAL EXAMINATION: Primary | ICD-10-CM

## 2021-12-16 DIAGNOSIS — Z13.820 SCREENING FOR OSTEOPOROSIS: ICD-10-CM

## 2021-12-16 PROBLEM — B37.31 YEAST VAGINITIS: Status: ACTIVE | Noted: 2021-12-16

## 2021-12-16 PROCEDURE — 99397 PER PM REEVAL EST PAT 65+ YR: CPT | Performed by: OBSTETRICS & GYNECOLOGY

## 2021-12-16 PROCEDURE — G0145 SCR C/V CYTO,THINLAYER,RESCR: HCPCS | Performed by: OBSTETRICS & GYNECOLOGY

## 2021-12-16 PROCEDURE — G0476 HPV COMBO ASSAY CA SCREEN: HCPCS | Performed by: OBSTETRICS & GYNECOLOGY

## 2021-12-16 RX ORDER — FLUCONAZOLE 150 MG/1
150 TABLET ORAL EVERY OTHER DAY
Qty: 2 TABLET | Refills: 0 | Status: SHIPPED | OUTPATIENT
Start: 2021-12-16 | End: 2021-12-19

## 2021-12-18 LAB
HPV HR 12 DNA CVX QL NAA+PROBE: NEGATIVE
HPV16 DNA CVX QL NAA+PROBE: NEGATIVE
HPV18 DNA CVX QL NAA+PROBE: NEGATIVE

## 2021-12-22 ENCOUNTER — TELEPHONE (OUTPATIENT)
Dept: GASTROENTEROLOGY | Facility: CLINIC | Age: 66
End: 2021-12-22

## 2021-12-22 DIAGNOSIS — K22.70 BARRETT'S ESOPHAGUS WITHOUT DYSPLASIA: Primary | ICD-10-CM

## 2021-12-22 RX ORDER — OMEPRAZOLE 40 MG/1
40 CAPSULE, DELAYED RELEASE ORAL DAILY
Qty: 90 CAPSULE | Refills: 1 | Status: SHIPPED | OUTPATIENT
Start: 2021-12-22 | End: 2022-03-03 | Stop reason: ALTCHOICE

## 2021-12-27 LAB
LAB AP GYN PRIMARY INTERPRETATION: NORMAL
Lab: NORMAL

## 2022-01-18 DIAGNOSIS — J45.50 SEVERE PERSISTENT ASTHMA WITHOUT COMPLICATION: Primary | ICD-10-CM

## 2022-01-18 RX ORDER — EPINEPHRINE 1 MG/ML
0.3 INJECTION, SOLUTION, CONCENTRATE INTRAVENOUS ONCE
OUTPATIENT
Start: 2022-01-21 | End: 2022-01-21

## 2022-01-21 ENCOUNTER — HOSPITAL ENCOUNTER (OUTPATIENT)
Dept: INFUSION CENTER | Facility: CLINIC | Age: 67
Discharge: HOME | End: 2022-01-21

## 2022-01-21 ENCOUNTER — TELEPHONE (OUTPATIENT)
Dept: INFUSION CENTER | Facility: CLINIC | Age: 67
End: 2022-01-21

## 2022-01-21 NOTE — TELEPHONE ENCOUNTER
Pt called Virgilio Dias and left VM to cancel her Roberto Eve appt for 1/21/22 due to receiving paper work in mail from her insurance company stating her Blenda Bile has been denied  appt has been canceled as pt requested

## 2022-01-25 ENCOUNTER — TELEPHONE (OUTPATIENT)
Dept: PULMONOLOGY | Facility: CLINIC | Age: 67
End: 2022-01-25

## 2022-01-25 NOTE — TELEPHONE ENCOUNTER
Patient needs to call and do an appeal  I called and spoke to [facility] insurance and the office can not do an appeal it must come from the patient  I did leave a voicemail explaining this to the patient as well

## 2022-01-25 NOTE — TELEPHONE ENCOUNTER
Received a denail for Pattie Jaime called the insurance company and they advised me that the doctors office can not do an appeal it has to come from the patient       I lvm advising pt of this I left the fax number [phone] is where the letter needs to be sent for the appeal

## 2022-01-31 NOTE — TELEPHONE ENCOUNTER
Spoke with pt, she stated she spoke with the insurance and was told that pt no longer qualifies for medication  Pt is confused and would like a call back to further discuss

## 2022-02-14 ENCOUNTER — HOSPITAL ENCOUNTER (OUTPATIENT)
Dept: RADIOLOGY | Facility: HOSPITAL | Age: 67
Discharge: HOME | End: 2022-02-14
Attending: INTERNAL MEDICINE
Payer: COMMERCIAL

## 2022-02-14 DIAGNOSIS — R10.13 EPIGASTRIC PAIN: ICD-10-CM

## 2022-02-14 PROCEDURE — G1004 CDSM NDSC: HCPCS

## 2022-02-14 PROCEDURE — 74177 CT ABD & PELVIS W/CONTRAST: CPT

## 2022-02-14 RX ADMIN — IOHEXOL 100 ML: 350 INJECTION, SOLUTION INTRAVENOUS at 08:47

## 2022-02-21 ENCOUNTER — TELEPHONE (OUTPATIENT)
Dept: GASTROENTEROLOGY | Facility: CLINIC | Age: 67
End: 2022-02-21

## 2022-02-21 NOTE — TELEPHONE ENCOUNTER
Pt sajan asking for a call back to discuss results  Please call her back at [phone]  Thank you!

## 2022-02-21 NOTE — TELEPHONE ENCOUNTER
Spoke to the patient about her Cat scan results and let her know that all was normal and since she is still having pain she will speak to Dr Cl Muse at her upcoming visit

## 2022-02-22 DIAGNOSIS — J45.50 SEVERE PERSISTENT ASTHMA WITHOUT COMPLICATION: ICD-10-CM

## 2022-02-22 RX ORDER — ALBUTEROL SULFATE 90 UG/1
AEROSOL, METERED RESPIRATORY (INHALATION)
Qty: 18 G | Refills: 5 | Status: SHIPPED | OUTPATIENT
Start: 2022-02-22 | End: 2022-05-20

## 2022-02-28 ENCOUNTER — HOSPITAL ENCOUNTER (OUTPATIENT)
Dept: RADIOLOGY | Facility: HOSPITAL | Age: 67
Discharge: HOME | End: 2022-02-28
Attending: OBSTETRICS & GYNECOLOGY
Payer: COMMERCIAL

## 2022-02-28 DIAGNOSIS — Z13.820 SCREENING FOR OSTEOPOROSIS: ICD-10-CM

## 2022-02-28 PROCEDURE — 77080 DXA BONE DENSITY AXIAL: CPT

## 2022-03-03 ENCOUNTER — OFFICE VISIT (OUTPATIENT)
Dept: GASTROENTEROLOGY | Facility: CLINIC | Age: 67
End: 2022-03-03
Payer: COMMERCIAL

## 2022-03-03 VITALS
WEIGHT: 146 LBS | BODY MASS INDEX: 28.66 KG/M2 | SYSTOLIC BLOOD PRESSURE: 113 MMHG | DIASTOLIC BLOOD PRESSURE: 71 MMHG | HEART RATE: 82 BPM | HEIGHT: 60 IN

## 2022-03-03 DIAGNOSIS — K22.70 BARRETT'S ESOPHAGUS WITHOUT DYSPLASIA: Primary | ICD-10-CM

## 2022-03-03 DIAGNOSIS — R10.13 EPIGASTRIC PAIN: ICD-10-CM

## 2022-03-03 DIAGNOSIS — F32.A DEPRESSION, UNSPECIFIED DEPRESSION TYPE: ICD-10-CM

## 2022-03-03 PROCEDURE — 99214 OFFICE O/P EST MOD 30 MIN: CPT | Performed by: INTERNAL MEDICINE

## 2022-03-03 RX ORDER — PANTOPRAZOLE SODIUM 20 MG/1
20 TABLET, DELAYED RELEASE ORAL DAILY
Qty: 90 TABLET | Refills: 3 | Status: SHIPPED | OUTPATIENT
Start: 2022-03-03

## 2022-03-03 NOTE — TELEPHONE ENCOUNTER
Patient called asking about her fasenra, she never received the VM in January, I relayed the following message  However, she has many questions about it and would like to speak with you and have it be explained to her   Please advise [phone]

## 2022-03-03 NOTE — TELEPHONE ENCOUNTER
I called and spoke to the patient she stated she did received my voicemail, but is unsure as to what she should do  She stated she went to the allergist and they are starting the process for Dupixent and she is unsure as to which she would like  She stated she will talk to Dr Gildardo Cortes at her next appt

## 2022-03-03 NOTE — PROGRESS NOTES
[facility] - Outpatient Follow-up Note  Gloria Ramírez 77 y o  female MRN: [id number]  Encounter: [id number]          ASSESSMENT AND PLAN:      1  Argueta's esophagus without dysplasia  Patient has history of gastroesophageal reflux disease and Argueta's esophagus  Recent endoscopy revealed 2 cm long Argueta's esophagus  Very long discussion with the patient regarding Argueta's esophagus  She has epigastric discomfort  She does not have clear-cut acid reflux  Patient will need some acid suppression  I will start her on 20 mg of pantoprazole 1/2 hour before breakfast   She will continue reflux precautions and dietary precautions  There is no weight loss or weight gain  Patient has significant history of anxiety and depression  Her epigastric symptoms are most likely from psychosomatic problem  She is on very low-dose Paxil  I have advised her to consider raising the dose with a psychiatrist   She should also follow with psychotherapist     - pantoprazole (PROTONIX) 20 mg tablet; Take 1 tablet (20 mg total) by mouth daily  Dispense: 90 tablet; Refill: 3    2  Epigastric pain  Epigastric discomfort and pain  Endoscopy and CT scan has been unremarkable  This is most likely psychosomatic  Patient has history of depression anxiety  She will follow with her psychiatrist and psychotherapist   On examination right ribcage area is tender mostly on the subcostal region  3  Depression, unspecified depression type  See above discussion  Patient is on low-dose Paxil  She will follow with a psychiatrist regarding possible increase in the dosage or other alternatives  Psychotherapy has been recommended     ______________________________________________________________________    SUBJECTIVE:  Epigastric discomfort  Uncomfortable feeling in the epigastric area  Also pain and discomfort in the right ribcage  REVIEW OF SYSTEMS IS OTHERWISE NEGATIVE        Historical Information Past Medical History:   Diagnosis Date    Anesthesia complication     desaturation of oxygen mostly at night- on O2 at 2L at hs-may wake up with asthma issue    Anxiety     Arthritis     Asthma     Argueta esophagus     Breathing difficulty     pt states s/p anesthesia will have an asthma attack-pt to bring ProAir    Chest pain     upper chest-intermittent asthma related?  Colon polyp     COVID-19 2020    DVT (deep venous thrombosis) (HCC)     during pregnancy in the leg    GERD (gastroesophageal reflux disease)     Hiatal hernia     Hypercholesteremia     Nasal polyps     Sinusitis, chronic     Tinnitus     Vertigo     on occ    Wears glasses      Past Surgical History:   Procedure Laterality Date     SECTION N/A     x 2     SECTION      COLONOSCOPY      ESOPHAGOGASTRODUODENOSCOPY      HYSTERECTOMY      complete    HYSTERECTOMY      NASAL POLYP SURGERY      x3-2013,2018,2019    IA COLSC FLX W/RMVL OF TUMOR POLYP LESION SNARE TQ N/A 2017    Procedure: COLONOSCOPYwith  snare polypectomy ;  Surgeon: Monae Boggs MD;  Location: [place] [address] GI LAB; Service: Gastroenterology    IA EGD TRANSORAL BIOPSY SINGLE/MULTIPLE N/A 2017    Procedure: ESOPHAGOGASTRODUODENOSCOPY (EGD)and biopsy ;  Surgeon: Monae Boggs MD;  Location: [place] [address] GI LAB;   Service: Gastroenterology    TONSILLECTOMY N/A     TONSILLECTOMY      VEIN LIGATION AND STRIPPING Bilateral      Social History   Social History     Substance and Sexual Activity   Alcohol Use Yes    Comment: social     Social History     Substance and Sexual Activity   Drug Use Never     Social History     Tobacco Use   Smoking Status Never Smoker   Smokeless Tobacco Never Used     Family History   Problem Relation Age of Onset    Heart disease Mother         CHF    Breast cancer additional onset Mother 52    Breast cancer Mother 52    Dysphagia Father     Breast cancer Maternal Aunt     Breast cancer Paternal Aunt     Ovarian cancer Maternal Aunt     Ovarian cancer Maternal Aunt     Cervical cancer Maternal Aunt        Meds/Allergies       Current Outpatient Medications:     acetaminophen (TYLENOL) 325 mg tablet    albuterol (PROVENTIL HFA,VENTOLIN HFA) 90 mcg/act inhaler    ALPRAZolam (XANAX) 0 5 mg tablet    budesonide (PULMICORT) 0 5 mg/2 mL nebulizer solution    calcium citrate-vitamin D (CITRACAL+D) 315-200 MG-UNIT per tablet    cetirizine-pseudoephedrine (ZyrTEC-D) 5-120 MG per tablet    famciclovir (FAMVIR) 500 mg tablet    montelukast (SINGULAIR) 10 mg tablet    multivitamin-minerals (CENTRUM ADULTS) tablet    mupirocin (BACTROBAN) 2 % ointment    PARoxetine (PAXIL) 10 mg tablet    traZODone (DESYREL) 100 mg tablet    Wixela Inhub 250-50 MCG/DOSE inhaler    EPINEPHrine (EPIPEN) 0 3 mg/0 3 mL SOAJ    Fasenra subcutaneous injection    pantoprazole (PROTONIX) 20 mg tablet    Sodium Sulfate-Mag Sulfate-KCl (Sutab) 1627-907-908 MG TABS    Allergies   Allergen Reactions    Livalo [Pitavastatin] Hives and Shortness Of Breath     Nausea    Cefditoren Nausea Only and Vomiting    Cefditoren Pivoxil Hives     N/V    Dust Mite Extract Sneezing    Mold Extract [Trichophyton] Sneezing           Objective     Blood pressure 113/71, pulse 82, height 5' (1 524 m), weight 66 2 kg (146 lb), not currently breastfeeding  Body mass index is 28 51 kg/m²  PHYSICAL EXAM:      General Appearance:   Alert, cooperative, no distress   HEENT:   Normocephalic, atraumatic, anicteric      Neck:  Supple, symmetrical, trachea midline   Lungs:   Clear to auscultation bilaterally; no rales, rhonchi or wheezing; respirations unlabored    Heart[de-identified]   Regular rate and rhythm; no murmur, rub, or gallop     Abdomen:   Soft, non-tender, non-distended; normal bowel sounds; no masses, no organomegaly    Genitalia:   Deferred    Rectal:   Deferred    Extremities:  No cyanosis, clubbing or edema    Pulses:  2+ and symmetric  Skin:  No jaundice, rashes, or lesions    Lymph nodes:  No palpable cervical lymphadenopathy        Lab Results:   No visits with results within 1 Day(s) from this visit  Latest known visit with results is:   Annual Exam on 12/16/2021   Component Date Value    Case Report 12/16/2021                      Value:Gynecologic Cytology Report                       Case: [id number]                                  Authorizing Provider:  Twan Steiner MD            Collected:           12/16/2021 1523              Ordering Location:     [facility]  Received:            12/16/2021 1523                                     OBGYN                                                                        First Screen:          Radha Baker                                                                  Specimen:    LIQUID-BASED PAP, SCREENING, Cervix, Endocervical                                          Primary Interpretation 12/16/2021 Negative for intraepithelial lesion or malignancy     Specimen Adequacy 12/16/2021 Satisfactory for evaluation  (See note)     Note 12/16/2021                      Value: This result contains rich text formatting which cannot be displayed here   Additional Information 12/16/2021                      Value: This result contains rich text formatting which cannot be displayed here   HPV Other HR 12/16/2021 Negative     HPV16 12/16/2021 Negative     HPV18 12/16/2021 Negative          Radiology Results:   DXA bone density spine hip and pelvis    Result Date: 2/28/2022  Narrative: CENTRAL  DXA SCAN CLINICAL HISTORY:  80-year-old postmenopausal female  OTHER RISK FACTORS:  Parental history of hip fracture status post minor injury, oral corticosteroid use, rheumatoid arthritis, COPD  PHARMACOLOGIC THERAPY FOR OSTEOPOROSIS:  None  TECHNIQUE: Bone densitometry was performed using a Landis+GyrXA   bone densitometer  Regions of interest appear properly placed   COMPARISON: 9/18/2019  RESULTS: LUMBAR SPINE L1-L2, L4 (L3 vertebra excluded from analysis due to local structural abnormalities or artifact): BMD  0 916  gm/cm2 T-score -2 2 LEFT TOTAL HIP: BMD: 0 794  gm/cm2 T-score: -1 7 LEFT FEMORAL NECK: BMD: 0 786  gm/cm2 T score: -1 8 RIGHT TOTAL HIP: BMD:  0 788  gm/cm2 T-score: -1 7 RIGHT FEMORAL NECK: BMD:  0 797  gm/cm2  T score: -1 7     Impression: 1  Low bone mass (osteopenia)  2   Since a DXA study from 9/18/2019, there has been: No statistically significant change in bone mineral density at any of the evaluated skeletal sites  3   The 10 year risk of hip fracture is 2 0% with the 10 year risk of major osteoporotic fracture being 18 1% as calculated by the [facility]/WHO fracture risk assessment tool (FRAX)  4   The current NOF guidelines recommend treating patients with a T-score of -2 5 or less in the lumbar spine or hips, or in post-menopausal women and men over the age of 48 with low bone mass (osteopenia) and a FRAX 10 year risk score of >3% for hip fracture and/or >20% for major osteoporotic fracture  5   The NOF recommends follow-up DXA in 1-2 years after initiating therapy for osteoporosis and every 2 years thereafter  More frequent evaluation is appropriate for patients with conditions associated with rapid bone loss, such as glucocorticoid therapy  The interval between DXA screenings may be longer for individuals without major risk factors and initial T-score in the normal or upper low bone mass range  The FRAX algorithm has certain limitations: -FRAX has not been validated in patients currently or previously treated with pharmacotherapy for osteoporosis  In such patients, clinical judgment must be exercised in interpreting FRAX scores    -Prior hip, vertebral and humeral fragility fractures appear to confer greater risk of subsequent fracture than fractures at other sites (this is especially true for individuals with severe vertebral fractures), but quantification of this incremental risk is not possible with FRAX  -FRAX underestimates fracture risk in patients with history of multiple fragility fractures  -FRAX may underestimate fracture risk in patients with history of frequent falls  -It is not appropriate to use FRAX to monitor treatment response  WHO CLASSIFICATION: Normal (a T-score of -1 0 or higher) Low bone mineral density (a T-score of less than -1 0 but higher than -2 5) Osteoporosis (a T-score of -2 5 or less) Severe osteoporosis (a T-score of -2 5 or less with a fragility fracture) LEAST SIGNIFICANT CHANGE AT 95% C I: Lumbar spine: 0 036 gm/cm2 (3 2%)  Total hip: 0 018 gm/cm2 (2 0%)  Forearm: 0 024 gm/cm2 (3 2%)  Workstation performed: [id number]     CT abdomen pelvis w contrast    Result Date: 2/17/2022  Narrative: CT ABDOMEN AND PELVIS WITH IV CONTRAST INDICATION:   R10 13: Epigastric pain  COMPARISON:  CT abdomen/pelvis dated May 9, 2018  TECHNIQUE:  CT examination of the abdomen and pelvis was performed  Axial, sagittal, and coronal 2D reformatted images were created from the source data and submitted for interpretation  Radiation dose length product (DLP) for this visit:  425 47 mGy-cm   This examination, like all CT scans performed in the [facility], was performed utilizing techniques to minimize radiation dose exposure, including the use of iterative  reconstruction and automated exposure control  IV Contrast:  100 mL of iohexol (OMNIPAQUE) Enteric Contrast:  Enteric contrast was administered  FINDINGS: ABDOMEN LOWER CHEST:  No clinically significant abnormality identified in the visualized lower chest  LIVER/BILIARY TREE:  Unremarkable  GALLBLADDER:  No calcified gallstones  No pericholecystic inflammatory change  SPLEEN:  Unremarkable  PANCREAS:  Unremarkable  ADRENAL GLANDS:  Unremarkable  KIDNEYS/URETERS:  Unremarkable  No hydronephrosis  STOMACH AND BOWEL:  Unremarkable  APPENDIX:  A normal appendix was visualized  ABDOMINOPELVIC CAVITY:  No ascites  No pneumoperitoneum  No lymphadenopathy  VESSELS:  Unremarkable for patient's age  PELVIS REPRODUCTIVE ORGANS:  Surgical changes of prior hysterectomy  URINARY BLADDER:  Unremarkable  ABDOMINAL WALL/INGUINAL REGIONS:  Unremarkable  OSSEOUS STRUCTURES:  No acute fracture or destructive osseous lesion  Impression: No acute intra-abdominal abnormality   Workstation performed: [id number]

## 2022-03-10 ENCOUNTER — TELEPHONE (OUTPATIENT)
Dept: OBGYN CLINIC | Facility: CLINIC | Age: 67
End: 2022-03-10

## 2022-03-10 DIAGNOSIS — B37.9 YEAST INFECTION: Primary | ICD-10-CM

## 2022-03-10 RX ORDER — FLUCONAZOLE 150 MG/1
150 TABLET ORAL EVERY OTHER DAY
Qty: 2 TABLET | Refills: 0 | Status: SHIPPED | OUTPATIENT
Start: 2022-03-10 | End: 2022-03-13

## 2022-03-10 NOTE — TELEPHONE ENCOUNTER
Reviewed dexa results with patient  Reviewed sx with her  States when she was here for annual doctor gave her a pill as she was experiencing the same sx at that time  Itching and irritation and a pulling sensation inside  Reviewed coconut oil, recurrence could be something else, but will send pill as per her request  Aware to call for apt if recurs often or doesn't resolve

## 2022-05-20 DIAGNOSIS — J45.50 SEVERE PERSISTENT ASTHMA WITHOUT COMPLICATION: ICD-10-CM

## 2022-05-20 RX ORDER — ALBUTEROL SULFATE 90 UG/1
AEROSOL, METERED RESPIRATORY (INHALATION)
Qty: 8.5 G | Refills: 5 | Status: SHIPPED | OUTPATIENT
Start: 2022-05-20 | End: 2022-05-27 | Stop reason: SDUPTHER

## 2022-05-20 NOTE — TELEPHONE ENCOUNTER
Isabela Roque from [facility] calling saying they need a date change in beacon  Date needs to be changed to 8/7   Thank you Home

## 2022-05-27 DIAGNOSIS — J45.50 SEVERE PERSISTENT ASTHMA WITHOUT COMPLICATION: ICD-10-CM

## 2022-05-27 RX ORDER — ALBUTEROL SULFATE 90 UG/1
2 AEROSOL, METERED RESPIRATORY (INHALATION) EVERY 6 HOURS PRN
Qty: 8.5 G | Refills: 5 | Status: SHIPPED | OUTPATIENT
Start: 2022-05-27

## 2022-05-31 DIAGNOSIS — J45.50 SEVERE PERSISTENT ASTHMA, UNSPECIFIED WHETHER COMPLICATED: ICD-10-CM

## 2022-06-01 RX ORDER — MONTELUKAST SODIUM 10 MG/1
TABLET ORAL
Qty: 30 TABLET | Refills: 5 | Status: SHIPPED | OUTPATIENT
Start: 2022-06-01

## 2022-08-23 ENCOUNTER — OFFICE VISIT (OUTPATIENT)
Dept: URGENT CARE | Facility: CLINIC | Age: 67
End: 2022-08-23
Payer: MEDICARE

## 2022-08-23 VITALS
WEIGHT: 140 LBS | HEIGHT: 60 IN | OXYGEN SATURATION: 98 % | TEMPERATURE: 97.8 F | BODY MASS INDEX: 27.48 KG/M2 | HEART RATE: 81 BPM | RESPIRATION RATE: 18 BRPM

## 2022-08-23 DIAGNOSIS — U07.1 COVID-19: Primary | ICD-10-CM

## 2022-08-23 PROCEDURE — 99213 OFFICE O/P EST LOW 20 MIN: CPT | Performed by: FAMILY MEDICINE

## 2022-08-23 NOTE — PROGRESS NOTES
[facility] [facility]        NAME: Loyd Gupta is a 79 y o  female  : 1955    MRN: [id number]  DATE: 2022  TIME: 5:01 PM    Assessment and Plan   COVID-19 [U07 1]  1  COVID-19       Has completed isolation period per CDC guidelines  Advised on supportive measures including taking a daily regular aspirin and drinking plenty of water  May take a prednisone 10 mg once daily for the next 7 days  Discussed various COVID treatments and patient prefers the above mentioned  Patient Instructions     Follow up with PCP in 3-5 days  Proceed to  ER if symptoms worsen  Chief Complaint     Chief Complaint   Patient presents with    Sinusitis     Sinus, chest congestion, sore throat x 3 days         History of Present Illness       70-year-old female with pertinent history of asthma presents today with about 4-5 days of flu-like symptoms including fatigue, generalized myalgias, low-grade fevers, nasal congestion, rhinorrhea, sore throat, sinus pain and pressure, coughing, chest tightness and wheezing  Denies any obvious sick contacts  Did have COVID-19 about 2 years ago requiring hospitalization  Today is concern for a recurrent sinus infection  Review of Systems   Review of Systems   Constitutional: Positive for fatigue and fever (mild)  HENT: Positive for congestion, rhinorrhea, sinus pressure and sore throat  Respiratory: Positive for cough, chest tightness, shortness of breath and wheezing  Gastrointestinal: Negative for abdominal pain and nausea  Musculoskeletal: Positive for myalgias  Neurological: Positive for dizziness and headaches           Current Medications       Current Outpatient Medications:     acetaminophen (TYLENOL) 325 mg tablet, Take 650 mg by mouth every 6 (six) hours as needed for mild pain, Disp: , Rfl:     albuterol (PROVENTIL HFA,VENTOLIN HFA) 90 mcg/act inhaler, Inhale 2 puffs every 6 (six) hours as needed for wheezing, Disp: 8 5 g, Rfl: 5    budesonide (PULMICORT) 0 5 mg/2 mL nebulizer solution, Take 1 vial (0 5 mg total) by nebulization 2 (two) times a day (Patient taking differently: Take 0 5 mg by nebulization 2 (two) times a day as needed), Disp: 60 vial, Rfl: 5    calcium citrate-vitamin D (CITRACAL+D) 315-200 MG-UNIT per tablet, Take 1 tablet by mouth every morning, Disp: , Rfl:     cetirizine-pseudoephedrine (ZyrTEC-D) 5-120 MG per tablet, Take 1 tablet by mouth 2 (two) times a day, Disp: , Rfl:     montelukast (SINGULAIR) 10 mg tablet, TAKE 1 TABLET DAILY AT BEDTIME, Disp: 30 tablet, Rfl: 5    multivitamin-minerals (CENTRUM ADULTS) tablet, Take 1 tablet by mouth daily, Disp: 30 tablet, Rfl: 0    ALPRAZolam (XANAX) 0 5 mg tablet, as needed  (Patient not taking: Reported on 8/23/2022), Disp: , Rfl: 0    EPINEPHrine (EPIPEN) 0 3 mg/0 3 mL SOAJ, Inject 0 3 mL (0 3 mg total) into a muscle once for 1 dose (Patient not taking: Reported on 8/23/2022), Disp: 0 6 mL, Rfl: 0    famciclovir (FAMVIR) 500 mg tablet, 1,500 mg as needed   (Patient not taking: Reported on 8/23/2022), Disp: , Rfl: 0    Fasenra subcutaneous injection, INJECT 1 SYRINGE UNDER THE SKIN (SUBCUTANEOUS INJECTION) EVERY 8 WEEKS (Patient not taking: No sig reported), Disp: 1 mL, Rfl: 8    mupirocin (BACTROBAN) 2 % ointment, as needed   (Patient not taking: Reported on 8/23/2022), Disp: , Rfl: 0    pantoprazole (PROTONIX) 20 mg tablet, Take 1 tablet (20 mg total) by mouth daily (Patient not taking: Reported on 8/23/2022), Disp: 90 tablet, Rfl: 3    PARoxetine (PAXIL) 10 mg tablet, 10 mg every morning   (Patient not taking: Reported on 8/23/2022), Disp: , Rfl:     Sodium Sulfate-Mag Sulfate-KCl (Sutab) 0680-544-071 MG TABS, Please administer according to instructions provided by our office (Patient not taking: No sig reported), Disp: 24 tablet, Rfl: 0    traZODone (DESYREL) 100 mg tablet, Take 100 mg by mouth daily at bedtime as needed for sleep  (Patient not taking: Reported on 2022), Disp: , Rfl: 0    Wixela Inhub 250-50 MCG/DOSE inhaler, Inhale 1 puff 2 (two) times a day Rinse mouth after use  (Patient not taking: Reported on 2022), Disp: 1 each, Rfl: 5    Current Allergies     Allergies as of 2022 - Reviewed 2022   Allergen Reaction Noted    Livalo [pitavastatin] Hives and Shortness Of Breath 2014    Cefditoren Nausea Only and Vomiting 2020    Cefditoren pivoxil Hives 2016    Dust mite extract Sneezing 2016    Mold extract [trichophyton] Sneezing 2016            The following portions of the patient's history were reviewed and updated as appropriate: allergies, current medications, past family history, past medical history, past social history, past surgical history and problem list      Past Medical History:   Diagnosis Date    Anesthesia complication     desaturation of oxygen mostly at night- on O2 at 2L at hs-may wake up with asthma issue    Anxiety     Arthritis     Asthma     Argueta esophagus     Breathing difficulty     pt states s/p anesthesia will have an asthma attack-pt to bring ProAir    Chest pain     upper chest-intermittent asthma related?  Colon polyp     COVID-19 2020    DVT (deep venous thrombosis) (HCC)     during pregnancy in the leg    GERD (gastroesophageal reflux disease)     Hiatal hernia     Hypercholesteremia     Nasal polyps     Sinusitis, chronic     Tinnitus     Vertigo     on occ    Wears glasses        Past Surgical History:   Procedure Laterality Date     SECTION N/A     x 2     SECTION      COLONOSCOPY      ESOPHAGOGASTRODUODENOSCOPY      HYSTERECTOMY      complete    HYSTERECTOMY      NASAL POLYP SURGERY      x3-2013,,2019    NV COLSC FLX W/RMVL OF TUMOR POLYP LESION SNARE TQ N/A 2017    Procedure: COLONOSCOPYwith  snare polypectomy ;  Surgeon: Eneida Narayan MD;  Location: [facility];   Service: Gastroenterology    NV EGD TRANSORAL BIOPSY SINGLE/MULTIPLE N/A 6/6/2017    Procedure: ESOPHAGOGASTRODUODENOSCOPY (EGD)and biopsy ;  Surgeon: Anjum Maza MD;  Location: [facility]; Service: Gastroenterology    TONSILLECTOMY N/A     TONSILLECTOMY      VEIN LIGATION AND STRIPPING Bilateral        Family History   Problem Relation Age of Onset    Heart disease Mother         CHF    Breast cancer additional onset Mother 52    Breast cancer Mother 52    Dysphagia Father     Breast cancer Maternal Aunt     Breast cancer Paternal Aunt     Ovarian cancer Maternal Aunt     Ovarian cancer Maternal Aunt     Cervical cancer Maternal Aunt          Medications have been verified  Objective   Pulse 81   Temp 97 8 °F (36 6 °C)   Resp 18   Ht 5' (1 524 m)   Wt 63 5 kg (140 lb)   LMP  (LMP Unknown)   SpO2 98%   BMI 27 34 kg/m²   No LMP recorded (lmp unknown)  Patient has had a hysterectomy  Physical Exam     Physical Exam  Vitals and nursing note reviewed  Constitutional:       General: She is in acute distress  Appearance: Normal appearance  She is normal weight  She is not ill-appearing, toxic-appearing or diaphoretic  HENT:      Head: Normocephalic and atraumatic  Nose: Nose normal       Mouth/Throat:      Mouth: Mucous membranes are moist       Pharynx: No oropharyngeal exudate or posterior oropharyngeal erythema  Eyes:      General:         Right eye: No discharge  Left eye: No discharge  Conjunctiva/sclera: Conjunctivae normal    Cardiovascular:      Rate and Rhythm: Normal rate and regular rhythm  Pulmonary:      Effort: No respiratory distress  Breath sounds: Normal breath sounds  No wheezing, rhonchi or rales  Skin:     General: Skin is warm  Findings: No erythema  Neurological:      General: No focal deficit present  Mental Status: She is alert and oriented to person, place, and time     Psychiatric:         Mood and Affect: Mood normal          Behavior: Behavior normal          Thought Content:  Thought content normal          Judgment: Judgment normal

## 2022-10-12 PROBLEM — Z13.820 SCREENING FOR OSTEOPOROSIS: Status: RESOLVED | Noted: 2021-12-16 | Resolved: 2022-10-12

## 2022-11-09 ENCOUNTER — OFFICE VISIT (OUTPATIENT)
Dept: URGENT CARE | Facility: CLINIC | Age: 67
End: 2022-11-09

## 2022-11-09 VITALS — RESPIRATION RATE: 14 BRPM | TEMPERATURE: 98.7 F | OXYGEN SATURATION: 96 % | HEART RATE: 81 BPM

## 2022-11-09 DIAGNOSIS — N39.0 URINARY TRACT INFECTION WITHOUT HEMATURIA, SITE UNSPECIFIED: Primary | ICD-10-CM

## 2022-11-09 LAB
SL AMB  POCT GLUCOSE, UA: ABNORMAL
SL AMB LEUKOCYTE ESTERASE,UA: ABNORMAL
SL AMB POCT BILIRUBIN,UA: ABNORMAL
SL AMB POCT BLOOD,UA: ABNORMAL
SL AMB POCT CLARITY,UA: ABNORMAL
SL AMB POCT COLOR,UA: ABNORMAL
SL AMB POCT KETONES,UA: ABNORMAL
SL AMB POCT NITRITE,UA: ABNORMAL
SL AMB POCT PH,UA: 6
SL AMB POCT SPECIFIC GRAVITY,UA: 1.01
SL AMB POCT URINE PROTEIN: ABNORMAL
SL AMB POCT UROBILINOGEN: 0.2

## 2022-11-09 RX ORDER — PREDNISONE 10 MG/1
10 TABLET ORAL DAILY
COMMUNITY
Start: 2022-08-23

## 2022-11-09 RX ORDER — SULFAMETHOXAZOLE AND TRIMETHOPRIM 800; 160 MG/1; MG/1
1 TABLET ORAL EVERY 12 HOURS SCHEDULED
Qty: 10 TABLET | Refills: 0 | Status: SHIPPED | OUTPATIENT
Start: 2022-11-09 | End: 2022-11-14

## 2022-11-09 RX ORDER — LOTEPREDNOL ETABONATE 5 MG/ML
SUSPENSION/ DROPS OPHTHALMIC
COMMUNITY
Start: 2022-11-04

## 2022-11-09 NOTE — PROGRESS NOTES
[address]        NAME: Sarah Castro is a 79 y o  female  : 1955    MRN: [id number]  DATE: 2022  TIME: 3:55 PM    Assessment and Plan   Urinary tract infection without hematuria, site unspecified [N39 0]  1  Urinary tract infection without hematuria, site unspecified  sulfamethoxazole-trimethoprim (BACTRIM DS) 800-160 mg per tablet    POCT urine dip    Urine culture     poct suggestive of uti, will send out culture  Discussed strict return to care precautions as well as red flag symptoms which should prompt immediate ED referral  Pt verbalized understanding and is in agreement with plan  Please follow up with your primary care provider within the next week  Please remember that your visit today was with an urgent care provider and should not replace follow up with your primary care provider for chronic medical issues or annual physicals  Patient Instructions       Follow up with PCP in 3-5 days  Proceed to  ER if symptoms worsen  Chief Complaint     Chief Complaint   Patient presents with   • Possible UTI     Pt presents with burning sensation with urination, pressure, dizziness, flank pain, started 1-2 weeks ago; has been taking AZo; History of Present Illness       Pt is a(n) 79 y o  female who presents out of concern for a UTI  Dysuria: Yes  Urgency: Yes  Frequency: Yes  Hematuria: No  Cloudy/malodorous urine: Yes  Vaginal discharge/itching: No  Concerns for STD: No  Possibility of pregnancy: No  LMP: No LMP recorded (lmp unknown)  Patient has had a hysterectomy  Fever: Yes 101F a few nights ago but this is not abnormal for pt as she has flare ups of chronic rhinosinusitis  Flank pain: Yes which is new over last few days  Nausea/vomiting: Yes nausea only  Previous UTIs: Yes  Last UTI: years ago        Review of Systems   Review of Systems   Constitutional: Negative for chills, diaphoresis and fever  Respiratory: Negative for shortness of breath  Cardiovascular: Negative for chest pain  Gastrointestinal: Negative for abdominal pain, nausea and vomiting  Genitourinary: Positive for dysuria, flank pain, frequency and urgency  Negative for hematuria  Musculoskeletal: Negative for back pain and myalgias  Psychiatric/Behavioral: Negative for confusion           Current Medications       Current Outpatient Medications:   •  acetaminophen (TYLENOL) 325 mg tablet, Take 650 mg by mouth every 6 (six) hours as needed for mild pain, Disp: , Rfl:   •  albuterol (ProAir HFA) 90 mcg/act inhaler, Inhale 2 puffs every 4 (four) hours as needed for wheezing (20 minutes before exertion ), Disp: 18 g, Rfl: 0  •  albuterol (PROVENTIL HFA,VENTOLIN HFA) 90 mcg/act inhaler, Inhale 2 puffs every 6 (six) hours as needed for wheezing, Disp: 8 5 g, Rfl: 5  •  ALPRAZolam (XANAX) 0 5 mg tablet, as needed, Disp: , Rfl: 0  •  budesonide (PULMICORT) 0 5 mg/2 mL nebulizer solution, Take 1 vial (0 5 mg total) by nebulization 2 (two) times a day (Patient taking differently: Take 0 5 mg by nebulization 2 (two) times a day as needed), Disp: 60 vial, Rfl: 5  •  calcium citrate-vitamin D (CITRACAL+D) 315-200 MG-UNIT per tablet, Take 1 tablet by mouth every morning, Disp: , Rfl:   •  Fluticasone Propionate (Xhance) 93 MCG/ACT EXHU, 1 spray into each nostril in the morning, Disp: 16 mL, Rfl: 5  •  Fluticasone-Salmeterol (Advair Diskus) 250-50 mcg/dose inhaler, Inhale 1 puff every 12 (twelve) hours Rinse mouth after use , Disp: 60 blister, Rfl: 5  •  loteprednol etabonate (LOTEMAX) 0 5 % ophthalmic suspension, , Disp: , Rfl:   •  montelukast (SINGULAIR) 10 mg tablet, TAKE 1 TABLET DAILY AT BEDTIME, Disp: 30 tablet, Rfl: 5  •  multivitamin-minerals (CENTRUM ADULTS) tablet, Take 1 tablet by mouth daily, Disp: 30 tablet, Rfl: 0  •  predniSONE 10 mg tablet, Take 10 mg by mouth daily, Disp: , Rfl:   •  sulfamethoxazole-trimethoprim (BACTRIM DS) 800-160 mg per tablet, Take 1 tablet by mouth every 12 (twelve) hours for 5 days, Disp: 10 tablet, Rfl: 0  •  traZODone (DESYREL) 100 mg tablet, Take 100 mg by mouth daily at bedtime as needed for sleep, Disp: , Rfl: 0  •  Fasenra subcutaneous injection, INJECT 1 SYRINGE UNDER THE SKIN (SUBCUTANEOUS INJECTION) EVERY 8 WEEKS (Patient not taking: No sig reported), Disp: 1 mL, Rfl: 8  •  lamiVUDine (EPIVIR) 10 mg/mL solution, Take by mouth 2 (two) times a day (Patient not taking: Reported on 2022), Disp: , Rfl:     Current Allergies     Allergies as of 2022 - Reviewed 2022   Allergen Reaction Noted   • Pitavastatin Hives, Shortness Of Breath, and Rash 2014   • Cefditoren Nausea Only and Vomiting 2020   • Levofloxacin GI Intolerance 2022   • Moxifloxacin GI Intolerance 2022   • Cefditoren pivoxil Hives 2016   • Dust mite extract Sneezing 2016   • Mold extract [trichophyton] Sneezing 2016            The following portions of the patient's history were reviewed and updated as appropriate: allergies, current medications, past family history, past medical history, past social history, past surgical history and problem list      Past Medical History:   Diagnosis Date   • Anesthesia complication     desaturation of oxygen mostly at night- on O2 at 2L at hs-may wake up with asthma issue   • Anxiety    • Arthritis    • Asthma    • Argueta esophagus    • Breathing difficulty     pt states s/p anesthesia will have an asthma attack-pt to bring ProAir   • Chest pain     upper chest-intermittent asthma related?    • Colon polyp    • COVID-19 2020   • DVT (deep venous thrombosis) (HCC)     during pregnancy in the leg   • GERD (gastroesophageal reflux disease)    • Hiatal hernia    • Hypercholesteremia    • Nasal polyps    • Sinusitis, chronic    • Tinnitus    • Vertigo     on occ   • Wears glasses        Past Surgical History:   Procedure Laterality Date   •  SECTION N/A     x 2   •  SECTION     • COLONOSCOPY • ESOPHAGOGASTRODUODENOSCOPY     • HYSTERECTOMY      complete   • HYSTERECTOMY     • NASAL POLYP SURGERY      x3-2013,2018,2019   • NM COLSC FLX W/RMVL OF TUMOR POLYP LESION SNARE TQ N/A 6/6/2017    Procedure: COLONOSCOPYwith  snare polypectomy ;  Surgeon: Reji Del Valle MD;  Location: [facility]; Service: Gastroenterology   • NM EGD TRANSORAL BIOPSY SINGLE/MULTIPLE N/A 6/6/2017    Procedure: ESOPHAGOGASTRODUODENOSCOPY (EGD)and biopsy ;  Surgeon: Reji Del Valle MD;  Location: [facility]; Service: Gastroenterology   • TONSILLECTOMY N/A    • TONSILLECTOMY     • VEIN LIGATION AND STRIPPING Bilateral        Family History   Problem Relation Age of Onset   • Heart disease Mother         CHF   • Breast cancer additional onset Mother 52   • Breast cancer Mother 52   • Dysphagia Father    • Breast cancer Maternal Aunt    • Breast cancer Paternal Aunt    • Ovarian cancer Maternal Aunt    • Ovarian cancer Maternal Aunt    • Cervical cancer Maternal Aunt          Medications have been verified  Objective   Pulse 81   Temp 98 7 °F (37 1 °C)   Resp 14   LMP  (LMP Unknown)   SpO2 96%        Physical Exam     Physical Exam  Vitals and nursing note reviewed  Constitutional:       General: She is not in acute distress  Appearance: Normal appearance  She is not ill-appearing  HENT:      Head: Normocephalic and atraumatic  Cardiovascular:      Rate and Rhythm: Normal rate and regular rhythm  Heart sounds: Normal heart sounds  Pulmonary:      Effort: Pulmonary effort is normal  No respiratory distress  Breath sounds: Normal breath sounds  No wheezing, rhonchi or rales  Abdominal:      General: Abdomen is flat  Palpations: Abdomen is soft  Tenderness: There is no abdominal tenderness  There is left CVA tenderness (mild)  There is no right CVA tenderness or guarding  Skin:     General: Skin is warm and dry        Capillary Refill: Capillary refill takes less than 2 seconds  Neurological:      Mental Status: She is alert and oriented to person, place, and time     Psychiatric:         Behavior: Behavior normal

## 2022-11-11 LAB — BACTERIA UR CULT: ABNORMAL

## 2022-11-29 DIAGNOSIS — J45.50 SEVERE PERSISTENT ASTHMA, UNSPECIFIED WHETHER COMPLICATED: ICD-10-CM

## 2022-11-29 RX ORDER — MONTELUKAST SODIUM 10 MG/1
TABLET ORAL
Qty: 30 TABLET | Refills: 5 | Status: SHIPPED | OUTPATIENT
Start: 2022-11-29

## 2023-01-04 ENCOUNTER — APPOINTMENT (OUTPATIENT)
Dept: LAB | Facility: CLINIC | Age: 68
End: 2023-01-04

## 2023-01-04 ENCOUNTER — TRANSCRIBE ORDERS (OUTPATIENT)
Dept: LAB | Facility: CLINIC | Age: 68
End: 2023-01-04

## 2023-01-04 DIAGNOSIS — E78.5 HYPERLIPIDEMIA, UNSPECIFIED HYPERLIPIDEMIA TYPE: Primary | ICD-10-CM

## 2023-01-04 DIAGNOSIS — J32.8 OTHER CHRONIC SINUSITIS: ICD-10-CM

## 2023-01-04 LAB
BASOPHILS # BLD AUTO: 0.06 THOUSANDS/ÂΜL (ref 0–0.1)
BASOPHILS NFR BLD AUTO: 1 % (ref 0–1)
CHOLEST SERPL-MCNC: 210 MG/DL
EOSINOPHIL # BLD AUTO: 0.37 THOUSAND/ÂΜL (ref 0–0.61)
EOSINOPHIL NFR BLD AUTO: 5 % (ref 0–6)
ERYTHROCYTE [DISTWIDTH] IN BLOOD BY AUTOMATED COUNT: 11.9 % (ref 11.6–15.1)
HCT VFR BLD AUTO: 38 % (ref 34.8–46.1)
HDLC SERPL-MCNC: 62 MG/DL
HGB BLD-MCNC: 12.1 G/DL (ref 11.5–15.4)
IMM GRANULOCYTES # BLD AUTO: 0.04 THOUSAND/UL (ref 0–0.2)
IMM GRANULOCYTES NFR BLD AUTO: 1 % (ref 0–2)
LDLC SERPL CALC-MCNC: 125 MG/DL (ref 0–100)
LYMPHOCYTES # BLD AUTO: 2.95 THOUSANDS/ÂΜL (ref 0.6–4.47)
LYMPHOCYTES NFR BLD AUTO: 40 % (ref 14–44)
MCH RBC QN AUTO: 32 PG (ref 26.8–34.3)
MCHC RBC AUTO-ENTMCNC: 31.8 G/DL (ref 31.4–37.4)
MCV RBC AUTO: 101 FL (ref 82–98)
MONOCYTES # BLD AUTO: 0.85 THOUSAND/ÂΜL (ref 0.17–1.22)
MONOCYTES NFR BLD AUTO: 12 % (ref 4–12)
NEUTROPHILS # BLD AUTO: 3.13 THOUSANDS/ÂΜL (ref 1.85–7.62)
NEUTS SEG NFR BLD AUTO: 41 % (ref 43–75)
NONHDLC SERPL-MCNC: 148 MG/DL
NRBC BLD AUTO-RTO: 0 /100 WBCS
PLATELET # BLD AUTO: 260 THOUSANDS/UL (ref 149–390)
PMV BLD AUTO: 9.6 FL (ref 8.9–12.7)
RBC # BLD AUTO: 3.78 MILLION/UL (ref 3.81–5.12)
TRIGL SERPL-MCNC: 113 MG/DL
WBC # BLD AUTO: 7.4 THOUSAND/UL (ref 4.31–10.16)

## 2023-01-05 LAB — TOTAL IGE SMQN RAST: 66.8 KU/L (ref 0–113)

## 2023-03-22 ENCOUNTER — TRANSCRIBE ORDERS (OUTPATIENT)
Dept: LAB | Facility: CLINIC | Age: 68
End: 2023-03-22

## 2023-03-22 ENCOUNTER — APPOINTMENT (OUTPATIENT)
Dept: LAB | Facility: CLINIC | Age: 68
End: 2023-03-22

## 2023-03-22 DIAGNOSIS — Z13.9 SCREENING DUE: ICD-10-CM

## 2023-03-22 DIAGNOSIS — E55.9 VITAMIN D DEFICIENCY: Primary | ICD-10-CM

## 2023-03-22 DIAGNOSIS — E78.00 PURE HYPERCHOLESTEROLEMIA: ICD-10-CM

## 2023-03-22 DIAGNOSIS — Z13.9 DUE FOR SCREENING: ICD-10-CM

## 2023-03-22 LAB
25(OH)D3 SERPL-MCNC: 36.7 NG/ML (ref 30–100)
ALBUMIN SERPL BCP-MCNC: 3.7 G/DL (ref 3.5–5)
ALP SERPL-CCNC: 91 U/L (ref 46–116)
ALT SERPL W P-5'-P-CCNC: 23 U/L (ref 12–78)
ANION GAP SERPL CALCULATED.3IONS-SCNC: 3 MMOL/L (ref 4–13)
AST SERPL W P-5'-P-CCNC: 20 U/L (ref 5–45)
BASOPHILS # BLD AUTO: 0.06 THOUSANDS/ÂΜL (ref 0–0.1)
BASOPHILS NFR BLD AUTO: 1 % (ref 0–1)
BILIRUB SERPL-MCNC: 0.51 MG/DL (ref 0.2–1)
BUN SERPL-MCNC: 19 MG/DL (ref 5–25)
CALCIUM SERPL-MCNC: 9.2 MG/DL (ref 8.3–10.1)
CHLORIDE SERPL-SCNC: 108 MMOL/L (ref 96–108)
CO2 SERPL-SCNC: 28 MMOL/L (ref 21–32)
CREAT SERPL-MCNC: 0.66 MG/DL (ref 0.6–1.3)
EOSINOPHIL # BLD AUTO: 0.41 THOUSAND/ÂΜL (ref 0–0.61)
EOSINOPHIL NFR BLD AUTO: 6 % (ref 0–6)
ERYTHROCYTE [DISTWIDTH] IN BLOOD BY AUTOMATED COUNT: 11.7 % (ref 11.6–15.1)
GFR SERPL CREATININE-BSD FRML MDRD: 91 ML/MIN/1.73SQ M
GLUCOSE P FAST SERPL-MCNC: 109 MG/DL (ref 65–99)
HCT VFR BLD AUTO: 39.6 % (ref 34.8–46.1)
HGB BLD-MCNC: 13 G/DL (ref 11.5–15.4)
IMM GRANULOCYTES # BLD AUTO: 0.03 THOUSAND/UL (ref 0–0.2)
IMM GRANULOCYTES NFR BLD AUTO: 0 % (ref 0–2)
LYMPHOCYTES # BLD AUTO: 2.63 THOUSANDS/ÂΜL (ref 0.6–4.47)
LYMPHOCYTES NFR BLD AUTO: 36 % (ref 14–44)
MCH RBC QN AUTO: 32 PG (ref 26.8–34.3)
MCHC RBC AUTO-ENTMCNC: 32.8 G/DL (ref 31.4–37.4)
MCV RBC AUTO: 98 FL (ref 82–98)
MONOCYTES # BLD AUTO: 0.61 THOUSAND/ÂΜL (ref 0.17–1.22)
MONOCYTES NFR BLD AUTO: 8 % (ref 4–12)
NEUTROPHILS # BLD AUTO: 3.59 THOUSANDS/ÂΜL (ref 1.85–7.62)
NEUTS SEG NFR BLD AUTO: 49 % (ref 43–75)
NRBC BLD AUTO-RTO: 0 /100 WBCS
PLATELET # BLD AUTO: 243 THOUSANDS/UL (ref 149–390)
PMV BLD AUTO: 9.6 FL (ref 8.9–12.7)
POTASSIUM SERPL-SCNC: 4.1 MMOL/L (ref 3.5–5.3)
PROT SERPL-MCNC: 7.2 G/DL (ref 6.4–8.4)
RBC # BLD AUTO: 4.06 MILLION/UL (ref 3.81–5.12)
SODIUM SERPL-SCNC: 139 MMOL/L (ref 135–147)
T4 FREE SERPL-MCNC: 1.02 NG/DL (ref 0.76–1.46)
TSH SERPL DL<=0.05 MIU/L-ACNC: 4.77 UIU/ML (ref 0.45–4.5)
WBC # BLD AUTO: 7.33 THOUSAND/UL (ref 4.31–10.16)

## 2023-03-23 LAB — HCV AB SER QL: NORMAL

## 2023-05-20 NOTE — TELEPHONE ENCOUNTER
Patient calling saying she given steroids at her last office visit and she was doing better but she now has a cough again   [phone]

## 2023-11-06 ENCOUNTER — APPOINTMENT (OUTPATIENT)
Dept: LAB | Facility: CLINIC | Age: 68
End: 2023-11-06
Payer: MEDICARE

## 2023-11-06 ENCOUNTER — TRANSCRIBE ORDERS (OUTPATIENT)
Dept: LAB | Facility: CLINIC | Age: 68
End: 2023-11-06

## 2023-11-06 DIAGNOSIS — M25.50 POLYARTHRALGIA: ICD-10-CM

## 2023-11-06 DIAGNOSIS — M25.50 POLYARTHRALGIA: Primary | ICD-10-CM

## 2023-11-06 LAB
CRP SERPL QL: 3.6 MG/L
ERYTHROCYTE [SEDIMENTATION RATE] IN BLOOD: 8 MM/HOUR (ref 0–29)
URATE SERPL-MCNC: 4.4 MG/DL (ref 2–7.5)

## 2023-11-06 PROCEDURE — 36415 COLL VENOUS BLD VENIPUNCTURE: CPT

## 2023-11-06 PROCEDURE — 86140 C-REACTIVE PROTEIN: CPT

## 2023-11-06 PROCEDURE — 84550 ASSAY OF BLOOD/URIC ACID: CPT

## 2023-11-06 PROCEDURE — 86200 CCP ANTIBODY: CPT

## 2023-11-06 PROCEDURE — 86430 RHEUMATOID FACTOR TEST QUAL: CPT

## 2023-11-06 PROCEDURE — 85652 RBC SED RATE AUTOMATED: CPT

## 2023-11-07 ENCOUNTER — TELEPHONE (OUTPATIENT)
Dept: GASTROENTEROLOGY | Facility: CLINIC | Age: 68
End: 2023-11-07

## 2023-11-07 LAB — RHEUMATOID FACT SER QL LA: NEGATIVE

## 2023-11-07 NOTE — TELEPHONE ENCOUNTER
Pt is due for an EGD with Dr. Danilo Castellano for hx of Argueta's. I lmom informing pt of this and that Dr. Danilo Castellano will be retiring the end of this year and to please call back to schedule. Please call pt again if do not hear back from her. Thank you!

## 2023-11-08 LAB — CCP AB SER IA-ACNC: 1.1

## 2023-12-18 ENCOUNTER — APPOINTMENT (EMERGENCY)
Dept: CT IMAGING | Facility: HOSPITAL | Age: 68
End: 2023-12-18
Payer: MEDICARE

## 2023-12-18 ENCOUNTER — HOSPITAL ENCOUNTER (EMERGENCY)
Facility: HOSPITAL | Age: 68
Discharge: HOME | End: 2023-12-18
Attending: EMERGENCY MEDICINE
Payer: MEDICARE

## 2023-12-18 VITALS
DIASTOLIC BLOOD PRESSURE: 67 MMHG | OXYGEN SATURATION: 100 % | RESPIRATION RATE: 18 BRPM | HEART RATE: 69 BPM | SYSTOLIC BLOOD PRESSURE: 157 MMHG | TEMPERATURE: 97.6 F

## 2023-12-18 DIAGNOSIS — J01.90 ACUTE SINUS INFECTION: ICD-10-CM

## 2023-12-18 DIAGNOSIS — R42 LIGHTHEADEDNESS: Primary | ICD-10-CM

## 2023-12-18 LAB
ALBUMIN SERPL BCP-MCNC: 4.3 G/DL (ref 3.5–5)
ALP SERPL-CCNC: 69 U/L (ref 34–104)
ALT SERPL W P-5'-P-CCNC: 9 U/L (ref 7–52)
ANION GAP SERPL CALCULATED.3IONS-SCNC: 6 MMOL/L
AST SERPL W P-5'-P-CCNC: 17 U/L (ref 13–39)
BASOPHILS # BLD AUTO: 0.05 THOUSANDS/ÂΜL (ref 0–0.1)
BASOPHILS NFR BLD AUTO: 1 % (ref 0–1)
BILIRUB SERPL-MCNC: 0.56 MG/DL (ref 0.2–1)
BUN SERPL-MCNC: 14 MG/DL (ref 5–25)
CALCIUM SERPL-MCNC: 9.4 MG/DL (ref 8.4–10.2)
CHLORIDE SERPL-SCNC: 106 MMOL/L (ref 96–108)
CO2 SERPL-SCNC: 28 MMOL/L (ref 21–32)
CREAT SERPL-MCNC: 0.69 MG/DL (ref 0.6–1.3)
EOSINOPHIL # BLD AUTO: 0.38 THOUSAND/ÂΜL (ref 0–0.61)
EOSINOPHIL NFR BLD AUTO: 5 % (ref 0–6)
ERYTHROCYTE [DISTWIDTH] IN BLOOD BY AUTOMATED COUNT: 11.9 % (ref 11.6–15.1)
GFR SERPL CREATININE-BSD FRML MDRD: 89 ML/MIN/1.73SQ M
GLUCOSE SERPL-MCNC: 99 MG/DL (ref 65–140)
HCT VFR BLD AUTO: 40.3 % (ref 34.8–46.1)
HGB BLD-MCNC: 13.3 G/DL (ref 11.5–15.4)
IMM GRANULOCYTES # BLD AUTO: 0.02 THOUSAND/UL (ref 0–0.2)
IMM GRANULOCYTES NFR BLD AUTO: 0 % (ref 0–2)
LYMPHOCYTES # BLD AUTO: 2.69 THOUSANDS/ÂΜL (ref 0.6–4.47)
LYMPHOCYTES NFR BLD AUTO: 38 % (ref 14–44)
MCH RBC QN AUTO: 32.9 PG (ref 26.8–34.3)
MCHC RBC AUTO-ENTMCNC: 33 G/DL (ref 31.4–37.4)
MCV RBC AUTO: 100 FL (ref 82–98)
MONOCYTES # BLD AUTO: 0.59 THOUSAND/ÂΜL (ref 0.17–1.22)
MONOCYTES NFR BLD AUTO: 8 % (ref 4–12)
NEUTROPHILS # BLD AUTO: 3.43 THOUSANDS/ÂΜL (ref 1.85–7.62)
NEUTS SEG NFR BLD AUTO: 48 % (ref 43–75)
NRBC BLD AUTO-RTO: 0 /100 WBCS
PLATELET # BLD AUTO: 228 THOUSANDS/UL (ref 149–390)
PMV BLD AUTO: 9.3 FL (ref 8.9–12.7)
POTASSIUM SERPL-SCNC: 4.3 MMOL/L (ref 3.5–5.3)
PROT SERPL-MCNC: 6.9 G/DL (ref 6.4–8.4)
RBC # BLD AUTO: 4.04 MILLION/UL (ref 3.81–5.12)
SODIUM SERPL-SCNC: 140 MMOL/L (ref 135–147)
WBC # BLD AUTO: 7.16 THOUSAND/UL (ref 4.31–10.16)

## 2023-12-18 PROCEDURE — 85025 COMPLETE CBC W/AUTO DIFF WBC: CPT

## 2023-12-18 PROCEDURE — 36415 COLL VENOUS BLD VENIPUNCTURE: CPT

## 2023-12-18 PROCEDURE — 96360 HYDRATION IV INFUSION INIT: CPT

## 2023-12-18 PROCEDURE — 80053 COMPREHEN METABOLIC PANEL: CPT

## 2023-12-18 PROCEDURE — 99284 EMERGENCY DEPT VISIT MOD MDM: CPT

## 2023-12-18 PROCEDURE — 70450 CT HEAD/BRAIN W/O DYE: CPT

## 2023-12-18 PROCEDURE — 93005 ELECTROCARDIOGRAM TRACING: CPT

## 2023-12-18 PROCEDURE — G1004 CDSM NDSC: HCPCS

## 2023-12-18 RX ORDER — MECLIZINE HCL 12.5 MG/1
25 TABLET ORAL ONCE
Status: COMPLETED | OUTPATIENT
Start: 2023-12-18 | End: 2023-12-18

## 2023-12-18 RX ORDER — AMOXICILLIN AND CLAVULANATE POTASSIUM 875; 125 MG/1; MG/1
1 TABLET, FILM COATED ORAL EVERY 12 HOURS
Qty: 14 TABLET | Refills: 0 | Status: SHIPPED | OUTPATIENT
Start: 2023-12-18 | End: 2023-12-25

## 2023-12-18 RX ADMIN — MECLIZINE 25 MG: 12.5 TABLET ORAL at 12:12

## 2023-12-18 RX ADMIN — SODIUM CHLORIDE 1000 ML: 0.9 INJECTION, SOLUTION INTRAVENOUS at 12:18

## 2023-12-18 NOTE — ED PROVIDER NOTES
"History  Chief Complaint   Patient presents with    Dizziness     Dizziness since Fri. Feels like she is swaying back and forth. +Nausea.      Diamond is a 68 year old female with with a PMHx chronic sinusitis, nasal polyps s/p removal presenting to the ED for lightheadedness x 3 days. Describes it as \"swishing\" when she moves her head, described more as on a boat rather than room spinning. Notes it is worse in the morning where it causes her to be off balance upon walking with associated nausea. Has had this in the past but never lasted this long. States for some time she has been feeling like the right side of her face is numb, but not a true numbness because if she pinches her skin she is able to feel it. States this sensation is without facial droop, slurred speech, weakness. Read online this could be resultant of dehydration so she has increased her water intake without symptom relief. Went to [facility] this morning, stated they couldn't do anything for her, and referred her to the ED. Denies recent illness.      Prior to Admission Medications   Prescriptions Last Dose Informant Patient Reported? Taking?   ALPRAZolam (XANAX) 0.5 mg tablet  Self Yes No   Sig: as needed   EPINEPHrine (EPIPEN) 0.3 mg/0.3 mL SOAJ   No No   Sig: Inject 0.3 mL (0.3 mg total) into a muscle once for 1 dose   Fluticasone Propionate (Xhance) 93 MCG/ACT EXHU   No No   Si spray into each nostril 2 (two) times a day   Fluticasone-Salmeterol (Advair Diskus) 250-50 mcg/dose inhaler  Self No No   Sig: Inhale 1 puff every 12 (twelve) hours Rinse mouth after use.   Patient not taking: Reported on 2023   acetaminophen (TYLENOL) 325 mg tablet  Self Yes No   Sig: Take 650 mg by mouth every 6 (six) hours as needed for mild pain   Patient not taking: Reported on 2023   albuterol (PROVENTIL HFA,VENTOLIN HFA) 90 mcg/act inhaler  Self No No   Sig: Inhale 2 puffs every 6 (six) hours as needed for wheezing   albuterol (ProAir HFA) 90 mcg/act " inhaler   No No   Sig: Inhale 2 puffs every 4 (four) hours as needed for wheezing (20 minutes before exertion.)   budesonide (PULMICORT) 0.5 mg/2 mL nebulizer solution  Self No No   Sig: Take 1 vial (0.5 mg total) by nebulization 2 (two) times a day   busPIRone (BUSPAR) 10 mg tablet   Yes No   calcium citrate-vitamin D (CITRACAL+D) 315-200 MG-UNIT per tablet  Self Yes No   Sig: Take 1 tablet by mouth every morning   doxycycline hyclate (VIBRAMYCIN) 100 mg capsule   Yes No   dupilumab (DUPIXENT) subcutaneous injection   No No   Sig: Inject 2 mL (300 mg total) under the skin every 14 (fourteen) days   famciclovir (FAMVIR) 500 mg tablet   Yes No   Sig: take 1 tablet by mouth three times a day as directed AT ONSET OF VIRAL ULCER   lamiVUDine (EPIVIR) 10 mg/mL solution   Yes No   Sig: Take by mouth 2 (two) times a day   loteprednol etabonate (LOTEMAX) 0.5 % ophthalmic suspension   Yes No   Patient not taking: Reported on 4/4/2023   montelukast (SINGULAIR) 10 mg tablet   No No   Sig: TAKE 1 TABLET DAILY AT BEDTIME   montelukast (SINGULAIR) 10 mg tablet   No No   Sig: take 1 tablet by mouth once daily   multivitamin-minerals (CENTRUM ADULTS) tablet  Self No No   Sig: Take 1 tablet by mouth daily   predniSONE 10 mg tablet  Self Yes No   Sig: Take 10 mg by mouth daily   Patient not taking: Reported on 1/30/2023   traZODone (DESYREL) 100 mg tablet   Yes No   Sig: Take 100 mg by mouth daily at bedtime as needed for sleep      Facility-Administered Medications: None       Past Medical History:   Diagnosis Date    Anesthesia complication     desaturation of oxygen mostly at night- on O2 at 2L at hs-may wake up with asthma issue    Anxiety     Arthritis     Asthma     Argueta esophagus     Breathing difficulty     pt states s/p anesthesia will have an asthma attack-pt to bring ProAir    Chest pain     upper chest-intermittent asthma related?    Colon polyp     COVID-19 02/2020    DVT (deep venous thrombosis) (HCC)     during  pregnancy in the leg    GERD (gastroesophageal reflux disease)     Hiatal hernia     Hypercholesteremia     Macular degeneration     Nasal polyps     Sinusitis, chronic     Tinnitus     Vertigo     on occ    Wears glasses        Past Surgical History:   Procedure Laterality Date     SECTION N/A     x 2     SECTION      COLONOSCOPY      ESOPHAGOGASTRODUODENOSCOPY      HYSTERECTOMY      complete    HYSTERECTOMY      NASAL POLYP SURGERY      x3-2013,,2019    MN COLSC FLX W/RMVL OF TUMOR POLYP LESION SNARE TQ N/A 2017    Procedure: COLONOSCOPYwith  snare polypectomy.;  Surgeon: Cuong Mejia MD;  Location: [facility] GI [facility];  Service: Gastroenterology    MN EGD TRANSORAL BIOPSY SINGLE/MULTIPLE N/A 2017    Procedure: ESOPHAGOGASTRODUODENOSCOPY (EGD)and biopsy.;  Surgeon: Cuong Mejia MD;  Location: [facility] GI [facility];  Service: Gastroenterology    TONSILLECTOMY N/A     TONSILLECTOMY      VEIN LIGATION AND STRIPPING Bilateral        Family History   Problem Relation Age of Onset    Heart disease Mother         CHF    Breast cancer additional onset Mother 49    Breast cancer Mother 49    Dysphagia Father     Breast cancer Maternal Aunt     Breast cancer Paternal Aunt     Ovarian cancer Maternal Aunt     Ovarian cancer Maternal Aunt     Cervical cancer Maternal Aunt      I have reviewed and agree with the history as documented.    E-Cigarette/Vaping    E-Cigarette Use Never User      E-Cigarette/Vaping Substances     Social History     Tobacco Use    Smoking status: Never    Smokeless tobacco: Never   Vaping Use    Vaping status: Never Used   Substance Use Topics    Alcohol use: Yes     Comment: social    Drug use: Never       Review of Systems   Constitutional:  Negative for diaphoresis and fever.   HENT:  Negative for congestion, ear pain and rhinorrhea.    Eyes:  Positive for visual disturbance.   Respiratory:  Negative for cough and shortness of breath.    Cardiovascular:  Negative for  chest pain and palpitations.   Gastrointestinal:  Positive for nausea. Negative for abdominal pain and vomiting.   Musculoskeletal:  Positive for gait problem.   Neurological:  Positive for light-headedness, numbness and headaches. Negative for dizziness.       Physical Exam  Physical Exam  Vitals (No orthostatic hypotension) reviewed.   Constitutional:       General: She is not in acute distress.     Appearance: Normal appearance. She is not ill-appearing, toxic-appearing or diaphoretic.   HENT:      Head: Normocephalic and atraumatic.      Right Ear: Tympanic membrane, ear canal and external ear normal.      Left Ear: Tympanic membrane, ear canal and external ear normal.      Nose: Nose normal.      Mouth/Throat:      Mouth: Mucous membranes are moist.      Pharynx: Oropharynx is clear. No oropharyngeal exudate or posterior oropharyngeal erythema.   Eyes:      General: No scleral icterus.        Right eye: No discharge.         Left eye: No discharge.      Extraocular Movements: Extraocular movements intact.      Right eye: No nystagmus.      Left eye: No nystagmus.      Conjunctiva/sclera: Conjunctivae normal.      Pupils: Pupils are equal, round, and reactive to light.   Cardiovascular:      Rate and Rhythm: Normal rate and regular rhythm.      Pulses: Normal pulses.      Heart sounds: Normal heart sounds.   Pulmonary:      Effort: Pulmonary effort is normal. No respiratory distress.      Breath sounds: Normal breath sounds.   Musculoskeletal:         General: Normal range of motion.      Cervical back: Normal range of motion and neck supple. No rigidity or tenderness.   Lymphadenopathy:      Cervical: No cervical adenopathy.   Skin:     General: Skin is warm and dry.      Capillary Refill: Capillary refill takes less than 2 seconds.   Neurological:      General: No focal deficit present.      Mental Status: She is alert.      Cranial Nerves: Cranial nerves 2-12 are intact.      Sensory: Sensation is intact.       Motor: Motor function is intact. No weakness or pronator drift.      Coordination: Romberg sign negative. Coordination normal.   Psychiatric:         Mood and Affect: Mood normal.         Behavior: Behavior normal.         Vital Signs  ED Triage Vitals [12/18/23 1059]   Temperature Pulse Respirations Blood Pressure SpO2   97.6 °F (36.4 °C) 80 18 131/61 97 %      Temp Source Heart Rate Source Patient Position - Orthostatic VS BP Location FiO2 (%)   Oral Monitor Sitting Right arm --      Pain Score       --           Vitals:    12/18/23 1059 12/18/23 1315   BP: 131/61 157/67   Pulse: 80 69   Patient Position - Orthostatic VS: Sitting Sitting         Visual Acuity      ED Medications  Medications   sodium chloride 0.9 % bolus 1,000 mL (0 mL Intravenous Stopped 12/18/23 1320)   meclizine (ANTIVERT) tablet 25 mg (25 mg Oral Given 12/18/23 1212)       Diagnostic Studies  Results Reviewed       Procedure Component Value Units Date/Time    Comprehensive metabolic panel [[id number]] Collected: 12/18/23 1217    Lab Status: Final result Specimen: Blood from Arm, Left Updated: 12/18/23 1246     Sodium 140 mmol/L      Potassium 4.3 mmol/L      Chloride 106 mmol/L      CO2 28 mmol/L      ANION GAP 6 mmol/L      BUN 14 mg/dL      Creatinine 0.69 mg/dL      Glucose 99 mg/dL      Calcium 9.4 mg/dL      AST 17 U/L      ALT 9 U/L      Alkaline Phosphatase 69 U/L      Total Protein 6.9 g/dL      Albumin 4.3 g/dL      Total Bilirubin 0.56 mg/dL      eGFR 89 ml/min/1.73sq m     Narrative:      National Kidney Disease Foundation guidelines for Chronic Kidney Disease (CKD):     Stage 1 with normal or high GFR (GFR > 90 mL/min/1.73 square meters)    Stage 2 Mild CKD (GFR = 60-89 mL/min/1.73 square meters)    Stage 3A Moderate CKD (GFR = 45-59 mL/min/1.73 square meters)    Stage 3B Moderate CKD (GFR = 30-44 mL/min/1.73 square meters)    Stage 4 Severe CKD (GFR = 15-29 mL/min/1.73 square meters)    Stage 5 End Stage CKD (GFR <15 mL/min/1.73  square meters)  Note: GFR calculation is accurate only with a steady state creatinine    CBC and differential [[id number]]  (Abnormal) Collected: 12/18/23 1217    Lab Status: Final result Specimen: Blood from Arm, Left Updated: 12/18/23 1232     WBC 7.16 Thousand/uL      RBC 4.04 Million/uL      Hemoglobin 13.3 g/dL      Hematocrit 40.3 %       fL      MCH 32.9 pg      MCHC 33.0 g/dL      RDW 11.9 %      MPV 9.3 fL      Platelets 228 Thousands/uL      nRBC 0 /100 WBCs      Neutrophils Relative 48 %      Immat GRANS % 0 %      Lymphocytes Relative 38 %      Monocytes Relative 8 %      Eosinophils Relative 5 %      Basophils Relative 1 %      Neutrophils Absolute 3.43 Thousands/µL      Immature Grans Absolute 0.02 Thousand/uL      Lymphocytes Absolute 2.69 Thousands/µL      Monocytes Absolute 0.59 Thousand/µL      Eosinophils Absolute 0.38 Thousand/µL      Basophils Absolute 0.05 Thousands/µL                    CT head without contrast   Final Result by Fernando Gaston MD (12/18 9334)      1. No intracranial hemorrhage, acute infarct or intracranial mass.      Paranasal sinus disease as described above. Frothy secretions within the right sphenoid sinus should be correlated clinically for the presence of acute sinusitis.                  Resident: HEIDY HOPKINS I, the attending radiologist, have reviewed the images and agree with the final report above.      Workstation performed: [id number]                    Procedures  ECG 12 Lead Documentation Only    Date/Time: 12/18/2023 11:28 AM    Performed by: Monica Mcconnell PA-C  Authorized by: Monica Mcconnell PA-C    Indications / Diagnosis:  Dizziness, triage protocol.  ECG reviewed by me, the ED Provider: yes    Patient location:  ED  Previous ECG:     Previous ECG:  Compared to current    Similarity:  No change    Comparison to cardiac monitor: Yes    Rate:     ECG rate:  66    ECG rate assessment: normal    Rhythm:     Rhythm: sinus rhythm   "  Ectopy:     Ectopy: none    QRS:     QRS axis:  Normal    QRS intervals:  Normal  Conduction:     Conduction: normal    ST segments:     ST segments:  Normal  T waves:     T waves: normal             ED Course  ED Course as of 12/18/23 1639   Mon Dec 18, 2023   1128 ECG 12 lead  Per my interpretation, normal sinus rhythm at 66 beats per minute.   1234 CBC without leukocytosis, anemia.   1247 CMP is unremarkable.   1406 CT head without contrast  IMPRESSION:     1. No intracranial hemorrhage, acute infarct or intracranial mass.     Paranasal sinus disease as described above. Frothy secretions within the right sphenoid sinus should be correlated clinically for the presence of acute sinusitis.                                               Medical Decision Making  68 year old female presenting with feelings of \"swishing\" for the past several days. Differential diagnosis to include but not limited to, orthostatic HTN, acute otitis media, dehydration, upper respiratory infection, electrolyte abnormality, anemia, brain tumor. Physical exam is benign, there is no obvious source for the symptom described. CBC without anemia or leukocytosis, CMP without electrolyte abnormality. CT of the head was ordered for further evaluation which resulted with right sided sphenoidal sinusitis. This could possibly be the source of her symptom, but recommended she follow up with ENT, especially if this persists. Discussed supportive care options, abx sent to the preferred pharmacy. Pt stable at time of discharge, vital signs reviewed, questions answered. Strict ER return precautions provided/discussed and were well understood by patient.    Problems Addressed:  Acute sinus infection: acute illness or injury  Lightheadedness: acute illness or injury    Amount and/or Complexity of Data Reviewed  Labs: ordered. Decision-making details documented in ED Course.  Radiology: ordered. Decision-making details documented in ED Course.  ECG/medicine " tests: independent interpretation performed. Decision-making details documented in ED Course.     Details: Per my interpretation, normal sinus rhythm at 66 beats per minute.    Risk  Prescription drug management.             Disposition  Final diagnoses:   Lightheadedness   Acute sinus infection     Time reflects when diagnosis was documented in both MDM as applicable and the Disposition within this note       Time User Action Codes Description Comment    12/18/2023  2:13 PM Monica Mcconnell Add [R42] Dizziness     12/18/2023  2:13 PM Monica Mcconnell Remove [R42] Dizziness     12/18/2023  2:13 PM [place] Add [R42] Lightheadedness     12/18/2023  2:14 PM Monica Mcconnell Add [J01.90] Acute sinus infection           ED Disposition       ED Disposition   Discharge    Condition   Stable    Date/Time   Mon Dec 18, 2023  2:13 PM    Comment   Diamond Aquino discharge to home/self care.                   Follow-up Information       Follow up With Specialties Details Why Contact Info Additional Information    [facility] Emergency Department Emergency Medicine Go to  If symptoms worsen [facility] [address]  [phone] [facility] Emergency [facility], [facility] [place], [place], [address]    [facility]   [address] [place] [id number]  [phone] [facility], [address] [place] [address], [phone], [phone]            Discharge Medication List as of 12/18/2023  2:16 PM        START taking these medications    Details   amoxicillin-clavulanate (AUGMENTIN) 875-125 mg per tablet Take 1 tablet by mouth every 12 (twelve) hours for 7 days, Starting Mon 12/18/2023, Until Mon 12/25/2023, Normal           CONTINUE these medications which have NOT CHANGED    Details   acetaminophen (TYLENOL) 325 mg tablet Take 650 mg by mouth every 6 (six) hours  as needed for mild pain, Historical Med      !! albuterol (ProAir HFA) 90 mcg/act inhaler Inhale 2 puffs every 4 (four) hours as needed for wheezing (20 minutes before exertion.), Starting Tue 4/4/2023, Until Wed 4/3/2024 at 2359, Normal      !! albuterol (PROVENTIL HFA,VENTOLIN HFA) 90 mcg/act inhaler Inhale 2 puffs every 6 (six) hours as needed for wheezing, Starting Fri 5/27/2022, Normal      ALPRAZolam (XANAX) 0.5 mg tablet as needed, Starting Tue 7/23/2019, Historical Med      budesonide (PULMICORT) 0.5 mg/2 mL nebulizer solution Take 1 vial (0.5 mg total) by nebulization 2 (two) times a day, Starting Thu 7/2/2020, Normal      busPIRone (BUSPAR) 10 mg tablet Starting Tue 6/6/2023, Historical Med      calcium citrate-vitamin D (CITRACAL+D) 315-200 MG-UNIT per tablet Take 1 tablet by mouth every morning, Historical Med      doxycycline hyclate (VIBRAMYCIN) 100 mg capsule Starting Thu 8/3/2023, Historical Med      dupilumab (DUPIXENT) subcutaneous injection Inject 2 mL (300 mg total) under the skin every 14 (fourteen) days, Starting Mon 7/17/2023, Normal      EPINEPHrine (EPIPEN) 0.3 mg/0.3 mL SOAJ Inject 0.3 mL (0.3 mg total) into a muscle once for 1 dose, Starting Tue 8/8/2023, Normal      famciclovir (FAMVIR) 500 mg tablet take 1 tablet by mouth three times a day as directed AT ONSET OF VIRAL ULCER, Historical Med      Fluticasone Propionate (Xhance) 93 MCG/ACT EXHU 1 spray into each nostril 2 (two) times a day, Starting Tue 9/26/2023, Normal      Fluticasone-Salmeterol (Advair Diskus) 250-50 mcg/dose inhaler Inhale 1 puff every 12 (twelve) hours Rinse mouth after use., Starting Mon 10/31/2022, Until Sat 4/29/2023, Normal      lamiVUDine (EPIVIR) 10 mg/mL solution Take by mouth 2 (two) times a day, Historical Med      loteprednol etabonate (LOTEMAX) 0.5 % ophthalmic suspension Starting Fri 11/4/2022, Historical Med      !! montelukast (SINGULAIR) 10 mg tablet TAKE 1 TABLET DAILY AT BEDTIME, Normal      !!  montelukast (SINGULAIR) 10 mg tablet take 1 tablet by mouth once daily, Starting Fri 10/13/2023, Normal      multivitamin-minerals (CENTRUM ADULTS) tablet Take 1 tablet by mouth daily, Starting Sat 2/13/2021, Normal      predniSONE 10 mg tablet Take 10 mg by mouth daily, Starting Tue 8/23/2022, Historical Med      traZODone (DESYREL) 100 mg tablet Take 100 mg by mouth daily at bedtime as needed for sleep, Starting Tue 7/23/2019, Historical Med       !! - Potential duplicate medications found. Please discuss with provider.              PDMP Review         Value Time User    PDMP Reviewed  Yes 2/8/2021 12:04 PM Sadia Meadows MD            ED Provider  Electronically Signed by             Monica Mcconnell PA-C  12/18/23 2041

## 2023-12-19 LAB
ATRIAL RATE: 66 BPM
P AXIS: 70 DEGREES
PR INTERVAL: 172 MS
QRS AXIS: 31 DEGREES
QRSD INTERVAL: 80 MS
QT INTERVAL: 382 MS
QTC INTERVAL: 400 MS
T WAVE AXIS: 55 DEGREES
VENTRICULAR RATE: 66 BPM

## 2024-01-15 ENCOUNTER — TELEPHONE (OUTPATIENT)
Dept: OBGYN CLINIC | Facility: CLINIC | Age: 69
End: 2024-01-15

## 2024-01-15 NOTE — TELEPHONE ENCOUNTER
Placed call to patient, she is having a pulling sensation in her vagina with vaginal discomfort. Trying to use OTC products and AZO but not having an relief.  Denies hematuria, dysuria, urgency, or frequency.  Does not have PCP at this time as that doctor retired. Patient scheduled for our [place] office on 1/16/24.

## 2024-01-15 NOTE — TELEPHONE ENCOUNTER
Patient called, left message on answering machine< having dysuria and vaginal pain. HX of yeast infection and believes this caused this. Overdue for annual - last seen 2021.

## 2024-01-16 ENCOUNTER — OFFICE VISIT (OUTPATIENT)
Dept: OBGYN CLINIC | Facility: CLINIC | Age: 69
End: 2024-01-16
Payer: MEDICARE

## 2024-01-16 VITALS — SYSTOLIC BLOOD PRESSURE: 130 MMHG | DIASTOLIC BLOOD PRESSURE: 70 MMHG | BODY MASS INDEX: 27.93 KG/M2 | WEIGHT: 143 LBS

## 2024-01-16 DIAGNOSIS — R39.9 LOWER URINARY TRACT SYMPTOMS (LUTS): ICD-10-CM

## 2024-01-16 DIAGNOSIS — R30.0 DYSURIA: Primary | ICD-10-CM

## 2024-01-16 DIAGNOSIS — N76.0 ACUTE VAGINITIS: ICD-10-CM

## 2024-01-16 LAB
SL AMB  POCT GLUCOSE, UA: ABNORMAL
SL AMB LEUKOCYTE ESTERASE,UA: ABNORMAL
SL AMB POCT BILIRUBIN,UA: ABNORMAL
SL AMB POCT BLOOD,UA: ABNORMAL
SL AMB POCT CLARITY,UA: CLEAR
SL AMB POCT COLOR,UA: YELLOW
SL AMB POCT KETONES,UA: ABNORMAL
SL AMB POCT NITRITE,UA: ABNORMAL
SL AMB POCT PH,UA: 5
SL AMB POCT SPECIFIC GRAVITY,UA: ABNORMAL
SL AMB POCT URINE PROTEIN: ABNORMAL
SL AMB POCT UROBILINOGEN: ABNORMAL

## 2024-01-16 PROCEDURE — 81002 URINALYSIS NONAUTO W/O SCOPE: CPT | Performed by: NURSE PRACTITIONER

## 2024-01-16 PROCEDURE — 87077 CULTURE AEROBIC IDENTIFY: CPT | Performed by: NURSE PRACTITIONER

## 2024-01-16 PROCEDURE — 87186 SC STD MICRODIL/AGAR DIL: CPT | Performed by: NURSE PRACTITIONER

## 2024-01-16 PROCEDURE — 87086 URINE CULTURE/COLONY COUNT: CPT | Performed by: NURSE PRACTITIONER

## 2024-01-16 PROCEDURE — 99213 OFFICE O/P EST LOW 20 MIN: CPT | Performed by: NURSE PRACTITIONER

## 2024-01-16 RX ORDER — NITROFURANTOIN 25; 75 MG/1; MG/1
100 CAPSULE ORAL 2 TIMES DAILY
Qty: 14 CAPSULE | Refills: 0 | Status: SHIPPED | OUTPATIENT
Start: 2024-01-16 | End: 2024-01-23

## 2024-01-16 RX ORDER — FLUCONAZOLE 100 MG/1
100 TABLET ORAL
Qty: 2 TABLET | Refills: 0 | Status: SHIPPED | OUTPATIENT
Start: 2024-01-16 | End: 2024-01-20

## 2024-01-16 NOTE — PROGRESS NOTES
SUBJECTIVE:     68 y.o. female complains of a pulling pain in her vagina started on Friday. Worse with activity and when she lays down. Burning with urination and pelvic pressure. She also complains of vaginal discharge and itching for which she treated with OTC monistat. Denies fever/chills.     No LMP recorded (lmp unknown). Patient has had a hysterectomy.    OBJECTIVE:     She appears well, afebrile.  Abdomen: benign, soft, nontender, no masses.  Pelvic Exam: VULVA: normal appearing vulva with no masses, tenderness or lesions, VAGINA: normal appearing vagina with normal color and discharge, no lesions, CERVIX: surgically absent, UTERUS: surgically absent, vaginal cuff well healed.    Results from last 7 days   Lab Units 01/16/24  1339   LEUKOCYTES UA  pos   UROBILINOGEN UA  neg   PROTEIN UA  neg   PH UA  5.0   SPEC GRAV UA  1.000sp   KETONES UA  neg   BILIRUBIN UA POC  neg   GLUCOSE UA  neg   COLOR UA  yellow   CLARITY UA  clear       ASSESSMENT AND PLAN:     Diagnoses and all orders for this visit:    Dysuria  -     POCT urine dip    Lower urinary tract symptoms (LUTS)  -     nitrofurantoin (MACROBID) 100 mg capsule; Take 1 capsule (100 mg total) by mouth 2 (two) times a day for 7 days    Acute vaginitis  -     fluconazole (DIFLUCAN) 100 mg tablet; Take 1 tablet (100 mg total) by mouth every third day for 2 doses      Antibiotic initiated for presumed UTI. Urine sent for culture. In addition Diflucan prescribed in the event she develops a yeast infection from the antibiotic.     Results will be released to [facility], if abnormal will call to review and discuss treatment plan.

## 2024-01-18 LAB — BACTERIA UR CULT: ABNORMAL

## 2024-02-01 RX ORDER — VITAMIN E 268 MG
400 CAPSULE ORAL DAILY
COMMUNITY

## 2024-02-01 NOTE — PRE-PROCEDURE INSTRUCTIONS
Pre-Surgery Instructions:   Medication Instructions    acetaminophen (TYLENOL) 325 mg tablet Hold day of surgery.    albuterol (ProAir HFA) 90 mcg/act inhaler Uses PRN- OK to take day of surgery    ALPRAZolam (XANAX) 0.5 mg tablet Uses PRN- OK to take day of surgery    budesonide (PULMICORT) 0.5 mg/2 mL nebulizer solution Uses PRN- OK to take day of surgery    calcium citrate-vitamin D (CITRACAL+D) 315-200 MG-UNIT per tablet Hold day of surgery.    Dupixent 300 MG/2ML SOPN Every 2 weeks    famciclovir (FAMVIR) 500 mg tablet Hold day of surgery.    Fluticasone Propionate (Xhance) 93 MCG/ACT EXHU Uses PRN- OK to take day of surgery    Fluticasone-Salmeterol (Advair Diskus) 250-50 mcg/dose inhaler Uses PRN- OK to take day of surgery    montelukast (SINGULAIR) 10 mg tablet Take night before surgery    Multiple Vitamins-Minerals (PRESERVISION AREDS 2 PO) Hold day of surgery.    traZODone (DESYREL) 100 mg tablet Take night before surgery    vitamin E, tocopherol, 400 units capsule Hold day of surgery.    Medication instructions for day surgery reviewed. Please use only a sip of water to take your instructed medications. Avoid all over the counter vitamins, supplements and NSAIDS for one week prior to surgery per anesthesia guidelines. Tylenol is ok to take as needed.     You will receive a call one business day prior to surgery with an arrival time and hospital directions. If your surgery is scheduled on a Monday, the hospital will be calling you on the Friday prior to your surgery. If you have not heard from anyone by 8pm, please call the hospital supervisor through the hospital  at [phone]. ([place] [phone]).    Do not eat or drink anything after midnight the night before your surgery, including candy, mints, lifesavers, or chewing gum. Do not drink alcohol 24hrs before your surgery. Try not to smoke at least 24hrs before your surgery.       Follow the pre surgery showering instructions as listed in  the “My Surgical Experience Booklet” or otherwise provided by your surgeon's office. Do not use a blade to shave the surgical area 1 week before surgery. It is okay to use a clean electric clippers up to 24 hours before surgery. Do not apply any lotions, creams, including makeup, cologne, deodorant, or perfumes after showering on the day of your surgery. Do not use dry shampoo, hair spray, hair gel, or any type of hair products.     No contact lenses, eye make-up, or artificial eyelashes. Remove nail polish, including gel polish, and any artificial, gel, or acrylic nails if possible. Remove all jewelry including rings and body piercing jewelry.     Wear causal clothing that is easy to take on and off. Consider your type of surgery.    Keep any valuables, jewelry, piercings at home. Please bring any specially ordered equipment (sling, braces) if indicated.    Arrange for a responsible person to drive you to and from the hospital on the day of your surgery. Visitor Guidelines discussed.     Call the surgeon's office with any new illnesses, exposures, or additional questions prior to surgery.    Please reference your “My Surgical Experience Booklet” for additional information to prepare for your upcoming surgery.

## 2024-02-04 ENCOUNTER — ANESTHESIA EVENT (OUTPATIENT)
Dept: PERIOP | Facility: AMBULARY SURGERY CENTER | Age: 69
End: 2024-02-04
Payer: MEDICARE

## 2024-02-04 PROBLEM — Z99.81 OXYGEN DEPENDENT: Status: ACTIVE | Noted: 2024-02-04

## 2024-02-04 PROBLEM — Z90.710 HISTORY OF HYSTERECTOMY: Status: ACTIVE | Noted: 2024-02-04

## 2024-02-04 NOTE — ANESTHESIA PREPROCEDURE EVALUATION
Procedure:  EXTRACTION EXTRACAPSULAR CATARACT PHACO INTRAOCULAR LENS (IOL) (Right: Eye)    Relevant Problems   CARDIO   (+) Other chest pain      GI/HEPATIC   (+) Gastroesophageal reflux disease without esophagitis      GYN   (+) History of hysterectomy      MUSCULOSKELETAL   (+) Osteoarthritis      NEURO/PSYCH   (+) Anxiety      PULMONARY   (+) Chronic obstructive pulmonary disease, unspecified COPD type (HCC)   (+) Eosinophilic asthma   (+) WILBERT (obstructive sleep apnea)   (+) Oxygen dependent - 2 LPM hs (Resolved)   (+) Severe persistent asthma without complication      Digestive   (+) Argueta esophagus        Physical Exam    Airway    Mallampati score: II  TM Distance: >3 FB  Neck ROM: full     Dental       Cardiovascular  Rhythm: regular, Rate: normal    Pulmonary   Breath sounds clear to auscultation    Other Findings  post-pubertal.      Anesthesia Plan  ASA Score- 2     Anesthesia Type- IV sedation with anesthesia with ASA Monitors.         Additional Monitors:     Airway Plan:            Plan Factors-    Chart reviewed.        Patient is not a current smoker.              Induction- intravenous.    Postoperative Plan-     Informed Consent- Anesthetic plan and risks discussed with patient.  I personally reviewed this patient with the CRNA. Discussed and agreed on the Anesthesia Plan with the CRNA..

## 2024-02-05 ENCOUNTER — ANESTHESIA (OUTPATIENT)
Dept: PERIOP | Facility: AMBULARY SURGERY CENTER | Age: 69
End: 2024-02-05
Payer: MEDICARE

## 2024-02-05 ENCOUNTER — HOSPITAL ENCOUNTER (OUTPATIENT)
Facility: AMBULARY SURGERY CENTER | Age: 69
Setting detail: OUTPATIENT SURGERY
Discharge: HOME | End: 2024-02-05
Attending: OPHTHALMOLOGY | Admitting: OPHTHALMOLOGY
Payer: MEDICARE

## 2024-02-05 VITALS
BODY MASS INDEX: 27.09 KG/M2 | SYSTOLIC BLOOD PRESSURE: 137 MMHG | TEMPERATURE: 97.2 F | DIASTOLIC BLOOD PRESSURE: 70 MMHG | OXYGEN SATURATION: 97 % | WEIGHT: 138 LBS | HEART RATE: 74 BPM | RESPIRATION RATE: 18 BRPM | HEIGHT: 60 IN

## 2024-02-05 DIAGNOSIS — H25.811 COMBINED FORMS OF AGE-RELATED CATARACT OF RIGHT EYE: Primary | ICD-10-CM

## 2024-02-05 PROBLEM — Z99.81 OXYGEN DEPENDENT: Status: RESOLVED | Noted: 2024-02-04 | Resolved: 2024-02-05

## 2024-02-05 PROCEDURE — V2632 POST CHMBR INTRAOCULAR LENS: HCPCS | Performed by: OPHTHALMOLOGY

## 2024-02-05 DEVICE — STERILE UV AND BLUE LIGHT FILTERING ACRYLIC FOLDABLE ASPHERIC POSTERIOR CHAMBER INTRAOCULAR LENS
Type: IMPLANTABLE DEVICE | Site: EYE | Status: FUNCTIONAL
Brand: CLAREON

## 2024-02-05 RX ORDER — CYCLOPENTOLATE HYDROCHLORIDE 10 MG/ML
1 SOLUTION/ DROPS OPHTHALMIC
Status: COMPLETED | OUTPATIENT
Start: 2024-02-05 | End: 2024-02-05

## 2024-02-05 RX ORDER — KETOROLAC TROMETHAMINE 5 MG/ML
1 SOLUTION OPHTHALMIC 4 TIMES DAILY
Start: 2024-02-05

## 2024-02-05 RX ORDER — TETRACAINE HYDROCHLORIDE 5 MG/ML
1 SOLUTION OPHTHALMIC ONCE
Status: COMPLETED | OUTPATIENT
Start: 2024-02-05 | End: 2024-02-05

## 2024-02-05 RX ORDER — BALANCED SALT SOLUTION 6.4; .75; .48; .3; 3.9; 1.7 MG/ML; MG/ML; MG/ML; MG/ML; MG/ML; MG/ML
SOLUTION OPHTHALMIC AS NEEDED
Status: DISCONTINUED | OUTPATIENT
Start: 2024-02-05 | End: 2024-02-05 | Stop reason: HOSPADM

## 2024-02-05 RX ORDER — TETRACAINE HYDROCHLORIDE 5 MG/ML
SOLUTION OPHTHALMIC AS NEEDED
Status: DISCONTINUED | OUTPATIENT
Start: 2024-02-05 | End: 2024-02-05 | Stop reason: HOSPADM

## 2024-02-05 RX ORDER — GATIFLOXACIN 5 MG/ML
1 SOLUTION/ DROPS OPHTHALMIC 2 TIMES DAILY
Start: 2024-02-05

## 2024-02-05 RX ORDER — LIDOCAINE HYDROCHLORIDE 10 MG/ML
INJECTION, SOLUTION EPIDURAL; INFILTRATION; INTRACAUDAL; PERINEURAL AS NEEDED
Status: DISCONTINUED | OUTPATIENT
Start: 2024-02-05 | End: 2024-02-05 | Stop reason: HOSPADM

## 2024-02-05 RX ORDER — MIDAZOLAM HYDROCHLORIDE 2 MG/2ML
INJECTION, SOLUTION INTRAMUSCULAR; INTRAVENOUS AS NEEDED
Status: DISCONTINUED | OUTPATIENT
Start: 2024-02-05 | End: 2024-02-05

## 2024-02-05 RX ORDER — PHENYLEPHRINE HYDROCHLORIDE 25 MG/ML
1 SOLUTION/ DROPS OPHTHALMIC
Status: COMPLETED | OUTPATIENT
Start: 2024-02-05 | End: 2024-02-05

## 2024-02-05 RX ORDER — GATIFLOXACIN 5 MG/ML
SOLUTION/ DROPS OPHTHALMIC AS NEEDED
Status: DISCONTINUED | OUTPATIENT
Start: 2024-02-05 | End: 2024-02-05 | Stop reason: HOSPADM

## 2024-02-05 RX ORDER — KETOROLAC TROMETHAMINE 5 MG/ML
1 SOLUTION OPHTHALMIC
Status: COMPLETED | OUTPATIENT
Start: 2024-02-05 | End: 2024-02-05

## 2024-02-05 RX ORDER — LIDOCAINE HYDROCHLORIDE 20 MG/ML
1 JELLY TOPICAL
Status: COMPLETED | OUTPATIENT
Start: 2024-02-05 | End: 2024-02-05

## 2024-02-05 RX ADMIN — CYCLOPENTOLATE HYDROCHLORIDE 1 DROP: 10 SOLUTION/ DROPS OPHTHALMIC at 15:30

## 2024-02-05 RX ADMIN — MIDAZOLAM 2 MG: 1 INJECTION INTRAMUSCULAR; INTRAVENOUS at 16:09

## 2024-02-05 RX ADMIN — LIDOCAINE HYDROCHLORIDE 1 APPLICATION: 20 JELLY TOPICAL at 15:45

## 2024-02-05 RX ADMIN — MIDAZOLAM 2 MG: 1 INJECTION INTRAMUSCULAR; INTRAVENOUS at 16:17

## 2024-02-05 RX ADMIN — PHENYLEPHRINE HYDROCHLORIDE 1 DROP: 25 SOLUTION/ DROPS OPHTHALMIC at 16:00

## 2024-02-05 RX ADMIN — KETOROLAC TROMETHAMINE 1 DROP: 5 SOLUTION OPHTHALMIC at 15:45

## 2024-02-05 RX ADMIN — TETRACAINE HYDROCHLORIDE 1 DROP: 5 SOLUTION OPHTHALMIC at 15:15

## 2024-02-05 RX ADMIN — KETOROLAC TROMETHAMINE 1 DROP: 5 SOLUTION OPHTHALMIC at 15:15

## 2024-02-05 RX ADMIN — LIDOCAINE HYDROCHLORIDE 1 APPLICATION: 20 JELLY TOPICAL at 15:15

## 2024-02-05 RX ADMIN — KETOROLAC TROMETHAMINE 1 DROP: 5 SOLUTION OPHTHALMIC at 15:30

## 2024-02-05 RX ADMIN — PHENYLEPHRINE HYDROCHLORIDE 1 DROP: 25 SOLUTION/ DROPS OPHTHALMIC at 15:30

## 2024-02-05 RX ADMIN — LIDOCAINE HYDROCHLORIDE 1 APPLICATION: 20 JELLY TOPICAL at 15:30

## 2024-02-05 RX ADMIN — CYCLOPENTOLATE HYDROCHLORIDE 1 DROP: 10 SOLUTION/ DROPS OPHTHALMIC at 15:15

## 2024-02-05 RX ADMIN — PHENYLEPHRINE HYDROCHLORIDE 1 DROP: 25 SOLUTION/ DROPS OPHTHALMIC at 15:45

## 2024-02-05 RX ADMIN — CYCLOPENTOLATE HYDROCHLORIDE 1 DROP: 10 SOLUTION/ DROPS OPHTHALMIC at 16:00

## 2024-02-05 RX ADMIN — KETOROLAC TROMETHAMINE 1 DROP: 5 SOLUTION OPHTHALMIC at 16:00

## 2024-02-05 RX ADMIN — PHENYLEPHRINE HYDROCHLORIDE 1 DROP: 25 SOLUTION/ DROPS OPHTHALMIC at 15:15

## 2024-02-05 RX ADMIN — CYCLOPENTOLATE HYDROCHLORIDE 1 DROP: 10 SOLUTION/ DROPS OPHTHALMIC at 15:45

## 2024-02-05 NOTE — OP NOTE
OPERATIVE REPORT    PATIENT NAME: Diamond Aquino    :  1955  MRN: [id number]  Pt Location: [facility] OR ROOM 01    Surgery Date: 2024    Surgeons and Role:     * Jamie Engel MD - Primary    Age-related nuclear cataract, right eye [H25.11]    Post-Op Diagnosis Codes:     * Age-related nuclear cataract, right eye [H25.11]    Procedure(s):  EXTRACTION EXTRACAPSULAR CATARACT PHACO INTRAOCULAR LENS (IOL)    Anesthesia Type:   IV Sedation with Anesthesia    Operative Indications:  Age-related nuclear cataract, right eye [H25.11]  Decreased vision to 20/200. With problems  peripheral vision .  Pt requested cataract sx the right eye    Procedure and Technique:    Procedure Details     The patient was brought in the OR in stable condition and placed on the operative table. The right eye was prepped and draped in the usual sterile manner. Attention was directed to the right eye where a lid speculum was placed. A 2.4 mm clear corneal incision was made temperally. 1/2 cc of 1% MPF Lidocaine was irrigated into the anterior chamber followed by viscoat. The side port incision was placed superiorly. The capsularrhexis was made and the nucleus was hydrodissected with BSS. The nucleus was then removed with the phaco handpiece followed by removal of the cortical material with the I/A handpiece. The capsular bag was then filled with Provisc. The IOL was folded and placed in to the capsular bag and centered well. The remaining Provisc was removed from the eye with the I/A. The wounds were hydrated with BSS and found to be water tight. The lid speculum was removed and 2 drops of Gatifloxicin were placed over the cornea. A protective eye shield was taped over the eye and the patient went to PACU in stable condition. I will see the patient in the office tomorrow and the expected post op period is a few weeks.       Complications: None        Disposition: PACU   Condition: Stable    SIGNATURE: Jamie Engel MD  DATE:   2024  TIME: 4:32 PM

## 2024-02-05 NOTE — DISCHARGE INSTR - AVS FIRST PAGE
Dr. Engel Cataract Instructions    Activity:     1.  No Driving until instructed   2.  Keep shield on until seen tomorrow except when administering drops   3.  No heavy lifting   4.  No water in eye     Diet:     1.  Resume normal diet    Normal Symptoms:     1.  Mild Headache   2. Scratchy or picky feeling around eye    Call the office if:     1.  You have any questions or concerns   2.  If eye pain is not relieved by extra strength tylenol    Office phone number:  [phone]      Next appointment:     1.  See Dr Engel at his office tomorrow as scheduled   __________________________________________________________   2.  Bring blue eye kit with you and eyedrops to the office    A new set of comprehensive instructions will be given and reviewed with you during your office visit tomorrow.

## 2024-02-05 NOTE — ANESTHESIA POSTPROCEDURE EVALUATION
Post-Op Assessment Note    CV Status:  Stable  Pain Score: 0    Pain management: adequate       Mental Status:  Alert and awake   Hydration Status:  Euvolemic and stable   PONV Controlled:  Controlled   Airway Patency:  Patent     Post Op Vitals Reviewed: Yes    No anethesia notable event occurred.    Staff: CRNA               BP   121/62   Temp      Pulse  71   Resp   15   SpO2   97%

## 2024-02-21 PROBLEM — Z01.419 ENCOUNTER FOR ANNUAL ROUTINE GYNECOLOGICAL EXAMINATION: Status: RESOLVED | Noted: 2019-05-09 | Resolved: 2024-02-21

## 2024-04-09 ENCOUNTER — OFFICE VISIT (OUTPATIENT)
Dept: FAMILY MEDICINE CLINIC | Facility: CLINIC | Age: 69
End: 2024-04-09
Payer: MEDICARE

## 2024-04-09 VITALS
SYSTOLIC BLOOD PRESSURE: 112 MMHG | BODY MASS INDEX: 26.19 KG/M2 | HEART RATE: 100 BPM | DIASTOLIC BLOOD PRESSURE: 70 MMHG | WEIGHT: 133.4 LBS | RESPIRATION RATE: 18 BRPM | TEMPERATURE: 97.4 F | HEIGHT: 60 IN

## 2024-04-09 DIAGNOSIS — Z79.899 ENCOUNTER FOR LONG-TERM CURRENT USE OF MEDICATION: ICD-10-CM

## 2024-04-09 DIAGNOSIS — E78.00 ELEVATED CHOLESTEROL: ICD-10-CM

## 2024-04-09 DIAGNOSIS — Z78.0 ASYMPTOMATIC POSTMENOPAUSAL STATE: ICD-10-CM

## 2024-04-09 DIAGNOSIS — R41.3 MEMORY LOSS: ICD-10-CM

## 2024-04-09 DIAGNOSIS — J45.50 SEVERE PERSISTENT ASTHMA WITHOUT COMPLICATION: ICD-10-CM

## 2024-04-09 DIAGNOSIS — D64.9 ANEMIA, UNSPECIFIED TYPE: ICD-10-CM

## 2024-04-09 DIAGNOSIS — Z00.00 INITIAL MEDICARE ANNUAL WELLNESS VISIT: Primary | ICD-10-CM

## 2024-04-09 DIAGNOSIS — R42 VERTIGO: ICD-10-CM

## 2024-04-09 DIAGNOSIS — K21.9 GASTROESOPHAGEAL REFLUX DISEASE WITHOUT ESOPHAGITIS: ICD-10-CM

## 2024-04-09 DIAGNOSIS — Z12.31 SCREENING MAMMOGRAM, ENCOUNTER FOR: ICD-10-CM

## 2024-04-09 PROBLEM — H35.30 MACULAR DEGENERATION: Status: ACTIVE | Noted: 2024-04-09

## 2024-04-09 PROBLEM — I60.9 SUBARACHNOID HEMORRHAGE (HCC): Status: RESOLVED | Noted: 2020-05-02 | Resolved: 2024-04-09

## 2024-04-09 PROBLEM — Z23 NEED FOR INFLUENZA VACCINATION: Status: RESOLVED | Noted: 2020-11-03 | Resolved: 2024-04-09

## 2024-04-09 PROBLEM — B37.31 YEAST VAGINITIS: Status: RESOLVED | Noted: 2021-12-16 | Resolved: 2024-04-09

## 2024-04-09 PROBLEM — Z12.4 PAP SMEAR FOR CERVICAL CANCER SCREENING: Status: RESOLVED | Noted: 2021-12-16 | Resolved: 2024-04-09

## 2024-04-09 PROBLEM — R07.89 OTHER CHEST PAIN: Status: RESOLVED | Noted: 2019-01-15 | Resolved: 2024-04-09

## 2024-04-09 PROBLEM — Z86.16 HISTORY OF COVID-19: Status: RESOLVED | Noted: 2021-02-05 | Resolved: 2024-04-09

## 2024-04-09 PROCEDURE — 99204 OFFICE O/P NEW MOD 45 MIN: CPT | Performed by: FAMILY MEDICINE

## 2024-04-09 PROCEDURE — G0438 PPPS, INITIAL VISIT: HCPCS | Performed by: FAMILY MEDICINE

## 2024-04-09 RX ORDER — ALBUTEROL SULFATE 90 UG/1
2 AEROSOL, METERED RESPIRATORY (INHALATION) EVERY 4 HOURS PRN
Qty: 8 G | Refills: 3 | Status: SHIPPED | OUTPATIENT
Start: 2024-04-09

## 2024-04-09 NOTE — PATIENT INSTRUCTIONS
Medicare Preventive Visit Patient Instructions  Thank you for completing your Welcome to Medicare Visit or Medicare Annual Wellness Visit today. Your next wellness visit will be due in one year (4/10/2025).  The screening/preventive services that you may require over the next 5-10 years are detailed below. Some tests may not apply to you based off risk factors and/or age. Screening tests ordered at today's visit but not completed yet may show as past due. Also, please note that scanned in results may not display below.  Preventive Screenings:  Service Recommendations Previous Testing/Comments   Colorectal Cancer Screening  * Colonoscopy    * Fecal Occult Blood Test (FOBT)/Fecal Immunochemical Test (FIT)  * Fecal DNA/Cologuard Test  * Flexible Sigmoidoscopy Age: 45-75 years old   Colonoscopy: every 10 years (may be performed more frequently if at higher risk)  OR  FOBT/FIT: every 1 year  OR  Cologuard: every 3 years  OR  Sigmoidoscopy: every 5 years  Screening may be recommended earlier than age 45 if at higher risk for colorectal cancer. Also, an individualized decision between you and your healthcare provider will decide whether screening between the ages of 76-85 would be appropriate. Colonoscopy: 12/08/2021  FOBT/FIT: Not on file  Cologuard: Not on file  Sigmoidoscopy: Not on file    Screening Current     Breast Cancer Screening Age: 40+ years old  Frequency: every 1-2 years  Not required if history of left and right mastectomy Mammogram: 01/31/2023    Screening Current   Cervical Cancer Screening Between the ages of 21-29, pap smear recommended once every 3 years.   Between the ages of 30-65, can perform pap smear with HPV co-testing every 5 years.   Recommendations may differ for women with a history of total hysterectomy, cervical cancer, or abnormal pap smears in past. Pap Smear: 12/16/2021    Screening Not Indicated   Hepatitis C Screening Once for adults born between 1945 and 1965  More frequently in  patients at high risk for Hepatitis C Hep C Antibody: 03/22/2023    Screening Current   Diabetes Screening 1-2 times per year if you're at risk for diabetes or have pre-diabetes Fasting glucose: 109 mg/dL (3/22/2023)  A1C: 5.5 % (12/9/2021)  Screening Current   Cholesterol Screening Once every 5 years if you don't have a lipid disorder. May order more often based on risk factors. Lipid panel: 01/04/2023    Screening Current     Other Preventive Screenings Covered by Medicare:  Abdominal Aortic Aneurysm (AAA) Screening: covered once if your at risk. You're considered to be at risk if you have a family history of AAA.  Lung Cancer Screening: covers low dose CT scan once per year if you meet all of the following conditions: (1) Age 55-77; (2) No signs or symptoms of lung cancer; (3) Current smoker or have quit smoking within the last 15 years; (4) You have a tobacco smoking history of at least 20 pack years (packs per day multiplied by number of years you smoked); (5) You get a written order from a healthcare provider.  Glaucoma Screening: covered annually if you're considered high risk: (1) You have diabetes OR (2) Family history of glaucoma OR (3)  aged 50 and older OR (4)  American aged 65 and older  Osteoporosis Screening: covered every 2 years if you meet one of the following conditions: (1) You're estrogen deficient and at risk for osteoporosis based off medical history and other findings; (2) Have a vertebral abnormality; (3) On glucocorticoid therapy for more than 3 months; (4) Have primary hyperparathyroidism; (5) On osteoporosis medications and need to assess response to drug therapy.   Last bone density test (DXA Scan): 02/28/2022.  HIV Screening: covered annually if you're between the age of 15-65. Also covered annually if you are younger than 15 and older than 65 with risk factors for HIV infection. For pregnant patients, it is covered up to 3 times per  pregnancy.    Immunizations:  Immunization Recommendations   Influenza Vaccine Annual influenza vaccination during flu season is recommended for all persons aged >= 6 months who do not have contraindications   Pneumococcal Vaccine   * Pneumococcal conjugate vaccine = PCV13 (Prevnar 13), PCV15 (Vaxneuvance), PCV20 (Prevnar 20)  * Pneumococcal polysaccharide vaccine = PPSV23 (Pneumovax) Adults 19-65 yo with certain risk factors or if 65+ yo  If never received any pneumonia vaccine: recommend Prevnar 20 (PCV20)  Give PCV20 if previously received 1 dose of PCV13 or PPSV23   Hepatitis B Vaccine 3 dose series if at intermediate or high risk (ex: diabetes, end stage renal disease, liver disease)   Respiratory syncytial virus (RSV) Vaccine - COVERED BY MEDICARE PART D  * RSVPreF3 (Arexvy) CDC recommends that adults 60 years of age and older may receive a single dose of RSV vaccine using shared clinical decision-making (SCDM)   Tetanus (Td) Vaccine - COST NOT COVERED BY MEDICARE PART B Following completion of primary series, a booster dose should be given every 10 years to maintain immunity against tetanus. Td may also be given as tetanus wound prophylaxis.   Tdap Vaccine - COST NOT COVERED BY MEDICARE PART B Recommended at least once for all adults. For pregnant patients, recommended with each pregnancy.   Shingles Vaccine (Shingrix) - COST NOT COVERED BY MEDICARE PART B  2 shot series recommended in those 19 years and older who have or will have weakened immune systems or those 50 years and older     Health Maintenance Due:      Topic Date Due   • Breast Cancer Screening: Mammogram  01/31/2024   • Colorectal Cancer Screening  12/07/2026   • Hepatitis C Screening  Completed     Immunizations Due:      Topic Date Due   • Pneumococcal Vaccine: 65+ Years (2 of 2 - PCV) 01/01/2015   • COVID-19 Vaccine (3 - 2023-24 season) 09/01/2023     Advance Directives   What are advance directives?  Advance directives are legal documents  that state your wishes and plans for medical care. These plans are made ahead of time in case you lose your ability to make decisions for yourself. Advance directives can apply to any medical decision, such as the treatments you want, and if you want to donate organs.   What are the types of advance directives?  There are many types of advance directives, and each state has rules about how to use them. You may choose a combination of any of the following:  Living will:  This is a written record of the treatment you want. You can also choose which treatments you do not want, which to limit, and which to stop at a certain time. This includes surgery, medicine, IV fluid, and tube feedings.   Durable power of  for healthcare (DPAHC):  This is a written record that states who you want to make healthcare choices for you when you are unable to make them for yourself. This person, called a proxy, is usually a family member or a friend. You may choose more than 1 proxy.  Do not resuscitate (DNR) order:  A DNR order is used in case your heart stops beating or you stop breathing. It is a request not to have certain forms of treatment, such as CPR. A DNR order may be included in other types of advance directives.  Medical directive:  This covers the care that you want if you are in a coma, near death, or unable to make decisions for yourself. You can list the treatments you want for each condition. Treatment may include pain medicine, surgery, blood transfusions, dialysis, IV or tube feedings, and a ventilator (breathing machine).  Values history:  This document has questions about your views, beliefs, and how you feel and think about life. This information can help others choose the care that you would choose.  Why are advance directives important?  An advance directive helps you control your care. Although spoken wishes may be used, it is better to have your wishes written down. Spoken wishes can be misunderstood, or  not followed. Treatments may be given even if you do not want them. An advance directive may make it easier for your family to make difficult choices about your care.   Weight Management   Why it is important to manage your weight:  Being overweight increases your risk of health conditions such as heart disease, high blood pressure, type 2 diabetes, and certain types of cancer. It can also increase your risk for osteoarthritis, sleep apnea, and other respiratory problems. Aim for a slow, steady weight loss. Even a small amount of weight loss can lower your risk of health problems.  How to lose weight safely:  A safe and healthy way to lose weight is to eat fewer calories and get regular exercise. You can lose up about 1 pound a week by decreasing the number of calories you eat by 500 calories each day.   Healthy meal plan for weight management:  A healthy meal plan includes a variety of foods, contains fewer calories, and helps you stay healthy. A healthy meal plan includes the following:  Eat whole-grain foods more often.  A healthy meal plan should contain fiber. Fiber is the part of grains, fruits, and vegetables that is not broken down by your body. Whole-grain foods are healthy and provide extra fiber in your diet. Some examples of whole-grain foods are whole-wheat breads and pastas, oatmeal, brown rice, and bulgur.  Eat a variety of vegetables every day.  Include dark, leafy greens such as spinach, kale, dawit greens, and mustard greens. Eat yellow and orange vegetables such as carrots, sweet potatoes, and winter squash.   Eat a variety of fruits every day.  Choose fresh or canned fruit (canned in its own juice or light syrup) instead of juice. Fruit juice has very little or no fiber.  Eat low-fat dairy foods.  Drink fat-free (skim) milk or 1% milk. Eat fat-free yogurt and low-fat cottage cheese. Try low-fat cheeses such as mozzarella and other reduced-fat cheeses.  Choose meat and other protein foods that  are low in fat.  Choose beans or other legumes such as split peas or lentils. Choose fish, skinless poultry (chicken or turkey), or lean cuts of red meat (beef or pork). Before you cook meat or poultry, cut off any visible fat.   Use less fat and oil.  Try baking foods instead of frying them. Add less fat, such as margarine, sour cream, regular salad dressing and mayonnaise to foods. Eat fewer high-fat foods. Some examples of high-fat foods include french fries, doughnuts, ice cream, and cakes.  Eat fewer sweets.  Limit foods and drinks that are high in sugar. This includes candy, cookies, regular soda, and sweetened drinks.  Exercise:  Exercise at least 30 minutes per day on most days of the week. Some examples of exercise include walking, biking, dancing, and swimming. You can also fit in more physical activity by taking the stairs instead of the elevator or parking farther away from stores. Ask your healthcare provider about the best exercise plan for you.      © Copyright [employer] 2018 Information is for End User's use only and may not be sold, redistributed or otherwise used for commercial purposes. All illustrations and images included in CareNotes® are the copyrighted property of [employer] or [employer]

## 2024-04-09 NOTE — PROGRESS NOTES
Assessment and Plan:     Problem List Items Addressed This Visit     Gastroesophageal reflux disease without esophagitis     She is ready to follow-up with gastroenterology.  She reports being due for an endoscopy         Relevant Orders    Ambulatory referral to Gastroenterology    Severe persistent asthma without complication     Following with allergist  Continue Pulmicort and albuterol as needed         Relevant Medications    albuterol (ProAir HFA) 90 mcg/act inhaler   Other Visit Diagnoses     Initial Medicare annual wellness visit    -  Primary    Memory loss        Has had issues doing the books at work since having COVID    Relevant Orders    MRI brain for memory loss    TSH, 3rd generation    Vitamin B12    Asymptomatic postmenopausal state        Relevant Orders    DXA bone density spine hip and pelvis    Vertigo        Has been going on intermittently for months.  Recommended that she go to physical therapy and will do home exercises until she can get there    Relevant Orders    Ambulatory referral to Physical Therapy    Screening mammogram, encounter for        Relevant Orders    Mammo screening bilateral w 3d & cad    Elevated cholesterol        Relevant Orders    Comprehensive metabolic panel    Lipid Panel with Direct LDL reflex    Encounter for long-term current use of medication        Anemia, unspecified type        Relevant Orders    CBC    Ferritin      Return for Recheck in June.       Preventive health issues were discussed with patient, and age appropriate screening tests were ordered as noted in patient's After Visit Summary.  Personalized health advice and appropriate referrals for health education or preventive services given if needed, as noted in patient's After Visit Summary.     History of Present Illness:     Patient presents for a Medicare Wellness Visit    She has been having issues with dizziness.    She see ENT and has had multiple surgeries.     Dizziness  Associated symptoms  include fatigue and headaches.   Headache  Fatigue  Associated symptoms include fatigue and headaches.      Patient Care Team:  Maureen Ritchie DO as PCP - General (Family Medicine)  DO Nettie Baltazar DO Shanker Mukherjee, MD as Endoscopist  Kameron Landrum DO (Family Medicine)     Review of Systems:     Review of Systems   Constitutional:  Positive for fatigue.   Neurological:  Positive for dizziness and headaches.        Problem List:     Patient Active Problem List   Diagnosis   • Severe persistent asthma without complication   • Chronic sinusitis   • Anxiety   • Argueta esophagus   • Osteoarthritis   • Elevated LFTs   • Gastroesophageal reflux disease without esophagitis   • WILBERT (obstructive sleep apnea)   • Nocturnal hypoxia   • Eosinophilic asthma   • Right shoulder pain   • Nasal polyps   • Epigastric discomfort   • History of hysterectomy   • Macular degeneration      Past Medical and Surgical History:     Past Medical History:   Diagnosis Date   • Anesthesia complication     desaturation of oxygen mostly at night- on O2 at 2L at hs-may wake up with asthma issue   • Anxiety    • Arthritis    • Asthma    • Argueta esophagus    • Breathing difficulty     pt states s/p anesthesia will have an asthma attack-pt to bring ProAir   • Chest pain     upper chest-intermittent asthma related?   • Colon polyp    • COVID-19 2020   • Decreased hearing of both ears    • DVT (deep venous thrombosis) (HCC)     during pregnancy in the leg   • GERD (gastroesophageal reflux disease)    • Hiatal hernia    • Hypercholesteremia    • Macular degeneration     wet-right eye injected 24   • Nasal polyps    • Sinusitis, chronic    • Subarachnoid hemorrhage (HCC) 2020   • Tinnitus    • Vertigo     on occ   • Wears glasses      Past Surgical History:   Procedure Laterality Date   •  SECTION N/A     x 2   • COLONOSCOPY     • ESOPHAGOGASTRODUODENOSCOPY     • HYSTERECTOMY      complete   • NASAL  POLYP SURGERY      x3-2013,2018,2019   • ID COLSC FLX W/RMVL OF TUMOR POLYP LESION SNARE TQ N/A 06/06/2017    Procedure: COLONOSCOPYwith  snare polypectomy.;  Surgeon: Cuong Mejia MD;  Location: [facility] GI LAB;  Service: Gastroenterology   • ID EGD TRANSORAL BIOPSY SINGLE/MULTIPLE N/A 06/06/2017    Procedure: ESOPHAGOGASTRODUODENOSCOPY (EGD)and biopsy.;  Surgeon: Cuong Mejia MD;  Location: [facility] GI LAB;  Service: Gastroenterology   • ID XCAPSL CTRC RMVL INSJ IO LENS PROSTH W/O ECP Right 2/5/2024    Procedure: EXTRACTION EXTRACAPSULAR CATARACT PHACO INTRAOCULAR LENS (IOL);  Surgeon: Jamie Engel MD;  Location: [facility] MAIN OR;  Service: Ophthalmology   • TONSILLECTOMY N/A    • VEIN LIGATION AND STRIPPING Bilateral       Family History:     Family History   Problem Relation Age of Onset   • Heart disease Mother         CHF   • Breast cancer additional onset Mother 49   • Breast cancer Mother 49   • Dysphagia Father    • Breast cancer Maternal Aunt    • Breast cancer Paternal Aunt    • Ovarian cancer Maternal Aunt    • Ovarian cancer Maternal Aunt    • Cervical cancer Maternal Aunt       Social History:     Social History     Socioeconomic History   • Marital status: /Civil Union     Spouse name: None   • Number of children: None   • Years of education: None   • Highest education level: None   Occupational History   • None   Tobacco Use   • Smoking status: Never   • Smokeless tobacco: Never   Vaping Use   • Vaping status: Never Used   Substance and Sexual Activity   • Alcohol use: Yes     Comment: social   • Drug use: Never   • Sexual activity: Yes     Partners: Male     Birth control/protection: Female Sterilization, Surgical     Comment: hysterectomy   Other Topics Concern   • None   Social History Narrative    ** Merged History Encounter **          Social Determinants of Health     Financial Resource Strain: Not on file   Food Insecurity: No Food Insecurity (4/9/2024)    Hunger Vital Sign    •  Worried About Running Out of Food in the Last Year: Never true    • Ran Out of Food in the Last Year: Never true   Transportation Needs: No Transportation Needs (4/9/2024)    PRAPARE - Transportation    • Lack of Transportation (Medical): No    • Lack of Transportation (Non-Medical): No   Physical Activity: Not on file   Stress: Not on file   Social Connections: Not on file   Intimate Partner Violence: Not on file   Housing Stability: Low Risk  (4/9/2024)    Housing Stability Vital Sign    • Unable to Pay for Housing in the Last Year: No    • Number of Places Lived in the Last Year: 1    • Unstable Housing in the Last Year: No      Medications and Allergies:     Current Outpatient Medications   Medication Sig Dispense Refill   • acetaminophen (TYLENOL) 325 mg tablet Take 650 mg by mouth every 6 (six) hours as needed for mild pain     • albuterol (ProAir HFA) 90 mcg/act inhaler Inhale 2 puffs every 4 (four) hours as needed for wheezing 8 g 3   • ALPRAZolam (XANAX) 0.5 mg tablet as needed  0   • budesonide (PULMICORT) 0.5 mg/2 mL nebulizer solution Take 1 vial (0.5 mg total) by nebulization 2 (two) times a day (Patient taking differently: Take 0.5 mg by nebulization 2 (two) times a day as needed) 60 vial 5   • busPIRone (BUSPAR) 10 mg tablet      • calcium citrate-vitamin D (CITRACAL+D) 315-200 MG-UNIT per tablet Take 1 tablet by mouth every morning     • Dupixent 300 MG/2ML SOPN every 14 (fourteen) days     • famciclovir (FAMVIR) 500 mg tablet if needed     • Fluticasone Propionate (Xhance) 93 MCG/ACT EXHU 1 spray into each nostril 2 (two) times a day 16 mL 5   • gatifloxacin (ZYMAXID) 0.5 % Administer 1 drop to the right eye 2 (two) times a day     • ketorolac (ACULAR) 0.5 % ophthalmic solution Administer 1 drop to the right eye 4 (four) times a day     • montelukast (SINGULAIR) 10 mg tablet take 1 tablet by mouth once daily (Patient taking differently: Take 10 mg by mouth daily at bedtime) 60 tablet 3   • Multiple  Vitamins-Minerals (PRESERVISION AREDS 2 PO) Take by mouth 2 (two) times a day     • traZODone (DESYREL) 100 mg tablet Take 100 mg by mouth daily at bedtime as needed for sleep  0   • vitamin E, tocopherol, 400 units capsule Take 400 Units by mouth daily       No current facility-administered medications for this visit.     Allergies   Allergen Reactions   • Pitavastatin Hives, Shortness Of Breath and Rash     Nausea   • Cefditoren Nausea Only and Vomiting   • Cefditoren Pivoxil Hives     N/V   • Dust Mite Extract Sneezing   • Levofloxacin GI Intolerance   • Mold Extract [Trichophyton] Sneezing   • Moxifloxacin GI Intolerance      Immunizations:     Immunization History   Administered Date(s) Administered   • COVID-19 MODERNA VACC 0.5 ML IM 10/26/2021, 11/30/2021   • Influenza, high dose seasonal 0.7 mL 11/03/2020, 10/31/2022, 09/26/2023   • Influenza, recombinant, quadrivalent,injectable, preservative free 10/30/2018   • Influenza, seasonal, injectable 01/01/2013   • Pneumococcal Polysaccharide PPV23 01/01/2014      Health Maintenance:         Topic Date Due   • Breast Cancer Screening: Mammogram  01/31/2024   • Colorectal Cancer Screening  12/07/2026   • Hepatitis C Screening  Completed         Topic Date Due   • Pneumococcal Vaccine: 65+ Years (2 of 2 - PCV) 01/01/2015   • COVID-19 Vaccine (3 - 2023-24 season) 09/01/2023      Medicare Screening Tests and Risk Assessments:     Diamond is here for her Initial Wellness visit.     Health Risk Assessment:   Patient rates overall health as fair. Patient feels that their physical health rating is slightly worse. Patient is dissatisfied with their life. Eyesight was rated as same. Hearing was rated as same. Patient feels that their emotional and mental health rating is much worse. Patients states they are never, rarely angry. Pain experienced in the last 7 days has been some. Patient's pain rating has been 10/10. Patient states that she has experienced no weight loss or  gain in last 6 months.     Depression Screening:   PHQ-2 Score: 1      Fall Risk Screening:   In the past year, patient has experienced: no history of falling in past year      Urinary Incontinence Screening:   Patient has not leaked urine accidently in the last six months.     Home Safety:  Patient does not have trouble with stairs inside or outside of their home. Patient has working smoke alarms and has working carbon monoxide detector. Home safety hazards include: none.     Nutrition:   Current diet is Regular.     Medications:   Patient is currently taking over-the-counter supplements. OTC medications include: see medication list. Patient is able to manage medications.     Activities of Daily Living (ADLs)/Instrumental Activities of Daily Living (IADLs):   Walk and transfer into and out of bed and chair?: Yes  Dress and groom yourself?: Yes    Bathe or shower yourself?: Yes    Feed yourself? Yes  Do your laundry/housekeeping?: Yes  Manage your money, pay your bills and track your expenses?: Yes  Make your own meals?: Yes    Do your own shopping?: Yes    Previous Hospitalizations:   Any hospitalizations or ED visits within the last 12 months?: Yes    How many hospitalizations have you had in the last year?: 1-2    Hospitalization Comments: Dizziness    Advance Care Planning:   Living will: Yes    Advanced directive: Yes    Advanced directive counseling given: Yes    End of Life Decisions reviewed with patient: Yes    Provider agrees with end of life decisions: Yes      Cognitive Screening:   Provider or family/friend/caregiver concerned regarding cognition?: Yes    PREVENTIVE SCREENINGS      Cardiovascular Screening:    General: Screening Current      Diabetes Screening:     General: Screening Current      Colorectal Cancer Screening:     General: Screening Current      Breast Cancer Screening:     General: Screening Current      Cervical Cancer Screening:    General: Screening Not Indicated      Osteoporosis  Screening:    General: Risks and Benefits Discussed    Due for: DXA Axial      Abdominal Aortic Aneurysm (AAA) Screening:        General: Screening Not Indicated      Lung Cancer Screening:     General: Screening Not Indicated      Hepatitis C Screening:    General: Screening Current    Screening, Brief Intervention, and Referral to Treatment (SBIRT)    Screening      AUDIT-C Screenin) How often did you have a drink containing alcohol in the past year? 2 to 4 times a month  2) How many drinks did you have on a typical day when you were drinking in the past year? 1 to 2  3) How often did you have 6 or more drinks on one occasion in the past year? never    AUDIT-C Score: 2  Interpretation: Score 0-2 (female): Negative screen for alcohol misuse    Single Item Drug Screening:  How often have you used an illegal drug (including marijuana) or a prescription medication for non-medical reasons in the past year? never    Single Item Drug Screen Score: 0  Interpretation: Negative screen for possible drug use disorder    Brief Intervention  Alcohol & drug use screenings were reviewed. No concerns regarding substance use disorder identified.     No results found.     Physical Exam:     /70   Pulse 100   Temp (!) 97.4 °F (36.3 °C)   Resp 18   Ht 5' (1.524 m)   Wt 60.5 kg (133 lb 6.4 oz)   LMP  (LMP Unknown)   BMI 26.05 kg/m²     Physical Exam  Vitals and nursing note reviewed.   Constitutional:       Appearance: She is well-developed.   HENT:      Head: Normocephalic and atraumatic.      Right Ear: External ear normal.      Left Ear: External ear normal.      Nose: Nose normal.   Cardiovascular:      Rate and Rhythm: Normal rate and regular rhythm.      Heart sounds: Normal heart sounds. No murmur heard.     No friction rub.   Pulmonary:      Effort: No respiratory distress.      Breath sounds: Normal breath sounds. No wheezing or rales.   Musculoskeletal:      Right lower leg: No edema.      Left lower leg: No  edema.   Neurological:      Comments: Dizziness with laying flat and turning her head to either side, nystagmus when looking to the left          Maureen Ritchie DO

## 2024-04-16 ENCOUNTER — APPOINTMENT (OUTPATIENT)
Dept: LAB | Facility: CLINIC | Age: 69
End: 2024-04-16
Payer: MEDICARE

## 2024-04-16 DIAGNOSIS — D64.9 ANEMIA, UNSPECIFIED TYPE: ICD-10-CM

## 2024-04-16 DIAGNOSIS — R41.3 MEMORY LOSS: ICD-10-CM

## 2024-04-16 DIAGNOSIS — E78.00 ELEVATED CHOLESTEROL: ICD-10-CM

## 2024-04-16 LAB
ALBUMIN SERPL BCP-MCNC: 4.2 G/DL (ref 3.5–5)
ALP SERPL-CCNC: 91 U/L (ref 34–104)
ALT SERPL W P-5'-P-CCNC: 41 U/L (ref 7–52)
ANION GAP SERPL CALCULATED.3IONS-SCNC: 9 MMOL/L (ref 4–13)
AST SERPL W P-5'-P-CCNC: 26 U/L (ref 13–39)
BILIRUB SERPL-MCNC: 0.53 MG/DL (ref 0.2–1)
BUN SERPL-MCNC: 15 MG/DL (ref 5–25)
CALCIUM SERPL-MCNC: 9.3 MG/DL (ref 8.4–10.2)
CHLORIDE SERPL-SCNC: 104 MMOL/L (ref 96–108)
CHOLEST SERPL-MCNC: 200 MG/DL
CO2 SERPL-SCNC: 28 MMOL/L (ref 21–32)
CREAT SERPL-MCNC: 0.63 MG/DL (ref 0.6–1.3)
ERYTHROCYTE [DISTWIDTH] IN BLOOD BY AUTOMATED COUNT: 12.4 % (ref 11.6–15.1)
FERRITIN SERPL-MCNC: 57 NG/ML (ref 11–307)
GFR SERPL CREATININE-BSD FRML MDRD: 92 ML/MIN/1.73SQ M
GLUCOSE P FAST SERPL-MCNC: 109 MG/DL (ref 65–99)
HCT VFR BLD AUTO: 39.5 % (ref 34.8–46.1)
HDLC SERPL-MCNC: 54 MG/DL
HGB BLD-MCNC: 13 G/DL (ref 11.5–15.4)
LDLC SERPL CALC-MCNC: 127 MG/DL (ref 0–100)
MCH RBC QN AUTO: 33.2 PG (ref 26.8–34.3)
MCHC RBC AUTO-ENTMCNC: 32.9 G/DL (ref 31.4–37.4)
MCV RBC AUTO: 101 FL (ref 82–98)
PLATELET # BLD AUTO: 262 THOUSANDS/UL (ref 149–390)
PMV BLD AUTO: 10.3 FL (ref 8.9–12.7)
POTASSIUM SERPL-SCNC: 4.2 MMOL/L (ref 3.5–5.3)
PROT SERPL-MCNC: 6.9 G/DL (ref 6.4–8.4)
RBC # BLD AUTO: 3.91 MILLION/UL (ref 3.81–5.12)
SODIUM SERPL-SCNC: 141 MMOL/L (ref 135–147)
TRIGL SERPL-MCNC: 93 MG/DL
TSH SERPL DL<=0.05 MIU/L-ACNC: 4.59 UIU/ML (ref 0.45–4.5)
VIT B12 SERPL-MCNC: 1145 PG/ML (ref 180–914)
WBC # BLD AUTO: 7.79 THOUSAND/UL (ref 4.31–10.16)

## 2024-04-16 PROCEDURE — 85027 COMPLETE CBC AUTOMATED: CPT

## 2024-04-16 PROCEDURE — 36415 COLL VENOUS BLD VENIPUNCTURE: CPT

## 2024-04-16 PROCEDURE — 80061 LIPID PANEL: CPT

## 2024-04-16 PROCEDURE — 84443 ASSAY THYROID STIM HORMONE: CPT

## 2024-04-16 PROCEDURE — 80053 COMPREHEN METABOLIC PANEL: CPT

## 2024-04-16 PROCEDURE — 82607 VITAMIN B-12: CPT

## 2024-04-16 PROCEDURE — 82728 ASSAY OF FERRITIN: CPT

## 2024-06-03 ENCOUNTER — HOSPITAL ENCOUNTER (OUTPATIENT)
Dept: MRI IMAGING | Facility: HOSPITAL | Age: 69
Discharge: HOME | End: 2024-06-03
Payer: MEDICARE

## 2024-06-03 DIAGNOSIS — R41.3 MEMORY LOSS: ICD-10-CM

## 2024-06-03 PROCEDURE — 70551 MRI BRAIN STEM W/O DYE: CPT

## 2024-06-10 ENCOUNTER — EVALUATION (OUTPATIENT)
Facility: CLINIC | Age: 69
End: 2024-06-10
Payer: MEDICARE

## 2024-06-10 DIAGNOSIS — R42 DIZZINESS: Primary | ICD-10-CM

## 2024-06-10 PROCEDURE — 97162 PT EVAL MOD COMPLEX 30 MIN: CPT
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
## 2024-06-12 PROBLEM — E78.5 DYSLIPIDEMIA: Status: ACTIVE | Noted: 2024-06-12

## 2024-06-12 NOTE — ASSESSMENT & PLAN NOTE
MRI of brain shows mild chronic microangiopathy   When she was on cholesterol medication in the past she had reaction she had other medical issues at the time     She is going to start taking it every other day for 2 weeks and if she is tolerating it well then increase to daily.

## 2024-06-14 ENCOUNTER — TELEPHONE (OUTPATIENT)
Dept: ADMINISTRATIVE | Facility: OTHER | Age: 69
End: 2024-06-14

## 2024-06-14 NOTE — TELEPHONE ENCOUNTER
06/14/24 8:50 AM    Patient contacted to bring Advance Directive, POLST, or Living Will document to next scheduled pcp visit.[facility] Department spoke with patient/ caregiver.    Thank you.  Neftaly Patel  [facility]

## 2024-06-17 ENCOUNTER — OFFICE VISIT (OUTPATIENT)
Facility: CLINIC | Age: 69
End: 2024-06-17
Payer: MEDICARE

## 2024-06-17 DIAGNOSIS — R42 DIZZINESS: Primary | ICD-10-CM

## 2024-06-17 PROCEDURE — 97530 THERAPEUTIC ACTIVITIES: CPT

## 2024-06-17 NOTE — PROGRESS NOTES
Daily Note     Today's date: 2024  Patient name: Diamond Aquino  : 1955  MRN: [id number]  Referring provider: Maureen Ritchie DO  Dx:   Encounter Diagnosis     ICD-10-CM    1. Dizziness  R42                      Patient treated by KAT Rodríguez under supervision from this PT. We discussed the case to ensure appropriate continuation and progression of care and I reviewed the documentation.     Subjective: Pt reports she had a good weekend, but she woke up this morning feeling very dizzy. Pt reports she did get an MRI and is waiting to meet with her doctor for the results.      Objective: See treatment diary below    TA:  -Head thrust test: Normal, no observation of under or overshooting. Pt did have subjective reports of dizziness  -VOR x 1: Abnormal, with decreased speed and increased reports of dizziness throughout, requiring <2 mins to recover  -DVA: Normal    Positional:  -Left estrada anderson-pike: (-)  -R estrada anderson-pike: (-)  -L roll test: (-)  -R roll test: (-)    Assessment: Pt tolerated treatment well. Retested positionals due to pt reports of increased dizziness when getting out of bed this morning, with findings being negative. Pt able to perform mCTSIB, but demonstrated increased postural sway with EC on firm and foam surface, potentially indicating that somatosensory and vestibular system are poorly integrated. Pt scored a 50/100 on the DHI, indicating moderate perception of handicap. Pt tested above the cutoff scores for FGA and DGI, putting her into the low fall risk category. Pt did demonstrate increased fatigue and dizziness towards end of session, but dizziness able to reside after <1 min. Patient will benefit from skilled OPPT services in order to reduce symptoms of dizziness and imbalance in order to maximize safe function and return to PLOF.       Plan: Continue per plan of care. Plan to incorporate VOR x 1, cancellation and habituation exercises to help improve pt tolerance for daily  activities.      POC expires Unit limit Auth Expiration date PT/OT + Visit Limit? Co-Insurance   9/2/24 NA NA BOMN No                                 PCP Dr. Ritchie 4/9/2024   Neurologist/Last Seen    ENT/Last Seen Dr. Chua 12/23/2023 and Dr. Zuniga 1/19/24   Psych/Last Seen    OT    Imaging MRI 6/3/2024     Outcome Measures Initial Eval  6/10/2024 6/17/24       mCTSIB  - FTEO (firm)  - FTEC (firm)  - FTEO (foam)  - FTEC (foam)   deferred   30 sec  30 sec  30 sec  30 sec       DGI deferred/24 20/24       FGA deferred/30 24/30       DHI deferred/100 50/100       JPET defer deferred

## 2024-06-18 ENCOUNTER — OFFICE VISIT (OUTPATIENT)
Dept: FAMILY MEDICINE CLINIC | Facility: CLINIC | Age: 69
End: 2024-06-18
Payer: MEDICARE

## 2024-06-18 VITALS
SYSTOLIC BLOOD PRESSURE: 128 MMHG | DIASTOLIC BLOOD PRESSURE: 64 MMHG | TEMPERATURE: 98.2 F | BODY MASS INDEX: 26 KG/M2 | RESPIRATION RATE: 18 BRPM | HEART RATE: 68 BPM | WEIGHT: 132.4 LBS | HEIGHT: 60 IN

## 2024-06-18 DIAGNOSIS — I83.813 VARICOSE VEINS OF BOTH LOWER EXTREMITIES WITH PAIN: ICD-10-CM

## 2024-06-18 DIAGNOSIS — E78.5 DYSLIPIDEMIA: Primary | ICD-10-CM

## 2024-06-18 DIAGNOSIS — Z79.899 ENCOUNTER FOR LONG-TERM CURRENT USE OF MEDICATION: ICD-10-CM

## 2024-06-18 DIAGNOSIS — Z12.11 COLON CANCER SCREENING: ICD-10-CM

## 2024-06-18 DIAGNOSIS — R41.3 MEMORY LOSS: ICD-10-CM

## 2024-06-18 DIAGNOSIS — F41.9 ANXIETY: ICD-10-CM

## 2024-06-18 PROCEDURE — G2211 COMPLEX E/M VISIT ADD ON: HCPCS | Performed by: FAMILY MEDICINE

## 2024-06-18 PROCEDURE — 99214 OFFICE O/P EST MOD 30 MIN: CPT | Performed by: FAMILY MEDICINE

## 2024-06-18 RX ORDER — ATORVASTATIN CALCIUM 20 MG/1
20 TABLET, FILM COATED ORAL DAILY
Qty: 30 TABLET | Refills: 3 | Status: SHIPPED | OUTPATIENT
Start: 2024-06-18

## 2024-06-18 NOTE — PROGRESS NOTES
Ambulatory Visit  Name: Diamond Aquino      : 1955      MRN: [id number]  Encounter Provider: Maureen Ritchie DO  Encounter Date: 2024   Encounter department: [facility]    Assessment & Plan   1. Dyslipidemia  Assessment & Plan:  MRI of brain shows mild chronic microangiopathy   When she was on cholesterol medication in the past she had reaction she had other medical issues at the time     She is going to start taking it every other day for 2 weeks and if she is tolerating it well then increase to daily.    Orders:  -     atorvastatin (LIPITOR) 20 mg tablet; Take 1 tablet (20 mg total) by mouth daily  -     Comprehensive metabolic panel; Future; Expected date: 2024  -     Lipid Panel with Direct LDL reflex; Future; Expected date: 2024  2. Varicose veins of both lower extremities with pain  Assessment & Plan:  Worsening  Continues to have been painful  Referred to surgeon for evaluation  Orders:  -     Ambulatory referral to Vascular Surgery; Future  3. Colon cancer screening  -     Ambulatory referral to Gastroenterology; Future  4. Memory loss  Assessment & Plan:  Ongoing  Suspect from COVID  Reviewed MRI at length with patient   5. Encounter for long-term current use of medication  -     CBC; Future; Expected date: 2024  6. Anxiety  Assessment & Plan:  Managed by Dr. Barron  Recommended that she talk to him about weaning off xanax due to her memory issues      Return in about 6 months (around 2024) for Next scheduled follow up.  History of Present Illness     Patient here today for follow-up.  She reports memory loss has not improved.  Has continued since having COVID.  She also is following with her psychiatrist regularly.    She is nervous about getting a stroke.  There is a family history.    She has a history of varicose veins in her legs.  She even had surgery at 1 point.  Recently the things are getting more painful.        Review of Systems    Objective     BP  128/64   Pulse 68   Temp 98.2 °F (36.8 °C) (Tympanic)   Resp 18   Ht 5' (1.524 m)   Wt 60.1 kg (132 lb 6.4 oz)   LMP  (LMP Unknown)   BMI 25.86 kg/m²     Physical Exam  Vitals and nursing note reviewed.   Constitutional:       General: She is not in acute distress.     Appearance: She is well-developed.   HENT:      Head: Normocephalic and atraumatic.      Right Ear: Tympanic membrane normal.      Left Ear: Tympanic membrane normal.   Cardiovascular:      Rate and Rhythm: Normal rate and regular rhythm.      Heart sounds: No murmur heard.  Pulmonary:      Effort: Pulmonary effort is normal. No respiratory distress.      Breath sounds: Normal breath sounds.   Musculoskeletal:         General: No swelling.      Cervical back: Neck supple.   Neurological:      Mental Status: She is alert.       Administrative Statements

## 2024-06-18 NOTE — ASSESSMENT & PLAN NOTE
Managed by Dr. Barron  Recommended that she talk to him about weaning off xanax due to her memory issues

## 2024-06-19 ENCOUNTER — TELEPHONE (OUTPATIENT)
Dept: VASCULAR SURGERY | Facility: CLINIC | Age: 69
End: 2024-06-19

## 2024-06-19 ENCOUNTER — OFFICE VISIT (OUTPATIENT)
Facility: CLINIC | Age: 69
End: 2024-06-19
Payer: MEDICARE

## 2024-06-19 DIAGNOSIS — R42 DIZZINESS: Primary | ICD-10-CM

## 2024-06-19 PROCEDURE — 97530 THERAPEUTIC ACTIVITIES: CPT

## 2024-06-19 PROCEDURE — 97112 NEUROMUSCULAR REEDUCATION: CPT

## 2024-06-19 PROCEDURE — 97110 THERAPEUTIC EXERCISES: CPT

## 2024-06-19 NOTE — PROGRESS NOTES
Daily Note     Today's date: 2024  Patient name: Diamond Aquino  : 1955  MRN: [id number]  Referring provider: Maureen Ritchie DO  Dx:   Encounter Diagnosis     ICD-10-CM    1. Dizziness  R42                        Subjective: Patient reports to PT session stating that she went over the MRI results with her primary doctor and her results look good.       Objective: See treatment diary below    NMR:  - VOR x 1 (self-selected speed), FT, firm, plain background x 30 seconds (2 sets)   H: 3-4/10 D   V: 4-5/10 D  - Ambulation with HT/HN 4 x 40 ft each    TE:  - UT & LS stretch 2 x 30 seconds  - Chin tucks 2 x 10 reps; 5 second hold  - Scapular retractions 2 x 10; 3 second hold    TA:  Access Code: [email or web address]  URL: [email or web address]  Date: 2024  Prepared by: Jacquelyn Barlow    Exercises  - Standing Gaze Stabilization with Head Rotation  - 1 x daily - 7 x weekly - 2 sets - 30 sec hold  - Seated Upper Trapezius Stretch  - 1 x daily - 7 x weekly - 3 sets - 30 sec hold  - Seated Levator Scapulae Stretch  - 1 x daily - 7 x weekly - 3 sets - 30 sec hold  - Seated Cervical Retraction  - 1 x daily - 7 x weekly - 2 sets - 10 reps - 5 sec hold  - Seated Scapular Retraction  - 1 x daily - 7 x weekly - 2 sets - 10 reps - 5 sec hold      Assessment: Patient tolerated treatment session well today with focus on initiating vestibular adaptation and headache management exercises. Discussed at length the complexity and multifaceted nature of dizziness. Educated patient on the interplay between cervical, vestibular, and ocular systems. Provided patient with written HEP and she demonstrated comprehensive understanding. Discussed recommendation for OT assessment regarding visual and memory complaints. Patient will benefit from skilled OPPT services in order to reduce symptoms of dizziness and imbalance in order to maximize safe function and return to PLOF.       Plan: Continue per plan of care. Plan to incorporate VOR  x 1, cancellation and habituation exercises to help improve pt tolerance for daily activities.      POC expires Unit limit Auth Expiration date PT/OT + Visit Limit? Co-Insurance   9/2/24 NA NA BOMN No                                 PCP Dr. Ritchie 4/9/2024   Neurologist/Last Seen    ENT/Last Seen Dr. Chua 12/23/2023 and Dr. Zuniga 1/19/24   Psych/Last Seen    OT    Imaging MRI 6/3/2024     Outcome Measures Initial Eval  6/10/2024 6/17/24       mCTSIB  - FTEO (firm)  - FTEC (firm)  - FTEO (foam)  - FTEC (foam)   deferred   30 sec  30 sec  30 sec  30 sec       DGI deferred/24 20/24       FGA deferred/30 24/30       DHI deferred/100 50/100       JPET defer deferred

## 2024-06-24 ENCOUNTER — APPOINTMENT (OUTPATIENT)
Facility: CLINIC | Age: 69
End: 2024-06-24
Payer: MEDICARE

## 2024-06-25 ENCOUNTER — OFFICE VISIT (OUTPATIENT)
Facility: CLINIC | Age: 69
End: 2024-06-25
Payer: MEDICARE

## 2024-06-25 DIAGNOSIS — R42 DIZZINESS: Primary | ICD-10-CM

## 2024-06-25 PROCEDURE — 97110 THERAPEUTIC EXERCISES: CPT | Performed by: PHYSICAL THERAPIST

## 2024-06-25 PROCEDURE — 97112 NEUROMUSCULAR REEDUCATION: CPT | Performed by: PHYSICAL THERAPIST

## 2024-06-25 NOTE — PROGRESS NOTES
"Daily Note     Today's date: 2024  Patient name: Diamond Aquino  : 1955  MRN: [id number]  Referring provider: Maureen Ritchie DO  Dx:   Encounter Diagnosis     ICD-10-CM    1. Dizziness  R42                    Subjective: Patient reports to PT session stating she has a very mild headache (3/10), \"but it is very doable.\"      Objective: See treatment diary below    NMR:  - VOR x 1 (self-selected speed), FT, firm, plain background x 45 seconds (2 sets)   H: 5-6/10 D   V: 3-4/10 D  - VOR x 1 (self-selected speed), FT, foam, plain background x 30 seconds (2 sets)   H: 4/10 HA, 6-710 D   V: 4/10 HA, 4/10 D  - Ambulation with HT/HN 4 x 50 ft each    TE:  - UT & LS stretch 4 x 30 seconds  - Chin tucks 2 x 10 reps; 5 second hold    TA:  Access Code: [email or web address]  URL: [email or web address]  Date: 2024  Prepared by: Jacquelyn Barlow    Exercises  - Standing Gaze Stabilization with Head Rotation  - 1 x daily - 7 x weekly - 2 sets - 30 sec hold  - Seated Upper Trapezius Stretch  - 1 x daily - 7 x weekly - 3 sets - 30 sec hold  - Seated Levator Scapulae Stretch  - 1 x daily - 7 x weekly - 3 sets - 30 sec hold  - Seated Cervical Retraction  - 1 x daily - 7 x weekly - 2 sets - 10 reps - 5 sec hold  - Seated Scapular Retraction  - 1 x daily - 7 x weekly - 2 sets - 10 reps - 5 sec hold      Assessment: Patient tolerated treatment session well today with focus on initiating vestibular adaptation and headache management exercises. Continued to educate patient on the interplay between cervical, vestibular, and ocular systems. With ambulation with HT, pt demonstrated increased veering laterally, but able to correct without LOB. Pt able to tolerate increase time with VOR X 1 exercises on firm surface with mild increase in symptoms. With HT on foam surface, pt experienced increased dizziness requiring UE support on plinth table to accommodate for LOB. Patient will benefit from skilled OPPT services in order to reduce " symptoms of dizziness and imbalance in order to maximize safe function and return to PLOF.       Plan: Continue per plan of care. Plan to incorporate VOR x 1, cancellation and habituation exercises to help improve pt tolerance for daily activities.      POC expires Unit limit Auth Expiration date PT/OT + Visit Limit? Co-Insurance   9/2/24 NA NA BOMN No                                 PCP Dr. Ritchie 4/9/2024   Neurologist/Last Seen    ENT/Last Seen Dr. Chua 12/23/2023 and Dr. Zuniga 1/19/24   Psych/Last Seen    OT    Imaging MRI 6/3/2024     Outcome Measures Initial Eval  6/10/2024 6/17/24       mCTSIB  - FTEO (firm)  - FTEC (firm)  - FTEO (foam)  - FTEC (foam)   deferred   30 sec  30 sec  30 sec  30 sec       DGI deferred/24 20/24       FGA deferred/30 24/30       DHI deferred/100 50/100       JPET defer deferred

## 2024-06-26 ENCOUNTER — OFFICE VISIT (OUTPATIENT)
Facility: CLINIC | Age: 69
End: 2024-06-26
Payer: MEDICARE

## 2024-06-26 DIAGNOSIS — R42 DIZZINESS: Primary | ICD-10-CM

## 2024-06-26 PROCEDURE — 97112 NEUROMUSCULAR REEDUCATION: CPT

## 2024-06-26 PROCEDURE — 97110 THERAPEUTIC EXERCISES: CPT

## 2024-06-26 NOTE — PROGRESS NOTES
Daily Note     Today's date: 2024  Patient name: Diamond Aquino  : 1955  MRN: [id number]  Referring provider: Maureen Ritchie DO  Dx:   Encounter Diagnosis     ICD-10-CM    1. Dizziness  R42           Patient treated by KAT Rodríguez under supervision from this PT. We discussed the case to ensure appropriate continuation and progression of care and I reviewed the documentation.              Subjective: Patient reports to PT session stating she is very tired. Her headache is sitting at a /10.      Objective: See treatment diary below    NMR:  - VOR x 1 (self-selected speed), FT, firm, plain background x 45 seconds (2 sets)   H: 5-6/10 D with increased nausea   V: 3-4/10 D    - Ambulation with HT/HN 4 x 50 ft each     -2 laps: Self ball toss with HT/HN     -3-4 lap: HT/HN with focus on pencil    - VOR CX - seated - 2 sets x 10 reps     -H: No increase in symptoms     -V: No increase in symptoms    TE:  - UT & LS stretch 4 x 30 seconds  - Chin tucks 2 x 10 reps; 5 second hold  - Upright rows- red theraband- 2 x 10 reps  - Shoulder extensions - 12 reps x 1    Post exercise:  -Headache: 4/10  -Dizziness: 3/10      Assessment: Patient tolerated treatment session well today with focus on vestibular adaptation and headache management exercises. With ambulation with HT focusing, pt demonstrated increased veering laterally particularly to the right, but no LOB. Pt had increased symptoms with VOR x 1 HT, which could be secondary to increased fatigue from working earlier in the day. Pt able to tolerate VOR cancellation exercises with no experiences of increased nausea or dizziness. Patient will benefit from skilled OPPT services in order to reduce symptoms of dizziness and imbalance in order to maximize safe function and return to PLOF.       Plan: Continue per plan of care. Continue to incorporate VOR x 1, cancellation and habituation exercises to help improve pt tolerance for daily activities.      POC expires  Unit limit Auth Expiration date PT/OT + Visit Limit? Co-Insurance   9/2/24 NA NA BOMN No                                 PCP Dr. Ritchie 4/9/2024   Neurologist/Last Seen    ENT/Last Seen Dr. Chua 12/23/2023 and Dr. Zuniga 1/19/24   Psych/Last Seen    OT    Imaging MRI 6/3/2024     Outcome Measures Initial Eval  6/10/2024 6/17/24       mCTSIB  - FTEO (firm)  - FTEC (firm)  - FTEO (foam)  - FTEC (foam)   deferred   30 sec  30 sec  30 sec  30 sec       DGI deferred/24 20/24       FGA deferred/30 24/30       DHI deferred/100 50/100       JPET defer deferred                                                      Access Code: [email or web address]  URL: [email or web address]  Date: 06/19/2024  Prepared by: Jacquelyn Barlow    Exercises  - Standing Gaze Stabilization with Head Rotation  - 1 x daily - 7 x weekly - 2 sets - 30 sec hold  - Seated Upper Trapezius Stretch  - 1 x daily - 7 x weekly - 3 sets - 30 sec hold  - Seated Levator Scapulae Stretch  - 1 x daily - 7 x weekly - 3 sets - 30 sec hold  - Seated Cervical Retraction  - 1 x daily - 7 x weekly - 2 sets - 10 reps - 5 sec hold  - Seated Scapular Retraction  - 1 x daily - 7 x weekly - 2 sets - 10 reps - 5 sec hold

## 2024-07-03 ENCOUNTER — OFFICE VISIT (OUTPATIENT)
Facility: CLINIC | Age: 69
End: 2024-07-03
Payer: MEDICARE

## 2024-07-03 DIAGNOSIS — R42 DIZZINESS: Primary | ICD-10-CM

## 2024-07-03 PROCEDURE — 97530 THERAPEUTIC ACTIVITIES: CPT

## 2024-07-03 NOTE — PROGRESS NOTES
Daily Note     Today's date: 7/3/2024  Patient name: Diamond Aquino  : 1955  MRN: [id number]  Referring provider: Maureen Ritchie DO  Dx:   Encounter Diagnosis     ICD-10-CM    1. Dizziness  R42                     Subjective: Patient reports to PT session stating she is very tired. Current headache level 3-4/10.  Reports she only dizziness when she goes out shopping.      Objective: See treatment diary below    DIZZINESS AND VERTIGO QUESTIONNAIRE    1. Do you experience an illusion of false motion? ex: “the room is spinning.”, “things are whirling.” “I am reeling.”, “everything is swaying.”, “things are pitching.”, “it looks like things are rocking.” yes(slightly) no    2. Do you experience nausea, vomiting, pallor and perspiration during these attacks? yes no    3. Do you note a sensation of ringing in the ears? Yes (Has always had ear ringing but slight increase over the past few months) no    4. Do you experience any dizziness when in store aisles or malls?     yes no    5. Do you experience dizziness in crowds? yes no    6. Do you experience dizziness in large open spaces? yes no    7. Do you experience dizziness in moving vehicles? yes no    8. Do you experience dizziness with repetitious visual patterns? (ex. floor tile, carpeting in movie theaters) yes no    9. Do you note an increase in light sensitivity? (glare) yes no    10. Do you note difficulties with movement on a TV screen?   yes no    11. Do you experience dizziness with reading or concentrating  on computers?   yes No    TA:  Education on referral to vision therapy based on patient's signs and symptoms and results of the visual vertigo exam.       Assessment:  Due to patient reporting minimal to no improvement in dizziness, plan to place patient on hold for PT until patient receive's a vision evaluation by OT.  Based on patient's symptoms and results of the dizziness and vertigo questionnaire; patient's vision may possibly be the driving factor  behind patient's dizziness especially since minimal improvement with vestibular therapy. Currently, patient reports dizziness with completing book keeping, large open spaces, reading, visual complex environment. Pt verbalized understand and is agreeable with plan.       Plan: Place patient on hold for PT until patient is evaluated by vision therapist in order to determine PLOC.     POC expires Unit limit Auth Expiration date PT/OT + Visit Limit? Co-Insurance   9/2/24 NA NA BOMN No                                 PCP Dr. Ritchie 4/9/2024   Neurologist/Last Seen    ENT/Last Seen Dr. Chua 12/23/2023 and Dr. Zuniga 1/19/24   Psych/Last Seen    OT    Imaging MRI 6/3/2024     Outcome Measures Initial Eval  6/10/2024 6/17/24       mCTSIB  - FTEO (firm)  - FTEC (firm)  - FTEO (foam)  - FTEC (foam)   deferred   30 sec  30 sec  30 sec  30 sec       DGI deferred/24 20/24       FGA deferred/30 24/30       DHI deferred/100 50/100       JPET defer deferred                                                      Access Code: [email or web address]  URL: [email or web address]  Date: 06/19/2024  Prepared by: Jacquelyn Barlow    Exercises  - Standing Gaze Stabilization with Head Rotation  - 1 x daily - 7 x weekly - 2 sets - 30 sec hold  - Seated Upper Trapezius Stretch  - 1 x daily - 7 x weekly - 3 sets - 30 sec hold  - Seated Levator Scapulae Stretch  - 1 x daily - 7 x weekly - 3 sets - 30 sec hold  - Seated Cervical Retraction  - 1 x daily - 7 x weekly - 2 sets - 10 reps - 5 sec hold  - Seated Scapular Retraction  - 1 x daily - 7 x weekly - 2 sets - 10 reps - 5 sec hold

## 2024-07-08 ENCOUNTER — APPOINTMENT (OUTPATIENT)
Facility: CLINIC | Age: 69
End: 2024-07-08
Payer: MEDICARE

## 2024-07-09 ENCOUNTER — EVALUATION (OUTPATIENT)
Facility: CLINIC | Age: 69
End: 2024-07-09
Payer: MEDICARE

## 2024-07-09 DIAGNOSIS — H53.9 VISION CHANGES: Primary | ICD-10-CM

## 2024-07-09 PROCEDURE — 97165 OT EVAL LOW COMPLEX 30 MIN: CPT | Performed by: OCCUPATIONAL THERAPIST
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
## 2024-07-10 ENCOUNTER — APPOINTMENT (OUTPATIENT)
Facility: CLINIC | Age: 69
End: 2024-07-10
Payer: MEDICARE

## 2024-07-15 ENCOUNTER — APPOINTMENT (OUTPATIENT)
Facility: CLINIC | Age: 69
End: 2024-07-15
Payer: MEDICARE

## 2024-07-17 ENCOUNTER — APPOINTMENT (OUTPATIENT)
Facility: CLINIC | Age: 69
End: 2024-07-17
Payer: MEDICARE

## 2024-07-22 ENCOUNTER — APPOINTMENT (OUTPATIENT)
Facility: CLINIC | Age: 69
End: 2024-07-22
Payer: MEDICARE

## 2024-07-24 ENCOUNTER — APPOINTMENT (OUTPATIENT)
Facility: CLINIC | Age: 69
End: 2024-07-24
Payer: MEDICARE

## 2024-07-29 ENCOUNTER — APPOINTMENT (OUTPATIENT)
Facility: CLINIC | Age: 69
End: 2024-07-29
Payer: MEDICARE

## 2024-07-31 ENCOUNTER — APPOINTMENT (OUTPATIENT)
Facility: CLINIC | Age: 69
End: 2024-07-31
Payer: MEDICARE

## 2024-08-05 ENCOUNTER — HOSPITAL ENCOUNTER (OUTPATIENT)
Dept: RADIOLOGY | Facility: HOSPITAL | Age: 69
Discharge: HOME | End: 2024-08-05
Payer: MEDICARE

## 2024-08-05 VITALS — BODY MASS INDEX: 26.11 KG/M2 | WEIGHT: 133 LBS | HEIGHT: 60 IN

## 2024-08-05 DIAGNOSIS — Z12.31 SCREENING MAMMOGRAM, ENCOUNTER FOR: ICD-10-CM

## 2024-08-05 PROCEDURE — 77063 BREAST TOMOSYNTHESIS BI: CPT

## 2024-08-05 PROCEDURE — 77067 SCR MAMMO BI INCL CAD: CPT

## 2024-08-06 ENCOUNTER — APPOINTMENT (OUTPATIENT)
Facility: CLINIC | Age: 69
End: 2024-08-06
Payer: COMMERCIAL

## 2024-08-13 ENCOUNTER — OFFICE VISIT (OUTPATIENT)
Facility: CLINIC | Age: 69
End: 2024-08-13
Payer: COMMERCIAL

## 2024-08-13 ENCOUNTER — HOSPITAL ENCOUNTER (OUTPATIENT)
Dept: RADIOLOGY | Facility: HOSPITAL | Age: 69
Discharge: HOME | End: 2024-08-13
Payer: MEDICARE

## 2024-08-13 VITALS — WEIGHT: 133 LBS | BODY MASS INDEX: 26.11 KG/M2 | HEIGHT: 60 IN

## 2024-08-13 DIAGNOSIS — H53.9 VISION CHANGES: Primary | ICD-10-CM

## 2024-08-13 DIAGNOSIS — Z78.0 ASYMPTOMATIC POSTMENOPAUSAL STATE: ICD-10-CM

## 2024-08-13 PROCEDURE — 77080 DXA BONE DENSITY AXIAL: CPT

## 2024-08-13 PROCEDURE — 97530 THERAPEUTIC ACTIVITIES: CPT | Performed by: OCCUPATIONAL THERAPIST

## 2024-08-13 NOTE — PROGRESS NOTES
"Daily Note     Today's date: 2024  Patient name: Diamond Aquino  : 1955  MRN: [id number]  Referring provider: Maureen Ritchie DO  Dx:   Encounter Diagnosis     ICD-10-CM    1. Vision changes  H53.9           Start Time: 930  Stop Time: 1015  Total time in clinic (min): 45 minutes    Subjective: \"I am not sure how long I will be able to continue coming. I got sick and now my hip and leg are really hurting. They think it might be fibromyalgia.\"    At end of session pt requested to d/c from OT services at this time. She will follow up as needed in the future.       Objective: See treatment diary below    DIZZINESS AND VERTIGO QUESTIONNAIRE   Score:      1. Do you experience an illusion of false motion? ex: “the room is spinning.”, “things are whirling.” “I am reeling.”, “everything is swaying.”, “things are pitching.”, “it looks like things are rocking.” yes(slightly)         no     2. Do you experience nausea, vomiting, pallor and perspiration during these attacks?       yes            no     3. Do you note a sensation of ringing in the ears?     Yes      no     4. Do you experience any dizziness when in store aisles or malls?     yes      no     5. Do you experience dizziness in crowds?   yes       no     6. Do you experience dizziness in large open spaces?         yes      no     7. Do you experience dizziness in moving vehicles? yes       no     8. Do you experience dizziness with repetitious visual patterns? (ex. floor tile, carpeting in movie theaters)      yes      no     9. Do you note an increase in light sensitivity? (glare)          yes       no     10. Do you note difficulties with movement on a TV screen?   yes       no     11. Do you experience dizziness with reading or concentrating  on computers?                        yes      No    Total:         Convergence Insufficiency Symptom Survey:  -Score: 29/60  Cutoff= 21 or higher. Pt's score is suggestive of convergence insufficiency.  "     TA:   -Due to patients schedule and increase in appointments she stated she might not be able to come as frequently and would be willing to do exercises at home to continue progressing.     -Pt was educated on lazy 8s, barone chart, and marker pushups for HEP. Pt demonstrated success with each of these exercises and stated she would be able to implement into a routine at home.     -Pt completed bananagrams with fill in the blank crossword puzzle to address saccades, attention, and OM control. Pt demonstrated difficulty with  skills during activity.      Assessment: Patient would benefit from continued OT although due to increased leg pain and fibromyalgia pain pt would like to discharge from services and implement HEP to continue addressing symptoms. Based on CISS from initial evaluation and today's score pt is demonstrating decreased symptoms overall.    Short Term Goals 4-6 weeks:  Pt will demonstrate improved blur and recovery point by 1 inch to complete close work. NOT ACHIEVED  Pt will increase oculomotor control for improved saccades to 80% accuracy. NOT ACHIEVED  Pt will increase oculomotor control for improved pursuits to 80% accuracy. NOT ACHIEVED  Pt will report decreased symptoms and demonstrate improved score on CISS to a 33/60 to improve functional participation in IADLs. ACHIEVED  Pt will report decreased symptoms and demonstrate improved score in DVQ to a 5/11 to improve functional participation in IADLs. NOT ACHIEVED        Long Term Goals: 8-12 weeks  Pt will demonstrate improved blur and recovery point by 2 inches.   Pt will increase oculomotor control for improved saccades to 90% accuracy.   Pt will increase oculomotor control for improved pursuits by 90% accuracy.   Pt will report decreased symptoms and demonstrate improved score on CISS to a 27/60 to improve functional participation in IADLs.   Pt will report decreased symptoms and demonstrate improved score in DVQ to a 3/11 to improve  functional participation in IADLs.     Plan: Discharge from OT services.

## 2024-08-15 NOTE — PROGRESS NOTES
Ambulatory Visit  Name: Diamond Aquino      : 1955      MRN: [id number]  Encounter Provider: Nubia Chilel PA-C  Encounter Date: 2024   Encounter department: [facility]    Assessment & Plan   1. Varicose veins of both lower extremities with pain  -     Compression Stocking  -     Ambulatory referral to Vascular Surgery  -     Compression Stocking  -     VAS Lower extremity venous insufficiency duplex, bilateral w/ measurements; Future; Expected date: 2024    -Recurrence of varicose veins with leg pain and vein pain.    Exam:  -5-10+ mm bilateral varicose veins  -Long area of varicosities over left posterior thigh and lateral calf  -Right medial thigh varicosities  -No significant LE edema  -Images on chart    Plan: We had a detailed discussion regarding varicose veins and the treatment of venous disease. We discussed the role of compression and other conservative measures for relief of symptoms related to varicose veins.  Given her profession and significant varicosities associated with leg symptoms, I strongly recommend trial of medical compression stockings.  She is given a prescription for full-length compression since most of her symptoms are in the thighs and into the groin/buttock.  She has had progressive, recurrent varicosities over the legs.  We will see her back in about 2 to 3 months for reevaluation.  Due to patient's request and severity of symptoms, we will check a venous reflux study prior to her reassessment.    Order for prescription graded compression stockings of 20-30 mm Hg  Thigh-high compression stockings to be worn every day and removed at night   Compression, periodic elevation, low-sodium diet, regular exercise and skin care  Patient education for venous insufficiency.  Follow up in about 3 months     History of Present Illness     CC: New patient, presents today for evaluation of VV. BLE VV L>R with pain and heaviness. Wearing compression on occasion. H/o  "vein stripping bilaterally, in 1996.     Diamond Aquino is a 69 y.o. female asthma, Argueta esophagus, dyslipidemia vertigo who presents for evaluation of varicose veins with pain.     Patient reports that her legs L>R have been painful for many years now with \"bands\" of tightness over her legs with discomfort radiating into the groins and around to her buttocks. Reports that she has scattered veins over the thighs which are tender and uncomfortable.  Patient states that the legs feel bad all of the time even whenshe wakes up in the morning but can worsen as the day goes on.  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  She has no leg swelling.  She has been taking Tylenol which partially helps.  She was told that she has fibromyalgia.    In the 1990s, she did have stripping at [facility] for painful veins which did help, but she developed recurrence over the years and has put off an evaluation.  She has worn compression in the past but feels that they did not help.  Due to significant recurrence of disease, she now feels that something more needs to be done.    She was thought to have had a \"blood clot\" during pregnancy, but apparently did not need treatment. ?Unclear if this is superficial thrombophlebitis or actual deep vein.      No swelling  ?No DVT.     Review of Systems   Constitutional: Negative.    HENT: Negative.     Eyes: Negative.    Respiratory: Negative.     Cardiovascular:         Painful veins   Gastrointestinal: Negative.    Endocrine: Negative.    Genitourinary: Negative.    Musculoskeletal: Negative.    Skin: Negative.    Allergic/Immunologic: Negative.    Neurological: Negative.    Hematological: Negative.    Psychiatric/Behavioral: Negative.         Objective     /76 (BP Location: Right arm, Patient Position: Sitting)   Pulse 74   Ht 5' (1.524 m)   Wt 60.8 kg (134 lb)   LMP  (LMP Unknown)   BMI 26.17 kg/m²       -5-10+ mm bilateral varicose veins  -Long varicosities " left posterior thigh and lateral calf  -Right medial thigh varicosities  -No significant LE edema    Physical Exam  Vitals and nursing note reviewed.   Constitutional:       Appearance: She is well-developed.   HENT:      Head: Normocephalic and atraumatic.   Eyes:      Pupils: Pupils are equal, round, and reactive to light.   Neck:      Vascular: No JVD.      Trachea: Trachea normal.   Cardiovascular:      Rate and Rhythm: Normal rate and regular rhythm.      Pulses:           Radial pulses are 2+ on the right side and 2+ on the left side.        Dorsalis pedis pulses are 2+ on the right side and 2+ on the left side.      Heart sounds: Normal heart sounds, S1 normal and S2 normal. No murmur heard.     No friction rub. No gallop.   Pulmonary:      Effort: Pulmonary effort is normal. No accessory muscle usage or respiratory distress.      Breath sounds: Normal breath sounds. No wheezing or rales.   Abdominal:      General: Bowel sounds are normal. There is no distension.      Palpations: Abdomen is soft.      Tenderness: There is no abdominal tenderness.   Musculoskeletal:         General: No deformity. Normal range of motion.      Right lower leg: No edema.      Left lower leg: No edema.   Skin:     General: Skin is warm and dry.      Findings: No lesion or rash.      Nails: There is no clubbing.   Neurological:      Mental Status: She is alert and oriented to person, place, and time.      Comments: Grossly normal    Psychiatric:         Behavior: Behavior is cooperative.         I have reviewed and made appropriate changes to the review of systems input by the medical assistant.    Vitals:    08/16/24 0925   BP: 118/76   BP Location: Right arm   Patient Position: Sitting   Pulse: 74   Weight: 60.8 kg (134 lb)   Height: 5' (1.524 m)       Patient Active Problem List   Diagnosis    Severe persistent asthma without complication    Chronic sinusitis    Anxiety    Argueta esophagus    Osteoarthritis    Elevated LFTs     Gastroesophageal reflux disease without esophagitis    WILBERT (obstructive sleep apnea)    Nocturnal hypoxia    Eosinophilic asthma    Right shoulder pain    Nasal polyps    Epigastric discomfort    History of hysterectomy    Macular degeneration    Dyslipidemia    Varicose veins of both lower extremities with pain    Memory loss       Past Surgical History:   Procedure Laterality Date     SECTION N/A     x 2    COLONOSCOPY      ESOPHAGOGASTRODUODENOSCOPY      HYSTERECTOMY      complete    NASAL POLYP SURGERY      x3-2013,,2019    MA COLSC FLX W/RMVL OF TUMOR POLYP LESION SNARE TQ N/A 2017    Procedure: COLONOSCOPYwith  snare polypectomy.;  Surgeon: Cuong Mejia MD;  Location: [facility];  Service: Gastroenterology    MA EGD TRANSORAL BIOPSY SINGLE/MULTIPLE N/A 2017    Procedure: ESOPHAGOGASTRODUODENOSCOPY (EGD)and biopsy.;  Surgeon: Cuong Mejia MD;  Location: [facility];  Service: Gastroenterology    MA XCAPSL CTRC RMVL INSJ IO LENS PROSTH W/O ECP Right 2024    Procedure: EXTRACTION EXTRACAPSULAR CATARACT PHACO INTRAOCULAR LENS (IOL);  Surgeon: Jamie Engel MD;  Location: [facility];  Service: Ophthalmology    TONSILLECTOMY N/A     VEIN LIGATION AND STRIPPING Bilateral        Family History   Problem Relation Age of Onset    Heart disease Mother         CHF    Breast cancer additional onset Mother 49    Breast cancer Mother 49    Dysphagia Father     Breast cancer Maternal Aunt     Breast cancer Paternal Aunt     Ovarian cancer Maternal Aunt     Ovarian cancer Maternal Aunt     Cervical cancer Maternal Aunt        Social History     Socioeconomic History    Marital status: /Civil Union     Spouse name: Not on file    Number of children: Not on file    Years of education: Not on file    Highest education level: Not on file   Occupational History    Not on file   Tobacco Use    Smoking status: Never     Passive exposure: Past    Smokeless tobacco: Never    Vaping Use    Vaping status: Never Used   Substance and Sexual Activity    Alcohol use: Yes     Comment: social    Drug use: Never    Sexual activity: Yes     Partners: Male     Birth control/protection: Female Sterilization, Surgical     Comment: hysterectomy   Other Topics Concern    Not on file   Social History Narrative    ** Merged History Encounter **          Social Determinants of Health     Financial Resource Strain: Not on file   Food Insecurity: No Food Insecurity (4/9/2024)    Hunger Vital Sign     Worried About Running Out of Food in the Last Year: Never true     Ran Out of Food in the Last Year: Never true   Transportation Needs: No Transportation Needs (4/9/2024)    PRAPARE - Transportation     Lack of Transportation (Medical): No     Lack of Transportation (Non-Medical): No   Physical Activity: Not on file   Stress: Not on file   Social Connections: Not on file   Intimate Partner Violence: Not on file   Housing Stability: Low Risk  (4/9/2024)    Housing Stability Vital Sign     Unable to Pay for Housing in the Last Year: No     Number of Times Moved in the Last Year: 1     Homeless in the Last Year: No       Allergies   Allergen Reactions    Pitavastatin Hives, Shortness Of Breath and Rash     Nausea    Cefditoren Nausea Only and Vomiting    Cefditoren Pivoxil Hives     N/V    Dust Mite Extract Sneezing    Levofloxacin GI Intolerance    Mold Extract [Trichophyton] Sneezing    Moxifloxacin GI Intolerance         Current Outpatient Medications:     acetaminophen (TYLENOL) 325 mg tablet, Take 650 mg by mouth every 6 (six) hours as needed for mild pain, Disp: , Rfl:     albuterol (ProAir HFA) 90 mcg/act inhaler, Inhale 2 puffs every 4 (four) hours as needed for wheezing, Disp: 8 g, Rfl: 3    ALPRAZolam (XANAX) 0.5 mg tablet, as needed, Disp: , Rfl: 0    Dupilumab (Dupixent) 300 MG/2ML SOPN, INJECT 1 PEN UNDER THE SKIN EVERY 14 DAYS, Disp: 4 mL, Rfl: 5    gatifloxacin (ZYMAXID) 0.5 %, Administer 1 drop  to the right eye 2 (two) times a day, Disp: , Rfl:     ketorolac (ACULAR) 0.5 % ophthalmic solution, Administer 1 drop to the right eye 4 (four) times a day, Disp: , Rfl:     montelukast (SINGULAIR) 10 mg tablet, take 1 tablet by mouth once daily (Patient taking differently: Take 10 mg by mouth daily at bedtime), Disp: 60 tablet, Rfl: 3    Multiple Vitamins-Minerals (PRESERVISION AREDS 2 PO), Take by mouth 2 (two) times a day, Disp: , Rfl:     traZODone (DESYREL) 100 mg tablet, Take 100 mg by mouth daily at bedtime as needed for sleep, Disp: , Rfl: 0    vitamin E, tocopherol, 400 units capsule, Take 400 Units by mouth daily, Disp: , Rfl:     atorvastatin (LIPITOR) 20 mg tablet, Take 1 tablet (20 mg total) by mouth daily (Patient not taking: Reported on 8/16/2024), Disp: 30 tablet, Rfl: 3    budesonide (PULMICORT) 0.5 mg/2 mL nebulizer solution, Take 1 vial (0.5 mg total) by nebulization 2 (two) times a day (Patient not taking: Reported on 8/16/2024), Disp: 60 vial, Rfl: 5    busPIRone (BUSPAR) 10 mg tablet, , Disp: , Rfl:     calcium citrate-vitamin D (CITRACAL+D) 315-200 MG-UNIT per tablet, Take 1 tablet by mouth every morning (Patient not taking: Reported on 6/18/2024), Disp: , Rfl:     famciclovir (FAMVIR) 500 mg tablet, if needed (Patient not taking: Reported on 8/16/2024), Disp: , Rfl:     Fluticasone Propionate (Xhance) 93 MCG/ACT EXHU, 1 spray into each nostril 2 (two) times a day (Patient not taking: Reported on 8/16/2024), Disp: 16 mL, Rfl: 5    I have reviewed and made appropriate changes to the review of systems input by the medical assistant.    Vitals:    08/16/24 0925   BP: 118/76   BP Location: Right arm   Patient Position: Sitting   Pulse: 74   Weight: 60.8 kg (134 lb)   Height: 5' (1.524 m)       Patient Active Problem List   Diagnosis    Severe persistent asthma without complication    Chronic sinusitis    Anxiety    Argueta esophagus    Osteoarthritis    Elevated LFTs    Gastroesophageal reflux  disease without esophagitis    WILBERT (obstructive sleep apnea)    Nocturnal hypoxia    Eosinophilic asthma    Right shoulder pain    Nasal polyps    Epigastric discomfort    History of hysterectomy    Macular degeneration    Dyslipidemia    Varicose veins of both lower extremities with pain    Memory loss       Past Surgical History:   Procedure Laterality Date     SECTION N/A     x 2    COLONOSCOPY      ESOPHAGOGASTRODUODENOSCOPY      HYSTERECTOMY      complete    NASAL POLYP SURGERY      x3-2013,,2019    [facility] COLSC FLX W/RMVL OF TUMOR POLYP LESION SNARE TQ N/A 2017    Procedure: COLONOSCOPYwith  snare polypectomy.;  Surgeon: Cuong Mejia MD;  Location: [facility];  Service: Gastroenterology    [facility] EGD TRANSORAL BIOPSY SINGLE/MULTIPLE N/A 2017    Procedure: ESOPHAGOGASTRODUODENOSCOPY (EGD)and biopsy.;  Surgeon: Cuong Mejia MD;  Location: [facility];  Service: Gastroenterology    UT XCAPSL CTRC RMVL INSJ IO LENS PROSTH W/O ECP Right 2024    Procedure: EXTRACTION EXTRACAPSULAR CATARACT PHACO INTRAOCULAR LENS (IOL);  Surgeon: Jamie Engel MD;  Location: [facility];  Service: Ophthalmology    TONSILLECTOMY N/A     VEIN LIGATION AND STRIPPING Bilateral        Family History   Problem Relation Age of Onset    Heart disease Mother         CHF    Breast cancer additional onset Mother 49    Breast cancer Mother 49    Dysphagia Father     Breast cancer Maternal Aunt     Breast cancer Paternal Aunt     Ovarian cancer Maternal Aunt     Ovarian cancer Maternal Aunt     Cervical cancer Maternal Aunt        Social History     Socioeconomic History    Marital status: /Civil Union     Spouse name: Not on file    Number of children: Not on file    Years of education: Not on file    Highest education level: Not on file   Occupational History    Not on file   Tobacco Use    Smoking status: Never     Passive exposure: Past    Smokeless tobacco: Never   Vaping Use    Vaping status:  Never Used   Substance and Sexual Activity    Alcohol use: Yes     Comment: social    Drug use: Never    Sexual activity: Yes     Partners: Male     Birth control/protection: Female Sterilization, Surgical     Comment: hysterectomy   Other Topics Concern    Not on file   Social History Narrative    ** Merged History Encounter **          Social Determinants of Health     Financial Resource Strain: Not on file   Food Insecurity: No Food Insecurity (4/9/2024)    Hunger Vital Sign     Worried About Running Out of Food in the Last Year: Never true     Ran Out of Food in the Last Year: Never true   Transportation Needs: No Transportation Needs (4/9/2024)    PRAPARE - Transportation     Lack of Transportation (Medical): No     Lack of Transportation (Non-Medical): No   Physical Activity: Not on file   Stress: Not on file   Social Connections: Not on file   Intimate Partner Violence: Not on file   Housing Stability: Low Risk  (4/9/2024)    Housing Stability Vital Sign     Unable to Pay for Housing in the Last Year: No     Number of Times Moved in the Last Year: 1     Homeless in the Last Year: No       Allergies   Allergen Reactions    Pitavastatin Hives, Shortness Of Breath and Rash     Nausea    Cefditoren Nausea Only and Vomiting    Cefditoren Pivoxil Hives     N/V    Dust Mite Extract Sneezing    Levofloxacin GI Intolerance    Mold Extract [Trichophyton] Sneezing    Moxifloxacin GI Intolerance         Current Outpatient Medications:     acetaminophen (TYLENOL) 325 mg tablet, Take 650 mg by mouth every 6 (six) hours as needed for mild pain, Disp: , Rfl:     albuterol (ProAir HFA) 90 mcg/act inhaler, Inhale 2 puffs every 4 (four) hours as needed for wheezing, Disp: 8 g, Rfl: 3    ALPRAZolam (XANAX) 0.5 mg tablet, as needed, Disp: , Rfl: 0    Dupilumab (Dupixent) 300 MG/2ML SOPN, INJECT 1 PEN UNDER THE SKIN EVERY 14 DAYS, Disp: 4 mL, Rfl: 5    gatifloxacin (ZYMAXID) 0.5 %, Administer 1 drop to the right eye 2 (two) times  a day, Disp: , Rfl:     ketorolac (ACULAR) 0.5 % ophthalmic solution, Administer 1 drop to the right eye 4 (four) times a day, Disp: , Rfl:     montelukast (SINGULAIR) 10 mg tablet, take 1 tablet by mouth once daily (Patient taking differently: Take 10 mg by mouth daily at bedtime), Disp: 60 tablet, Rfl: 3    Multiple Vitamins-Minerals (PRESERVISION AREDS 2 PO), Take by mouth 2 (two) times a day, Disp: , Rfl:     traZODone (DESYREL) 100 mg tablet, Take 100 mg by mouth daily at bedtime as needed for sleep, Disp: , Rfl: 0    vitamin E, tocopherol, 400 units capsule, Take 400 Units by mouth daily, Disp: , Rfl:     atorvastatin (LIPITOR) 20 mg tablet, Take 1 tablet (20 mg total) by mouth daily (Patient not taking: Reported on 8/16/2024), Disp: 30 tablet, Rfl: 3    budesonide (PULMICORT) 0.5 mg/2 mL nebulizer solution, Take 1 vial (0.5 mg total) by nebulization 2 (two) times a day (Patient not taking: Reported on 8/16/2024), Disp: 60 vial, Rfl: 5    busPIRone (BUSPAR) 10 mg tablet, , Disp: , Rfl:     calcium citrate-vitamin D (CITRACAL+D) 315-200 MG-UNIT per tablet, Take 1 tablet by mouth every morning (Patient not taking: Reported on 6/18/2024), Disp: , Rfl:     famciclovir (FAMVIR) 500 mg tablet, if needed (Patient not taking: Reported on 8/16/2024), Disp: , Rfl:     Fluticasone Propionate (Xhance) 93 MCG/ACT EXHU, 1 spray into each nostril 2 (two) times a day (Patient not taking: Reported on 8/16/2024), Disp: 16 mL, Rfl: 5

## 2024-08-16 ENCOUNTER — CONSULT (OUTPATIENT)
Dept: VASCULAR SURGERY | Facility: CLINIC | Age: 69
End: 2024-08-16
Payer: COMMERCIAL

## 2024-08-16 ENCOUNTER — APPOINTMENT (OUTPATIENT)
Facility: CLINIC | Age: 69
End: 2024-08-16
Payer: COMMERCIAL

## 2024-08-16 VITALS
WEIGHT: 134 LBS | DIASTOLIC BLOOD PRESSURE: 76 MMHG | HEART RATE: 74 BPM | HEIGHT: 60 IN | BODY MASS INDEX: 26.31 KG/M2 | SYSTOLIC BLOOD PRESSURE: 118 MMHG

## 2024-08-16 DIAGNOSIS — I83.813 VARICOSE VEINS OF BOTH LOWER EXTREMITIES WITH PAIN: ICD-10-CM

## 2024-08-16 PROCEDURE — 99204 OFFICE O/P NEW MOD 45 MIN: CPT | Performed by: PHYSICIAN ASSISTANT

## 2024-08-16 NOTE — PATIENT INSTRUCTIONS
Varicose veins    We had a detailed discussion regarding varicose veins and the treatment of venous disease. We discussed the role of compression and other conservative measures for relief of symptoms related to varicose veins.     Order for prescription graded compression stockings of 20-30 mm Hg  Wear stocking EVERY day to help control swelling in legs and remove at night  Elevate legs while at rest  Continue healthy life-style changes; heart-healthy, low sodium diet; regular exercise  Patient education for venous insufficiency.  Follow up  3 months

## 2024-08-19 ENCOUNTER — APPOINTMENT (OUTPATIENT)
Dept: LAB | Facility: CLINIC | Age: 69
End: 2024-08-19
Payer: MEDICARE

## 2024-08-19 ENCOUNTER — HOSPITAL ENCOUNTER (OUTPATIENT)
Dept: RADIOLOGY | Facility: HOSPITAL | Age: 69
Discharge: HOME | End: 2024-08-19
Payer: MEDICARE

## 2024-08-19 ENCOUNTER — OFFICE VISIT (OUTPATIENT)
Dept: FAMILY MEDICINE CLINIC | Facility: CLINIC | Age: 69
End: 2024-08-19
Payer: COMMERCIAL

## 2024-08-19 ENCOUNTER — APPOINTMENT (OUTPATIENT)
Facility: CLINIC | Age: 69
End: 2024-08-19
Payer: COMMERCIAL

## 2024-08-19 VITALS
RESPIRATION RATE: 16 BRPM | HEIGHT: 60 IN | SYSTOLIC BLOOD PRESSURE: 122 MMHG | DIASTOLIC BLOOD PRESSURE: 62 MMHG | TEMPERATURE: 98.6 F | HEART RATE: 80 BPM | WEIGHT: 135.8 LBS | BODY MASS INDEX: 26.66 KG/M2

## 2024-08-19 DIAGNOSIS — M25.50 ARTHRALGIA, UNSPECIFIED JOINT: ICD-10-CM

## 2024-08-19 DIAGNOSIS — M25.552 LEFT HIP PAIN: ICD-10-CM

## 2024-08-19 DIAGNOSIS — E78.5 DYSLIPIDEMIA: ICD-10-CM

## 2024-08-19 DIAGNOSIS — R53.83 OTHER FATIGUE: ICD-10-CM

## 2024-08-19 DIAGNOSIS — R53.83 OTHER FATIGUE: Primary | ICD-10-CM

## 2024-08-19 LAB
CRP SERPL QL: 23.6 MG/L
ERYTHROCYTE [SEDIMENTATION RATE] IN BLOOD: 43 MM/HOUR (ref 0–29)
FERRITIN SERPL-MCNC: 81 NG/ML (ref 11–307)
IRON SATN MFR SERPL: 41 % (ref 15–50)
IRON SERPL-MCNC: 130 UG/DL (ref 50–212)
TIBC SERPL-MCNC: 318 UG/DL (ref 250–450)
UIBC SERPL-MCNC: 188 UG/DL (ref 155–355)

## 2024-08-19 PROCEDURE — 73502 X-RAY EXAM HIP UNI 2-3 VIEWS: CPT

## 2024-08-19 PROCEDURE — 82728 ASSAY OF FERRITIN: CPT

## 2024-08-19 PROCEDURE — 83540 ASSAY OF IRON: CPT

## 2024-08-19 PROCEDURE — 86430 RHEUMATOID FACTOR TEST QUAL: CPT

## 2024-08-19 PROCEDURE — 86039 ANTINUCLEAR ANTIBODIES (ANA): CPT

## 2024-08-19 PROCEDURE — 99214 OFFICE O/P EST MOD 30 MIN: CPT | Performed by: NURSE PRACTITIONER

## 2024-08-19 PROCEDURE — 86225 DNA ANTIBODY NATIVE: CPT

## 2024-08-19 PROCEDURE — 86038 ANTINUCLEAR ANTIBODIES: CPT

## 2024-08-19 PROCEDURE — 83550 IRON BINDING TEST: CPT

## 2024-08-19 PROCEDURE — 86618 LYME DISEASE ANTIBODY: CPT

## 2024-08-19 PROCEDURE — 86140 C-REACTIVE PROTEIN: CPT

## 2024-08-19 PROCEDURE — 36415 COLL VENOUS BLD VENIPUNCTURE: CPT

## 2024-08-19 PROCEDURE — 85652 RBC SED RATE AUTOMATED: CPT

## 2024-08-19 PROCEDURE — 86235 NUCLEAR ANTIGEN ANTIBODY: CPT

## 2024-08-19 RX ORDER — PRAVASTATIN SODIUM 20 MG
20 TABLET ORAL DAILY
Qty: 30 TABLET | Refills: 1 | Status: SHIPPED | OUTPATIENT
Start: 2024-08-19

## 2024-08-19 NOTE — PATIENT INSTRUCTIONS
Patient Education     Pravastatin (prav a STAT in)   Brand Names: US Pravachol [DSC]   Brand Names: Usha ACH-Pravastatin; AG-Pravastatin; APO-Pravastatin; AURO-Pravastatin; BIO-Pravastatin; DOM-Pravastatin [DSC]; JAMP-Pravastatin; M-Pravastatin; MAR-Pravastatin; MINT-Pravastatin; PMS-Pravastatin; Pravachol [DSC]; Priva-Pravastatin [DSC]; SANDOZ Pravastatin; TARO-Pravastatin; TEVA-Pravastatin   What is this drug used for?   It is used to lower bad cholesterol, lower triglycerides, and raise good cholesterol (HDL).  It is used to slow the progress of heart disease.  It is used to lower the chance of heart attack, stroke, and death in some people.  It may be given to you for other reasons. Talk with the doctor.  What do I need to tell my doctor BEFORE I take this drug?   If you are allergic to this drug; any part of this drug; or any other drugs, foods, or substances. Tell your doctor about the allergy and what signs you had.  If you have liver disease or raised liver enzymes.  If you are taking gemfibrozil.  If you are pregnant or may be pregnant. This drug may cause harm to an unborn baby. You will need to talk with your doctor about if this drug is right for you.  If you are breast-feeding. Do not breast-feed while you take this drug.  This is not a list of all drugs or health problems that interact with this drug.  Tell your doctor and pharmacist about all of your drugs (prescription or OTC, natural products, vitamins) and health problems. You must check to make sure that it is safe for you to take this drug with all of your drugs and health problems. Do not start, stop, or change the dose of any drug without checking with your doctor.  What are some things I need to know or do while I take this drug?   Tell all of your health care providers that you take this drug. This includes your doctors, nurses, pharmacists, and dentists.  Have blood work checked as you have been told by the doctor. Talk with the  doctor.  Follow the diet and workout plan that your doctor told you about.  Do not take more than what your doctor told you to take. Taking more than you are told may raise your chance of very bad side effects.  If you have high blood sugar (diabetes), you will need to watch your blood sugar closely.  If you take cholestyramine or colestipol, take them at least 4 hours before or 1 hour after this drug.  Avoid or limit drinking alcohol to 2 drinks a day. Drinking too much alcohol may raise your chance of liver disease.  If you are 65 or older, use this drug with care. You could have more side effects.  You may need to use birth control to prevent pregnancy while taking this drug. Talk with your doctor.  What are some side effects that I need to call my doctor about right away?   WARNING/CAUTION: Even though it may be rare, some people may have very bad and sometimes deadly side effects when taking a drug. Tell your doctor or get medical help right away if you have any of the following signs or symptoms that may be related to a very bad side effect:  Signs of an allergic reaction, like rash; hives; itching; red, swollen, blistered, or peeling skin with or without fever; wheezing; tightness in the chest or throat; trouble breathing, swallowing, or talking; unusual hoarseness; or swelling of the mouth, face, lips, tongue, or throat.  Chest pain.  Not able to pass urine or change in how much urine is passed.  This drug may cause muscle pain, tenderness, or weakness. The risk may be raised if you have low thyroid function or kidney problems. It may also be raised if you take this drug with certain other drugs, or if you are 65 or older. Sometimes, a severe muscle problem may lead to kidney problems. Rarely, deaths have happened. Call your doctor right away if you have abnormal muscle pain, tenderness, or weakness (with or without fever or feeling out of sorts). Call your doctor right away if muscle problems last after  your doctor has told you to stop taking this drug.  Liver problems have happened with drugs like this one. Sometimes, this has been deadly. Call your doctor right away if you have signs of liver problems like dark urine, tiredness, decreased appetite, upset stomach or stomach pain, light-colored stools, throwing up, or yellow skin or eyes.  What are some other side effects of this drug?   All drugs may cause side effects. However, many people have no side effects or only have minor side effects. Call your doctor or get medical help if any of these side effects or any other side effects bother you or do not go away:  Headache.  Feeling dizzy, tired, or weak.  Diarrhea, upset stomach, or throwing up.  Signs of a common cold.  These are not all of the side effects that may occur. If you have questions about side effects, call your doctor. Call your doctor for medical advice about side effects.  You may report side effects to your national health agency.  You may report side effects to the FDA at 1-163.136.1972. You may also report side effects at https://www.fda.gov/medwatch.  How is this drug best taken?   Use this drug as ordered by your doctor. Read all information given to you. Follow all instructions closely.  Take with or without food.  Keep taking this drug as you have been told by your doctor or other health care provider, even if you feel well.  What do I do if I miss a dose?   Take a missed dose as soon as you think about it.  If it is close to the time for your next dose, skip the missed dose and go back to your normal time.  Do not take 2 doses at the same time or extra doses.  How do I store and/or throw out this drug?   Store at room temperature protected from light. Store in a dry place. Do not store in a bathroom.  Keep lid tightly closed.  Keep all drugs in a safe place. Keep all drugs out of the reach of children and pets.  Throw away unused or  drugs. Do not flush down a toilet or pour down a  drain unless you are told to do so. Check with your pharmacist if you have questions about the best way to throw out drugs. There may be drug take-back programs in your area.  General drug facts   If your symptoms or health problems do not get better or if they become worse, call your doctor.  Do not share your drugs with others and do not take anyone else's drugs.  Some drugs may have another patient information leaflet. If you have any questions about this drug, please talk with your doctor, nurse, pharmacist, or other health care provider.  Some drugs may have another patient information leaflet. Check with your pharmacist. If you have any questions about this drug, please talk with your doctor, nurse, pharmacist, or other health care provider.  If you think there has been an overdose, call your poison control center or get medical care right away. Be ready to tell or show what was taken, how much, and when it happened.  Consumer Information Use and Disclaimer   This generalized information is a limited summary of diagnosis, treatment, and/or medication information. It is not meant to be comprehensive and should be used as a tool to help the user understand and/or assess potential diagnostic and treatment options. It does NOT include all information about conditions, treatments, medications, side effects, or risks that may apply to a specific patient. It is not intended to be medical advice or a substitute for the medical advice, diagnosis, or treatment of a health care provider based on the health care provider's examination and assessment of a patient's specific and unique circumstances. Patients must speak with a health care provider for complete information about their health, medical questions, and treatment options, including any risks or benefits regarding use of medications. This information does not endorse any treatments or medications as safe, effective, or approved for treating a specific patient.  [employer] and its affiliates disclaim any warranty or liability relating to this information or the use thereof. The use of this information is governed by the Terms of Use, available at https://www.[employer].com/en/know/clinical-effectiveness-terms.  Last Reviewed Date   2022-07-08  Copyright   © 2024 [employer] and its affiliates and/or licensors. All rights reserved.

## 2024-08-19 NOTE — PROGRESS NOTES
Assessment/Plan:    1. Other fatigue  -     RF Screen w/ Reflex to Titer; Future; Expected date: 08/19/2024  -     Sjogren's Antibodies; Future; Expected date: 08/19/2024  -     Lyme Total AB W Reflex to IGM/IGG; Future; Expected date: 08/19/2024  -     C-reactive protein; Future; Expected date: 08/19/2024  -     Sedimentation rate, automated; Future; Expected date: 08/19/2024  -     CARLOS Screen w/ Reflex to Titer/Pattern; Future; Expected date: 08/19/2024  -     Iron Panel (Includes Ferritin, Iron Sat%, Iron, and TIBC); Future  2. Arthralgia, unspecified joint  -     RF Screen w/ Reflex to Titer; Future; Expected date: 08/19/2024  -     Sjogren's Antibodies; Future; Expected date: 08/19/2024  -     Lyme Total AB W Reflex to IGM/IGG; Future; Expected date: 08/19/2024  -     C-reactive protein; Future; Expected date: 08/19/2024  -     Sedimentation rate, automated; Future; Expected date: 08/19/2024  -     CARLOS Screen w/ Reflex to Titer/Pattern; Future; Expected date: 08/19/2024  -     Iron Panel (Includes Ferritin, Iron Sat%, Iron, and TIBC); Future  3. Left hip pain  -     RF Screen w/ Reflex to Titer; Future; Expected date: 08/19/2024  -     Sjogren's Antibodies; Future; Expected date: 08/19/2024  -     Lyme Total AB W Reflex to IGM/IGG; Future; Expected date: 08/19/2024  -     C-reactive protein; Future; Expected date: 08/19/2024  -     Sedimentation rate, automated; Future; Expected date: 08/19/2024  -     CARLOS Screen w/ Reflex to Titer/Pattern; Future; Expected date: 08/19/2024  -     XR hip/pelv 2-3 vws left if performed; Future; Expected date: 08/19/2024  -     Iron Panel (Includes Ferritin, Iron Sat%, Iron, and TIBC); Future  4. Dyslipidemia  -     pravastatin (PRAVACHOL) 20 mg tablet; Take 1 tablet (20 mg total) by mouth daily        Future Appointments   Date Time Provider Department Center   10/10/2024  1:00 PM [place] [facility]   10/18/2024  9:00 AM DO YAKELIN Ontiveros [facility]    11/15/2024  9:00 AM Reyna Cristina MD [facility]   12/20/2024  8:30 AM Maureen Ritchie DO [facility]           Subjective:      Patient ID: Diamond Aquino is a 69 y.o. female.    Chief Complaint   Patient presents with   • Leg Pain     Left leg pain - ongoing  Basilia Byrd CMA    • Fatigue         Vitals:  /62   Pulse 80   Temp 98.6 °F (37 °C)   Resp 16   Ht 5' (1.524 m)   Wt 61.6 kg (135 lb 12.8 oz)   LMP  (LMP Unknown)   BMI 26.52 kg/m²     HPI  Patient stated that was started on lipitor in past in July 2024 by PCP and stopped after a week as gave her lot of muscle pain and joint pains. Will try something else and will start on pravastatin and will also take CoQ10 with it OTC.  Also having vascular issues in legs and following with vascular surgery and scheduled for vascular scan of legs. Stated that now having pain in both shoulder and shoulder blades and hips from last couple of weeks and but worse in left hip and radiating to left groin and left leg. Denies any falls and injury. Also feeling fatigue and denies any rash      The following portions of the patient's history were reviewed and updated as appropriate: allergies, current medications, past family history, past medical history, past social history, past surgical history and problem list.      Review of Systems   Constitutional:  Positive for fatigue.   HENT: Negative.     Respiratory: Negative.     Cardiovascular: Negative.    Genitourinary:  Negative for difficulty urinating.   Musculoskeletal:  Positive for arthralgias and myalgias.   Neurological: Negative.          Objective:    Social History     Tobacco Use   Smoking Status Never   • Passive exposure: Past   Smokeless Tobacco Never       Allergies:   Allergies   Allergen Reactions   • Pitavastatin Hives, Shortness Of Breath and Rash     Nausea   • Cefditoren Nausea Only and Vomiting   • Cefditoren Pivoxil Hives     N/V   • Dust Mite Extract Sneezing   •  Levofloxacin GI Intolerance   • Mold Extract [Trichophyton] Sneezing   • Moxifloxacin GI Intolerance         Current Outpatient Medications   Medication Sig Dispense Refill   • acetaminophen (TYLENOL) 325 mg tablet Take 650 mg by mouth every 6 (six) hours as needed for mild pain     • albuterol (ProAir HFA) 90 mcg/act inhaler Inhale 2 puffs every 4 (four) hours as needed for wheezing 8 g 3   • ALPRAZolam (XANAX) 0.5 mg tablet as needed  0   • budesonide (PULMICORT) 0.5 mg/2 mL nebulizer solution Take 1 vial (0.5 mg total) by nebulization 2 (two) times a day 60 vial 5   • Dupilumab (Dupixent) 300 MG/2ML SOPN INJECT 1 PEN UNDER THE SKIN EVERY 14 DAYS 4 mL 5   • famciclovir (FAMVIR) 500 mg tablet if needed     • gatifloxacin (ZYMAXID) 0.5 % Administer 1 drop to the right eye 2 (two) times a day     • ketorolac (ACULAR) 0.5 % ophthalmic solution Administer 1 drop to the right eye 4 (four) times a day     • montelukast (SINGULAIR) 10 mg tablet take 1 tablet by mouth once daily (Patient taking differently: Take 10 mg by mouth daily at bedtime) 60 tablet 3   • Multiple Vitamins-Minerals (PRESERVISION AREDS 2 PO) Take by mouth 2 (two) times a day     • pravastatin (PRAVACHOL) 20 mg tablet Take 1 tablet (20 mg total) by mouth daily 30 tablet 1   • traZODone (DESYREL) 100 mg tablet Take 100 mg by mouth daily at bedtime as needed for sleep  0   • vitamin E, tocopherol, 400 units capsule Take 400 Units by mouth daily     • busPIRone (BUSPAR) 10 mg tablet  (Patient not taking: Reported on 8/16/2024)     • calcium citrate-vitamin D (CITRACAL+D) 315-200 MG-UNIT per tablet Take 1 tablet by mouth every morning (Patient not taking: Reported on 6/18/2024)     • Fluticasone Propionate (Xhance) 93 MCG/ACT EXHU 1 spray into each nostril 2 (two) times a day (Patient not taking: Reported on 8/16/2024) 16 mL 5     No current facility-administered medications for this visit.          Physical Exam  Constitutional:       Appearance: Normal  appearance.   HENT:      Head: Normocephalic and atraumatic.      Nose: Nose normal.   Eyes:      Conjunctiva/sclera: Conjunctivae normal.   Cardiovascular:      Rate and Rhythm: Normal rate and regular rhythm.      Pulses: Normal pulses.      Heart sounds: Normal heart sounds.   Pulmonary:      Effort: Pulmonary effort is normal.      Breath sounds: Normal breath sounds.   Musculoskeletal:      Right shoulder: No tenderness.      Left shoulder: No tenderness.      Left hip: No tenderness (tender on left groin mildly but not tender in left hip, left thigh and left calf area. no calf swelling noted).   Skin:     General: Skin is warm and dry.      Findings: No rash.   Neurological:      Mental Status: She is alert and oriented to person, place, and time.   Psychiatric:         Mood and Affect: Mood normal.         Behavior: Behavior normal.         Thought Content: Thought content normal.         Judgment: Judgment normal.                     LEIF Garcia

## 2024-08-20 ENCOUNTER — APPOINTMENT (OUTPATIENT)
Facility: CLINIC | Age: 69
End: 2024-08-20
Payer: COMMERCIAL

## 2024-08-20 LAB
ANA SER QL IA: POSITIVE
B BURGDOR IGG+IGM SER QL IA: NEGATIVE
RHEUMATOID FACT SER QL LA: NEGATIVE

## 2024-08-21 ENCOUNTER — TELEPHONE (OUTPATIENT)
Age: 69
End: 2024-08-21

## 2024-08-21 DIAGNOSIS — R76.8 ANA POSITIVE: Primary | ICD-10-CM

## 2024-08-21 DIAGNOSIS — R70.0 ELEVATED SED RATE: ICD-10-CM

## 2024-08-21 DIAGNOSIS — R79.82 ELEVATED C-REACTIVE PROTEIN: ICD-10-CM

## 2024-08-21 LAB
ANA HOMOGEN SER QL IF: NORMAL
ANA HOMOGEN TITR SER: NORMAL {TITER}
DSDNA AB SER-ACNC: 10 IU/ML
ENA RNP AB SER IA-ACNC: NEGATIVE
ENA SM AB SER IA-ACNC: NEGATIVE
ENA SM+RNP IGG SER-ACNC: NEGATIVE
ENA SS-A AB SER IA-ACNC: NEGATIVE
ENA SS-A AB SER-ACNC: <0.2 AI (ref 0–0.9)
ENA SS-B AB SER IA-ACNC: NEGATIVE
ENA SS-B AB SER-ACNC: <0.2 AI (ref 0–0.9)

## 2024-08-21 NOTE — TELEPHONE ENCOUNTER
Reviewed chart, This is PCP My chart message from 8/21 at 3:55 pm    Diamond, all your results for blood work is back and I called you twice to discuss those. Please call tomorrow my office any time at [phone] to discuss results as now I am leaving office. LEIF Garcia

## 2024-08-21 NOTE — TELEPHONE ENCOUNTER
Called patient. Aware of My chart result note message. Patient will call tomorrow morning at 8 am, she is anxious to speak to her doctor

## 2024-08-21 NOTE — TELEPHONE ENCOUNTER
LEIF Garcia  8/21/2024  1:15 PM EDT   Patient called and left message to call back. LEIF Garcia       Patient called back requesting to speak with LEIF Garcia to review her results.  Patient also stated  she was prescribed another statin, which she read causes muscle pain.  She has pain all over and would rather not start the medication until the issue is healed.  Please advise.  Thank you!

## 2024-08-22 NOTE — TELEPHONE ENCOUNTER
Patient called to follow up on result note. Patient asked if Kiya could give her a call to discuss results.     Please advise, thank you

## 2024-08-27 ENCOUNTER — TELEPHONE (OUTPATIENT)
Age: 69
End: 2024-08-27

## 2024-08-27 ENCOUNTER — APPOINTMENT (OUTPATIENT)
Facility: CLINIC | Age: 69
End: 2024-08-27
Payer: COMMERCIAL

## 2024-08-27 DIAGNOSIS — M25.552 LEFT HIP PAIN: Primary | ICD-10-CM

## 2024-08-27 NOTE — TELEPHONE ENCOUNTER
"Patient called returning PCP call . Attempted warm transfer without success. Please call patient back . Reviewed chart . This is in a result note for imaging     \"Patient called and left message to call back. LEIF Garcia \"      "

## 2024-08-27 NOTE — TELEPHONE ENCOUNTER
Patient called and xray hip results discussed. Will follow with orthopedics if pain getting worse. LEIF Garcia

## 2024-08-30 ENCOUNTER — APPOINTMENT (OUTPATIENT)
Facility: CLINIC | Age: 69
End: 2024-08-30
Payer: COMMERCIAL

## 2024-09-03 ENCOUNTER — NURSE TRIAGE (OUTPATIENT)
Age: 69
End: 2024-09-03

## 2024-09-03 NOTE — TELEPHONE ENCOUNTER
Regarding: FLARE UP  ----- Message from Tiff MAK sent at 9/3/2024  2:36 PM EDT -----  Patient called in to schedule a sooner appointment . Patient is having pain all over which she has not  experience before . - Pain in both of her arms .( Elbow and shoulder ) makes it difficult  her to lyft up her arms . Left Leg - pain ( weakness ) and her lower back . Please advise

## 2024-09-12 ENCOUNTER — OFFICE VISIT (OUTPATIENT)
Dept: OBGYN CLINIC | Facility: CLINIC | Age: 69
End: 2024-09-12
Payer: COMMERCIAL

## 2024-09-12 VITALS
SYSTOLIC BLOOD PRESSURE: 114 MMHG | HEART RATE: 86 BPM | HEIGHT: 60 IN | WEIGHT: 134 LBS | BODY MASS INDEX: 26.31 KG/M2 | DIASTOLIC BLOOD PRESSURE: 68 MMHG

## 2024-09-12 DIAGNOSIS — M25.552 LEFT HIP PAIN: ICD-10-CM

## 2024-09-12 DIAGNOSIS — M25.50 POLYARTHRALGIA: Primary | ICD-10-CM

## 2024-09-12 DIAGNOSIS — R70.0 ESR RAISED: ICD-10-CM

## 2024-09-12 DIAGNOSIS — R79.82 CRP ELEVATED: ICD-10-CM

## 2024-09-12 DIAGNOSIS — R76.8 POSITIVE ANA (ANTINUCLEAR ANTIBODY): ICD-10-CM

## 2024-09-12 DIAGNOSIS — M16.12 PRIMARY OSTEOARTHRITIS OF ONE HIP, LEFT: ICD-10-CM

## 2024-09-12 PROCEDURE — 99204 OFFICE O/P NEW MOD 45 MIN: CPT | Performed by: PHYSICIAN ASSISTANT

## 2024-09-12 NOTE — PROGRESS NOTES
Assessment/Plan:  1. Polyarthralgia        2. Positive CARLOS (antinuclear antibody)        3. CRP elevated        4. ESR raised        5. Left hip pain  Ambulatory Referral to Orthopedic Surgery      6. Primary osteoarthritis of one hip, left          Diamond is a pleasant 69-year-old female presenting today for initial evaluation of her left hip.  After reviewing her images, history, and physical exam, she does have mild left hip osteoarthritis.  However, I do not believe that this is a pain generator for her.  I was unable to elicit any discomfort in her hip on exam.  Her arthritis has not advanced significantly since she was last seen 7 years ago.  She does have diffuse polyarthralgia that is nonspecific in nature.  In conjunction with her recent blood tests with a positive CARLOS and elevated ESR and CRP, I believe that her pain and discomforts as well as weakness is likely autoimmune in nature.  She does have a rheumatology appointment in 10 days, which I feel will be appropriate.  I would like her to initiate treatment with rheumatology.  If her hip pain persists after then, she can return for reevaluation.  Additionally, we discussed utilizing extra strength Tylenol, 2 tablets every 8 hours for pain.  I encouraged her to avoid NSAIDs due to her history of aspirin allergy.  She can follow-up 2 months after her rheumatology appointment if her hip pain persists, otherwise as needed.  She and her  expressed understanding all of their questions were addressed today    Subjective: Initial consultation left hip pain    Patient ID: Diamond Aquino is a 69 y.o. female presenting today for evaluation of her left hip.  She was seen for a similar complaint in the last 7 years ago by Dr. Kaplan and diagnosed with mild left hip osteoarthritis.  However, she states that her recent pain is different.  She describes pain in multiple joints including her hips, low back, and arms.  This is caused weakness and discomfort with  walking.  She has avoided NSAIDs due to her aspirin allergy.  She has not seen significant relief with Tylenol.  Her primary care provider did provide her with lab work, which was positive for CARLOS.  She does have an appointment scheduled with rheumatology in 10 days, but does not have any history of rheumatological condition.  She does not have significant groin pain with activity.  The pain and fatigue that she feels in multiple joints occurs randomly, whether she is active or sedentary.  She denies any history of injury or surgery to her left hip    Review of Systems   Constitutional:  Positive for activity change.   HENT: Negative.     Eyes: Negative.    Respiratory: Negative.     Cardiovascular: Negative.    Gastrointestinal: Negative.    Endocrine: Negative.    Genitourinary: Negative.    Musculoskeletal:  Positive for arthralgias, back pain, gait problem, joint swelling, myalgias and neck pain.   Skin: Negative.    Allergic/Immunologic: Negative.    Neurological:  Positive for weakness and numbness.   Hematological: Negative.    Psychiatric/Behavioral:  The patient is nervous/anxious.      Past Medical History:   Diagnosis Date    Anesthesia complication     desaturation of oxygen mostly at night- on O2 at 2L at hs-may wake up with asthma issue    Anxiety     Arthritis     Asthma     Argueta esophagus     Breathing difficulty     pt states s/p anesthesia will have an asthma attack-pt to bring ProAir    Chest pain     upper chest-intermittent asthma related?    Colon polyp     COVID-19 02/2020    Decreased hearing of both ears     DVT (deep venous thrombosis) (HCC)     during pregnancy in the leg    GERD (gastroesophageal reflux disease)     Hiatal hernia     Hypercholesteremia     Macular degeneration     wet-right eye injected 1/31/24    Nasal polyps     Sinusitis, chronic     Subarachnoid hemorrhage (HCC) 05/02/2020    Tinnitus     Vertigo     on occ    Wears glasses        Past Surgical History:   Procedure  Laterality Date     SECTION N/A     x 2    COLONOSCOPY      ESOPHAGOGASTRODUODENOSCOPY      HYSTERECTOMY      complete    NASAL POLYP SURGERY      x3-2013,2018,2019    HI COLSC FLX W/RMVL OF TUMOR POLYP LESION SNARE TQ N/A 2017    Procedure: COLONOSCOPYwith  snare polypectomy.;  Surgeon: Cuong Mejia MD;  Location: [facility] GI [facility];  Service: Gastroenterology    HI EGD TRANSORAL BIOPSY SINGLE/MULTIPLE N/A 2017    Procedure: ESOPHAGOGASTRODUODENOSCOPY (EGD)and biopsy.;  Surgeon: Cuong Mejia MD;  Location: [facility] GI [facility];  Service: Gastroenterology    HI XCAPSL CTRC RMVL INSJ IO LENS PROSTH W/O ECP Right 2024    Procedure: EXTRACTION EXTRACAPSULAR CATARACT PHACO INTRAOCULAR LENS (IOL);  Surgeon: Jamie Engel MD;  Location: [facility] MAIN OR;  Service: Ophthalmology    TONSILLECTOMY N/A     VEIN LIGATION AND STRIPPING Bilateral        Family History   Problem Relation Age of Onset    Heart disease Mother         CHF    Breast cancer additional onset Mother 49    Breast cancer Mother 49    Dysphagia Father     Breast cancer Maternal Aunt     Breast cancer Paternal Aunt     Ovarian cancer Maternal Aunt     Ovarian cancer Maternal Aunt     Cervical cancer Maternal Aunt        Social History     Occupational History    Not on file   Tobacco Use    Smoking status: Never     Passive exposure: Past    Smokeless tobacco: Never   Vaping Use    Vaping status: Never Used   Substance and Sexual Activity    Alcohol use: Yes     Comment: social    Drug use: Never    Sexual activity: Yes     Partners: Male     Birth control/protection: Female Sterilization, Surgical     Comment: hysterectomy         Current Outpatient Medications:     acetaminophen (TYLENOL) 325 mg tablet, Take 650 mg by mouth every 6 (six) hours as needed for mild pain, Disp: , Rfl:     albuterol (ProAir HFA) 90 mcg/act inhaler, Inhale 2 puffs every 4 (four) hours as needed for wheezing, Disp: 8 g, Rfl: 3    ALPRAZolam (XANAX) 0.5 mg  tablet, as needed, Disp: , Rfl: 0    Dupilumab (Dupixent) 300 MG/2ML SOPN, INJECT 1 PEN UNDER THE SKIN EVERY 14 DAYS, Disp: 4 mL, Rfl: 5    famciclovir (FAMVIR) 500 mg tablet, if needed, Disp: , Rfl:     gatifloxacin (ZYMAXID) 0.5 %, Administer 1 drop to the right eye 2 (two) times a day, Disp: , Rfl:     ketorolac (ACULAR) 0.5 % ophthalmic solution, Administer 1 drop to the right eye 4 (four) times a day, Disp: , Rfl:     pravastatin (PRAVACHOL) 20 mg tablet, Take 1 tablet (20 mg total) by mouth daily, Disp: 30 tablet, Rfl: 1    vitamin E, tocopherol, 400 units capsule, Take 400 Units by mouth daily, Disp: , Rfl:     budesonide (PULMICORT) 0.5 mg/2 mL nebulizer solution, Take 1 vial (0.5 mg total) by nebulization 2 (two) times a day (Patient not taking: Reported on 9/12/2024), Disp: 60 vial, Rfl: 5    busPIRone (BUSPAR) 10 mg tablet, , Disp: , Rfl:     calcium citrate-vitamin D (CITRACAL+D) 315-200 MG-UNIT per tablet, Take 1 tablet by mouth every morning (Patient not taking: Reported on 9/12/2024), Disp: , Rfl:     Fluticasone Propionate (Xhance) 93 MCG/ACT EXHU, 1 spray into each nostril 2 (two) times a day (Patient not taking: Reported on 8/16/2024), Disp: 16 mL, Rfl: 5    montelukast (SINGULAIR) 10 mg tablet, take 1 tablet by mouth once daily (Patient not taking: Reported on 9/12/2024), Disp: 60 tablet, Rfl: 3    Multiple Vitamins-Minerals (PRESERVISION AREDS 2 PO), Take by mouth 2 (two) times a day (Patient not taking: Reported on 9/12/2024), Disp: , Rfl:     traZODone (DESYREL) 100 mg tablet, Take 100 mg by mouth daily at bedtime as needed for sleep (Patient not taking: Reported on 9/12/2024), Disp: , Rfl: 0    Allergies   Allergen Reactions    Pitavastatin Hives, Shortness Of Breath and Rash     Nausea    Cefditoren Nausea Only and Vomiting    Cefditoren Pivoxil Hives     N/V    Dust Mite Extract Sneezing    Levofloxacin GI Intolerance    Mold Extract [Trichophyton] Sneezing    Moxifloxacin GI Intolerance        Objective:  Vitals:    09/12/24 0900   BP: 114/68   Pulse: 86       Body mass index is 26.17 kg/m².    Left Hip Exam     Tenderness   The patient is experiencing no tenderness.     Range of Motion   Abduction:  normal   Adduction:  normal   Extension:  normal   Flexion:  normal   External rotation:  normal   Internal rotation: normal     Muscle Strength   Abduction: 5/5   Adduction: 5/5   Flexion: 5/5     Tests   ELIEL: negative  Eduardo: negative    Other   Erythema: absent  Scars: absent  Sensation: normal  Pulse: present    Comments:  Negative Stinchfield, logroll, ELIEL  Thigh calf soft nontender  No tenderness about left hip  Grossly distally neurovascularly intact            Physical Exam  Vitals and nursing note reviewed.   Constitutional:       Appearance: Normal appearance. She is well-developed.      Comments: Body mass index is 26.17 kg/m².   HENT:      Head: Normocephalic and atraumatic.      Right Ear: External ear normal.      Left Ear: External ear normal.   Eyes:      Extraocular Movements: Extraocular movements intact.      Conjunctiva/sclera: Conjunctivae normal.   Cardiovascular:      Rate and Rhythm: Normal rate.      Pulses: Normal pulses.   Pulmonary:      Effort: Pulmonary effort is normal.   Musculoskeletal:      Cervical back: Normal range of motion.      Comments: See ortho exam   Skin:     General: Skin is warm and dry.   Neurological:      General: No focal deficit present.      Mental Status: She is alert and oriented to person, place, and time. Mental status is at baseline.   Psychiatric:         Mood and Affect: Mood normal.         Behavior: Behavior normal.         Thought Content: Thought content normal.         Judgment: Judgment normal.       I have personally reviewed pertinent films in PACS of the recently taken x-rays of her pelvis and bilateral hips which demonstrate mild degenerative changes with early narrowing.  There is no significant sclerosis or osteophytosis.  There  is no avascular necrosis, fracture, or lytic or blastic lesion    This document was created using speech voice recognition software.   Grammatical errors, random word insertions, pronoun errors, and incomplete sentences are an occasional consequence of this system due to software limitations, ambient noise, and hardware issues.   Any formal questions or concerns about content, text, or information contained within the body of this dictation should be directly addressed to the provider for clarification.

## 2024-09-15 DIAGNOSIS — E78.5 DYSLIPIDEMIA: ICD-10-CM

## 2024-09-17 RX ORDER — PRAVASTATIN SODIUM 20 MG
20 TABLET ORAL DAILY
Qty: 30 TABLET | Refills: 5 | Status: SHIPPED | OUTPATIENT
Start: 2024-09-17

## 2024-09-19 NOTE — PROGRESS NOTES
Ambulatory Visit  Name: Diamond Aquino      : 1955      MRN: [id number]  Encounter Provider: Kristina Fish DO  Encounter Date: 2024   Encounter department: [facility] [place]    Assessment & Plan  CARLOS positive  Patient is a 69 year old female presenting with several weeks of severe pain all over. Pain is worse with activity and no synovitis or joint tenderness on exam, less likely inflammatory arthritis. Patient noted to have an elevated dsDNA on serologic evaluation, however no sign of a connective disease today such as mucocutaneous manifestation, Raynaud's, Sicca symptoms, Raynaud's, inflammatory joint pain, cytopenias, renal function as baseline. Will check urine studies. Full muscle strength on exam and no rashes making myositis less likely, however will check LFTs and CK.     Overall, patient is presenting with with progressive widespread arthralgias and myalgias, nonrestorative sleep and brain fog likely due to chronic pain. These symptoms are most consistent with fibromyalgia, however, fibromyalgia mimics should be considered, including low vitamin D, thyroid disease, hepatitis and occult malignancy. If serologic evaluation unremarkable, will defer further treatment of fibromyalgia/central pain syndrome to PCP.     - Check CMP, CK, UA, UPCR, TSH, vitamin D   - Start physical therapy/graded exercise routine- referral provided  - PCP may consider the following medications: Elavil, Gabapentin, Lyrica, Duloxetine       Orders:    Ambulatory Referral to Rheumatology    UA (URINE) with reflex to Scope; Future    Protein / creatinine ratio, urine; Future    Fibromyalgia  As above   Orders:    Ambulatory Referral to Physical Therapy; Future    Primary generalized (osteo)arthritis  Patient noted to have OA changes on exam and imaging which maybe contributing to pain. PT can help with OA related pain as well.     Continue Tylenol for pain control. No more than 3g per day         Elevated sed rate    Orders:    Ambulatory Referral to Rheumatology    CK; Future    Elevated C-reactive protein    Orders:    Ambulatory Referral to Rheumatology    Chronic fatigue    Orders:    Vitamin D 25 hydroxy; Future    TSH, 3rd generation with Free T4 reflex; Future    CK; Future    Comprehensive metabolic panel; Future    Vitamin D deficiency  Patient currently takes daily supplements, will repeat level   Orders:    Vitamin D 25 hydroxy; Future    Nasal polyps  Currently no signs or symptoms of active vasculitis appreciated, low suspicion for EGPA.  Orders:    UA (URINE) with reflex to Scope; Future    Protein / creatinine ratio, urine; Future      History of Present Illness     Diamond Aquino is a 69 y.o. female with a PMH of fibromylagia, OA, recurrent nasal polyposis, and chronic sinus disease who presents for follow up.    History obtained from : patient    Patient reports that a few weeks ago, she started to have severe pain. States that the pain was all over. Reports that it was so bad that she could not move or get dressed. Reports pain in the muscles and joints. Denies any joint swelling. Reports having significant pain with use. Reports that it felt like there were these band all over her legs and arms that hurt. States that different parts of her body hurt at different times. Sometimes gets shooting pains all through her body.     Patient states that she takes scheduled Tylenol which helps some. Feels like she cannot function without the Tylenol.     Patient reports that she cannot do routine house hold activities. Reports that it is too painful for her to even try.     Endorses poor sleep. Reports that the pain prevents her from falling asleep. Never wakes up feeling rested.     Endorses severe fatigue and brain fog.     Denies any recent stressors. Has been traveling a lot this year.     Works as a . Denies dropping things. Reports that sometimes she has to come home early because  it is too painful.     Takes daily vitamin D supplements.     Was not on statin when symptoms began.     Per chart review, patient was previously seen by Dr. Orona, last visit 7/6/2020, for diffuse arthralgias. Patient was diagnosed with osteoarthritis for which she was advise scheduled Tylenol and fibromyalgia for which she was given Cymbalta.    Patient also has hx of nasal polyposis and patient was worked up for EGPA with low clinical suspicion for vasculitis. Currently on dupixent which has helped.     Review of Systems  Denies:  Fever  Rash  Oral/nasal/genital ulcers  Dry eyes, dry mouth  Vision changes  Dysphagia/odynophagia  CP  DAUGHERTY  SOB at rest  Pleurisy  Stomach pain  Constipation/bloating  Hematochezia  Gross hematuria  Dactylitis  Raynaud's  Joint issues other than noted above    Past Medical History:   Diagnosis Date    Anesthesia complication     desaturation of oxygen mostly at night- on O2 at 2L at hs-may wake up with asthma issue    Anxiety     Arthritis     Asthma     Argueta esophagus     Breathing difficulty     pt states s/p anesthesia will have an asthma attack-pt to bring ProAir    Chest pain     upper chest-intermittent asthma related?    Colon polyp     COVID-19 02/2020    Decreased hearing of both ears     DVT (deep venous thrombosis) (HCC)     during pregnancy in the leg    GERD (gastroesophageal reflux disease)     Hiatal hernia     Hypercholesteremia     Macular degeneration     wet-right eye injected 1/31/24    Nasal polyps     Sinusitis, chronic     Subarachnoid hemorrhage (HCC) 05/02/2020    Tinnitus     Vertigo     on occ    Wears glasses     Problems       Current Outpatient Medications on File Prior to Visit   Medication Sig Dispense Refill    acetaminophen (TYLENOL) 325 mg tablet Take 650 mg by mouth every 6 (six) hours as needed for mild pain      albuterol (ProAir HFA) 90 mcg/act inhaler Inhale 2 puffs every 4 (four) hours as needed for wheezing 8 g 3    ALPRAZolam (XANAX) 0.5  mg tablet as needed  0    Dupilumab (Dupixent) 300 MG/2ML SOPN INJECT 1 PEN UNDER THE SKIN EVERY 14 DAYS 4 mL 5    famciclovir (FAMVIR) 500 mg tablet if needed      pravastatin (PRAVACHOL) 20 mg tablet TAKE 1 TABLET BY MOUTH DAILY 30 tablet 5    vitamin E, tocopherol, 400 units capsule Take 400 Units by mouth daily      budesonide (PULMICORT) 0.5 mg/2 mL nebulizer solution Take 1 vial (0.5 mg total) by nebulization 2 (two) times a day (Patient not taking: Reported on 9/12/2024) 60 vial 5    busPIRone (BUSPAR) 10 mg tablet  (Patient not taking: Reported on 9/12/2024)      calcium citrate-vitamin D (CITRACAL+D) 315-200 MG-UNIT per tablet Take 1 tablet by mouth every morning (Patient not taking: Reported on 9/12/2024)      Fluticasone Propionate (Xhance) 93 MCG/ACT EXHU 1 spray into each nostril 2 (two) times a day (Patient not taking: Reported on 8/16/2024) 16 mL 5    gatifloxacin (ZYMAXID) 0.5 % Administer 1 drop to the right eye 2 (two) times a day (Patient not taking: Reported on 9/23/2024)      ketorolac (ACULAR) 0.5 % ophthalmic solution Administer 1 drop to the right eye 4 (four) times a day (Patient not taking: Reported on 9/23/2024)      montelukast (SINGULAIR) 10 mg tablet take 1 tablet by mouth once daily (Patient not taking: Reported on 9/12/2024) 60 tablet 3    Multiple Vitamins-Minerals (PRESERVISION AREDS 2 PO) Take by mouth 2 (two) times a day (Patient not taking: Reported on 9/12/2024)      traZODone (DESYREL) 100 mg tablet Take 100 mg by mouth daily at bedtime as needed for sleep (Patient not taking: Reported on 9/12/2024)  0     No current facility-administered medications on file prior to visit.      Social History     Tobacco Use    Smoking status: Never     Passive exposure: Past    Smokeless tobacco: Never   Vaping Use    Vaping status: Never Used   Substance and Sexual Activity    Alcohol use: Yes     Comment: social    Drug use: Never    Sexual activity: Yes     Partners: Male     Birth  control/protection: Female Sterilization, Surgical     Comment: hysterectomy         Objective     /68   Ht 5' (1.524 m)   Wt 61.2 kg (135 lb)   LMP  (LMP Unknown)   BMI 26.37 kg/m²     Physical Exam  General appearance: normal appearing, tearful   Skin: normal, no rashes  HEENT: normal, moist oropharynx, no nasal or oral ulcers  Lymph nodes: no palpable adenopathy  Lungs: normal respiratory effort, comfortable on room air, lungs clear to auscultation b/l   Heart: normal heart sounds, normal rate, normal rhythm,  Abdomen: soft, normal bowel sounds, no tenderness  Neurologic: no obvious neurological deficits   Extremities: no edema, warm and well perfused     Musculoskeletal Exam:   - Observation: (+) Heberden's and Nehal's nodes   - Palpation: no joint tenderness  - Synovitis: absent  - Joint effusions: absent  - ROM: intact throughout  - Muscle Strength: 5/5 throughout       I have independently reviewed the following labs/images and interpret them as follows.    CARLOS 1:40 (negative)   dsDNA 10  RF negative   CCP negative  SSA negative   SSB negative  Sm negative  RNP negative     XR hand 6/20/23  There is no acute fracture or dislocation.   No blastic or lytic lesions are noted.   There is osteoarthritis of the first carpometacarpal joints and the interphalangeal joints of the thumb. The other joint spaces are well-maintained.   There are degenerative spurs the basis of the right and left second and third distal phalanges.     US hand/wrist 12/6/23  LEFT hand/wrist:   Distal radioulnar joint: No hyperemia or joint effusion.   Radiocarpal joint: No hyperemia or joint effusion   Extensor compartment tendons (1st-6th): No hyperemia or evidence of   tenosynovitis   Metacarpophalangeal joints (1st-5th): No hyperemia or evidence of synovitis   Proximal interphalangeal joints (1st-5th): No hyperemia or evidence of synovitis     RIGHT hand/wrist:   Distal radioulnar joint: No hyperemia or joint effusion.    Radiocarpal joint: No hyperemia or joint effusion   Extensor compartment tendons (1st-6th): No hyperemia or evidence of   tenosynovitis   Metacarpophalangeal joints (1st-5th): No hyperemia or evidence of synovitis   Proximal interphalangeal joints (1st-5th): No hyperemia or evidence of synovitis       Kristina Fish DO, CCD, MSUS  [facility]

## 2024-09-23 ENCOUNTER — TELEPHONE (OUTPATIENT)
Age: 69
End: 2024-09-23

## 2024-09-23 ENCOUNTER — OFFICE VISIT (OUTPATIENT)
Dept: RHEUMATOLOGY | Facility: CLINIC | Age: 69
End: 2024-09-23
Payer: MEDICARE

## 2024-09-23 VITALS
WEIGHT: 135 LBS | SYSTOLIC BLOOD PRESSURE: 116 MMHG | DIASTOLIC BLOOD PRESSURE: 68 MMHG | HEIGHT: 60 IN | BODY MASS INDEX: 26.5 KG/M2

## 2024-09-23 DIAGNOSIS — E55.9 VITAMIN D DEFICIENCY: ICD-10-CM

## 2024-09-23 DIAGNOSIS — R70.0 ELEVATED SED RATE: ICD-10-CM

## 2024-09-23 DIAGNOSIS — J33.9 NASAL POLYPS: ICD-10-CM

## 2024-09-23 DIAGNOSIS — R53.82 CHRONIC FATIGUE: ICD-10-CM

## 2024-09-23 DIAGNOSIS — R79.82 ELEVATED C-REACTIVE PROTEIN: ICD-10-CM

## 2024-09-23 DIAGNOSIS — M79.7 FIBROMYALGIA: Primary | ICD-10-CM

## 2024-09-23 DIAGNOSIS — M15.0 PRIMARY GENERALIZED (OSTEO)ARTHRITIS: ICD-10-CM

## 2024-09-23 DIAGNOSIS — R76.8 ANA POSITIVE: ICD-10-CM

## 2024-09-23 PROCEDURE — 99204 OFFICE O/P NEW MOD 45 MIN: CPT | Performed by: STUDENT IN AN ORGANIZED HEALTH CARE EDUCATION/TRAINING PROGRAM

## 2024-09-23 NOTE — ASSESSMENT & PLAN NOTE
Currently no signs or symptoms of active vasculitis appreciated, low suspicion for EGPA.  Orders:    UA (URINE) with reflex to Scope; Future    Protein / creatinine ratio, urine; Future

## 2024-09-23 NOTE — PATIENT INSTRUCTIONS
"Your symptoms are most consistent with a diagnosis of fibromyalgia. I have included more information about the diagnosis and management below. The treatment approach for fibromyalgia includes the followin. Medications: Cymbalta is typically the first medication to try and in most patients can be increased to a dose of 60 mg daily. Other medications include Savella, gabapentin (which is increased in small increments over time) or Lyrica. Some patients use amitriptyline at night to help with chronic pain and sleep. The other medical conditions and medications you are on will determine which of these medicines is best for you.     2. Physical activity/exercise: Regular, aerobic exercise (especially exercises that off-load the joints such as swimming, biking, elliptical, yoga, Aashish Chi, etc) has been proven to be beneficial in fibromyalgia and is more helpful than medication alone. Start slow (5-10 minutes per day) and gradually work your way up. Consistency is key. You should aim for 20-30 minutes of light aerobic exercise at least 5 days per week. Physical therapy can be helpful to get you started. Aqua (water-based) therapy is best.     3. Cognitive behavioral therapy: This will provide you with techniques to help \"rewire\" the connections in the brain and to cope with living with chronic pain. Most sessions can be done virtually as part of a group therapy where you can meet others who are living with the same condition. Managing anxiety and depression is also very important for controlling the pain associated with fibromyalgia.       Fibromyalgia Patient Education Websites  CDC exercise website: http://www.cdc.gov/arthritis/basics/physical-activity-overview.html  has general information and guidelines for arthritis and fibromyalgia patients starting exercise program    [facility] Fibromyalgia Patient education website:  [email or web address] designed by the [place]’s " Fibromyalgia research group led by Dr Hoang Jaffe MD. Has 9 modules that reviews our current understanding of what fibromyalgia is as well as management options emphasizing self management    You Tube video:   https://www.[employer].com/watch?v=C_3phB93rvI animated video presenting concept of chronic centralized pain syndromes such as fibromyalgia. This is done in way that would be understandable to patients.       Information From the American College of Rheumatology  Fibromyalgia is a common health problem that causes widespread pain and tenderness (sensitivity to touch). The pain and tenderness tend to come and go, and move about the body. Most often, people with this chronic (long-term) illness are fatigued (very tired) and have sleep problems. It can be hard to diagnose Fibromyalgia.      Fast Facts:   Fibromyalgia affects two to four percent of people, mostly women.   Doctors diagnose fibromyalgia based on all the patient’s relevant symptoms (what you feel), no longer just on the number of tender points.   There is no test to detect this disease, but you may need lab tests or X-rays to ruleout other health problems.   Though there is no cure, medications can relieve symptoms.  Patients also may feel better with proper self-care, such as exercise and getting enough sleep.     What is Fibromyalgia?   Fibromyalgia is a chronic health problem that causes pain all over the body and other symptoms. Other symptoms of fibromyalgia that patients most often have are:   Tenderness to touch or pressure affecting muscles and sometimes joints or even the skin   Severe fatigue   Sleep problems (waking up unrefreshed)   Problems with memory or thinking clearly     Some patients also may have:   Depression or anxiety   Migraine or tension headaches   Digestive problems: irritable bowel syndrome (commonly called IBS) or gastroesophageal reflux disease (often referred to as GERD)   Irritable or overactive bladder   Pelvic pain    Temporomandibular disorder--often called TMJ (a set of symptoms including face or jaw pain, jaw clicking and ringing in the ears)   Symptoms of fibromyalgia and its related problems can vary in intensity, and will wax and wane over time. Stress often worsens the symptoms.     What causes Fibromyalgia?   The causes of fibromyalgia are unclear. They may be different in different people. Fibromyalgia may run in families. There likely are certain genes that can make people more prone to getting fibromyalgia and the other health problems that can occur with it. Genes alone, though, do not cause fibromyalgia.     There is most often some triggering factor that sets off fibromyalgia. It may be spine problems, arthritis, injury, or other type of physical stress. Emotional stress also may trigger this illness. The result is a change in the way the body “talks” with the spinal cord and brain. Levels of brain chemicals and proteins may change. For the person with fibromyalgia, it is as though the “volume control” is turned up too high in the brain's pain processing centers.     Who gets Fibromyalgia?  Fibromyalgia is most common in women, though it can occur in men.  It most often starts in middle adulthood, but can occur in the teen years and in old age.  Younger children can also develop widespread body pain and fatigue.     You are at higher risk for fibromyalgia if you have a rheumatic disease (health problem that affects the joints, muscles, and bones)  These include osteoarthritis, lupus, rheumatoid arthritis or ankylosing spondylitis.     How is fibromyalgia diagnosed?   A doctor will suspect fibromyalgia based on your symptoms. Doctors may require that you have tenderness to pressure or tender points at a specific number of certain spots before saying you have fibromyalgia, but they are not required to make the diagnosis.  A physical exam can be helpful to detect tenderness and to exclude other causes of muscle pain.      There are no diagnostic tests (such as X-rays or blood tests) for this problem. Yet, you may need tests to rule out another health problem that can be confused with fibromyalgia.     Because widespread body pain is the main feature of fibromyalgia, health care providers will ask you to describe your pain. This may help tell the difference between fibromyalgia and other diseases with similar symptoms. Other conditions such as hypothyroidism (underactive thyroid gland) and polymyalgia rheumatica sometimes mimic fibromyalgia. Yet, certain blood tests can tell if you have either of these problems. Sometimes, fibromyalgia is confused with rheumatoid arthritis or lupus.  But, again, there is a difference in the symptoms, physical findings and blood tests that will help your health care provider detect these health problems. Unlike fibromyalgia, these rheumatic diseases cause inflammation in the joints and tissues.     Criteria Needed for a Fibromyalgia Diagnosis   1. Pain and symptoms over the past week, based on the total of number of painful areas out of 18 parts of the body, plus level of severity of these symptoms:   Fatigue   Waking unrefreshed  Cognitive (memory or thought) problems      Plus number of other general physical symptoms   2. Symptoms lasting at least three months at a similar level    3. No other health problem that would explain the pain and other symptoms.     How is Fibromyalgia treated?   There is no cure for fibromyalgia. However, symptoms can be treated with both medication and non-drug treatments. Many times the best outcomes are achieved by using multiple types of treatments.   Medications: The U.S. Food and Drug Administration has approved three drugs for the treatment of fibromyalgia. They include two drugs that change some of the brain chemicals (serotonin and norepinephrine) that help control pain levels: duloxetine (Cymbalta) and milnacipran (Savella). Older drugs that affect these  same brain chemicals also may be used to treat fibromyalgia. These include amitriptyline (Elavil) and cyclobenzaprine (Flexeril). Other antidepressant drugs can be helpful in some patients. Side effects vary by the drug. Ask your doctor about the risks and benefits of your medicine.     The other drug approved for fibromyalgia is pregabalin (Lyrica). Pregabalin and another drug, gabapentin (Neurontin), work by blocking the over activity of nerve cells involved in pain transmission. These medicines may cause dizziness, sleepiness, swelling and weight gain.   Doctors do not suggest using other opioids for treating fibromyalgia.  This is not because of fears of dependence. Rather, evidence suggests these drugs are not of great benefit to most people with fibromyalgia.  In fact, they may cause greater pain sensitivity or make pain persist.     In some cases,  fibromyalgia pain can improve with use of over-the-counter medicines such as acetaminophen (Tylenol) or nonsteroidal anti-inflammatory drugs (commonly called NSAIDs) like ibuprofen (Advil, Motrin) or naproxen (Aleve, Anaprox).  Yet, these drugs likely treat the pain triggers rather than the fibromyalgia pain itself.  Thus, they are most useful in people who have other causes for pain such as arthritis.  For sleep problems, some of the medicines that treat pain also improve sleep. These include cyclobenzaprine (Flexeril), amitriptyline (Elavil), gabapentin (Neurontin) or pregabalin (Lyrica). It is not recommended that patients with fibromyalgia take sleeping medicines like zolpidem (Ambien) or benzodiazepine medications.     Other Therapies: People with fibromyalgia should use non-drug treatments as well as any medicines their doctors suggest. Research shows that the most effective treatment for fibromyalgia is physical exercise. Physical exercise should be used in addition to any drug treatment. Patients benefit most from aerobic exercises. Other body-based  therapies including Aashish Chi and yoga can ease fibromyalgia symptoms.     Cognitive behavioral therapy is a type of therapy focused on understanding how thoughts and behaviors affect pain and other symptoms. CBT and related treatments such as mindfulness can help patients learn symptom reduction skills that lessen pain.   Other complementary and alternative therapies (sometimes called CAM or integrative medicine), such as acupuncture, chiropractic and massage therapy, can be useful to manage fibromyalgia symptoms. Many of these treatments, though, have not been well tested in patients with fibromyalgia.       Living with Fibromyalgia:   Even with the many treatment options, patient self-care is vital to improving symptoms and daily function. In concert with medical treatment, healthy lifestyle behaviors can reduce pain, increase sleep quality, lessen fatigue and help you cope better with fibromyalgia.     Here are some self-care tips for living with fibromyalgia:   - Make time to relax each day. Deep-breathing exercises and meditation will help reduce the stress that can bring on symptoms.   - Set a regular sleep pattern. Go to bed and wake up at the same time each day. Getting enough sleep lets your body repair itself, physically and mentally. Also, avoid daytime napping and limit caffeine intake, which can disrupt sleep. Nicotine is a stimulant, so those fibromyalgia patients with sleep problems should stop smoking.   - Exercise often. This is a very important part of fibromyalgia treatment. While difficult at first, regular exercise often reduces pain symptoms and fatigue. Patients should follow the saying, “Start low, go slow.” Slowly add daily fitness into your routine. For instance, take the stairs instead of the elevator, or park further away from the store. As your symptoms decrease with drug treatments, start increasing your activity. Add in some walking, swimming, water aerobics and/or stretching exercises,  and begin to do things that you stopped doing because of your pain and other symptoms. It takes time to create a comfortable routine. Just get moving, stay active and don't give up!   - Educate yourself. Nationally recognized organizations like the Arthritis Foundation and the National Fibromyalgia Association are great resources for information. Share this information with family, friends and co-workers.    Points to remember:    - Look forward, not backward. Focus on what you need to do to get better, not what caused your illness.   - As your symptoms decrease with drug treatments, start increasing your activity.   Begin to fd things that you stopped doing because of your pain and other symptoms.   - With proper treatment and self-care, you can get better and live a normal life.

## 2024-09-23 NOTE — TELEPHONE ENCOUNTER
"Patient went for a Rheumatology visit today.  She is recommending that the patient start on something like. Gabapentin. See office note below.     \"- PCP may consider the following medications: Elavil, Gabapentin, Lyrica, Duloxetine \"     Patient has appointment on 9/23.      Please advise.    Preferred pharmacy:  [facility] - [address]     "

## 2024-09-24 ENCOUNTER — APPOINTMENT (OUTPATIENT)
Dept: LAB | Facility: CLINIC | Age: 69
End: 2024-09-24
Payer: MEDICARE

## 2024-09-24 DIAGNOSIS — R70.0 ELEVATED SED RATE: ICD-10-CM

## 2024-09-24 DIAGNOSIS — R76.8 ANA POSITIVE: ICD-10-CM

## 2024-09-24 DIAGNOSIS — E55.9 VITAMIN D DEFICIENCY: ICD-10-CM

## 2024-09-24 DIAGNOSIS — J33.9 NASAL POLYPS: ICD-10-CM

## 2024-09-24 DIAGNOSIS — R53.82 CHRONIC FATIGUE: ICD-10-CM

## 2024-09-24 LAB
25(OH)D3 SERPL-MCNC: 52.8 NG/ML (ref 30–100)
ALBUMIN SERPL BCG-MCNC: 4.1 G/DL (ref 3.5–5)
ALP SERPL-CCNC: 84 U/L (ref 34–104)
ALT SERPL W P-5'-P-CCNC: 19 U/L (ref 7–52)
ANION GAP SERPL CALCULATED.3IONS-SCNC: 11 MMOL/L (ref 4–13)
AST SERPL W P-5'-P-CCNC: 26 U/L (ref 13–39)
BACTERIA UR QL AUTO: ABNORMAL /HPF
BILIRUB SERPL-MCNC: 0.47 MG/DL (ref 0.2–1)
BILIRUB UR QL STRIP: NEGATIVE
BUN SERPL-MCNC: 21 MG/DL (ref 5–25)
CALCIUM SERPL-MCNC: 9.7 MG/DL (ref 8.4–10.2)
CHLORIDE SERPL-SCNC: 101 MMOL/L (ref 96–108)
CK SERPL-CCNC: 25 U/L (ref 26–192)
CLARITY UR: CLEAR
CO2 SERPL-SCNC: 27 MMOL/L (ref 21–32)
COLOR UR: YELLOW
CREAT SERPL-MCNC: 0.56 MG/DL (ref 0.6–1.3)
CREAT UR-MCNC: 111.4 MG/DL
GFR SERPL CREATININE-BSD FRML MDRD: 95 ML/MIN/1.73SQ M
GLUCOSE P FAST SERPL-MCNC: 98 MG/DL (ref 65–99)
GLUCOSE UR STRIP-MCNC: NEGATIVE MG/DL
HGB UR QL STRIP.AUTO: NEGATIVE
HYALINE CASTS #/AREA URNS LPF: ABNORMAL /LPF
KETONES UR STRIP-MCNC: NEGATIVE MG/DL
LEUKOCYTE ESTERASE UR QL STRIP: NEGATIVE
NITRITE UR QL STRIP: NEGATIVE
NON-SQ EPI CELLS URNS QL MICRO: ABNORMAL /HPF
PH UR STRIP.AUTO: 6 [PH]
POTASSIUM SERPL-SCNC: 4.4 MMOL/L (ref 3.5–5.3)
PROT SERPL-MCNC: 7.1 G/DL (ref 6.4–8.4)
PROT UR STRIP-MCNC: ABNORMAL MG/DL
PROT UR-MCNC: 21.6 MG/DL
PROT/CREAT UR: 0.2 MG/G{CREAT} (ref 0–0.1)
RBC #/AREA URNS AUTO: ABNORMAL /HPF
SODIUM SERPL-SCNC: 139 MMOL/L (ref 135–147)
SP GR UR STRIP.AUTO: 1.03 (ref 1–1.03)
TSH SERPL DL<=0.05 MIU/L-ACNC: 2.28 UIU/ML (ref 0.45–4.5)
UROBILINOGEN UR STRIP-ACNC: <2 MG/DL
WBC #/AREA URNS AUTO: ABNORMAL /HPF

## 2024-09-24 PROCEDURE — 82550 ASSAY OF CK (CPK): CPT

## 2024-09-24 PROCEDURE — 81001 URINALYSIS AUTO W/SCOPE: CPT

## 2024-09-24 PROCEDURE — 36415 COLL VENOUS BLD VENIPUNCTURE: CPT

## 2024-09-24 PROCEDURE — 84156 ASSAY OF PROTEIN URINE: CPT

## 2024-09-24 PROCEDURE — 80053 COMPREHEN METABOLIC PANEL: CPT

## 2024-09-24 PROCEDURE — 82570 ASSAY OF URINE CREATININE: CPT

## 2024-09-24 PROCEDURE — 84443 ASSAY THYROID STIM HORMONE: CPT

## 2024-09-24 PROCEDURE — 82306 VITAMIN D 25 HYDROXY: CPT

## 2024-09-25 ENCOUNTER — VBI (OUTPATIENT)
Dept: ADMINISTRATIVE | Facility: OTHER | Age: 69
End: 2024-09-25

## 2024-09-25 NOTE — TELEPHONE ENCOUNTER
09/25/24 9:31 AM    Patient contacted to bring Advance Directive, POLST, or Living Will document to next scheduled pcp visit.[facility] Department spoke with patient/ caregiver.    Thank you.  SAY MAGALLANES MA  [facility]

## 2024-09-30 ENCOUNTER — OFFICE VISIT (OUTPATIENT)
Dept: FAMILY MEDICINE CLINIC | Facility: CLINIC | Age: 69
End: 2024-09-30
Payer: MEDICARE

## 2024-09-30 VITALS
SYSTOLIC BLOOD PRESSURE: 120 MMHG | BODY MASS INDEX: 25.97 KG/M2 | RESPIRATION RATE: 16 BRPM | WEIGHT: 133 LBS | TEMPERATURE: 98.8 F | HEART RATE: 84 BPM | DIASTOLIC BLOOD PRESSURE: 66 MMHG

## 2024-09-30 DIAGNOSIS — M79.10 MYALGIA: ICD-10-CM

## 2024-09-30 DIAGNOSIS — E78.5 DYSLIPIDEMIA: ICD-10-CM

## 2024-09-30 DIAGNOSIS — M62.81 MUSCLE WEAKNESS: Primary | ICD-10-CM

## 2024-09-30 DIAGNOSIS — R82.90 ABNORMAL URINALYSIS: ICD-10-CM

## 2024-09-30 PROBLEM — M79.7 FIBROMYALGIA: Status: ACTIVE | Noted: 2024-09-30

## 2024-09-30 PROCEDURE — G2211 COMPLEX E/M VISIT ADD ON: HCPCS | Performed by: FAMILY MEDICINE

## 2024-09-30 PROCEDURE — 99214 OFFICE O/P EST MOD 30 MIN: CPT | Performed by: FAMILY MEDICINE

## 2024-09-30 RX ORDER — GABAPENTIN 300 MG/1
300 CAPSULE ORAL
Qty: 30 CAPSULE | Refills: 5 | Status: SHIPPED | OUTPATIENT
Start: 2024-09-30

## 2024-09-30 RX ORDER — MULTIVIT-MIN/IRON FUM/FOLIC AC 7.5 MG-4
1 TABLET ORAL DAILY
COMMUNITY

## 2024-09-30 NOTE — PATIENT INSTRUCTIONS
Stop pravastatin for 2 weeks and see how your muscle pain is.  If your pain does not improve then restart the medication.  The neck step after stopping the pravastatin will be to start the gabapentin.  You are to take that at bedtime.    In the meantime:  Get urine testing done  Get blood work done to check for myasthenia gravis  Schedule EMG

## 2024-09-30 NOTE — ASSESSMENT & PLAN NOTE
She is going to take a 2-week holiday from her pravastatin and see if that affects her symptoms.  When she congested 2 weeks off of the medication she will then try the gabapentin

## 2024-09-30 NOTE — PROGRESS NOTES
Ambulatory Visit  Name: Diamond Aquino      : 1955      MRN: [id number]  Encounter Provider: Maureen Ritchie DO  Encounter Date: 2024   Encounter department: [facility]    Assessment & Plan  Muscle weakness  New  Orders:    Acetylcholine receptor, binding; Future    EMG 2 Limb Upper Extremity; Future    Abnormal urinalysis  Had a few red cells seen on last urinalysis will recheck  Orders:    Urinalysis with microscopic; Future    Urine culture; Future    Myalgia  No point tenderness on exam but having significant pain  Orders:    gabapentin (NEURONTIN) 300 mg capsule; Take 1 capsule (300 mg total) by mouth daily at bedtime    Dyslipidemia  She is going to take a 2-week holiday from her pravastatin and see if that affects her symptoms.  When she congested 2 weeks off of the medication she will then try the gabapentin        Return for follow up in December as scheduled .  History of Present Illness     Her muscle pain hit her fast this year.  It has been affecting her ability to work.     She did have the muscle pain when she wasn't on a statin.   She does not think it is fibromyalgia.  She does not have any point tenderness.  Also it hit her very quickly.  She is concerned it could be related to her medication.    She has been feeling weaker.  She has weakness in her legs when she is walking as well as gets weakness in her arms.  The weakness is intermittent          Review of Systems        Objective     /66   Pulse 84   Temp 98.8 °F (37.1 °C)   Resp 16   Wt 60.3 kg (133 lb)   LMP  (LMP Unknown)   BMI 25.97 kg/m²     Physical Exam  Vitals and nursing note reviewed.   Constitutional:       General: She is not in acute distress.     Appearance: She is well-developed.   HENT:      Head: Normocephalic and atraumatic.      Right Ear: Tympanic membrane normal.      Left Ear: Tympanic membrane normal.   Cardiovascular:      Rate and Rhythm: Normal rate and regular rhythm.      Heart  sounds: No murmur heard.  Pulmonary:      Effort: Pulmonary effort is normal. No respiratory distress.      Breath sounds: Normal breath sounds.   Musculoskeletal:      Cervical back: Neck supple.   Neurological:      Mental Status: She is alert.

## 2024-10-01 ENCOUNTER — APPOINTMENT (OUTPATIENT)
Dept: LAB | Facility: CLINIC | Age: 69
End: 2024-10-01
Payer: MEDICARE

## 2024-10-01 DIAGNOSIS — R82.90 ABNORMAL URINALYSIS: ICD-10-CM

## 2024-10-01 DIAGNOSIS — M62.81 MUSCLE WEAKNESS: ICD-10-CM

## 2024-10-01 LAB
AMORPH URATE CRY URNS QL MICRO: ABNORMAL
BACTERIA UR QL AUTO: ABNORMAL /HPF
BILIRUB UR QL STRIP: NEGATIVE
CLARITY UR: CLEAR
COLOR UR: YELLOW
GLUCOSE UR STRIP-MCNC: NEGATIVE MG/DL
HGB UR QL STRIP.AUTO: NEGATIVE
KETONES UR STRIP-MCNC: NEGATIVE MG/DL
LEUKOCYTE ESTERASE UR QL STRIP: ABNORMAL
NITRITE UR QL STRIP: NEGATIVE
NON-SQ EPI CELLS URNS QL MICRO: ABNORMAL /HPF
PH UR STRIP.AUTO: 7 [PH]
PROT UR STRIP-MCNC: ABNORMAL MG/DL
RBC #/AREA URNS AUTO: ABNORMAL /HPF
RENAL EPI CELLS #/AREA URNS HPF: PRESENT /[HPF]
SP GR UR STRIP.AUTO: 1.02 (ref 1–1.03)
TRANS CELLS #/AREA URNS HPF: PRESENT /[HPF]
UROBILINOGEN UR STRIP-ACNC: <2 MG/DL
WBC #/AREA URNS AUTO: ABNORMAL /HPF

## 2024-10-01 PROCEDURE — 86041 ACETYLCHOLN RCPTR BNDNG ANTB: CPT

## 2024-10-01 PROCEDURE — 81001 URINALYSIS AUTO W/SCOPE: CPT

## 2024-10-01 PROCEDURE — 87086 URINE CULTURE/COLONY COUNT: CPT

## 2024-10-01 PROCEDURE — 36415 COLL VENOUS BLD VENIPUNCTURE: CPT

## 2024-10-03 LAB — BACTERIA UR CULT: NORMAL

## 2024-10-04 LAB — ACHR BIND AB SER-SCNC: <0.03 NMOL/L (ref 0–0.24)

## 2024-10-10 ENCOUNTER — HOSPITAL ENCOUNTER (OUTPATIENT)
Dept: RADIOLOGY | Facility: HOSPITAL | Age: 69
Discharge: HOME | End: 2024-10-10
Payer: MEDICARE

## 2024-10-10 DIAGNOSIS — I83.813 VARICOSE VEINS OF BOTH LOWER EXTREMITIES WITH PAIN: ICD-10-CM

## 2024-10-10 PROCEDURE — 93970 EXTREMITY STUDY: CPT | Performed by: SURGERY

## 2024-10-10 PROCEDURE — 93970 EXTREMITY STUDY: CPT

## 2024-10-14 ENCOUNTER — TELEPHONE (OUTPATIENT)
Dept: FAMILY MEDICINE CLINIC | Facility: CLINIC | Age: 69
End: 2024-10-14

## 2024-10-14 NOTE — TELEPHONE ENCOUNTER
If the question is about the pain , it will take the gabapentin more time and often times the dose needs to be increased

## 2024-10-16 NOTE — TELEPHONE ENCOUNTER
Left voice mail to make aware  Please notify of Dr Lombardi's note if patient calls back  Thank you  Mindy Vallejo MA

## 2024-10-18 ENCOUNTER — TELEPHONE (OUTPATIENT)
Dept: VASCULAR SURGERY | Facility: CLINIC | Age: 69
End: 2024-10-18

## 2024-10-18 ENCOUNTER — OFFICE VISIT (OUTPATIENT)
Dept: VASCULAR SURGERY | Facility: CLINIC | Age: 69
End: 2024-10-18
Payer: MEDICARE

## 2024-10-18 VITALS
SYSTOLIC BLOOD PRESSURE: 140 MMHG | WEIGHT: 139 LBS | OXYGEN SATURATION: 99 % | RESPIRATION RATE: 18 BRPM | HEART RATE: 78 BPM | DIASTOLIC BLOOD PRESSURE: 66 MMHG | BODY MASS INDEX: 27.29 KG/M2 | HEIGHT: 60 IN

## 2024-10-18 DIAGNOSIS — I83.813 VARICOSE VEINS OF BOTH LOWER EXTREMITIES WITH PAIN: Primary | ICD-10-CM

## 2024-10-18 PROCEDURE — 99215 OFFICE O/P EST HI 40 MIN: CPT | Performed by: SURGERY

## 2024-10-18 RX ORDER — CLINDAMYCIN PHOSPHATE 900 MG/50ML
900 INJECTION, SOLUTION INTRAVENOUS ONCE
OUTPATIENT
Start: 2024-10-18 | End: 2024-10-18

## 2024-10-18 RX ORDER — CHLORHEXIDINE GLUCONATE ORAL RINSE 1.2 MG/ML
15 SOLUTION DENTAL ONCE
OUTPATIENT
Start: 2024-10-18 | End: 2024-10-18

## 2024-10-18 NOTE — TELEPHONE ENCOUNTER
REMINDER: Under Reason For Call, comments MUST be formatted as:   (Surgeon's Initials) / (Procedure)      Special Instructions / FYI: LEVEL 4, NO CLEARANCES    Consent: I certify that patient has signed, printed, timed, and dated their surgery consent.  I certify that the patient's LEGAL NAME and DATE OF BIRTH are written in the upper left corner on BOTH sides of the consent.  I certify that BOTH sides of the completed surgery consent have been scanned into the patient's Epic chart by myself on 10/18/2024.  Yes, I have LABELED the consent in Epic as Consent for Vascular Procedure.     For Surgical Clearances     Levels   1-3   ROUTE this encounter to [facility] Clearance Pool (AND)   [facility] Surgery Coordinator Pool     Level   4   ROUTE this encounter to [facility] Surgery Coordinator Pool       HYDRATION CLEARANCES   ONLY ROUTE TO  [facility] Clearance Pool       Yes, I have ROUTED this encounter to [facility] Surgery Coordinator and/or [facility] Clearance Pool.

## 2024-10-18 NOTE — PROGRESS NOTES
Ambulatory Visit  Name: Diamond Aquino      : 1955      MRN: [id number]  Encounter Provider: Jose Elias Aguirre DO  Encounter Date: 10/18/2024   [facility]    Assessment & Plan  Varicose veins of both lower extremities with pain  69-year-old female presents for varicose veins.  She had a initial visit with our vascular [facility] she was given compression stockings and lower extremity venous reflux duplex ordered.  She is now here today for follow-up.  She has a remote history of bilateral great saphenous vein stripping over 20 years ago.  She does endorse bilateral leg pain and heaviness that is worse at the end of the day and worse on the left side.  Her varicosities are located on the left leg on the lateral left calf and popliteal fossa and inferior left posterior thigh.  She also has varicosities on her lateral right calf.  She has a diagnosis of fibromyalgia but she does also report symptoms that are consistent with chronic venous insufficiency.  Her venous reflux duplex is consistent with prior great saphenous vein stripping and she does not have any DVT.  I did offer her left leg phlebectomies only and after discussing all risks benefits and alternative therapies with her in detail she consented to proceed she would need to be prone for this primarily for her popliteal fossa and posterior thigh varicosities and I believe we could reach the lateral left calf varicosities from a prone position as well.  I told her that we would plan to stage the right leg if she wishes to have that done in the future and I also clearly explained to her that this was not for cosmetic purposes and that it may not result in resolution of her symptoms but I do believe there is a good chance of improving her symptoms.    Orders:    Case request operating room: left leg phlebectomies  prone position; Standing    Case request operating room: left leg phlebectomies  prone position      History of  Present Illness         Diamond Aquino is a 69 y.o. female     Patient had LEVDR on 10/10/24. Pt c/o BLE painful veins, Lt leg > Rt leg. Pt states she had BLE vein stripping done years ago. Pt does not wear compression stockings or elevate her legs.     History obtained from : patient  Review of Systems   Constitutional: Negative.    HENT: Negative.     Eyes: Negative.    Respiratory: Negative.     Cardiovascular:  Negative for chest pain.        Painful veins   Gastrointestinal: Negative.    Endocrine: Negative.    Genitourinary: Negative.    Musculoskeletal:  Positive for joint swelling and myalgias.   Skin: Negative.    Allergic/Immunologic: Negative.    Neurological:  Positive for numbness. Negative for dizziness and light-headedness.        Twitches/ shakes   Hematological: Negative.    Psychiatric/Behavioral:  The patient is nervous/anxious.         Depression   I have reviewed the ROS above and made changes as needed.        Pertinent Medical History             Objective     LMP  (LMP Unknown)     Physical Exam  General  Exam: alert, awake, oriented, no distress, consistent with stated age    Integumentary  Exam: warm, dry, no gross lesions, no bruises and normal color    Head and Neck  Exam: supple, no bruits, trachea midline, no JVD, no mass or palpable nodes      Chest and Lung  Exam: chest normal without deformity, bilaterally expansive, clear to auscultation    Cardiovascular  Exam: regular rate, regular rhythm, no murmurs, no rubs or gallops    Adbomen  Exam: soft, non-tender, non-distended, no pulsatile abdominal masses, no abdominal bruit    Peripheral Vascular  Exam: no clubbing of the digits of the upper extremity, no cyanosis, no edema, both feet are warm, radial pulses 2+ bilaterally, skin well perfused    Left posterior thigh, later calf varicosities  Lateral right calf varicosities    Upper Extremity:  Palpation: Radial pulse- Bilateral 2+    Neurologic  Exam:alert, non-focal, oriented x 3,  cranial nerves II-XII grossly intact            EVLT Operative Scheduling Information:    Hospital:  [facility]    Physician:  Timothy    Surgery: left leg phlebectomies, prone position    Urgency:  Standard    Level:  Level 4: Outpatients to be scheduled for screening procedures and elective surgery that can be delayed for longer than one month without reasonable expectation of detriment to patient.    Case Length:  Normal    Post-op Bed:  Outpatient    OR Table:  Standard    Equipment Needs:  None    Medication Instructions:  None    Hydration:  No    Contrast Allergy:  No    Venous Clinical Severity Scores (VCSS)  Item Absent   (0 points) Mild   (1 point) Moderate   (2 points) Severe   (3 points)   Pain [] None [] Occasional [] Daily [x] Daily limiting   Varicose veins [] None [] Few [] Calf or thigh [x] Calf and thigh   Venous edema [] None [x] Foot and ankle [] Above ankle, below knee [] To knee of above   Skin pigmentation [] None [x] Perimalleolar [] Diffuse, lower 1/3 calf [] Wider, above lower 1/3 calf   Inflammation [] None [] Perimalleolar [x] Diffuse, lower 1/3 calf [] Wider, above lower 1/3 calf   Induration [] None [] Perimalleolar [x] Diffuse, lower 1/3 calf [] Wider, above lower 1/3 calf   No. active ulcers [x] None [] 1 [] 2 [] >=3   Active ulcer size [x] None [] <2 cm [] 2 - 6 cm [] >6 cm   Ulcer duration [x] None [] <3 months [] 3 - 12 months [] >1 year   Compression therapy [] None [] Intermittent [] Most days [x] Fully comply   Total 15          CEAP Clinical Classification  [x] Symptomatic   [] Asymptomatic     [] Class 0 No visible or palpable signs of venous disease   [] Class 1 Telangiectasies or reticular veins   [x] Class 2 Varicose veins; distinguished from reticular veins by a diameter of 3mm or more   [] Class 3 Edema   [] Class 4 Changes in skin and subcutaneous tissue secondary to CVD    [] Class 4a Pigmentation or eczema   [] Class 4b Lipodermatosclerosis or atrophie heather   [] Class 5  Healed venous ulcer   [] Class 6 Active venous ulcer         LLE CEAP 2  RLE CEAP 2

## 2024-10-18 NOTE — ASSESSMENT & PLAN NOTE
69-year-old female presents for varicose veins.  She had a initial visit with our vascular [facility] she was given compression stockings and lower extremity venous reflux duplex ordered.  She is now here today for follow-up.  She has a remote history of bilateral great saphenous vein stripping over 20 years ago.  She does endorse bilateral leg pain and heaviness that is worse at the end of the day and worse on the left side.  Her varicosities are located on the left leg on the lateral left calf and popliteal fossa and inferior left posterior thigh.  She also has varicosities on her lateral right calf.  She has a diagnosis of fibromyalgia but she does also report symptoms that are consistent with chronic venous insufficiency.  Her venous reflux duplex is consistent with prior great saphenous vein stripping and she does not have any DVT.  I did offer her left leg phlebectomies only and after discussing all risks benefits and alternative therapies with her in detail she consented to proceed she would need to be prone for this primarily for her popliteal fossa and posterior thigh varicosities and I believe we could reach the lateral left calf varicosities from a prone position as well.  I told her that we would plan to stage the right leg if she wishes to have that done in the future and I also clearly explained to her that this was not for cosmetic purposes and that it may not result in resolution of her symptoms but I do believe there is a good chance of improving her symptoms.    Orders:    Case request operating room: left leg phlebectomies  prone position; Standing    Case request operating room: left leg phlebectomies  prone position

## 2024-10-21 ENCOUNTER — OFFICE VISIT (OUTPATIENT)
Dept: FAMILY MEDICINE CLINIC | Facility: CLINIC | Age: 69
End: 2024-10-21
Payer: MEDICARE

## 2024-10-21 VITALS
WEIGHT: 137 LBS | HEIGHT: 60 IN | HEART RATE: 78 BPM | BODY MASS INDEX: 26.9 KG/M2 | RESPIRATION RATE: 17 BRPM | TEMPERATURE: 97.4 F | DIASTOLIC BLOOD PRESSURE: 68 MMHG | SYSTOLIC BLOOD PRESSURE: 140 MMHG

## 2024-10-21 DIAGNOSIS — M79.10 MYALGIA: ICD-10-CM

## 2024-10-21 DIAGNOSIS — E78.5 DYSLIPIDEMIA: ICD-10-CM

## 2024-10-21 DIAGNOSIS — M79.601 RIGHT ARM PAIN: Primary | ICD-10-CM

## 2024-10-21 DIAGNOSIS — F33.9 DEPRESSION, RECURRENT (HCC): ICD-10-CM

## 2024-10-21 PROCEDURE — 99214 OFFICE O/P EST MOD 30 MIN: CPT | Performed by: FAMILY MEDICINE

## 2024-10-21 PROCEDURE — G2211 COMPLEX E/M VISIT ADD ON: HCPCS | Performed by: FAMILY MEDICINE

## 2024-10-21 RX ORDER — TRAZODONE HYDROCHLORIDE 50 MG/1
50 TABLET, FILM COATED ORAL
Qty: 30 TABLET | Refills: 5 | Status: SHIPPED | OUTPATIENT
Start: 2024-10-21

## 2024-10-21 RX ORDER — GABAPENTIN 300 MG/1
300 CAPSULE ORAL 3 TIMES DAILY
Qty: 90 CAPSULE | Refills: 3 | Status: SHIPPED | OUTPATIENT
Start: 2024-10-21

## 2024-10-21 NOTE — PROGRESS NOTES
Ambulatory Visit  Name: Diamond Aquino      : 1955      MRN: [id number]  Encounter Provider: Maureen Ritchie DO  Encounter Date: 10/21/2024   Encounter department: [facility]    Assessment & Plan  Depression, recurrent (HCC)  Depression Screening Follow-up Plan: Patient's depression screening was positive with a PHQ-2 score of 6. Their PHQ-9 score was 16.     Orders:    traZODone (DESYREL) 50 mg tablet; Take 1 tablet (50 mg total) by mouth daily at bedtime    Right arm pain  Worsening pain   Orders:    US MSK limited; Future    Dyslipidemia  Advised to restart pravastatin 20 mg a day        Myalgia  Not controlled  Dose of gabapentin increased to 3 times a day  Will start with twice a day for 3 days and then increase to twice a day  Orders:    gabapentin (NEURONTIN) 300 mg capsule; Take 1 capsule (300 mg total) by mouth 3 (three) times a day     Return for follow up in December as scheduled .      History of Present Illness     She has been taking tylenol and advil to control her pain.   Her right arm continues to have pain.     She is not comfortable  She is not sleeping well.       Review of Systems        Objective     /68   Pulse 78   Temp (!) 97.4 °F (36.3 °C)   Resp 17   Ht 5' (1.524 m)   Wt 62.1 kg (137 lb)   LMP  (LMP Unknown)   BMI 26.76 kg/m²     Physical Exam  Vitals and nursing note reviewed.   Constitutional:       General: She is not in acute distress.     Appearance: She is well-developed.   HENT:      Head: Normocephalic and atraumatic.      Right Ear: Tympanic membrane normal.      Left Ear: Tympanic membrane normal.   Cardiovascular:      Rate and Rhythm: Normal rate and regular rhythm.      Heart sounds: No murmur heard.  Pulmonary:      Effort: Pulmonary effort is normal. No respiratory distress.      Breath sounds: Normal breath sounds.   Musculoskeletal:         General: Tenderness (right upper arm) present.      Cervical back: Neck supple.   Neurological:       Mental Status: She is alert.

## 2024-10-21 NOTE — ASSESSMENT & PLAN NOTE
Not controlled  Dose of gabapentin increased to 3 times a day  Will start with twice a day for 3 days and then increase to twice a day  Orders:    gabapentin (NEURONTIN) 300 mg capsule; Take 1 capsule (300 mg total) by mouth 3 (three) times a day

## 2024-10-21 NOTE — ASSESSMENT & PLAN NOTE
Depression Screening Follow-up Plan: Patient's depression screening was positive with a PHQ-2 score of 6. Their PHQ-9 score was 16.     Orders:    traZODone (DESYREL) 50 mg tablet; Take 1 tablet (50 mg total) by mouth daily at bedtime

## 2024-10-21 NOTE — TELEPHONE ENCOUNTER
Patient called to explain scenario that happened last Sunday. She states she has been taking too much tylonel and Aleve to try to help with pain. She has been taking 1 tylonel and 2 Aleve every 6 hours in addition to the Gabapentin. Please see message 9/23/24 [employer] message in Dr. Ritchie absence This was regarding involuntary twitches and shakes. She states this was never addressed and has not experienced this since. I was able to fit her in today with PCP for 4 PM. She will discuss further at this time.

## 2024-10-21 NOTE — TELEPHONE ENCOUNTER
[facility]LT Operative Scheduling Information:     Hospital:  [facility]     Physician:  Timothy     Surgery: left leg phlebectomies, prone position     Urgency:  Standard     Level:  Level 4: Outpatients to be scheduled for screening procedures and elective surgery that can be delayed for longer than one month without reasonable expectation of detriment to patient.     Case Length:  Normal     Post-op Bed:  Outpatient     OR Table:  Standard     Equipment Needs:  None     Medication Instructions:  None     Hydration:  No     Contrast Allergy:  No     Venous Clinical Severity Scores (VCSS)  Item Absent   (0 points) Mild   (1 point) Moderate   (2 points) Severe   (3 points)   Pain [] None [] Occasional [] Daily [x] Daily limiting   Varicose veins [] None [] Few [] Calf or thigh [x] Calf and thigh   Venous edema [] None [x] Foot and ankle [] Above ankle, below knee [] To knee of above   Skin pigmentation [] None [x] Perimalleolar [] Diffuse, lower 1/3 calf [] Wider, above lower 1/3 calf   Inflammation [] None [] Perimalleolar [x] Diffuse, lower 1/3 calf [] Wider, above lower 1/3 calf   Induration [] None [] Perimalleolar [x] Diffuse, lower 1/3 calf [] Wider, above lower 1/3 calf   No. active ulcers [x] None [] 1 [] 2 [] >=3   Active ulcer size [x] None [] <2 cm [] 2 - 6 cm [] >6 cm   Ulcer duration [x] None [] <3 months [] 3 - 12 months [] >1 year   Compression therapy [] None [] Intermittent [] Most days [x] Fully comply   Total 15               CEAP Clinical Classification  [x] Symptomatic   [] Asymptomatic      [] Class 0 No visible or palpable signs of venous disease   [] Class 1 Telangiectasies or reticular veins   [x] Class 2 Varicose veins; distinguished from reticular veins by a diameter of 3mm or more   [] Class 3 Edema   [] Class 4 Changes in skin and subcutaneous tissue secondary to CVD    [] Class 4a Pigmentation or eczema   [] Class 4b Lipodermatosclerosis or atrophie heather   [] Class 5 Healed venous ulcer   [] Class  6 Active venous ulcer            LLE CEAP 2  RLE CEAP 2

## 2024-10-22 NOTE — TELEPHONE ENCOUNTER
Spoke with the patient on 10/22/24 and would like a call back on Friday 10/25/24 to confirm at 9:00 am

## 2024-11-04 DIAGNOSIS — I83.813 VARICOSE VEINS OF BOTH LOWER EXTREMITIES WITH PAIN: Primary | ICD-10-CM

## 2024-11-11 ENCOUNTER — TELEPHONE (OUTPATIENT)
Age: 69
End: 2024-11-11

## 2024-11-12 ENCOUNTER — HOSPITAL ENCOUNTER (OUTPATIENT)
Dept: RADIOLOGY | Facility: HOSPITAL | Age: 69
Discharge: HOME | End: 2024-11-12
Payer: MEDICARE

## 2024-11-12 DIAGNOSIS — M79.601 RIGHT ARM PAIN: ICD-10-CM

## 2024-11-12 PROCEDURE — 76882 US LMTD JT/FCL EVL NVASC XTR: CPT

## 2024-11-15 ENCOUNTER — CONSULT (OUTPATIENT)
Dept: PAIN MEDICINE | Facility: CLINIC | Age: 69
End: 2024-11-15
Payer: MEDICARE

## 2024-11-15 ENCOUNTER — APPOINTMENT (OUTPATIENT)
Dept: RADIOLOGY | Facility: CLINIC | Age: 69
End: 2024-11-15
Payer: MEDICARE

## 2024-11-15 ENCOUNTER — RESULTS FOLLOW-UP (OUTPATIENT)
Dept: FAMILY MEDICINE CLINIC | Facility: CLINIC | Age: 69
End: 2024-11-15

## 2024-11-15 VITALS
DIASTOLIC BLOOD PRESSURE: 67 MMHG | HEIGHT: 60 IN | WEIGHT: 137 LBS | BODY MASS INDEX: 26.9 KG/M2 | SYSTOLIC BLOOD PRESSURE: 109 MMHG | HEART RATE: 72 BPM

## 2024-11-15 DIAGNOSIS — G89.4 CHRONIC PAIN SYNDROME: ICD-10-CM

## 2024-11-15 DIAGNOSIS — M54.50 LOW BACK PAIN, UNSPECIFIED BACK PAIN LATERALITY, UNSPECIFIED CHRONICITY, UNSPECIFIED WHETHER SCIATICA PRESENT: Primary | ICD-10-CM

## 2024-11-15 DIAGNOSIS — M54.50 LOW BACK PAIN, UNSPECIFIED BACK PAIN LATERALITY, UNSPECIFIED CHRONICITY, UNSPECIFIED WHETHER SCIATICA PRESENT: ICD-10-CM

## 2024-11-15 DIAGNOSIS — M54.2 NECK PAIN: ICD-10-CM

## 2024-11-15 DIAGNOSIS — G56.01 CARPAL TUNNEL SYNDROME ON RIGHT: Primary | ICD-10-CM

## 2024-11-15 DIAGNOSIS — M47.812 CERVICAL SPONDYLOSIS: ICD-10-CM

## 2024-11-15 PROCEDURE — 72040 X-RAY EXAM NECK SPINE 2-3 VW: CPT

## 2024-11-15 PROCEDURE — 72100 X-RAY EXAM L-S SPINE 2/3 VWS: CPT

## 2024-11-15 PROCEDURE — G2211 COMPLEX E/M VISIT ADD ON: HCPCS | Performed by: STUDENT IN AN ORGANIZED HEALTH CARE EDUCATION/TRAINING PROGRAM

## 2024-11-15 PROCEDURE — 99204 OFFICE O/P NEW MOD 45 MIN: CPT | Performed by: STUDENT IN AN ORGANIZED HEALTH CARE EDUCATION/TRAINING PROGRAM

## 2024-11-15 RX ORDER — PREGABALIN 50 MG/1
50 CAPSULE ORAL 2 TIMES DAILY
Qty: 60 CAPSULE | Refills: 1 | Status: SHIPPED | OUTPATIENT
Start: 2024-11-15

## 2024-11-15 NOTE — PROGRESS NOTES
Assessment:  1. Low back pain, unspecified back pain laterality, unspecified chronicity, unspecified whether sciatica present    2. Cervical spondylosis    3. Chronic pain syndrome        Plan:  Orders Placed This Encounter   Procedures    XR spine lumbar 2 or 3 views injury     Standing Status:   Future     Number of Occurrences:   1     Expected Date:   11/15/2024     Expiration Date:   11/15/2028     Scheduling Instructions:      Bring along any outside films relating to this procedure.          XR spine cervical 2 or 3 vw injury     Standing Status:   Future     Number of Occurrences:   1     Expected Date:   11/15/2024     Expiration Date:   11/15/2028     Scheduling Instructions:      Bring along any outside films relating to this procedure.          Ambulatory referral to Physical Therapy     Standing Status:   Future     Expiration Date:   11/15/2025     Referral Priority:   Routine     Referral Type:   Physical Therapy     Referral Reason:   Specialty Services Required     Requested Specialty:   Physical Therapy     Number of Visits Requested:   1     Expiration Date:   11/15/2025       New Medications Ordered This Visit   Medications    pregabalin (LYRICA) 50 mg capsule     Sig: Take 1 capsule (50 mg total) by mouth 2 (two) times a day     Dispense:  60 capsule     Refill:  1       My impressions and treatment recommendations were discussed in detail with the patient, who verbalized understanding and had no further questions.    69-year-old female who presents today for evaluation management of chronic right arm and left leg pain.  I reviewed her cervical and lumbar radiographs in the office today.  She does have mild degenerative changes in the cervical spine which may be resulting in neuroforaminal stenosis causing cervical nerve root impingement that may explain the pain and weakness in her right arm.  I reviewed her lumbar radiographs which look largely normal.  She is also on gabapentin for suspected  fibromyalgia.    We did discuss management including activity modification, physical therapy for strengthening and stabilization, medications for widespread pain.    I have provided her with a referral for PT to help with her mechanical pain symptoms.  I have also provided her with a prescription for pregabalin as she was having some side effects with the gabapentin.  If failure to improve with conservative measures we can consider obtaining MRI of the cervical and lumbar spine to further evaluate for pathology and for consideration of injection therapy.  She is also scheduled for an EMG/nerve conduction study to better evaluate her pain and weakness as well as is scheduled for neurology evaluation.      Follow-up is planned in 6 weeks time or sooner as warranted.  Discharge instructions were provided. I personally saw and examined the patient and I agree with the above discussed plan of care.    History of Present Illness:    Diamond Aquino is a 69 y.o. female who presents to [facility] for initial evaluation of the above stated pain complaints. The patient has a past medical and chronic pain history as outlined in the assessment section. She was referred by No referring provider defined for this encounter..    69-year-old female with a history of anxiety, asthma, depression, fibromyalgia, osteoarthritis who presents today for evaluation and management of chronic right arm and left leg pain associated with weakness.  Pains been present for 3 months without any inciting event.  Pain is rated as 8 out of 10 and interfering with daily activity.  Pain is constant without typical pattern.  Associated with burning, shooting, sharpness, and throbbing.  She also endorses twitching.  Pain is made worse with bending and walking and improves when lying down.  Also reports pain with exercise.  She has not done any physical therapy, she uses heat with some relief.  She is currently taking Tylenol and has  taken Toradol as well.  She is currently also taking gabapentin.  She presents today for further evaluation.    Review of Systems:    Review of Systems   Constitutional:  Negative for chills and fatigue.   HENT:  Negative for ear pain, mouth sores and sinus pressure.    Eyes:  Positive for redness and visual disturbance. Negative for pain.   Respiratory:  Negative for shortness of breath and wheezing.    Cardiovascular:  Negative for chest pain and palpitations.   Gastrointestinal:  Negative for abdominal pain and nausea.   Endocrine: Negative for polyphagia.   Musculoskeletal:  Positive for back pain, gait problem and joint swelling. Negative for arthralgias and neck pain.        Decreased ROM, joint and muscle pain and weakness   Skin:  Negative for wound.   Neurological:  Positive for dizziness, weakness, light-headedness, numbness and headaches. Negative for seizures.   Psychiatric/Behavioral:  Positive for decreased concentration, dysphoric mood and sleep disturbance. The patient is nervous/anxious.            Past Medical History:   Diagnosis Date    Anesthesia complication     desaturation of oxygen mostly at night- on O2 at 2L at hs-may wake up with asthma issue    Anxiety     Arthritis     Asthma     Argueta esophagus     Breathing difficulty     pt states s/p anesthesia will have an asthma attack-pt to bring ProAir    Chest pain     upper chest-intermittent asthma related?    Colon polyp     COVID-19 2020    Decreased hearing of both ears     DVT (deep venous thrombosis) (HCC)     during pregnancy in the leg    Fibromyalgia     GERD (gastroesophageal reflux disease)     Hiatal hernia     Hypercholesteremia     Macular degeneration     wet-right eye injected 24    Nasal polyps     Sinusitis, chronic     Subarachnoid hemorrhage (HCC) 2020    Tinnitus     Vertigo     on occ    Wears glasses        Past Surgical History:   Procedure Laterality Date     SECTION N/A     x 2    COLONOSCOPY       ESOPHAGOGASTRODUODENOSCOPY      HYSTERECTOMY      complete    NASAL POLYP SURGERY      x3-2013,2018,2019    MD COLSC FLX W/RMVL OF TUMOR POLYP LESION SNARE TQ N/A 06/06/2017    Procedure: COLONOSCOPYwith  snare polypectomy.;  Surgeon: Cuong Mejia MD;  Location: [facility] GI [facility];  Service: Gastroenterology    MD EGD TRANSORAL BIOPSY SINGLE/MULTIPLE N/A 06/06/2017    Procedure: ESOPHAGOGASTRODUODENOSCOPY (EGD)and biopsy.;  Surgeon: Cuong Mejia MD;  Location: [facility] GI [facility];  Service: Gastroenterology    MD XCAPSL CTRC RMVL INSJ IO LENS PROSTH W/O ECP Right 2/5/2024    Procedure: EXTRACTION EXTRACAPSULAR CATARACT PHACO INTRAOCULAR LENS (IOL);  Surgeon: Jamie Engel MD;  Location: [facility] MAIN OR;  Service: Ophthalmology    TONSILLECTOMY N/A     VEIN LIGATION AND STRIPPING Bilateral        Family History   Problem Relation Age of Onset    Heart disease Mother         CHF    Breast cancer additional onset Mother 49    Breast cancer Mother 49    Dysphagia Father     Breast cancer Maternal Aunt     Breast cancer Paternal Aunt     Ovarian cancer Maternal Aunt     Ovarian cancer Maternal Aunt     Cervical cancer Maternal Aunt        Social History     Occupational History    Not on file   Tobacco Use    Smoking status: Never     Passive exposure: Past    Smokeless tobacco: Never   Vaping Use    Vaping status: Never Used   Substance and Sexual Activity    Alcohol use: Yes     Comment: social    Drug use: Never    Sexual activity: Yes     Partners: Male     Birth control/protection: Female Sterilization, Surgical     Comment: hysterectomy         Current Outpatient Medications:     acetaminophen (TYLENOL) 325 mg tablet, Take 650 mg by mouth every 6 (six) hours as needed for mild pain, Disp: , Rfl:     albuterol (ProAir HFA) 90 mcg/act inhaler, Inhale 2 puffs every 4 (four) hours as needed for wheezing, Disp: 8 g, Rfl: 3    ALPRAZolam (XANAX) 0.5 mg tablet, as needed, Disp: , Rfl: 0    calcium citrate-vitamin D  (CITRACAL+D) 315-200 MG-UNIT per tablet, Take 1 tablet by mouth every morning, Disp: , Rfl:     Coenzyme Q10 (Co Q 10) 100 MG CAPS, Take by mouth, Disp: , Rfl:     Dupilumab (Dupixent) 300 MG/2ML SOPN, INJECT 1 PEN UNDER THE SKIN EVERY 14 DAYS, Disp: 4 mL, Rfl: 5    famciclovir (FAMVIR) 500 mg tablet, if needed, Disp: , Rfl:     Fluticasone Propionate (Xhance) 93 MCG/ACT EXHU, 1 spray into each nostril 2 (two) times a day, Disp: 16 mL, Rfl: 5    Multiple Vitamins-Minerals (multivitamin with minerals) tablet, Take 1 tablet by mouth daily, Disp: , Rfl:     Multiple Vitamins-Minerals (PRESERVISION AREDS 2 PO), Take by mouth 2 (two) times a day, Disp: , Rfl:     pravastatin (PRAVACHOL) 20 mg tablet, TAKE 1 TABLET BY MOUTH DAILY, Disp: 30 tablet, Rfl: 5    pregabalin (LYRICA) 50 mg capsule, Take 1 capsule (50 mg total) by mouth 2 (two) times a day, Disp: 60 capsule, Rfl: 1    traZODone (DESYREL) 50 mg tablet, Take 1 tablet (50 mg total) by mouth daily at bedtime, Disp: 30 tablet, Rfl: 5    Allergies   Allergen Reactions    Pitavastatin Hives, Shortness Of Breath and Rash     Nausea    Cefditoren Nausea Only and Vomiting    Cefditoren Pivoxil Hives     N/V    Dust Mite Extract Sneezing    Levofloxacin GI Intolerance    Mold Extract [Trichophyton] Sneezing    Moxifloxacin GI Intolerance       Physical Exam:    /67 (Patient Position: Sitting, Cuff Size: Adult)   Pulse 72   Ht 5' (1.524 m)   Wt 62.1 kg (137 lb)   LMP  (LMP Unknown)   BMI 26.76 kg/m²     Constitutional: normal, well developed, well nourished, alert, in no distress and non-toxic and no overt pain behavior.  Eyes: anicteric  HEENT: grossly intact  Neck: supple, symmetric, trachea midline and no masses   Pulmonary:even and unlabored  Cardiovascular:No edema or pitting edema present  Skin:Normal without rashes or lesions and well hydrated  Psychiatric:Mood and affect appropriate  Neurologic:Cranial Nerves II-XII grossly intact  Musculoskeletal: Range  of motion of the bilateral upper limbs is full however limited by pain with right shoulder abduction and flexion.  No tenderness over the subacromial bursa.  Negative Magno's test bilaterally.  Strength in available range is full.  Tenderness over the lumbosacral area.  Strength in the lower limbs is full and symmetric.  Sensation is normal and symmetric.  Reflexes are normal and symmetric in bilateral upper and lower limbs.  Negative Carver's reflex bilaterally.    Imaging  No orders to display       Orders Placed This Encounter   Procedures    XR spine lumbar 2 or 3 views injury    XR spine cervical 2 or 3 vw injury    Ambulatory referral to Physical Therapy

## 2024-11-25 ENCOUNTER — EVALUATION (OUTPATIENT)
Dept: PHYSICAL THERAPY | Facility: CLINIC | Age: 69
End: 2024-11-25
Payer: MEDICARE

## 2024-11-25 DIAGNOSIS — M47.812 CERVICAL SPONDYLOSIS: ICD-10-CM

## 2024-11-25 DIAGNOSIS — M54.50 LOW BACK PAIN, UNSPECIFIED BACK PAIN LATERALITY, UNSPECIFIED CHRONICITY, UNSPECIFIED WHETHER SCIATICA PRESENT: ICD-10-CM

## 2024-11-25 DIAGNOSIS — M79.7 FIBROMYALGIA: Primary | ICD-10-CM

## 2024-11-25 PROCEDURE — 97162 PT EVAL MOD COMPLEX 30 MIN: CPT | Performed by: PHYSICAL THERAPIST

## 2024-11-25 NOTE — PROGRESS NOTES
"    PT Evaluation   Today's date: 2024  Patient name: Diamond Aquino  : 1955  MRN: [id number]  Referring provider: Haseeb Tran DO  Dx:   Encounter Diagnosis     ICD-10-CM    1. Fibromyalgia  M79.7       2. Low back pain, unspecified back pain laterality, unspecified chronicity, unspecified whether sciatica present  M54.50 Ambulatory referral to Physical Therapy      3. Cervical spondylosis  M47.812 Ambulatory referral to Physical Therapy            CASE SUMMARY:   Diamond Aquino is a 69 y.o. year old female who presents to OPPT upon referral from ortho and rheumatologist  secondary to c/o fibromylagia and cervical spondylosis and low back pain  . describes having tremors during the day, shakes \"internally\"  in morning and at night.  States ~ 3 months ago  she sat on floor and had difficulty getting up from floor  due to weakness and fatique despite being very active until onset of symptoms.  And then experiencing \"ache\" all over (body), and describes pain with using arms functionally in legs right arm and left leg is worse. Primary physician referred her to rheumatologist.  States was diagnosed with fibromayalgia. Had a test for carpal tunnel, results pending.   Being tested to rule out a neurological origin. She  also experiences dizziness or \"swirling\" particularly with a change of position.  Did attend PT for dizziness 2 years ago.   Current symptom location includes posterior aspect of left leg,  describes as bends around the arms right above the elbow greater then left  and patient describes  current symptoms as: sharp shooting, describes her arm has a \"fuzziness\" , numb, can be burning .  Symptoms are : right UE, left LE constant but all 4 extremities experience symptoms.  Pain level:  Current: 8/10 right arm and with ADL's 3-810.  Symptoms improve with: \"wraps it\" ace bandage right arm , and worsen with using the right arm, walking on left leg .  Overall symptom progression at this time is " worse.   Previous injury to this area: none  Previous treatment includes: none  Diagnostic testing includes: xray cervical , low back .     PMHx includes: See chart for full details with medications, allergies, past family history, past medical history, social history, past surgical history and problem list. All topics were reviewed.      Functionally, at baseline, Diamond is needs help getting in out of car, closing door  with all basic ADL's and advanced ADL's.  Currently, Diamond is limited in the following activities: drving is limited.      Diamond is lives w/ , describes stress in the home due to passing of her MIL.      Diamond Aquino is employed as a , limited working capacity at this time .         Concurrent Complaints:  Red flags present: Tingling, Numbness,      Upper Motor Neuron testing (present/not present):  Carver (middle finger first segment flick + sign is first and second finger to flexion): neg  Inverted supinator ( reflex hammer to supinator normal: neg is wrist extension, + wrist and finger flexion) : 2+  Babinski test (spinal cord/brain pathology: toes fan, heel -->lateral aspect of foot --> first digit) : NT  Ankle clonus (+ CNS insult, brisk dorsiflexion of ankle, ankle Df/PF couple times passisve then brisk Dfm, then hold ankle into DF: + repeated oscillations observed) NT      Posture  Sitting: forward head,  Standing: forward flexed, reduced lumbar lordosis    Cervical ROM  Active (sitting)  Protraction: wnl   Retraction: min lom   Flexion: wnl   Extension: deviates to right min LOM no pain   Rotation: Right:  min LOM   Left min LOM    Side bend: Right:  mod LOM   Left mod LOM        Thoracic ROM:NA      Functional reaching:  Overhead: right: limited by 75% left: wnl + pain with lowering  Behind head: Right: limited by 20% left: wnl  Behind back: Right: limited by 25% left: wnl    MMT Upper Extremity  Flexion: Right: 2-/5 Left 4-/5  Abduction(C5): TBA  Elbow flexion (C6): TBA    Elbow extension (C7): TBA  Wrist flexion: TBA  Wrist Extension: TBA  : Right: TBA  Digit abduction (C8): TBA    Shoulder ER: left 4-/5 Right: 3-/5,  Right: left 4-/5  Right: 3-/5      Dermatomes: NT    Repeated motions: NT    Gait:  mild unsteadiness noted in gait but no true LOB observed , hitch on right side with form stance to swing on right   Assistive device: neg   Trunk movement: red   Stride length: wnl   Trendelenburg: neg   Foot drop: neg    Functional movements:   Squat: poor stability jacy LE     SLS: left: unable to perform   Right : 10 sec     Bridging: NT    Transfers sit/stand: needs multiple attempts to achieve standing without UE   bed mobility NT    Lumbar AROM:    Flexion: moderate LOM limited by instability   Extension: moderate LOM (-) pain   Side bend: Right: minimal LOM (-) pain     Left: minimal LOM (-) pain       Repeated motions: NT       LE MMT  L Hip Flexion: 3+/5  R Hip Flexion: 4-/5   L Hip Extension: 4-/5 R Hip Extension: 4-/5   L Hip Abduction: 4-/5 R Hip Abduction: 4-/5   L Hip Adduction: 4/5 R Hip Adduction: 4/5   L Knee Extension: 4/5 R Knee Extension: 4/5   L Knee Flexion: 3+/5 R Knee Flexion: 4-/5   L Ankle DF: 4-/5 R Ankle DF: 4-/5   L Ankle PF: 4/5  R Ankle PF: 4/5       LE ROM: NT      Dermatomes: TBA      Flexibility:NT          Assessment  Assessment details: Patient is a 69 y.o. Female who presents to skilled outpatient PT for the diagnosis of   Encounter Diagnosis     ICD-10-CM    1. Fibromyalgia  M79.7       2. Low back pain, unspecified back pain laterality, unspecified chronicity, unspecified whether sciatica present  M54.50 Ambulatory referral to Physical Therapy      3. Cervical spondylosis  M47.812 Ambulatory referral to Physical Therapy      . Patient presents with significant subjective report of insidious onset of symptoms, demonstrates an unsteady gait, signficant weakness in right UE but present in left UE as well, just not functionally limited in left.  Right patient is limited in ~ 75% of active motion. Patient  demonstrates weakness in jacy LE  and reduced stability as noted in Single limb balance. Patient vocalized lack of understanding her symptoms is causing her depression. Patient will benefit from skilled PT paricularly a neuro evaluation.  Will resume ortho PT if neuro assessment is unfounded. Patient agreeable with plan. . The aforementioned impairments have limited the patient's ability to perform ADL's basic and advanced.   Patient vocalized a good understanding of  PLOC and HEP issued. Diamond would benefit from skilled physical therapy services to address the functional limitations and progress towards prior level of function, to meet stated goals,  and independence with home exercise program.  Prognosis for successful rehab outcome is fair due to uncertainty of symtpom origin with consistent participation in therapy and carryover of HEP and PLOC.No further referral is necessary at this time based upon examination results. Patient education performed during today's session included: Hep and PLOC.     Impairments: Abnormal gait, Activity intolerance, Impaired balance, Impaired physical strength, Lacks appropriate HEP, Poor body mechanics, and Pain with function  Understanding of Dx/Px/POC:  Fair  Prognosis: Fair    STG (TBD post neuro assessment)  +patient will be assessed by neuro PT (1 week)    LTG: TBD post neuro eval      Plan  Plan details:  PLOC discussed and agreed upon with patient patient to be evaluated by neuro PT    Patient would benefit from: Skilled PT  Frequency:  TBD      Patient verbalized understanding of POC. Please contact me if you have any questions or recommendations. Thank you for the referral and the opportunity to share in Diamond Aquino's care.                Past Medical History:   Diagnosis Date    Anesthesia complication     desaturation of oxygen mostly at night- on O2 at 2L at hs-may wake up with asthma issue    Anxiety      Arthritis     Asthma     Argueta esophagus     Breathing difficulty     pt states s/p anesthesia will have an asthma attack-pt to bring ProAir    Chest pain     upper chest-intermittent asthma related?    Colon polyp     COVID-19 02/2020    Decreased hearing of both ears     DVT (deep venous thrombosis) (HCC)     during pregnancy in the leg    Fibromyalgia     GERD (gastroesophageal reflux disease)     Hiatal hernia     Hypercholesteremia     Macular degeneration     wet-right eye injected 1/31/24    Nasal polyps     Sinusitis, chronic     Subarachnoid hemorrhage (HCC) 05/02/2020    Tinnitus     Vertigo     on occ    Wears glasses          Eval/ Re-eval Auth #/ Referral # Total visits Start date  Expiration date Total active units  Total manual units  PT only or  PT+OT?   11/25/2024 25 annemarie izaguirre on secondary, medicare primary                               Reeval:   Insurance :         Visits: 1 2 3 4 5   Manual: 11/25/2024               TBD                                        Ther ex/NMR:                                                                                                                                                                                                                Therapeutic Activity:         Reevaluation                Gait Training:                 HEP:                 Modalities        MH        ice        Total time:

## 2024-11-26 ENCOUNTER — EVALUATION (OUTPATIENT)
Facility: CLINIC | Age: 69
End: 2024-11-26
Payer: MEDICARE

## 2024-11-26 DIAGNOSIS — M47.812 CERVICAL SPONDYLOSIS: ICD-10-CM

## 2024-11-26 DIAGNOSIS — M79.7 FIBROMYALGIA: ICD-10-CM

## 2024-11-26 DIAGNOSIS — M54.50 LOW BACK PAIN, UNSPECIFIED BACK PAIN LATERALITY, UNSPECIFIED CHRONICITY, UNSPECIFIED WHETHER SCIATICA PRESENT: Primary | ICD-10-CM

## 2024-11-26 PROCEDURE — 97112 NEUROMUSCULAR REEDUCATION: CPT | Performed by: PHYSICAL THERAPIST

## 2024-11-26 PROCEDURE — 97530 THERAPEUTIC ACTIVITIES: CPT | Performed by: PHYSICAL THERAPIST

## 2024-11-26 NOTE — PROGRESS NOTES
PT Re-Evaluation          POC expires Unit limit Auth Expiration date PT/OT + Visit Limit? Co-Insurance   25 N/A N/A Secondary 25 PCY No and $0 Co-pay                                 Visit/Unit Tracking  AUTH Status:  Date 6-10 6-17 6-19 6-25 6-26 7-3 11-25 11-26       Secondary is 25 PCY Used 1 1 1 1 1 1 1 1        Remaining  24 23 22 21 20 19 18 17                  Today's date: 2024  Patient name: Diamond Aquino  : 1955  MRN: [id number]  Referring provider: Haseeb Tran DO  Dx:   Encounter Diagnosis     ICD-10-CM    1. Low back pain, unspecified back pain laterality, unspecified chronicity, unspecified whether sciatica present  M54.50       2. Cervical spondylosis  M47.812       3. Fibromyalgia  M79.7             Assessment  Assessment details: Patient is a 69 y.o. Female who presents to skilled outpatient PT with pain and deficits associated with strength, mobility, endurance, activity tolerance, coordination, and gait impacting her ability to perform ADLs, ambulation, and work related tasks safely. Sensation screen was unremarkable. Coordination screen was slowed and equal B/L. MMT assessment illustrated inconsistencies as patient required contralateral UE assist to raise her RUE to attain testing position, however was able to maintain isometric hold until MMT test was elicited. Patient displayed similar inconsistent results in B/L Les, however she required UE assist to attain LLE hip flexion with good ability to maintain isometric hold until break test via MMT. Patient displayed asymmetrical UMN signs as related to Brachioradialis and Patellar DTRs. No clonus elicited and myelopathy screen was unremarkable. Due to self reports of depression (no suicidal ideations), heavy discussion and education surrounded establishing care with psych and she was in good verbal agreement and understanding. Due to aforementioned  asymmetrical deficits and incongruencies, educated the patient on Pain Neuroscience Education and how yellow flags can contribute to physical manifestation of symptoms. Discussed with the patient a trial of the PNE group and she was in good verbal agreement and understanding as she will benefit from skilled outpatient PT in order to address her symptoms via multifaceted approach.        Impairments: Abnormal coordination, Abnormal gait, Abnormal muscle tone, Abnormal or restricted ROM, Activity intolerance, Impaired balance, Impaired physical strength, Lacks appropriate HEP, Poor posture, Poor body mechanics, Pain with function, Safety issue, Weight-bearing intolerance, Abnormal movement, Difficulty understanding, Abnormal muscle firing  Understanding of Dx/Px/POC: Fair  Prognosis: Good    Patient verbalized understanding of POC.         Please contact me if you have any questions or recommendations. Thank you for the referral and the opportunity to share in Diamond Aquino's care.        Plan  Plan details: PNE Group  Patient would benefit from: PT Eval and Skilled PT  Planned modality interventions: Biofeedback, Cryotherapy, TENS, Thermotherapy  Planned therapy interventions: Abdominal trunk stabilization, ADL training, Balance, Balance/WB training, Breathing training, Body mechanics training, Coordination, Functional ROM exercises, Gait training, HEP, Joint Mobilization, Manual Therapy, Ponce taping, Motor coordination training, Neuromuscular re-education, Patient education, Postural training, Strengthening, Stretching, Therapeutic activities, Therapeutic exercises, Therapeutic training, Transfer training, Activity modification, Work reintegration  Frequency: 1-2x/wk  Duration in weeks: 12  Plan of Care beginning date: 11-  Plan of Care expiration date: 12 weeks - 2-  Treatment plan discussed with: Patient       Goals  Short Term Goals (4 weeks):    - Patient will improve time on TUG by 2.9 seconds  to facilitate improved safety in all ambulation  - Patient will be independent in basic HEP 2-3 weeks  - Patient will improve 5xSTS score by 2.3 seconds to promote improved LE functional strength needed for ADLs  - Patient will complete components of HiMAT to promote agility necessary for sports related tasks    Long Term Goals (12 weeks):  - Patient will be independent in a comprehensive home exercise program  - Patient will improve scoring on DGI by 2.6 points to progress safety  - Patient will improve gait speed by 0.18 m/s to improve safety with community ambulation  - Patient will improve BARRIOS by 6 points in order to improve static balance and reduce risk for falls  - Patient will improve scoring on FGA by 4 points to progress safety with dynamic tasks  - Patient will be able to demonstrate HT in gait without veering  - Patient will improve 6 Minute Walk Test score by 190 feet to promote improved cardiovascular endurance  - Patient will report 50% reduction in near falls in order to improve safety with functional tasks and reduce his risk for falls  - Patient will report going on walks at least 3 days per week to promote independence and improved cardiovascular endurance  - Patient will be able to ascend/descend stairs reciprocally with 1 UE assist to promote independence and safety with ADLs  - Patient will report 50% reduction in near falls when ambulating on uneven terrain      Cut off score    All date taken from [facility]TA Neuro Section or Rehab Measures      Barrios/56  MDC: 6 pts  Age Norms:  60-69: M - 55   F - 55  70-79: M - 54   F - 53  80-89: M - 53   F - 50 5xSTS: Kd et al 2010  MDC: 2.3 sec  Age Norms:  60-69: 11.4 sec  70-79: 12.6 sec  80-89: 14.8 sec   TUG  MDC: 4.14 sec  Cut off score:  >13.5 sec community dwelling adults  >32.2 frail elderly  <20 I for basic transfers  >30 dependent on transfers 10 Meter Walk Test: Erasto et al 2011  MDC: 0.18 m/s  20-29: M - 1.35 m   F - 1.34  m  30-39: M - 1.43 m   F - 1.34 m  40-49: M - 1.43 m   F - 1.39 m  50-59: M - 1.43 m   F - 1.31 m  60-69: M - 1.34 m   F - 1.24 m  70-79: M - 1.26 m   F - 1.13 m  80-89: M - 0.97 m   F - 0.94 m    Household Ambulator < 0.4 m/s  Limited Community Ambulator 0.4 - 0.8 m/s  Community Ambulator 0.8 - 1.2 m/s  Safely cross the street > 1.2 m/s   FGA  MCID: 4 pts  Geriatrics/community < 22/30 fall risk  Geriatrics/community < 20/30 unexplained falls    DGI  MDC: vestibular - 4 pts  MDC: geriatric/community - 3 pts  Falls risk <19/24 mCTSIB  Norm: 20-60 yrs  Eyes open firm: norm sway 0.21-0.48  Eyes closed firm: norm sway 0.48-0.99  Eyes open foam: norm sway 0.38-0.71  Eyes closed foam: norm sway 0.70-2.22   6 Minute Walk Test  MDC: 190.98 ft  MCID: 164 ft    Age Norms  60-69: M - 1876 ft (571.80 m)  F - 1765 ft (537.98 m)  70-79: M - 1729 ft (527.00 m)  F - 1545 ft (470.92 m)  80-89: M - 1368 ft (416.97 m)  F - 1286 ft (391.97 m) ABC: Dimitrios & Kristel, 2003  <67% increased risk for falls   [place] Monofilaments  Evaluator Size:        Force (grams):          Hand/Dorsal Thresholds:        Plantar Thresholds:  - 1.65                       - 0.008                       - Normal                                 - Normal  - 2.36                       - 0.02                         - Normal                                 - Normal  - 2.44                       - 0.04                         - Normal                                 - Normal  - 2.83                       - 0.07                         - Normal                                 - Normal  - 3.22                       - 0.16                         - Diminished light touch          - Normal  - 3.61                       - 0.40                         - Diminished light touch          - Normal  - 3.84                       - 0.60                         - Diminished protective           - Diminished light touch  - 4.08                       - 1.00                          - Diminished protective           - Diminished light touch  - 4.17                       - 1.40                         - Diminished protective           - Diminished light touch  - 4.31                       - 2.00                         - Diminished protective           - Diminished light touch  - 4.56                       - 4.00                         - Loss of protective sense      - Diminished protective  - 4.74                       - 6.00                         - Loss of protective sense      - Diminished protective  - 4.93                       - 8.00                         - Loss of protective sense      - Diminished protective  - 5.07                       - 10.0                         - Loss of protective sense     - Loss of protective sense  - 5.18                       - 15.0                         - Loss of protective sense     - Loss of protective sense  - 5.46                       - 26.0                         - Loss of protective sense     - Loss of protective sense  - 5.88                       - 60.0                         - Loss of protective sense     - Loss of protective sense  - 6.10                       - 100                          - Loss of protective sense     - Loss of protective sense  - 6.45                       - 180                          - Loss of protective sense     - Loss of protective sense  - 6.65                       - 300                          - Deep pressure sense only  - Deep pressure sense only         Subjective    History of Present Illness  - Mechanism of injury: Patient is a 68 y/o female who arrives to skilled outpatient PT with complaints of RUE pain/weakness and LLE weakness which began in August 2024. She noted is started when all of a sudden she couldn't return to standing after sitting on the floor. She noted she has been to multiple physicians who performed x-rays which were inconclusive. Patient had a brain MRI performed in June   which was unremarkable. No reported trauma. She further stated that she has been very depressed since all of this occurred and stated that her Father in Law passed away last week and they just had his . She noted that she feels badly, because she can't really help her  because she is fighting her own complications. Patient reportedly was recently diagnosed with Fibromyalgia. 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    She is a previous patient ins which she was treated for a SAH/concussion 4.5 years ago. Patient stated she always felt a little foggy after that, however has been feeling much worse lately.    - Primary AD: None  - Assist level at home: None      Pain  - Current pain rating: /10  - At best pain rating: /10  - At worst pain rating: /10  - Location: L shoulder  - Aggravating factors: Movement    Social Support  - Steps to enter house: 2 (-) HR  - Stairs in house: Yes (-) HR   - Lives in: [place]  - Lives with:     - Employment status: Self employed   - Hand dominance: L    Treatments  - Previous treatment: Medication, ice/heat  - Current treatment: PT  - Diagnostic Testing: X-Ray (-)      Objective     UE MMT  - R Shoulder Flexion: 3+/5  L Shoulder Flexion: 4/5  - R Shoulder Abduction: 3+/5  L Shoulder Abduction: 4/5  - R Elbow Extension: 4-/5  L Elbow Extension: 4/5  - R Elbow Flexion: 4/5   L Elbow Flexion: 4/5  - R : Diminished compared to L L :     LE MMT  - R Hip Flexion: 4+/5  L Hip Flexion: 3+/5  - R Hip Extension: 4/5  L Hip Extension: 3-/5  - R Hip Abduction: 4+/5 L Hip Abduction: 3+/5  - R Hip Adduction: 4+/5 L Hip Adduction: 3+/5  - R Knee Extension: 4+/5 L Knee Extension: 4-/5  - R Knee Flexion: 4+/5 L Knee Flexion: 4/5  - R Ankle DF: 4/5  L Ankle DF: 4/5  - R Ankle PF: 4/5  L Ankle PF: 4/5    Sensation  - Light touch: Intact and equal B/L  - Deep pressure: Intact and equal B/L    Coordination  - Heel to Shin: Slowed LLE  -  Alternate Toe Taps: Intact and equal B/L    Reflexes/Clonus  - Clonus: No  - L Patellar DTR: 3+: Increased  - R Patellar DTR: 2+: Normal  - L Brachioradialis DTR: 2+: Normal  - R Brachcioradialis DTR: 3+: Increased    Myelopathy Screen (>3/5 +)  - Carver's Reflex: -  - Babinski Reflex: NT  - Inverted Supinator Sign: -  - Age > 45: Yes  - Gait Deviation: No         Outcome Measures Initial Eval  11-        5xSTS         TUG  - Regular  - Cognitive         10 meter         BARRIOS         FGA         DGI         mCTSIB  - FTEO (firm)  - FTEC (firm)  - FTEO (foam)  - FTEC (foam)         6MWT                                      Precautions: Depression  Past Medical History:   Diagnosis Date    Anesthesia complication     desaturation of oxygen mostly at night- on O2 at 2L at hs-may wake up with asthma issue    Anxiety     Arthritis     Asthma     Argueta esophagus     Breathing difficulty     pt states s/p anesthesia will have an asthma attack-pt to bring ProAir    Chest pain     upper chest-intermittent asthma related?    Colon polyp     COVID-19 02/2020    Decreased hearing of both ears     DVT (deep venous thrombosis) (HCC)     during pregnancy in the leg    Fibromyalgia     GERD (gastroesophageal reflux disease)     Hiatal hernia     Hypercholesteremia     Macular degeneration     wet-right eye injected 1/31/24    Nasal polyps     Sinusitis, chronic     Subarachnoid hemorrhage (HCC) 05/02/2020    Tinnitus     Vertigo     on occ    Wears glasses

## 2024-11-27 ENCOUNTER — OFFICE VISIT (OUTPATIENT)
Dept: PHYSICAL THERAPY | Facility: CLINIC | Age: 69
End: 2024-11-27
Payer: MEDICARE

## 2024-11-27 DIAGNOSIS — M54.50 LOW BACK PAIN, UNSPECIFIED BACK PAIN LATERALITY, UNSPECIFIED CHRONICITY, UNSPECIFIED WHETHER SCIATICA PRESENT: Primary | ICD-10-CM

## 2024-11-27 DIAGNOSIS — M47.812 CERVICAL SPONDYLOSIS: ICD-10-CM

## 2024-11-27 DIAGNOSIS — M79.7 FIBROMYALGIA: ICD-10-CM

## 2024-11-27 PROCEDURE — 97530 THERAPEUTIC ACTIVITIES: CPT | Performed by: PHYSICAL THERAPIST

## 2024-11-27 PROCEDURE — 97112 NEUROMUSCULAR REEDUCATION: CPT

## 2024-11-27 PROCEDURE — 97110 THERAPEUTIC EXERCISES: CPT | Performed by: PHYSICAL THERAPIST

## 2024-11-27 NOTE — PROGRESS NOTES
"Daily Note     Today's date: 2024  Patient name: Diamond Aquino  : 1955  MRN: [id number]  Referring provider: Haseeb Tran DO  Dx:   Encounter Diagnosis     ICD-10-CM    1. Low back pain, unspecified back pain laterality, unspecified chronicity, unspecified whether sciatica present  M54.50       2. Cervical spondylosis  M47.812       3. Fibromyalgia  M79.7           Start Time: 1500  Stop Time: 1625  Total time in clinic (min): 85 minutes    Subjective: Patient states she experienced bout of \"shakiness\" while getting her house ready for her daughter to come over. States she's \"still coming down from it.\" Denies changes in regards to other symptoms at the start of today's session.      Objective: See treatment diary below    Neuro PNE Exercises  Neuro Re-Ed:  Week 1: Patient educated on the concept of neuroplasticity, and how factors as temperature, stress, movement, immunity, and blood flow affect pain via ion channel expression. Instruction provided regarding homeostasis/ion channel balance disruption may occur based on what your brain thinks is needed for survival. Patient encouraged to review/diagram nerve sensitivity topics discussed today to promote deep learning. The definition of positive affect and effects on pain, sleep, physical function, stress were discussed. Educated on participating in positive activities, rather than just reflecting on them, to promote pain reduction, help decrease catastrophizing and improve mood.    Examples of positive activities that are shown to be most beneficial in promoting happiness were provided.    Homework: Participate in at least 1 positive activity this week, and record what s/he did and how s/he felt before and after.     TherEx:  Week 1 - Generalized Strengthening and Functional Mobility/Transfers  Circuit 1  - STS from 18\" box x1 min - 19 reps  - STS from 18\" box x1 min w/ KB tidal tank  - STS from 18\" box x1 min - 27 reps  With intermittent " education regarding neuroplasticity    TherAct:  Negative tri bilat, negative inverted supinator bilat      Assessment: Patient introduced to the topic of pain neuroscience education and that improving knowledge of how pain works promotes improved recovery and rehab. Current knowledge and understanding of patient on pain related topics was explored to create baseline. Patient educated on the concept of the nervous system as the bodies alarm system, and the role of nociception to warn body of danger. Peripheral nerve sensitization, hyperalgesia, and allodynia were explained using metaphors to promote deep learning. Patient encouraged to identify personal yellow flags, and explore/identify activity limitations as a result of  nervous system hypersensitivity prior to next visit. STS performed at self-regulated vs externally regulated velocities to promote neuroplasticity, gabriel well overall and able to perform inc reps during final set. Patient will continue to benefit from skilled PT to improve functional mobility and activity tolerance.    TherEx and TherAct performed by Bonita Hernandez, PT, DPT      Plan: Continue per plan of care.        POC expires Unit limit Auth Expiration date PT/OT + Visit Limit? Co-Insurance   2/18/25 25 PCY per secondary

## 2024-12-04 ENCOUNTER — OFFICE VISIT (OUTPATIENT)
Facility: CLINIC | Age: 69
End: 2024-12-04
Payer: MEDICARE

## 2024-12-04 DIAGNOSIS — M54.50 LOW BACK PAIN, UNSPECIFIED BACK PAIN LATERALITY, UNSPECIFIED CHRONICITY, UNSPECIFIED WHETHER SCIATICA PRESENT: Primary | ICD-10-CM

## 2024-12-04 DIAGNOSIS — M79.7 FIBROMYALGIA: ICD-10-CM

## 2024-12-04 DIAGNOSIS — M47.812 CERVICAL SPONDYLOSIS: ICD-10-CM

## 2024-12-04 PROCEDURE — 97530 THERAPEUTIC ACTIVITIES: CPT | Performed by: PHYSICAL THERAPIST

## 2024-12-04 PROCEDURE — 97110 THERAPEUTIC EXERCISES: CPT | Performed by: PHYSICAL THERAPIST

## 2024-12-04 PROCEDURE — 97112 NEUROMUSCULAR REEDUCATION: CPT

## 2024-12-06 DIAGNOSIS — M79.10 MYALGIA: ICD-10-CM

## 2024-12-09 ENCOUNTER — TELEPHONE (OUTPATIENT)
Age: 69
End: 2024-12-09

## 2024-12-09 RX ORDER — GABAPENTIN 300 MG/1
300 CAPSULE ORAL 3 TIMES DAILY
Qty: 90 CAPSULE | Refills: 3 | Status: SHIPPED | OUTPATIENT
Start: 2024-12-09 | End: 2024-12-20 | Stop reason: SDUPTHER

## 2024-12-09 NOTE — TELEPHONE ENCOUNTER
Pt called requesting to have a new prescription of the following medication:    gabapentin (NEURONTIN) 300 mg capsule     She stopped taking Lyrica due to intolerance. She will call pain mgmt to let them know as well.     Please advise    Pharm confirmed

## 2024-12-09 NOTE — PROGRESS NOTES
Daily Note     Today's date: 2024  Patient name: Diamond Aquino  : 1955  MRN: [id number]  Referring provider: Haseeb Tran DO  Dx:   Encounter Diagnosis     ICD-10-CM    1. Low back pain, unspecified back pain laterality, unspecified chronicity, unspecified whether sciatica present  M54.50       2. Cervical spondylosis  M47.812       3. Fibromyalgia  M79.7                        Subjective: Patient reports that she did the homework from last session and felt okay after all of the exercises. Patient further noted that she is frustrated more physicians don't know about PNE.      Objective: See treatment diary below    Neuro PNE Exercises  Neuro Re-Ed:  Week 2: Patient educated regarding spreading pain symptoms, and that feeling pain in adjacent areas of the body does not indicate definite tissue injury. Hyperalgesia, immune responses and central sensitization topics were introduced using metaphors to promote deep learning.   Automatic negative thoughts and relationship to chronic pain.   Definition and examples of cognitive distortions, and education on how they perpetuate negative thought patterns were discussed.   Education on maladaptive coping strategies that often occur with chronic pain (ie. avoidance and compensating) was provided.   Guided imagery was introduced as a relaxation technique, and completed before exercising.    TherEx:  Week 2 - Generalized Strengthening and Cardio  Circuit 1  - Varied levels of intensity with NuStep  With intermittent education regarding neuroplasticity      Assessment: Patient refamiliarized with PNE introduction and introduced to the concept of spreading pain without tissue issue. Patient able to ask appropriate questions and demonstrated understanding with further rephrasing of content. Patient able to perform NuStep activity today with good tolerance and ability to use B/L UEs and LEs for 75% of the time. Further educated the patient on neuroplasticity as it  relates to the patient's specific deficits. Upon arrival to PT the patient displayed continued gait disturbances and limited mobility in RUE. Patient displayed significant improvement in ambulation without any incoordination/ataxia present and was able to perform ADLs with B/L UE while holding a conversation without deficit in AROM or power at end of session.  Patient will continue to benefit from skilled PT to improve functional mobility and activity tolerance.    NMR performed by Arabella Hernandez, PT, DPT      Plan: Continue per plan of care.        POC expires Unit limit Auth Expiration date PT/OT + Visit Limit? Co-Insurance   2/18/25 25 PCY per secondary

## 2024-12-10 ENCOUNTER — TELEPHONE (OUTPATIENT)
Age: 69
End: 2024-12-10

## 2024-12-10 NOTE — TELEPHONE ENCOUNTER
Caller: aleida Fields    Doctor: Chelsea    Reason for call: Pt stated that she is no longer taking the Lyrica. Pt stated the medication was giving her a upset stomach, and she feels the medication was not helping. Pt has questions and would like a call back from the nurse.     Call back#: [phone]

## 2024-12-10 NOTE — TELEPHONE ENCOUNTER
I s/w pt. She tried the Lyrica for 2 weeks after her consult 11/15. Had GI upset and did not help her pain. She spoke to her PCP who put her back on her previous dose of Gabapentin.  Pt is not scheduled to see NEURO and EMG with [facility] for a few months. She did however get both appointments at [facility] sooner and is thinking about going there however unsure at this time.  I explained it is her choice where she prefers to go based on timing. Should she go to [facility] to just let our office know when test and appt is completed.  She was agreeable.  Any other advice?

## 2024-12-11 ENCOUNTER — OFFICE VISIT (OUTPATIENT)
Facility: CLINIC | Age: 69
End: 2024-12-11
Payer: MEDICARE

## 2024-12-11 DIAGNOSIS — M54.50 LOW BACK PAIN, UNSPECIFIED BACK PAIN LATERALITY, UNSPECIFIED CHRONICITY, UNSPECIFIED WHETHER SCIATICA PRESENT: Primary | ICD-10-CM

## 2024-12-11 DIAGNOSIS — M79.7 FIBROMYALGIA: ICD-10-CM

## 2024-12-11 DIAGNOSIS — M47.812 CERVICAL SPONDYLOSIS: ICD-10-CM

## 2024-12-11 PROCEDURE — 97112 NEUROMUSCULAR REEDUCATION: CPT

## 2024-12-11 PROCEDURE — 97110 THERAPEUTIC EXERCISES: CPT | Performed by: PHYSICAL THERAPIST

## 2024-12-12 NOTE — PROGRESS NOTES
"Daily Note     Today's date: 2024  Patient name: Diamond Aquino  : 1955  MRN: [id number]  Referring provider: Haseeb Tran DO  Dx:   Encounter Diagnosis     ICD-10-CM    1. Low back pain, unspecified back pain laterality, unspecified chronicity, unspecified whether sciatica present  M54.50       2. Cervical spondylosis  M47.812       3. Fibromyalgia  M79.7                          Subjective: Patient reports no new changes, complaints, or falls.      Objective: See treatment diary below    NMR performed by Arabella Hernandez, PT, DPT    Neuro PNE Exercises  Neuro Re-Ed:  Week 3: Patient was educated regarding endogenous mechanisms and strategies to increase the brain's production of chemicals which decrease pain, such as aerobic exercise and improved pain knowledge. The concepts of pacing, graded exposure, 'sore but safe' and 'hurt does not equal harm' were discussed.   ANTs and types of cognitive distortions were reviewed. Education provided on when ANTs/cognitive distortions are most likely to occur. Discussed how to recognize patterns in day to day life that can lead to cognitive distortions/ANTs in order to start working toward more productive thought processes.  Education on identifying and implementing positive coping statements as a way to combat ANTs was provided.     TherEx:  Week 3 - Generalized Strengthening and Balance    Circuit 1  - Week 3 - Step up onto foam beam w/ lat movements and posterior resistance  - Week 3 - Sidestepping on foam beam w/ Tiny Tank and anterior resistance  - Week 3 - Hurdles/Object negotiation/manipulation w/ foam pads and Tiny Tank    Circuit 2  - Week 3 - Fwd/bwd figure 8s w/ partners  - Week 3 - Lat figure 8s w/ partners  - Week 3 - Cone weaving w/ PBall perturbations and self med ball toss  With intermittent education regarding neuroplasticity      Assessment: Patient refamiliarized with PNE introduction and introduced the concepts of \"sore but safe,\" graded " "exposure, pacing, and \"hurt does not equal harm.\" Patient with significant difficulty during education portion of today's session initially in which required further individualized education regarding length of symptoms or length of time since initial injury does not coincide with worsened symptoms. Great participation in exercise component with ability to perform all dual tasking with coordinated and fluid B/L UE and LE mechanics. She will continue to benefit from skilled outpatient PT in order to improve her functional mobility and activity tolerance.        Plan: Continue per plan of care.        POC expires Unit limit Auth Expiration date PT/OT + Visit Limit? Co-Insurance   2/18/25 25 PCY per secondary                                           "

## 2024-12-13 ENCOUNTER — APPOINTMENT (OUTPATIENT)
Dept: LAB | Facility: CLINIC | Age: 69
End: 2024-12-13
Payer: MEDICARE

## 2024-12-13 DIAGNOSIS — Z79.899 ENCOUNTER FOR LONG-TERM CURRENT USE OF MEDICATION: ICD-10-CM

## 2024-12-13 DIAGNOSIS — E78.5 DYSLIPIDEMIA: ICD-10-CM

## 2024-12-13 LAB
ALBUMIN SERPL BCG-MCNC: 4 G/DL (ref 3.5–5)
ALP SERPL-CCNC: 86 U/L (ref 34–104)
ALT SERPL W P-5'-P-CCNC: 16 U/L (ref 7–52)
ANION GAP SERPL CALCULATED.3IONS-SCNC: 7 MMOL/L (ref 4–13)
AST SERPL W P-5'-P-CCNC: 18 U/L (ref 13–39)
BILIRUB SERPL-MCNC: 0.44 MG/DL (ref 0.2–1)
BUN SERPL-MCNC: 22 MG/DL (ref 5–25)
CALCIUM SERPL-MCNC: 9.2 MG/DL (ref 8.4–10.2)
CHLORIDE SERPL-SCNC: 104 MMOL/L (ref 96–108)
CHOLEST SERPL-MCNC: 182 MG/DL (ref ?–200)
CO2 SERPL-SCNC: 30 MMOL/L (ref 21–32)
CREAT SERPL-MCNC: 0.57 MG/DL (ref 0.6–1.3)
ERYTHROCYTE [DISTWIDTH] IN BLOOD BY AUTOMATED COUNT: 11.9 % (ref 11.6–15.1)
GFR SERPL CREATININE-BSD FRML MDRD: 94 ML/MIN/1.73SQ M
GLUCOSE P FAST SERPL-MCNC: 106 MG/DL (ref 65–99)
HCT VFR BLD AUTO: 36.4 % (ref 34.8–46.1)
HDLC SERPL-MCNC: 59 MG/DL
HGB BLD-MCNC: 11.8 G/DL (ref 11.5–15.4)
LDLC SERPL CALC-MCNC: 103 MG/DL (ref 0–100)
MCH RBC QN AUTO: 31.3 PG (ref 26.8–34.3)
MCHC RBC AUTO-ENTMCNC: 32.4 G/DL (ref 31.4–37.4)
MCV RBC AUTO: 97 FL (ref 82–98)
PLATELET # BLD AUTO: 258 THOUSANDS/UL (ref 149–390)
PMV BLD AUTO: 10 FL (ref 8.9–12.7)
POTASSIUM SERPL-SCNC: 4.2 MMOL/L (ref 3.5–5.3)
PROT SERPL-MCNC: 6.9 G/DL (ref 6.4–8.4)
RBC # BLD AUTO: 3.77 MILLION/UL (ref 3.81–5.12)
SODIUM SERPL-SCNC: 141 MMOL/L (ref 135–147)
TRIGL SERPL-MCNC: 102 MG/DL (ref ?–150)
WBC # BLD AUTO: 7.61 THOUSAND/UL (ref 4.31–10.16)

## 2024-12-13 PROCEDURE — 36415 COLL VENOUS BLD VENIPUNCTURE: CPT

## 2024-12-13 PROCEDURE — 80053 COMPREHEN METABOLIC PANEL: CPT

## 2024-12-13 PROCEDURE — 85027 COMPLETE CBC AUTOMATED: CPT

## 2024-12-13 PROCEDURE — 80061 LIPID PANEL: CPT

## 2024-12-18 ENCOUNTER — TELEPHONE (OUTPATIENT)
Dept: ADMINISTRATIVE | Facility: OTHER | Age: 69
End: 2024-12-18

## 2024-12-18 ENCOUNTER — APPOINTMENT (OUTPATIENT)
Facility: CLINIC | Age: 69
End: 2024-12-18
Payer: MEDICARE

## 2024-12-18 NOTE — TELEPHONE ENCOUNTER
12/18/24 10:42 AM    Patient contacted to bring Advance Directive, POLST, or Living Will document to next scheduled pcp visit.[facility] Department spoke with patient/ caregiver.    Thank you.  Trey Woodward MA  [facility]

## 2024-12-20 ENCOUNTER — OFFICE VISIT (OUTPATIENT)
Dept: FAMILY MEDICINE CLINIC | Facility: CLINIC | Age: 69
End: 2024-12-20
Payer: MEDICARE

## 2024-12-20 VITALS
SYSTOLIC BLOOD PRESSURE: 120 MMHG | RESPIRATION RATE: 18 BRPM | WEIGHT: 142.4 LBS | HEART RATE: 92 BPM | BODY MASS INDEX: 27.96 KG/M2 | TEMPERATURE: 96.3 F | DIASTOLIC BLOOD PRESSURE: 58 MMHG | HEIGHT: 60 IN

## 2024-12-20 DIAGNOSIS — F33.9 DEPRESSION, RECURRENT (HCC): ICD-10-CM

## 2024-12-20 DIAGNOSIS — E78.5 DYSLIPIDEMIA: Primary | ICD-10-CM

## 2024-12-20 DIAGNOSIS — R73.01 IMPAIRED FASTING GLUCOSE: ICD-10-CM

## 2024-12-20 DIAGNOSIS — M79.10 MYALGIA: ICD-10-CM

## 2024-12-20 DIAGNOSIS — Z13.0 SCREENING FOR DEFICIENCY ANEMIA: ICD-10-CM

## 2024-12-20 PROCEDURE — G2211 COMPLEX E/M VISIT ADD ON: HCPCS | Performed by: FAMILY MEDICINE

## 2024-12-20 PROCEDURE — 99214 OFFICE O/P EST MOD 30 MIN: CPT | Performed by: FAMILY MEDICINE

## 2024-12-20 RX ORDER — GABAPENTIN 400 MG/1
400 CAPSULE ORAL 3 TIMES DAILY
Qty: 270 CAPSULE | Refills: 1 | Status: SHIPPED | OUTPATIENT
Start: 2024-12-20

## 2024-12-20 RX ORDER — PRAVASTATIN SODIUM 20 MG
20 TABLET ORAL DAILY
Qty: 90 TABLET | Refills: 1 | Status: SHIPPED | OUTPATIENT
Start: 2024-12-20

## 2024-12-20 NOTE — PROGRESS NOTES
Name: Diamond Aquino      : 1955      MRN: [id number]  Encounter Provider: Maureen Ritchie DO  Encounter Date: 2024   Encounter department: [facility]  :  Assessment & Plan  Dyslipidemia  Encouraged to restart pravastatin  Stopping it didn't help her symptoms   Orders:  •  pravastatin (PRAVACHOL) 20 mg tablet; Take 1 tablet (20 mg total) by mouth daily  •  Comprehensive metabolic panel; Future  •  Lipid Panel with Direct LDL reflex; Future    Depression, recurrent (HCC)  Unchanged        Impaired fasting glucose  Fasting sugar is slightly elevated  Will check Hemoglobin A1c   Orders:  •  Hemoglobin A1C; Future    Myalgia  Pain not controlled  Gabapentin dose increased from 300 to 400 mg a day   Agree with follow-up with neurology  Orders:  •  gabapentin (NEURONTIN) 400 mg capsule; Take 1 capsule (400 mg total) by mouth 3 (three) times a day    Screening for deficiency anemia    Orders:  •  CBC; Future    Return for Annual Wellness Visit in April .         History of Present Illness     She is still having muscle weakness. She has an EMG scheduled and a follow up neurology visit.  She is having moments where she cannot walk right.     She has to cut down on her working hours.  She has pain in her arm.  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      Review of Systems    Objective   /58   Pulse 92   Temp (!) 96.3 °F (35.7 °C)   Resp 18   Ht 5' (1.524 m)   Wt 64.6 kg (142 lb 6.4 oz)   LMP  (LMP Unknown)   BMI 27.81 kg/m²      Physical Exam  Vitals and nursing note reviewed.   Constitutional:       General: She is not in acute distress.     Appearance: She is well-developed.   HENT:      Head: Normocephalic and atraumatic.      Right Ear: Tympanic membrane normal.      Left Ear: Tympanic membrane normal.   Cardiovascular:      Rate and Rhythm: Normal rate and regular rhythm.      Heart sounds: No murmur heard.  Pulmonary:      Effort: Pulmonary effort is normal. No  respiratory distress.      Breath sounds: Normal breath sounds.   Musculoskeletal:      Cervical back: Neck supple.   Neurological:      Mental Status: She is alert.

## 2024-12-20 NOTE — ASSESSMENT & PLAN NOTE
Pain not controlled  Gabapentin dose increased from 300 to 400 mg a day   Agree with follow-up with neurology  Orders:  •  gabapentin (NEURONTIN) 400 mg capsule; Take 1 capsule (400 mg total) by mouth 3 (three) times a day

## 2024-12-20 NOTE — ASSESSMENT & PLAN NOTE
Encouraged to restart pravastatin  Stopping it didn't help her symptoms   Orders:  •  pravastatin (PRAVACHOL) 20 mg tablet; Take 1 tablet (20 mg total) by mouth daily  •  Comprehensive metabolic panel; Future  •  Lipid Panel with Direct LDL reflex; Future

## 2024-12-20 NOTE — ASSESSMENT & PLAN NOTE
Fasting sugar is slightly elevated  Will check Hemoglobin A1c   Orders:  •  Hemoglobin A1C; Future

## 2025-01-09 ENCOUNTER — OFFICE VISIT (OUTPATIENT)
Dept: OBGYN CLINIC | Facility: CLINIC | Age: 70
End: 2025-01-09
Payer: MEDICARE

## 2025-01-09 ENCOUNTER — APPOINTMENT (OUTPATIENT)
Dept: RADIOLOGY | Facility: AMBULARY SURGERY CENTER | Age: 70
End: 2025-01-09
Attending: SURGERY
Payer: MEDICARE

## 2025-01-09 VITALS — WEIGHT: 144 LBS | BODY MASS INDEX: 28.27 KG/M2 | HEIGHT: 60 IN

## 2025-01-09 DIAGNOSIS — M79.642 PAIN IN BOTH HANDS: ICD-10-CM

## 2025-01-09 DIAGNOSIS — M79.641 PAIN IN BOTH HANDS: ICD-10-CM

## 2025-01-09 DIAGNOSIS — M19.049 CMC ARTHRITIS: Primary | ICD-10-CM

## 2025-01-09 PROCEDURE — 99203 OFFICE O/P NEW LOW 30 MIN: CPT | Performed by: SURGERY

## 2025-01-09 PROCEDURE — 73130 X-RAY EXAM OF HAND: CPT

## 2025-01-09 NOTE — PROGRESS NOTES
Heide Zhang M.D.  Attending, Orthopaedic Surgery  Hand, Wrist, and Elbow Surgery  [facility]      ORTHOPAEDIC HAND, WRIST, AND ELBOW OFFICE  VISIT       ASSESSMENT/PLAN:      69 y.o. female with bilateral thumb CMC arthritis and right carpal tunnel syndrome     Bilateral hand x-rays were performed in the office and reviewed. Right wrist and right elbow US results were reviewed. The etiology of above diagnosis was discussed along with treatment options. It was discussed with the patient that she does have evidence of right carpal tunnel syndrome on US, but this is not her main complaint today. She was advised to continue with nighttime bracing as this can improve carpal tunnel symptoms. With regards to CMC arthritis, we discussed warm moist heat can be beneficial as well as comfort cool braces, Voltaren Gel and CMC injections. She is scheduled to undergo a EMG and would like to wait until after this test is performed to undergo CMC injections. She may benefit from seeing a difference Rheumatologist as she did have some positive lab work, which can be concerning for inflammatory arthropathy. I will see her back in the office once her EMG is complete for thumb CMC CSI's.      The patient verbalized understanding of exam findings and treatment plan. We engaged in the shared decision-making process and treatment options were discussed at length with the patient. Surgical and conservative management discussed today along with risks and benefits.    Diagnoses and all orders for this visit:    CMC arthritis  -     Thumb Cude comf/Cool    Pain in both hands  -     XR hand 3+ vw left; Future  -     XR hand 3+ vw right; Future      Follow Up:  Return for next week or so .    To Do Next Visit:  Re-evaluation of current issue      General Discussions:  CMC Arthritis: The anatomy and physiology of carpometacarpal joint arthritis was discussed with the patient today in the office.  Deterioration of the  articular cartilage eventually leads to hypermobility at the thumb CMC joint, resulting in joint subluxation, osteophyte formation, cystic changes within the trapezium and base of the first metacarpal, as well as subchondral sclerosis.  Eventually, pain, limited mobility, and compensatory hyperextension at the metacarpophalangeal joint may develop.  While normal activity and usage of the thumb joint may provide a painful experience to the patient, this typically does not result in damage to the thumb or hand.  Treatment options include resting thumb spica splints to decreased joint edema, pain, and inflammation.  Therapy exercises to strengthen the thenar musculature may relieve pain, but do not alter the overall continued development of osteoarthritis.  Oral medications, topical medications, corticosteroid injections may decrease pain and increase overall function.  Eventually, approximately 5% of patients may require surgical intervention.                                                                                                                                                                                                 Carpal Tunnel Syndrome: The anatomy and physiology of carpal tunnel syndrome was discussed with the patient today.  Increase pressure localized under the transverse carpal ligament can cause pain, numbness, tingling, or dysesthesias within the median nerve distribution as well as feelings of fatigue, clumsiness, or awkwardness.  These symptoms typically occur at night and worse in the morning upon waking.  Eventually, untreated carpal tunnel syndrome can result in weakness and permanent loss of muscle within the thenar compartment of the hand.  Treatment options were discussed with the patient.  Conservative treatment includes nocturnal resting splints to keep the nerve in a neutral position, ergonomic changes within the work or home environment, activity modification, and tendon gliding  exercises. Vitamin B6 one tablet daily over the counter may helpful to reduce symptoms.   Steroid injections within the carpal canal can help a majority of patients, however this is often self-limited in a majority of patients.  Surgical intervention to divide the transverse carpal ligament typically results in a long-lasting relief of the patient's complaints, with the recurrence rate of less than 1%.                                                                                                                                                                                   ____________________________________________________________________________________________________________________________________________      CHIEF COMPLAINT:  Chief Complaint   Patient presents with    Right Hand - Pain     Patient has gone for ultrasound of the right side     Left Hand - Pain     She is having the same pain on the left side no images        SUBJECTIVE:  Diamond Aquino is a 69 y.o. year old RHD female who presents to the office for bilateral hand pain. I am seeing the patient in consultation at the request of Kiya YADAV. She notes pain to the base of both thumbs. Pain will worsen with gripping, pinching and opening jars and bottles. She feels she has difficulty making fists in the AM. She was seen by a Rheumatologist and notes a US was ordered and she was found to have carpal tunnel syndrome. She notes intermittent numbness and tingling to her ring finger, which yu snot seem to be her main complaint at this time. Numbness and tingling does not wake her from sleep at night. She notes pain to her right humerus/shoulder as well as foot pain. She is taking OTC analgesics as needed for pain control.         Pain/symptom timing:  Worse during the day when active  Pain/symptom context:  Worse with activites and work  Pain/symptom modifying factors:  Rest makes better, activities make worse  Pain/symptom associated  signs/symptoms: none    Prior treatment   NSAIDsYes   Injections No   Bracing/Orthotics Yes    Physical Therapy No     I have personally reviewed all the relevant PMH, PSH, SH, FH, Medications and allergies      PAST MEDICAL HISTORY:  Past Medical History:   Diagnosis Date    Anesthesia complication     desaturation of oxygen mostly at night- on O2 at 2L at hs-may wake up with asthma issue    Anxiety     Arthritis     Asthma     Argueta esophagus     Breathing difficulty     pt states s/p anesthesia will have an asthma attack-pt to bring ProAir    Chest pain     upper chest-intermittent asthma related?    Colon polyp     COVID-19 2020    Decreased hearing of both ears     DVT (deep venous thrombosis) ([facility])     during pregnancy in the leg    Fibromyalgia     GERD (gastroesophageal reflux disease)     Hiatal hernia     Hypercholesteremia     Macular degeneration     wet-right eye injected 24    Nasal polyps     Sinusitis, chronic     Subarachnoid hemorrhage (HCC) 2020    Tinnitus     Vertigo     on occ    Wears glasses        PAST SURGICAL HISTORY:  Past Surgical History:   Procedure Laterality Date     SECTION N/A     x 2    COLONOSCOPY      ESOPHAGOGASTRODUODENOSCOPY      HYSTERECTOMY      complete    NASAL POLYP SURGERY      x3-2013,,    MT COLSC FLX W/RMVL OF TUMOR POLYP LESION SNARE TQ N/A 2017    Procedure: COLONOSCOPYwith  snare polypectomy.;  Surgeon: Cuong Mejia MD;  Location: [facility] GI LAB;  Service: Gastroenterology    MT EGD TRANSORAL BIOPSY SINGLE/MULTIPLE N/A 2017    Procedure: ESOPHAGOGASTRODUODENOSCOPY (EGD)and biopsy.;  Surgeon: Cuong Mejia MD;  Location: [facility] GI LAB;  Service: Gastroenterology    MT XCAPSL CTRC RMVL INSJ IO LENS PROSTH W/O ECP Right 2024    Procedure: EXTRACTION EXTRACAPSULAR CATARACT PHACO INTRAOCULAR LENS (IOL);  Surgeon: Jamie Engel MD;  Location: [facility] MAIN OR;  Service: Ophthalmology    TONSILLECTOMY N/A     VEIN  LIGATION AND STRIPPING Bilateral        FAMILY HISTORY:  Family History   Problem Relation Age of Onset    Heart disease Mother         CHF    Breast cancer additional onset Mother 49    Breast cancer Mother 49    Dysphagia Father     Breast cancer Maternal Aunt     Breast cancer Paternal Aunt     Ovarian cancer Maternal Aunt     Ovarian cancer Maternal Aunt     Cervical cancer Maternal Aunt        SOCIAL HISTORY:  Social History     Tobacco Use    Smoking status: Never     Passive exposure: Past    Smokeless tobacco: Never   Vaping Use    Vaping status: Never Used   Substance Use Topics    Alcohol use: Yes     Comment: social    Drug use: Never       MEDICATIONS:    Current Outpatient Medications:     acetaminophen (TYLENOL) 325 mg tablet, Take 650 mg by mouth every 6 (six) hours as needed for mild pain, Disp: , Rfl:     albuterol (ProAir HFA) 90 mcg/act inhaler, Inhale 2 puffs every 4 (four) hours as needed for wheezing, Disp: 8 g, Rfl: 3    ALPRAZolam (XANAX) 0.5 mg tablet, as needed, Disp: , Rfl: 0    calcium citrate-vitamin D (CITRACAL+D) 315-200 MG-UNIT per tablet, Take 1 tablet by mouth every morning, Disp: , Rfl:     Coenzyme Q10 (Co Q 10) 100 MG CAPS, Take by mouth, Disp: , Rfl:     Dupilumab (Dupixent) 300 MG/2ML SOAJ, INJECT 1 PEN UNDER THE SKIN EVERY 14 DAYS, Disp: 4 mL, Rfl: 5    dupilumab (DUPIXENT) subcutaneous injection, Inject 2 mL (300 mg total) under the skin every 14 (fourteen) days, Disp: 4 mL, Rfl: 5    famciclovir (FAMVIR) 500 mg tablet, if needed, Disp: , Rfl:     Fluticasone Propionate (Xhance) 93 MCG/ACT EXHU, 1 spray into each nostril 2 (two) times a day, Disp: 16 mL, Rfl: 5    gabapentin (NEURONTIN) 400 mg capsule, Take 1 capsule (400 mg total) by mouth 3 (three) times a day, Disp: 270 capsule, Rfl: 1    Multiple Vitamins-Minerals (multivitamin with minerals) tablet, Take 1 tablet by mouth daily, Disp: , Rfl:     Multiple Vitamins-Minerals (PRESERVISION AREDS 2 PO), Take by mouth 2 (two)  times a day, Disp: , Rfl:     pravastatin (PRAVACHOL) 20 mg tablet, Take 1 tablet (20 mg total) by mouth daily, Disp: 90 tablet, Rfl: 1    traZODone (DESYREL) 50 mg tablet, Take 1 tablet (50 mg total) by mouth daily at bedtime, Disp: 30 tablet, Rfl: 5    ALLERGIES:  Allergies   Allergen Reactions    Pitavastatin Hives, Shortness Of Breath and Rash     Nausea    Cefditoren Nausea Only and Vomiting    Cefditoren Pivoxil Hives     N/V    Dust Mite Extract Sneezing    Levofloxacin GI Intolerance    Mold Extract [Trichophyton] Sneezing    Moxifloxacin GI Intolerance           REVIEW OF SYSTEMS:  Review of Systems   Constitutional:  Negative for chills, fever and unexpected weight change.   HENT:  Negative for hearing loss, nosebleeds and sore throat.    Eyes:  Negative for pain, redness and visual disturbance.   Respiratory:  Negative for cough, shortness of breath and wheezing.    Cardiovascular:  Negative for chest pain, palpitations and leg swelling.   Gastrointestinal:  Negative for abdominal pain, nausea and vomiting.   Endocrine: Negative for polydipsia and polyuria.   Genitourinary:  Negative for difficulty urinating and hematuria.   Musculoskeletal:  Negative for arthralgias, joint swelling and myalgias.   Skin:  Negative for rash and wound.   Neurological:  Negative for dizziness, numbness and headaches.   Psychiatric/Behavioral:  Negative for decreased concentration, dysphoric mood and suicidal ideas. The patient is not nervous/anxious.        VITALS:  There were no vitals filed for this visit.    LABS:      _____________________________________________________  PHYSICAL EXAMINATION:  General: well developed and well nourished, alert, oriented times 3, and appears comfortable  Psychiatric: Normal  HEENT: Normocephalic, Atraumatic Trachea Midline, No torticollis  Pulmonary: No audible wheezing or respiratory distress   Abdomen/GI: Non tender, non distended   Cardiovascular: No pitting edema, 2+ radial pulse    Skin: No masses, erythema, lacerations, fluctation, ulcerations  Neurovascular: Sensation Intact to the Median, Ulnar, Radial Nerve, Motor Intact to the Median, Ulnar, Radial Nerve, and Pulses Intact  Musculoskeletal: Normal, except as noted in detailed exam and in HPI.      MUSCULOSKELETAL EXAMINATION:    Bilateral hands:     No erythema, ecchymosis or edema  CMC joint tenderness bilaterally   Full digital extension   Loose fist   ___________________________________________________  STUDIES REVIEWED:  I have personally reviewed and interpreted  AP lateral and oblique radiographs of bilateral thumbs   which demonstrate stage III CMC arthrosis with large osteophytes    I have personally reviewed and interpreted  US of left wrist which demonstrate median nerve enlargement consistent with carpal tunnel syndrome, syndrome of ultrasound of the elbow    LABS REVIEWED:    HgA1c:   Lab Results   Component Value Date    HGBA1C 5.5 12/09/2021     BMP:   Lab Results   Component Value Date    GLUCOSE 91 05/02/2020    CALCIUM 9.2 12/13/2024     05/08/2015    K 4.2 12/13/2024    CO2 30 12/13/2024     12/13/2024    BUN 22 12/13/2024    CREATININE 0.57 (L) 12/13/2024               PROCEDURES PERFORMED:  Procedures  No Procedures performed today    _____________________________________________________      Scribe Attestation      I,:  Yuliet Vargas MA am acting as a scribe while in the presence of the attending physician.:       I,:  Heide Zhang MD personally performed the services described in this documentation    as scribed in my presence.:

## 2025-01-12 ENCOUNTER — RESULTS FOLLOW-UP (OUTPATIENT)
Dept: FAMILY MEDICINE CLINIC | Facility: CLINIC | Age: 70
End: 2025-01-12

## 2025-01-23 ENCOUNTER — OFFICE VISIT (OUTPATIENT)
Dept: OBGYN CLINIC | Facility: CLINIC | Age: 70
End: 2025-01-23
Payer: MEDICARE

## 2025-01-23 VITALS — HEIGHT: 60 IN | BODY MASS INDEX: 28.27 KG/M2 | WEIGHT: 144 LBS

## 2025-01-23 DIAGNOSIS — M18.11 ARTHRITIS OF CARPOMETACARPAL (CMC) JOINT OF RIGHT THUMB: ICD-10-CM

## 2025-01-23 DIAGNOSIS — M18.12 ARTHRITIS OF CARPOMETACARPAL (CMC) JOINT OF LEFT THUMB: Primary | ICD-10-CM

## 2025-01-23 PROCEDURE — 20600 DRAIN/INJ JOINT/BURSA W/O US: CPT | Performed by: SURGERY

## 2025-01-23 PROCEDURE — 99213 OFFICE O/P EST LOW 20 MIN: CPT | Performed by: SURGERY

## 2025-01-23 RX ADMIN — TRIAMCINOLONE ACETONIDE 20 MG: 40 INJECTION, SUSPENSION INTRA-ARTICULAR; INTRAMUSCULAR at 15:00

## 2025-01-23 RX ADMIN — LIDOCAINE HYDROCHLORIDE 1 ML: 10 INJECTION, SOLUTION INFILTRATION; PERINEURAL at 15:00

## 2025-01-23 NOTE — PROGRESS NOTES
ASSESSMENT/PLAN:      69 year old female here for her bilateral thumb CMC arthritis, and positional right carpal tunnel being negative on EMG and suspicious on US. In regards to the thumb CMC arthritis, treatment options are heat in the morning and evening 20 mins each, topical Voltaren gel 1%, localized cortisone injection to the thumb CMC. She wishes to proceed with the right thumb CMC joint injection, which she tolerated well.   We will follow up in 3 weeks for possible left thumb CMC joint injection.    The patient verbalized understanding of exam findings and treatment plan. We engaged in the shared decision-making process and treatment options were discussed at length with the patient. Surgical and conservative management discussed today along with risks and benefits.    Diagnoses and all orders for this visit:    Arthritis of carpometacarpal (CMC) joint of left thumb    Arthritis of carpometacarpal (CMC) joint of right thumb    Other orders  -     Small joint arthrocentesis      Follow Up:  Return in about 3 weeks (around 2/13/2025) for bilateral thumbs.      To Do Next Visit:  Re-evaluation of current issue    ____________________________________________________________________________________________________________________________________________      CHIEF COMPLAINT:  Chief Complaint   Patient presents with    Follow-up     B/l cmc injections        SUBJECTIVE:  Diamond Aquino is a 69 y.o. year old RHD female who presents today for follow-up bilateral thumb CMC arthritis and suspicious right carpal tunnel syndrome. The EMG was obtained and reviewed at today's visit. She has intermittent pain at the base of the thumbs and hard to tell which one bothers her.      I have personally reviewed all the relevant PMH, PSH, SH, FH, Medications and allergies.     PAST MEDICAL HISTORY:  Past Medical History:   Diagnosis Date    Anesthesia complication     desaturation of oxygen mostly at night- on O2 at 2L at hs-may  wake up with asthma issue    Anxiety     Arthritis     Asthma     Argueta esophagus     Breathing difficulty     pt states s/p anesthesia will have an asthma attack-pt to bring ProAir    Chest pain     upper chest-intermittent asthma related?    Colon polyp     COVID-19 2020    Decreased hearing of both ears     DVT (deep venous thrombosis) (HCC)     during pregnancy in the leg    Fibromyalgia     GERD (gastroesophageal reflux disease)     Hiatal hernia     Hypercholesteremia     Macular degeneration     wet-right eye injected 24    Nasal polyps     Sinusitis, chronic     Subarachnoid hemorrhage (HCC) 2020    Tinnitus     Vertigo     on occ    Wears glasses        PAST SURGICAL HISTORY:  Past Surgical History:   Procedure Laterality Date     SECTION N/A     x 2    COLONOSCOPY      ESOPHAGOGASTRODUODENOSCOPY      HYSTERECTOMY      complete    NASAL POLYP SURGERY      x3-,,    IA COLSC FLX W/RMVL OF TUMOR POLYP LESION SNARE TQ N/A 2017    Procedure: COLONOSCOPYwith  snare polypectomy.;  Surgeon: Cuong Mejia MD;  Location: [facility] GI LAB;  Service: Gastroenterology    IA EGD TRANSORAL BIOPSY SINGLE/MULTIPLE N/A 2017    Procedure: ESOPHAGOGASTRODUODENOSCOPY (EGD)and biopsy.;  Surgeon: Cuong Mejia MD;  Location: [facility] GI LAB;  Service: Gastroenterology    IA XCAPSL CTRC RMVL INSJ IO LENS PROSTH W/O ECP Right 2024    Procedure: EXTRACTION EXTRACAPSULAR CATARACT PHACO INTRAOCULAR LENS (IOL);  Surgeon: Jamie Engel MD;  Location: [facility] MAIN OR;  Service: Ophthalmology    TONSILLECTOMY N/A     VEIN LIGATION AND STRIPPING Bilateral        FAMILY HISTORY:  Family History   Problem Relation Age of Onset    Heart disease Mother         CHF    Breast cancer additional onset Mother 49    Breast cancer Mother 49    Dysphagia Father     Breast cancer Maternal Aunt     Breast cancer Paternal Aunt     Ovarian cancer Maternal Aunt     Ovarian cancer Maternal Aunt      Cervical cancer Maternal Aunt        SOCIAL HISTORY:  Social History     Tobacco Use    Smoking status: Never     Passive exposure: Past    Smokeless tobacco: Never   Vaping Use    Vaping status: Never Used   Substance Use Topics    Alcohol use: Yes     Comment: social    Drug use: Never       MEDICATIONS:    Current Outpatient Medications:     acetaminophen (TYLENOL) 325 mg tablet, Take 650 mg by mouth every 6 (six) hours as needed for mild pain, Disp: , Rfl:     albuterol (ProAir HFA) 90 mcg/act inhaler, Inhale 2 puffs every 4 (four) hours as needed for wheezing, Disp: 8 g, Rfl: 3    ALPRAZolam (XANAX) 0.5 mg tablet, as needed, Disp: , Rfl: 0    calcium citrate-vitamin D (CITRACAL+D) 315-200 MG-UNIT per tablet, Take 1 tablet by mouth every morning, Disp: , Rfl:     Coenzyme Q10 (Co Q 10) 100 MG CAPS, Take by mouth, Disp: , Rfl:     Dupilumab (Dupixent) 300 MG/2ML SOAJ, INJECT 1 PEN UNDER THE SKIN EVERY 14 DAYS, Disp: 4 mL, Rfl: 5    dupilumab (DUPIXENT) subcutaneous injection, Inject 2 mL (300 mg total) under the skin every 14 (fourteen) days, Disp: 4 mL, Rfl: 5    famciclovir (FAMVIR) 500 mg tablet, if needed, Disp: , Rfl:     Fluticasone Propionate (Xhance) 93 MCG/ACT EXHU, 1 spray into each nostril 2 (two) times a day, Disp: 16 mL, Rfl: 5    gabapentin (NEURONTIN) 400 mg capsule, Take 1 capsule (400 mg total) by mouth 3 (three) times a day, Disp: 270 capsule, Rfl: 1    Multiple Vitamins-Minerals (multivitamin with minerals) tablet, Take 1 tablet by mouth daily, Disp: , Rfl:     Multiple Vitamins-Minerals (PRESERVISION AREDS 2 PO), Take by mouth 2 (two) times a day, Disp: , Rfl:     pravastatin (PRAVACHOL) 20 mg tablet, Take 1 tablet (20 mg total) by mouth daily, Disp: 90 tablet, Rfl: 1    traZODone (DESYREL) 50 mg tablet, Take 1 tablet (50 mg total) by mouth daily at bedtime, Disp: 30 tablet, Rfl: 5    ALLERGIES:  Allergies   Allergen Reactions    Pitavastatin Hives, Shortness Of Breath and Rash     Nausea     Cefditoren Nausea Only and Vomiting    Cefditoren Pivoxil Hives     N/V    Dust Mite Extract Sneezing    Levofloxacin GI Intolerance    Mold Extract [Trichophyton] Sneezing    Moxifloxacin GI Intolerance       REVIEW OF SYSTEMS:  Review of Systems   Constitutional:  Negative for chills, fever and unexpected weight change.   HENT:  Negative for hearing loss, nosebleeds and sore throat.    Eyes:  Negative for pain, redness and visual disturbance.   Respiratory:  Negative for cough, shortness of breath and wheezing.    Cardiovascular:  Negative for chest pain, palpitations and leg swelling.   Gastrointestinal:  Negative for abdominal pain and nausea.   Genitourinary:  Negative for dyspareunia, dysuria and frequency.   Musculoskeletal:  Positive for arthralgias.   Skin:  Negative for rash and wound.   Neurological:  Negative for dizziness, numbness and headaches.   Psychiatric/Behavioral:  Negative for decreased concentration and suicidal ideas. The patient is not nervous/anxious.        VITALS:  There were no vitals filed for this visit.      _____________________________________________________  PHYSICAL EXAMINATION:  General: well developed and well nourished, alert, oriented times 3, and appears comfortable  Psychiatric: Normal  HEENT: Normocephalic, Atraumatic Trachea Midline, No torticollis  Pulmonary: No audible wheezing or respiratory distress   Cardiovascular: No pitting edema, 2+ radial pulse   Abdominal/GI: abdomen non tender, non distended   Skin: No masses, erythema, lacerations, fluctation, ulcerations  Neurovascular: Sensation intact to the Ulnar Nerve, Sensation Intact to the Radial Nerve, Decreased Sensation to  the Median Nerve, Motor Intact to the Median, Ulnar, Radial Nerve, and Pulses Intact  Musculoskeletal: Normal, except as noted in detailed exam and in HPI.      MUSCULOSKELETAL EXAMINATION:    left CMC Exam:  No adduction contracture  No hyperextension deformity of MCP joint  Positive localized  tenderness over radial and dorsal aspect of thumb (CMC joint)  Grind test is Positive for pain and Positive for crepitus  Metacarpal load shift test positive  No triggering or tenderness over the A1 pulley  Negative pain with Finkelstein’s maneuver       Right CMC Exam:  No adduction contracture  No hyperextension deformity of MCP joint  Positive localized tenderness over radial and dorsal aspect of thumb (CMC joint)  Grind test is Positive for pain and Positive for crepitus  Metacarpal load shift test positive  No triggering or tenderness over the A1 pulley  Negative pain with Finkelstein’s maneuver     Right Carpal Tunnel Exam:    Negative thenar atrophy. Negative phalen's test. Negative carpal tunnel compression. Positive tinels over median nerve at the wrist.  Opposition strength 5/5.  Abduction strength 5/5.      __________________________________________________    STUDIES REVIEWED:  I have personally reviewed and interpreted  AP lateral and oblique radiographs of  bilateral thumbs  which demonstrate stage III CMC arthrosis with large osteophytes        US of the right wrist from 11/12/2024: IMPRESSION:     Findings suspicious for carpal tunnel syndrome, with median nerve WFR ratio >1.4.  Maximum median nerve cross sectional area within carpal tunnel (CSAc): 9.4 sq mm.   US of right elbow reviewed from 11/12/2024: IMPRESSION:     No sonographic findings to suggest ulnar neuropathy/cubital tunnel syndrome.      EMG bilateral upper extremity reviewed and interpreted from 1/10/2025 performed at [facility]:   Impression: Normal EMG  SUMMARY:  1.  Right median nerve motor conduction study is normal.  F-wave minimal  latency is normal.  2.  Left median nerve motor conduction study is normal.  F-wave minimal  latency is normal.  3.  Right ulnar nerve motor conduction study is normal.  F-wave minimal  latency is normal.  4.  Left ulnar nerve motor conduction study is normal, including F-wave  minimal  "latency.  5.  Bilateral median, ulnar and radial nerve sensory studies are normal.  6.  Bilateral transcarpal comparison studies are normal.  7.  The EMG examinations of the left deltoid, left triceps, right deltoid,  right biceps, right triceps and right first dorsal interosseous are  normal.  The EMG examinations of the left biceps, left abductor pollicis  brevis and right abductor pollicis brevis show increased insertional  activity with no sustained abnormal spontaneous activity and there are  motor unit action potentials of normal duration and slightly increased  amplitude.  The EMG examination of the left first dorsal interosseous  shows no abnormal spontaneous activity and there are motor unit action  potentials of normal duration and slightly increased amplitude.       LABS REVIEWED:    HgA1c:   Lab Results   Component Value Date    HGBA1C 5.5 12/09/2021     BMP:   Lab Results   Component Value Date    GLUCOSE 91 05/02/2020    CALCIUM 9.2 12/13/2024     05/08/2015    K 4.2 12/13/2024    CO2 30 12/13/2024     12/13/2024    BUN 22 12/13/2024    CREATININE 0.57 (L) 12/13/2024             PROCEDURES PERFORMED:  Small joint arthrocentesis: R thumb CMC  [place] Protocol:  Consent: Verbal consent obtained.  Risks and benefits: risks, benefits and alternatives were discussed  Consent given by: patient  Time out: Immediately prior to procedure a \"time out\" was called to verify the correct patient, procedure, equipment, support staff and site/side marked as required.  Timeout called at: 1/23/2025 4:08 PM.  Patient understanding: patient states understanding of the procedure being performed  Site marked: the operative site was marked  Patient identity confirmed: verbally with patient  Supporting Documentation  Indications: pain   Procedure Details  Location: thumb - R thumb CMC  Preparation: Patient was prepped and draped in the usual sterile fashion  Needle size: 25 G  Ultrasound guidance: no  Approach: " volar  Medications administered: 1 mL lidocaine 1 %; 20 mg triamcinolone acetonide 40 mg/mL    Patient tolerance: patient tolerated the procedure well with no immediate complications  Dressing:  Sterile dressing applied        _____________________________________________________      Scribe Attestation      I,:  Katherine Chavez am acting as a scribe while in the presence of the attending physician.:       I,:  Heide Zhang MD personally performed the services described in this documentation    as scribed in my presence.:

## 2025-01-24 ENCOUNTER — ANNUAL EXAM (OUTPATIENT)
Dept: OBGYN CLINIC | Facility: CLINIC | Age: 70
End: 2025-01-24
Payer: MEDICARE

## 2025-01-24 VITALS
WEIGHT: 147 LBS | BODY MASS INDEX: 28.86 KG/M2 | SYSTOLIC BLOOD PRESSURE: 120 MMHG | DIASTOLIC BLOOD PRESSURE: 72 MMHG | HEIGHT: 60 IN

## 2025-01-24 DIAGNOSIS — Z12.31 ENCOUNTER FOR SCREENING MAMMOGRAM FOR MALIGNANT NEOPLASM OF BREAST: ICD-10-CM

## 2025-01-24 DIAGNOSIS — Z01.419 ENCOUNTER FOR GYNECOLOGICAL EXAMINATION WITHOUT ABNORMAL FINDING: Primary | ICD-10-CM

## 2025-01-24 DIAGNOSIS — L90.0 LICHEN SCLEROSUS: ICD-10-CM

## 2025-01-24 PROCEDURE — G0101 CA SCREEN;PELVIC/BREAST EXAM: HCPCS | Performed by: OBSTETRICS & GYNECOLOGY

## 2025-01-24 RX ORDER — TRIAMCINOLONE ACETONIDE 40 MG/ML
20 INJECTION, SUSPENSION INTRA-ARTICULAR; INTRAMUSCULAR
Status: COMPLETED | OUTPATIENT
Start: 2025-01-23 | End: 2025-01-23

## 2025-01-24 RX ORDER — CLOBETASOL PROPIONATE 0.5 MG/G
OINTMENT TOPICAL 2 TIMES DAILY PRN
Qty: 15 G | Refills: 3 | Status: SHIPPED | OUTPATIENT
Start: 2025-01-24

## 2025-01-24 RX ORDER — LIDOCAINE HYDROCHLORIDE 10 MG/ML
1 INJECTION, SOLUTION INFILTRATION; PERINEURAL
Status: COMPLETED | OUTPATIENT
Start: 2025-01-23 | End: 2025-01-23

## 2025-01-24 NOTE — PROGRESS NOTES
Subjective      Diamond Aquino is a 69 y.o. G6, P3 female who presents for annual well woman exam. Patient reports no bleeding, spotting, or discharge.  Patient reports No hot flashes/night sweats, not sexually active No vaginal dryness, sleeping poorly multiple aches and pains undergoing workup with hand specialist and rheumatologist.  Patient has had severe vulvar burning and itching with some pain and discomfort no specific discharge.   Patient also has history of Argueta's esophagus and sometimes some mucus and bleeding on surface of bowel movement usually only goes every other day not specifically with constipation diarrhea.  No findings on exam today slightly lower tone.  Given some names of gastroenterology to follow-up with as her physicians have retired      Current contraception: status post hysterectomy  History of abnormal Pap smear: yes -history of HPV last Pap 2021 normal negative high-risk HPV  Family history of uterine or ovarian cancer: yes -maternal aunt ovarian, also maternal aunt with cervical  Regular self breast exam: yes  History of abnormal mammogram: no  Family history of breast cancer: yes -mother breast cancer, maternal aunt breast cancer, paternal aunt breast cancer    Menstrual History:  OB History          6    Para   6    Term   3       0    AB   0    Living   3         SAB   0    IAB   0    Ectopic   0    Multiple        Live Births   3                No LMP recorded (lmp unknown). Patient has had a hysterectomy.       The following portions of the patient's history were reviewed and updated as appropriate: allergies, current medications, past family history, past medical history, past social history, past surgical history, and problem list.  Past Medical History:   Diagnosis Date    Anesthesia complication     desaturation of oxygen mostly at night- on O2 at 2L at hs-may wake up with asthma issue    Anxiety     Arthritis     Asthma     Argueta esophagus      Breathing difficulty     pt states s/p anesthesia will have an asthma attack-pt to bring ProAir    Chest pain     upper chest-intermittent asthma related?    Colon polyp     COVID-19 2020    Decreased hearing of both ears     DVT (deep venous thrombosis) (HCC)     during pregnancy in the leg    Fibromyalgia     GERD (gastroesophageal reflux disease)     Hiatal hernia     Hypercholesteremia     Macular degeneration     wet-right eye injected 24    Nasal polyps     Sinusitis, chronic     Subarachnoid hemorrhage (HCC) 2020    Tinnitus     Vertigo     on occ    Wears glasses      Past Surgical History:   Procedure Laterality Date     SECTION N/A     x 2    COLONOSCOPY      ESOPHAGOGASTRODUODENOSCOPY      HYSTERECTOMY      complete    NASAL POLYP SURGERY      x3-2013,,2019    IA COLSC FLX W/RMVL OF TUMOR POLYP LESION SNARE TQ N/A 2017    Procedure: COLONOSCOPYwith  snare polypectomy.;  Surgeon: Cuong Mejia MD;  Location: [facility] GI LAB;  Service: Gastroenterology    IA EGD TRANSORAL BIOPSY SINGLE/MULTIPLE N/A 2017    Procedure: ESOPHAGOGASTRODUODENOSCOPY (EGD)and biopsy.;  Surgeon: Cuong Mejia MD;  Location: [facility] GI LAB;  Service: Gastroenterology    IA XCAPSL CTRC RMVL INSJ IO LENS PROSTH W/O ECP Right 2024    Procedure: EXTRACTION EXTRACAPSULAR CATARACT PHACO INTRAOCULAR LENS (IOL);  Surgeon: Jamie Engel MD;  Location: [facility] MAIN OR;  Service: Ophthalmology    TONSILLECTOMY N/A     VEIN LIGATION AND STRIPPING Bilateral      OB History          6    Para   6    Term   3       0    AB   0    Living   3         SAB   0    IAB   0    Ectopic   0    Multiple        Live Births   3                 Review of Systems  Review of Systems   Constitutional:  Positive for fatigue. Negative for chills, fever and unexpected weight change.   HENT: Negative.  Negative for dental problem, sinus pressure, sinus pain and tinnitus.    Eyes: Negative.  Negative for  visual disturbance.   Respiratory: Negative.  Negative for cough, shortness of breath and wheezing.    Cardiovascular: Negative.  Negative for chest pain and leg swelling.   Gastrointestinal:  Negative for constipation, diarrhea, nausea and vomiting.        Hard stools   Genitourinary:  Negative for menstrual problem, pelvic pain and urgency.        Vaginal itching   Musculoskeletal:  Negative for back pain.        Arm and hand pain   Allergic/Immunologic: Positive for environmental allergies.   Neurological:  Positive for weakness, light-headedness and headaches. Negative for dizziness.      Objective     Vitals:    01/24/25 1534   BP: 120/72   BP Location: Left arm   Patient Position: Sitting   Cuff Size: Standard   Weight: 66.7 kg (147 lb)   Height: 5' (1.524 m)     General:   alert and oriented, in no acute distress   Heart: regular rate and rhythm, S1, S2 normal, no murmur, click, rub or gallop   Lungs: clear to auscultation bilaterally   Abdomen: soft, non-tender, without masses or organomegaly   Vulva: Ring of erythema surrounding vulva some present perirectally but less, small area of cracking and irritation at top of vulva consistent with mild lichen sclerosus   Vagina: normal mucosa, no erythema no significant discharge very mild anterior wall prolapse   Cervix: surgically absent   Uterus: surgically absent   Adnexa: no mass, fullness, tenderness   Breast inspection negative, no nipple discharge or bleeding, no masses or nodularity palpable  Rectal negative, stool guaiac negative  Thyroid normal no masses or nodules     Assessment   69-year-old G6, P3 with history of hysterectomy here for annual exam.  Last Pap December 2021 normal negative high-risk HPV, last mammogram August 2024 normal, last DEXA scan stable August 2024 osteopenia     Plan   Given slip for mammogram for August 2025, prescription for Temovate ointment to use as needed for irritation especially in the upper vulvar area, reviewed local  care such as Desitin or A&D to protect bottom, leave open to air when able call if not improving or increasing symptoms discussed possibly using rinse bottle at home.  Return in 2 years or sooner as needed

## 2025-01-31 ENCOUNTER — TELEPHONE (OUTPATIENT)
Age: 70
End: 2025-01-31

## 2025-01-31 NOTE — TELEPHONE ENCOUNTER
Patient is calling in to  request her most recent MRI to be faxed over to her Neurologist below. She does not have fax number and will call back in once she obtains fax number      MRI from 6/3/24  Bea Roman Neurologist at [facility]  Tele:  [phone]  Scheduled for 2/5/25

## 2025-02-19 ENCOUNTER — APPOINTMENT (OUTPATIENT)
Dept: LAB | Facility: CLINIC | Age: 70
End: 2025-02-19
Payer: COMMERCIAL

## 2025-02-19 ENCOUNTER — TRANSCRIBE ORDERS (OUTPATIENT)
Dept: LAB | Facility: CLINIC | Age: 70
End: 2025-02-19

## 2025-02-19 DIAGNOSIS — R76.8 ANA POSITIVE: ICD-10-CM

## 2025-02-19 DIAGNOSIS — G70.9 NEUROMUSCULAR WEAKNESS (HCC): ICD-10-CM

## 2025-02-19 DIAGNOSIS — R20.0 FACIAL NUMBNESS: ICD-10-CM

## 2025-02-19 DIAGNOSIS — M62.81 MUSCLE WEAKNESS: Primary | ICD-10-CM

## 2025-02-19 DIAGNOSIS — M79.10 MYALGIA: ICD-10-CM

## 2025-02-19 DIAGNOSIS — R74.8 ABNORMAL LEVELS OF OTHER SERUM ENZYMES: ICD-10-CM

## 2025-02-19 LAB — CK SERPL-CCNC: 29 U/L (ref 26–192)

## 2025-02-19 PROCEDURE — 84443 ASSAY THYROID STIM HORMONE: CPT

## 2025-02-19 PROCEDURE — 84207 ASSAY OF VITAMIN B-6: CPT

## 2025-02-19 PROCEDURE — 82085 ASSAY OF ALDOLASE: CPT

## 2025-02-19 PROCEDURE — 36415 COLL VENOUS BLD VENIPUNCTURE: CPT

## 2025-02-19 PROCEDURE — 82550 ASSAY OF CK (CPK): CPT

## 2025-02-20 LAB — TSH SERPL DL<=0.05 MIU/L-ACNC: 3.31 UIU/ML (ref 0.45–4.5)

## 2025-02-21 LAB — ALDOLASE SERPL-CCNC: 5.1 U/L (ref 3.3–10.3)

## 2025-02-24 LAB — VIT B6 SERPL-MCNC: 5.7 UG/L (ref 3.4–65.2)

## 2025-03-21 ENCOUNTER — APPOINTMENT (OUTPATIENT)
Dept: LAB | Facility: CLINIC | Age: 70
End: 2025-03-21
Payer: COMMERCIAL

## 2025-03-21 DIAGNOSIS — R73.01 IMPAIRED FASTING GLUCOSE: ICD-10-CM

## 2025-03-21 DIAGNOSIS — E78.5 DYSLIPIDEMIA: ICD-10-CM

## 2025-03-21 DIAGNOSIS — I83.813 VARICOSE VEINS OF BOTH LOWER EXTREMITIES WITH PAIN: ICD-10-CM

## 2025-03-21 DIAGNOSIS — Z13.0 SCREENING FOR DEFICIENCY ANEMIA: ICD-10-CM

## 2025-03-21 LAB
ALBUMIN SERPL BCG-MCNC: 3.6 G/DL (ref 3.5–5)
ALP SERPL-CCNC: 73 U/L (ref 34–104)
ALT SERPL W P-5'-P-CCNC: 8 U/L (ref 7–52)
ANION GAP SERPL CALCULATED.3IONS-SCNC: 7 MMOL/L (ref 4–13)
AST SERPL W P-5'-P-CCNC: 13 U/L (ref 13–39)
BILIRUB SERPL-MCNC: 0.37 MG/DL (ref 0.2–1)
BUN SERPL-MCNC: 23 MG/DL (ref 5–25)
CALCIUM SERPL-MCNC: 9 MG/DL (ref 8.4–10.2)
CHLORIDE SERPL-SCNC: 106 MMOL/L (ref 96–108)
CHOLEST SERPL-MCNC: 155 MG/DL (ref ?–200)
CO2 SERPL-SCNC: 28 MMOL/L (ref 21–32)
CREAT SERPL-MCNC: 0.67 MG/DL (ref 0.6–1.3)
ERYTHROCYTE [DISTWIDTH] IN BLOOD BY AUTOMATED COUNT: 12.1 % (ref 11.6–15.1)
EST. AVERAGE GLUCOSE BLD GHB EST-MCNC: 117 MG/DL
GFR SERPL CREATININE-BSD FRML MDRD: 89 ML/MIN/1.73SQ M
GLUCOSE P FAST SERPL-MCNC: 99 MG/DL (ref 65–99)
HBA1C MFR BLD: 5.7 %
HCT VFR BLD AUTO: 34.9 % (ref 34.8–46.1)
HDLC SERPL-MCNC: 54 MG/DL
HGB BLD-MCNC: 11 G/DL (ref 11.5–15.4)
LDLC SERPL CALC-MCNC: 88 MG/DL (ref 0–100)
MCH RBC QN AUTO: 30.6 PG (ref 26.8–34.3)
MCHC RBC AUTO-ENTMCNC: 31.5 G/DL (ref 31.4–37.4)
MCV RBC AUTO: 97 FL (ref 82–98)
PLATELET # BLD AUTO: 268 THOUSANDS/UL (ref 149–390)
PMV BLD AUTO: 9.9 FL (ref 8.9–12.7)
POTASSIUM SERPL-SCNC: 4.2 MMOL/L (ref 3.5–5.3)
PROT SERPL-MCNC: 6.7 G/DL (ref 6.4–8.4)
RBC # BLD AUTO: 3.59 MILLION/UL (ref 3.81–5.12)
SODIUM SERPL-SCNC: 141 MMOL/L (ref 135–147)
TRIGL SERPL-MCNC: 63 MG/DL (ref ?–150)
WBC # BLD AUTO: 8.88 THOUSAND/UL (ref 4.31–10.16)

## 2025-03-21 PROCEDURE — 83036 HEMOGLOBIN GLYCOSYLATED A1C: CPT

## 2025-03-21 PROCEDURE — 80053 COMPREHEN METABOLIC PANEL: CPT

## 2025-03-21 PROCEDURE — 36415 COLL VENOUS BLD VENIPUNCTURE: CPT

## 2025-03-21 PROCEDURE — 85027 COMPLETE CBC AUTOMATED: CPT

## 2025-03-21 PROCEDURE — 80061 LIPID PANEL: CPT

## 2025-04-04 ENCOUNTER — TRANSCRIBE ORDERS (OUTPATIENT)
Dept: LAB | Facility: CLINIC | Age: 70
End: 2025-04-04

## 2025-04-04 ENCOUNTER — APPOINTMENT (OUTPATIENT)
Dept: LAB | Facility: CLINIC | Age: 70
End: 2025-04-04
Payer: COMMERCIAL

## 2025-04-04 DIAGNOSIS — G70.9 NEUROMUSCULAR WEAKNESS (HCC): ICD-10-CM

## 2025-04-04 DIAGNOSIS — M79.10 MYALGIA: ICD-10-CM

## 2025-04-04 DIAGNOSIS — M62.81 MUSCLE WEAKNESS (GENERALIZED): Primary | ICD-10-CM

## 2025-04-04 DIAGNOSIS — R76.8 POSITIVE ANA (ANTINUCLEAR ANTIBODY): ICD-10-CM

## 2025-04-04 LAB — VIT B12 SERPL-MCNC: 421 PG/ML (ref 180–914)

## 2025-04-04 PROCEDURE — 36415 COLL VENOUS BLD VENIPUNCTURE: CPT

## 2025-04-04 PROCEDURE — 82607 VITAMIN B-12: CPT

## 2025-04-07 ENCOUNTER — NURSE TRIAGE (OUTPATIENT)
Age: 70
End: 2025-04-07

## 2025-04-07 ENCOUNTER — APPOINTMENT (OUTPATIENT)
Dept: LAB | Facility: CLINIC | Age: 70
End: 2025-04-07
Payer: COMMERCIAL

## 2025-04-07 DIAGNOSIS — R30.0 BURNING WITH URINATION: ICD-10-CM

## 2025-04-07 DIAGNOSIS — R30.0 BURNING WITH URINATION: Primary | ICD-10-CM

## 2025-04-07 LAB
BACTERIA UR QL AUTO: ABNORMAL /HPF
BILIRUB UR QL STRIP: NEGATIVE
CLARITY UR: ABNORMAL
COLOR UR: ABNORMAL
GLUCOSE UR STRIP-MCNC: NEGATIVE MG/DL
HGB UR QL STRIP.AUTO: ABNORMAL
KETONES UR STRIP-MCNC: NEGATIVE MG/DL
LEUKOCYTE ESTERASE UR QL STRIP: ABNORMAL
NITRITE UR QL STRIP: POSITIVE
NON-SQ EPI CELLS URNS QL MICRO: ABNORMAL /HPF
PH UR STRIP.AUTO: 6 [PH]
PROT UR STRIP-MCNC: ABNORMAL MG/DL
RBC #/AREA URNS AUTO: ABNORMAL /HPF
SP GR UR STRIP.AUTO: 1.01 (ref 1–1.03)
TRANS CELLS #/AREA URNS HPF: PRESENT /[HPF]
UROBILINOGEN UR STRIP-ACNC: <2 MG/DL
WBC #/AREA URNS AUTO: ABNORMAL /HPF

## 2025-04-07 PROCEDURE — 81001 URINALYSIS AUTO W/SCOPE: CPT

## 2025-04-07 PROCEDURE — 87186 SC STD MICRODIL/AGAR DIL: CPT

## 2025-04-07 PROCEDURE — 87086 URINE CULTURE/COLONY COUNT: CPT

## 2025-04-07 PROCEDURE — 87077 CULTURE AEROBIC IDENTIFY: CPT

## 2025-04-07 NOTE — TELEPHONE ENCOUNTER
"  FOLLOW UP: n/a    REASON FOR CONVERSATION: Urinary Symptoms    SYMPTOMS: burning with urination beginning this past Friday. Has been using Azo with some relief. Requesting outpatient lab testing.     OTHER: Ordered UA and urine cx to be done outpatient.     DISPOSITION: No disposition on file.      Reason for Disposition   All other urine symptoms    Answer Assessment - Initial Assessment Questions  1. SYMPTOM: \"What's the main symptom you're concerned about?\" (e.g., frequency, incontinence)      Burning with urination  2. ONSET: \"When did the  symptoms  start?\"      Friday  3. PAIN: \"Is there any pain?\" If Yes, ask: \"How bad is it?\" (Scale: 1-10; mild, moderate, severe)      Mild pain  4. CAUSE: \"What do you think is causing the symptoms?\"      UTI  5. OTHER SYMPTOMS: \"Do you have any other symptoms?\" (e.g., blood in urine, fever, flank pain, pain with urination)      denies  6. PREGNANCY: \"Is there any chance you are pregnant?\" \"When was your last menstrual period?\"      hysterectomy  7. Last yearly: 01/24/25    Protocols used: Urinary Symptoms-Adult-[place]    "

## 2025-04-08 ENCOUNTER — RESULTS FOLLOW-UP (OUTPATIENT)
Dept: LABOR AND DELIVERY | Facility: HOSPITAL | Age: 70
End: 2025-04-08

## 2025-04-08 ENCOUNTER — TELEPHONE (OUTPATIENT)
Age: 70
End: 2025-04-08

## 2025-04-08 DIAGNOSIS — N39.0 UTI (URINARY TRACT INFECTION): ICD-10-CM

## 2025-04-08 DIAGNOSIS — R39.9 LOWER URINARY TRACT SYMPTOMS (LUTS): Primary | ICD-10-CM

## 2025-04-08 RX ORDER — NITROFURANTOIN 25; 75 MG/1; MG/1
100 CAPSULE ORAL 2 TIMES DAILY
Qty: 10 CAPSULE | Refills: 0 | Status: SHIPPED | OUTPATIENT
Start: 2025-04-08 | End: 2025-04-13

## 2025-04-08 RX ORDER — PHENAZOPYRIDINE HYDROCHLORIDE 95 MG/1
95 TABLET ORAL 3 TIMES DAILY PRN
Qty: 10 TABLET | Refills: 0 | Status: SHIPPED | OUTPATIENT
Start: 2025-04-08

## 2025-04-08 NOTE — TELEPHONE ENCOUNTER
Pt calling in saying that she woud like to know if she should still take Azo for urinary issues as pt waits for the urine culture to result.

## 2025-04-08 NOTE — TELEPHONE ENCOUNTER
Patient requesting results of urinary culture. States she is having worsening pain and pressure. Urine culture resulted today. Patient hoping to hear back before end of day. ESC sent to on-call provider Dr. Soares. Dr. Soares states appears like infection is present, likely e. Coli but results are not finalized and still awaiting sensitives. Provider will send in treatment but states patient may need to change to different antibiotics one sensitivities are in. Called patient and made aware of the same. Patient verbalized understanding and appreciative.

## 2025-04-09 LAB — BACTERIA UR CULT: ABNORMAL

## 2025-04-10 ENCOUNTER — TELEPHONE (OUTPATIENT)
Dept: ADMINISTRATIVE | Facility: OTHER | Age: 70
End: 2025-04-10

## 2025-04-10 NOTE — TELEPHONE ENCOUNTER
04/10/25 3:38 PM    Patient contacted to bring Advance Directive, POLST, or Living Will document to next scheduled pcp visit.[facility] Department spoke with patient/ caregiver.    Thank you.  Renetta Melendez MA  [facility]

## 2025-04-14 ENCOUNTER — OFFICE VISIT (OUTPATIENT)
Dept: FAMILY MEDICINE CLINIC | Facility: CLINIC | Age: 70
End: 2025-04-14
Payer: MEDICARE

## 2025-04-14 VITALS
DIASTOLIC BLOOD PRESSURE: 70 MMHG | HEART RATE: 76 BPM | SYSTOLIC BLOOD PRESSURE: 130 MMHG | BODY MASS INDEX: 28.66 KG/M2 | HEIGHT: 60 IN | WEIGHT: 146 LBS | RESPIRATION RATE: 18 BRPM | OXYGEN SATURATION: 98 % | TEMPERATURE: 97 F

## 2025-04-14 DIAGNOSIS — E78.5 DYSLIPIDEMIA: ICD-10-CM

## 2025-04-14 DIAGNOSIS — G89.4 CHRONIC PAIN SYNDROME: ICD-10-CM

## 2025-04-14 DIAGNOSIS — Z00.00 MEDICARE ANNUAL WELLNESS VISIT, SUBSEQUENT: Primary | ICD-10-CM

## 2025-04-14 DIAGNOSIS — J45.50 SEVERE PERSISTENT ASTHMA WITHOUT COMPLICATION: ICD-10-CM

## 2025-04-14 DIAGNOSIS — M79.10 MYALGIA: ICD-10-CM

## 2025-04-14 DIAGNOSIS — R73.01 IMPAIRED FASTING GLUCOSE: ICD-10-CM

## 2025-04-14 DIAGNOSIS — D64.9 ANEMIA, UNSPECIFIED TYPE: ICD-10-CM

## 2025-04-14 PROBLEM — R79.89 ELEVATED LFTS: Status: RESOLVED | Noted: 2018-01-22 | Resolved: 2025-04-14

## 2025-04-14 PROCEDURE — 99214 OFFICE O/P EST MOD 30 MIN: CPT | Performed by: FAMILY MEDICINE

## 2025-04-14 PROCEDURE — G0439 PPPS, SUBSEQ VISIT: HCPCS | Performed by: FAMILY MEDICINE

## 2025-04-14 PROCEDURE — G2211 COMPLEX E/M VISIT ADD ON: HCPCS | Performed by: FAMILY MEDICINE

## 2025-04-14 RX ORDER — CELECOXIB 200 MG/1
200 CAPSULE ORAL DAILY
Qty: 90 CAPSULE | Refills: 1 | Status: SHIPPED | OUTPATIENT
Start: 2025-04-14

## 2025-04-14 RX ORDER — GABAPENTIN 400 MG/1
400 CAPSULE ORAL 3 TIMES DAILY
Qty: 270 CAPSULE | Refills: 1 | Status: SHIPPED | OUTPATIENT
Start: 2025-04-14

## 2025-04-14 NOTE — PROGRESS NOTES
Name: Diamond Aquino      : 1955      MRN: [id number]  Encounter Provider: Maureen Ritchie DO  Encounter Date: 2025   Encounter department: [facility]  :  Assessment & Plan  Severe persistent asthma without complication  Unchanged       Medicare annual wellness visit, subsequent         Impaired fasting glucose  Hemoglobin A1c is mildly abnormal         Dyslipidemia  Cholesterol has improved with diet  Patient has not been taking pravastatin  Pravastatin discontinued       Chronic pain syndrome  Advised to stop advil and change to celebrex   I am concerned about her concomitant anemia    Orders:  •  celecoxib (CeleBREX) 200 mg capsule; Take 1 capsule (200 mg total) by mouth daily Take with food    Anemia, unspecified type  Concerned about her NSAID use as well as being a vegetarian  Labs ordered    Orders:  •  Ambulatory referral to Gastroenterology; Future  •  Urinalysis with microscopic; Future  •  iFOBT (FIT); Future  •  Ferritin; Future  •  Iron; Future    Myalgia  Pain not controlled  Will follow-up with neurology  Orders:  •  gabapentin (NEURONTIN) 400 mg capsule; Take 1 capsule (400 mg total) by mouth 3 (three) times a day      Depression Screening and Follow-up Plan: Patient's depression screening was positive with a PHQ-9 score of 6.   Patient with underlying depression and was advised to continue current medications as prescribed.       Preventive health issues were discussed with patient, and age appropriate screening tests were ordered as noted in patient's After Visit Summary. Personalized health advice and appropriate referrals for health education or preventive services given if needed, as noted in patient's After Visit Summary.    Return in about 6 months (around 10/14/2025) for Next scheduled follow up.    History of Present Illness     She has been taking 6 advil every day       Patient Care Team:  Maureen Ritchie DO as PCP - General (Family Medicine)  Aramis Garrison  DO Cuong Bland MD as Endoscopist  Kameron Landrum DO (Inactive) (Family Medicine)    Review of Systems   Constitutional:  Positive for fatigue.   Musculoskeletal:  Positive for arthralgias and myalgias.     Medical History Reviewed by provider this encounter:  Tobacco  Allergies  Meds  Problems  Med Hx  Surg Hx  Fam Hx       Annual Wellness Visit Questionnaire   Diamond is here for her Subsequent Wellness visit.     Health Risk Assessment:   Patient rates overall health as poor. Patient feels that their physical health rating is slightly worse. Patient is dissatisfied with their life. Eyesight was rated as slightly worse. Hearing was rated as slightly worse. Patient feels that their emotional and mental health rating is slightly worse. Patients states they are never, rarely angry. Patient states they are often unusually tired/fatigued. Pain experienced in the last 7 days has been a lot. Patient's pain rating has been 7/10. Patient states that she has experienced no weight loss or gain in last 6 months.     Depression Screening:   PHQ-9 Score: 6      Fall Risk Screening:   In the past year, patient has experienced: no history of falling in past year      Urinary Incontinence Screening:   Patient has not leaked urine accidently in the last six months.     Home Safety:  Patient has trouble with stairs inside or outside of their home. Patient has working smoke alarms and has working carbon monoxide detector. Home safety hazards include: none.     Nutrition:   Current diet is Regular.     Medications:   Patient is not currently taking any over-the-counter supplements. Patient is able to manage medications.     Activities of Daily Living (ADLs)/Instrumental Activities of Daily Living (IADLs):   Walk and transfer into and out of bed and chair?: Yes  Dress and groom yourself?: Yes    Bathe or shower yourself?: Yes    Feed yourself? Yes  Do your laundry/housekeeping?: Yes  Manage your  money, pay your bills and track your expenses?: Yes  Make your own meals?: Yes    Do your own shopping?: Yes    Previous Hospitalizations:   Any hospitalizations or ED visits within the last 12 months?: No      Advance Care Planning:   Living will: Yes    Durable POA for healthcare: Yes    Advanced directive: Yes    Advanced directive counseling given: Yes    End of Life Decisions reviewed with patient: Yes    Provider agrees with end of life decisions: Yes      Cognitive Screening:   Provider or family/friend/caregiver concerned regarding cognition?: No    Preventive Screenings      Cardiovascular Screening:    General: Screening Not Indicated and History Lipid Disorder      Diabetes Screening:     General: Screening Current      Colorectal Cancer Screening:     General: Screening Current      Breast Cancer Screening:     General: Screening Current      Cervical Cancer Screening:    General: Screening Not Indicated      Osteoporosis Screening:    General: Screening Current      Abdominal Aortic Aneurysm (AAA) Screening:        General: Screening Not Indicated      Lung Cancer Screening:     General: Screening Not Indicated      Hepatitis C Screening:    General: Screening Current    Immunizations:  - Immunizations due: Prevnar 20, Tdap and Zoster (Shingrix)  - Risks/benefits immunizations discussed    - The patient declines recommended vaccines currently despite my recommendations      Screening, Brief Intervention, and Referral to Treatment (SBIRT)     Screening  Typical number of drinks in a day: 0  Typical number of drinks in a week: 0  Interpretation: Low risk drinking behavior.    AUDIT-C Screenin) How often did you have a drink containing alcohol in the past year? never  2) How many drinks did you have on a typical day when you were drinking in the past year? 0  3) How often did you have 6 or more drinks on one occasion in the past year? never    AUDIT-C Score: 0  Interpretation: Score 0-2 (female):  Negative screen for alcohol misuse    Single Item Drug Screening:  How often have you used an illegal drug (including marijuana) or a prescription medication for non-medical reasons in the past year? never    Single Item Drug Screen Score: 0  Interpretation: Negative screen for possible drug use disorder    Brief Intervention  Alcohol & drug use screenings were reviewed. No concerns regarding substance use disorder identified.     Social Drivers of Health     Food Insecurity: No Food Insecurity (4/14/2025)    Hunger Vital Sign    • Worried About Running Out of Food in the Last Year: Never true    • Ran Out of Food in the Last Year: Never true   Transportation Needs: No Transportation Needs (4/14/2025)    PRAPARE - Transportation    • Lack of Transportation (Medical): No    • Lack of Transportation (Non-Medical): No   Housing Stability: Unknown (4/14/2025)    Housing Stability Vital Sign    • Unable to Pay for Housing in the Last Year: No    • Homeless in the Last Year: No   Utilities: Not At Risk (4/14/2025)    [facility] Utilities    • Threatened with loss of utilities: No     No results found.    Objective   /70   Pulse 76   Temp (!) 97 °F (36.1 °C)   Resp 18   Ht 5' (1.524 m)   Wt 66.2 kg (146 lb)   LMP  (LMP Unknown)   SpO2 98%   BMI 28.51 kg/m²     Physical Exam  Vitals and nursing note reviewed.   Constitutional:       Appearance: She is well-developed.   HENT:      Head: Normocephalic and atraumatic.      Right Ear: External ear normal.      Left Ear: External ear normal.      Nose: Nose normal.   Cardiovascular:      Rate and Rhythm: Normal rate and regular rhythm.      Heart sounds: Normal heart sounds. No murmur heard.     No friction rub.   Pulmonary:      Effort: No respiratory distress.      Breath sounds: Normal breath sounds. No wheezing or rales.

## 2025-04-14 NOTE — ASSESSMENT & PLAN NOTE
Advised to stop advil and change to celebrex   I am concerned about her concomitant anemia    Orders:  •  celecoxib (CeleBREX) 200 mg capsule; Take 1 capsule (200 mg total) by mouth daily Take with food

## 2025-04-14 NOTE — PATIENT INSTRUCTIONS
Medicare Preventive Visit Patient Instructions  Thank you for completing your Welcome to Medicare Visit or Medicare Annual Wellness Visit today. Your next wellness visit will be due in one year (4/15/2026).  The screening/preventive services that you may require over the next 5-10 years are detailed below. Some tests may not apply to you based off risk factors and/or age. Screening tests ordered at today's visit but not completed yet may show as past due. Also, please note that scanned in results may not display below.  Preventive Screenings:  Service Recommendations Previous Testing/Comments   Colorectal Cancer Screening  * Colonoscopy    * Fecal Occult Blood Test (FOBT)/Fecal Immunochemical Test (FIT)  * Fecal DNA/Cologuard Test  * Flexible Sigmoidoscopy Age: 45-75 years old   Colonoscopy: every 10 years (may be performed more frequently if at higher risk)  OR  FOBT/FIT: every 1 year  OR  Cologuard: every 3 years  OR  Sigmoidoscopy: every 5 years  Screening may be recommended earlier than age 45 if at higher risk for colorectal cancer. Also, an individualized decision between you and your healthcare provider will decide whether screening between the ages of 76-85 would be appropriate. Colonoscopy: 12/08/2021  FOBT/FIT: Not on file  Cologuard: Not on file  Sigmoidoscopy: Not on file    Screening Current     Breast Cancer Screening Age: 40+ years old  Frequency: every 1-2 years  Not required if history of left and right mastectomy Mammogram: 08/05/2024    Screening Current   Cervical Cancer Screening Between the ages of 21-29, pap smear recommended once every 3 years.   Between the ages of 30-65, can perform pap smear with HPV co-testing every 5 years.   Recommendations may differ for women with a history of total hysterectomy, cervical cancer, or abnormal pap smears in past. Pap Smear: 01/24/2025    Screening Not Indicated   Hepatitis C Screening Once for adults born between 1945 and 1965  More frequently in  patients at high risk for Hepatitis C Hep C Antibody: 03/22/2023    Screening Current   Diabetes Screening 1-2 times per year if you're at risk for diabetes or have pre-diabetes Fasting glucose: 99 mg/dL (3/21/2025)  A1C: 5.7 % (3/21/2025)  Screening Current   Cholesterol Screening Once every 5 years if you don't have a lipid disorder. May order more often based on risk factors. Lipid panel: 03/21/2025    Screening Not Indicated  History Lipid Disorder     Other Preventive Screenings Covered by Medicare:  Abdominal Aortic Aneurysm (AAA) Screening: covered once if your at risk. You're considered to be at risk if you have a family history of AAA.  Lung Cancer Screening: covers low dose CT scan once per year if you meet all of the following conditions: (1) Age 55-77; (2) No signs or symptoms of lung cancer; (3) Current smoker or have quit smoking within the last 15 years; (4) You have a tobacco smoking history of at least 20 pack years (packs per day multiplied by number of years you smoked); (5) You get a written order from a healthcare provider.  Glaucoma Screening: covered annually if you're considered high risk: (1) You have diabetes OR (2) Family history of glaucoma OR (3)  aged 50 and older OR (4)  American aged 65 and older  Osteoporosis Screening: covered every 2 years if you meet one of the following conditions: (1) You're estrogen deficient and at risk for osteoporosis based off medical history and other findings; (2) Have a vertebral abnormality; (3) On glucocorticoid therapy for more than 3 months; (4) Have primary hyperparathyroidism; (5) On osteoporosis medications and need to assess response to drug therapy.   Last bone density test (DXA Scan): 08/13/2024.  HIV Screening: covered annually if you're between the age of 15-65. Also covered annually if you are younger than 15 and older than 65 with risk factors for HIV infection. For pregnant patients, it is covered up to 3 times per  pregnancy.    Immunizations:  Immunization Recommendations   Influenza Vaccine Annual influenza vaccination during flu season is recommended for all persons aged >= 6 months who do not have contraindications   Pneumococcal Vaccine   * Pneumococcal conjugate vaccine = PCV13 (Prevnar 13), PCV15 (Vaxneuvance), PCV20 (Prevnar 20)  * Pneumococcal polysaccharide vaccine = PPSV23 (Pneumovax) Adults 19-63 yo with certain risk factors or if 65+ yo  If never received any pneumonia vaccine: recommend Prevnar 20 (PCV20)  Give PCV20 if previously received 1 dose of PCV13 or PPSV23   Hepatitis B Vaccine 3 dose series if at intermediate or high risk (ex: diabetes, end stage renal disease, liver disease)   Respiratory syncytial virus (RSV) Vaccine - COVERED BY MEDICARE PART D  * RSVPreF3 (Arexvy) CDC recommends that adults 60 years of age and older may receive a single dose of RSV vaccine using shared clinical decision-making (SCDM)   Tetanus (Td) Vaccine - COST NOT COVERED BY MEDICARE PART B Following completion of primary series, a booster dose should be given every 10 years to maintain immunity against tetanus. Td may also be given as tetanus wound prophylaxis.   Tdap Vaccine - COST NOT COVERED BY MEDICARE PART B Recommended at least once for all adults. For pregnant patients, recommended with each pregnancy.   Shingles Vaccine (Shingrix) - COST NOT COVERED BY MEDICARE PART B  2 shot series recommended in those 19 years and older who have or will have weakened immune systems or those 50 years and older     Health Maintenance Due:      Topic Date Due   • Breast Cancer Screening: Mammogram  08/05/2025   • Colorectal Cancer Screening  12/07/2026   • Hepatitis C Screening  Completed     Immunizations Due:      Topic Date Due   • DTaP,Tdap,and Td Vaccines (1 - Tdap) Never done   • Pneumococcal Vaccine: 65+ Years (2 of 2 - PCV) 01/01/2015   • COVID-19 Vaccine (3 - 2024-25 season) 09/01/2024     Advance Directives   What are advance  directives?  Advance directives are legal documents that state your wishes and plans for medical care. These plans are made ahead of time in case you lose your ability to make decisions for yourself. Advance directives can apply to any medical decision, such as the treatments you want, and if you want to donate organs.   What are the types of advance directives?  There are many types of advance directives, and each state has rules about how to use them. You may choose a combination of any of the following:  Living will:  This is a written record of the treatment you want. You can also choose which treatments you do not want, which to limit, and which to stop at a certain time. This includes surgery, medicine, IV fluid, and tube feedings.   Durable power of  for healthcare (DPAHC):  This is a written record that states who you want to make healthcare choices for you when you are unable to make them for yourself. This person, called a proxy, is usually a family member or a friend. You may choose more than 1 proxy.  Do not resuscitate (DNR) order:  A DNR order is used in case your heart stops beating or you stop breathing. It is a request not to have certain forms of treatment, such as CPR. A DNR order may be included in other types of advance directives.  Medical directive:  This covers the care that you want if you are in a coma, near death, or unable to make decisions for yourself. You can list the treatments you want for each condition. Treatment may include pain medicine, surgery, blood transfusions, dialysis, IV or tube feedings, and a ventilator (breathing machine).  Values history:  This document has questions about your views, beliefs, and how you feel and think about life. This information can help others choose the care that you would choose.  Why are advance directives important?  An advance directive helps you control your care. Although spoken wishes may be used, it is better to have your wishes  written down. Spoken wishes can be misunderstood, or not followed. Treatments may be given even if you do not want them. An advance directive may make it easier for your family to make difficult choices about your care.   Weight Management   Why it is important to manage your weight:  Being overweight increases your risk of health conditions such as heart disease, high blood pressure, type 2 diabetes, and certain types of cancer. It can also increase your risk for osteoarthritis, sleep apnea, and other respiratory problems. Aim for a slow, steady weight loss. Even a small amount of weight loss can lower your risk of health problems.  How to lose weight safely:  A safe and healthy way to lose weight is to eat fewer calories and get regular exercise. You can lose up about 1 pound a week by decreasing the number of calories you eat by 500 calories each day.   Healthy meal plan for weight management:  A healthy meal plan includes a variety of foods, contains fewer calories, and helps you stay healthy. A healthy meal plan includes the following:  Eat whole-grain foods more often.  A healthy meal plan should contain fiber. Fiber is the part of grains, fruits, and vegetables that is not broken down by your body. Whole-grain foods are healthy and provide extra fiber in your diet. Some examples of whole-grain foods are whole-wheat breads and pastas, oatmeal, brown rice, and bulgur.  Eat a variety of vegetables every day.  Include dark, leafy greens such as spinach, kale, dawit greens, and mustard greens. Eat yellow and orange vegetables such as carrots, sweet potatoes, and winter squash.   Eat a variety of fruits every day.  Choose fresh or canned fruit (canned in its own juice or light syrup) instead of juice. Fruit juice has very little or no fiber.  Eat low-fat dairy foods.  Drink fat-free (skim) milk or 1% milk. Eat fat-free yogurt and low-fat cottage cheese. Try low-fat cheeses such as mozzarella and other reduced-fat  cheeses.  Choose meat and other protein foods that are low in fat.  Choose beans or other legumes such as split peas or lentils. Choose fish, skinless poultry (chicken or turkey), or lean cuts of red meat (beef or pork). Before you cook meat or poultry, cut off any visible fat.   Use less fat and oil.  Try baking foods instead of frying them. Add less fat, such as margarine, sour cream, regular salad dressing and mayonnaise to foods. Eat fewer high-fat foods. Some examples of high-fat foods include french fries, doughnuts, ice cream, and cakes.  Eat fewer sweets.  Limit foods and drinks that are high in sugar. This includes candy, cookies, regular soda, and sweetened drinks.  Exercise:  Exercise at least 30 minutes per day on most days of the week. Some examples of exercise include walking, biking, dancing, and swimming. You can also fit in more physical activity by taking the stairs instead of the elevator or parking farther away from stores. Ask your healthcare provider about the best exercise plan for you.      © Copyright [employer] 2018 Information is for End User's use only and may not be sold, redistributed or otherwise used for commercial purposes. All illustrations and images included in CareNotes® are the copyrighted property of [employer] or [employer]

## 2025-04-14 NOTE — ASSESSMENT & PLAN NOTE
Pain not controlled  Will follow-up with neurology  Orders:  •  gabapentin (NEURONTIN) 400 mg capsule; Take 1 capsule (400 mg total) by mouth 3 (three) times a day

## 2025-04-14 NOTE — ASSESSMENT & PLAN NOTE
Cholesterol has improved with diet  Patient has not been taking pravastatin  Pravastatin discontinued

## 2025-04-15 ENCOUNTER — APPOINTMENT (OUTPATIENT)
Dept: LAB | Facility: CLINIC | Age: 70
End: 2025-04-15
Payer: COMMERCIAL

## 2025-04-15 DIAGNOSIS — D64.9 ANEMIA, UNSPECIFIED TYPE: ICD-10-CM

## 2025-04-15 LAB
FERRITIN SERPL-MCNC: 77 NG/ML (ref 30–307)
IRON SERPL-MCNC: 92 UG/DL (ref 50–212)

## 2025-04-15 PROCEDURE — 82728 ASSAY OF FERRITIN: CPT

## 2025-04-15 PROCEDURE — 81001 URINALYSIS AUTO W/SCOPE: CPT

## 2025-04-15 PROCEDURE — 83540 ASSAY OF IRON: CPT

## 2025-04-15 PROCEDURE — 36415 COLL VENOUS BLD VENIPUNCTURE: CPT

## 2025-04-16 ENCOUNTER — RESULTS FOLLOW-UP (OUTPATIENT)
Dept: FAMILY MEDICINE CLINIC | Facility: CLINIC | Age: 70
End: 2025-04-16

## 2025-04-16 LAB
BACTERIA UR QL AUTO: ABNORMAL /HPF
BILIRUB UR QL STRIP: NEGATIVE
CLARITY UR: CLEAR
COLOR UR: YELLOW
GLUCOSE UR STRIP-MCNC: NEGATIVE MG/DL
HGB UR QL STRIP.AUTO: NEGATIVE
KETONES UR STRIP-MCNC: NEGATIVE MG/DL
LEUKOCYTE ESTERASE UR QL STRIP: NEGATIVE
NITRITE UR QL STRIP: NEGATIVE
NON-SQ EPI CELLS URNS QL MICRO: ABNORMAL /HPF
PH UR STRIP.AUTO: 6 [PH]
PROT UR STRIP-MCNC: ABNORMAL MG/DL
RBC #/AREA URNS AUTO: ABNORMAL /HPF
SP GR UR STRIP.AUTO: 1.02 (ref 1–1.03)
UROBILINOGEN UR STRIP-ACNC: <2 MG/DL
WBC #/AREA URNS AUTO: ABNORMAL /HPF

## 2025-04-17 ENCOUNTER — APPOINTMENT (OUTPATIENT)
Dept: LAB | Facility: CLINIC | Age: 70
End: 2025-04-17
Attending: FAMILY MEDICINE
Payer: COMMERCIAL

## 2025-04-17 LAB — HEMOCCULT STL QL IA: NEGATIVE

## 2025-04-17 PROCEDURE — G0328 FECAL BLOOD SCRN IMMUNOASSAY: HCPCS

## 2025-04-21 ENCOUNTER — OFFICE VISIT (OUTPATIENT)
Dept: URGENT CARE | Facility: CLINIC | Age: 70
End: 2025-04-21
Payer: MEDICARE

## 2025-04-21 VITALS
TEMPERATURE: 97.1 F | BODY MASS INDEX: 29.64 KG/M2 | SYSTOLIC BLOOD PRESSURE: 130 MMHG | HEART RATE: 88 BPM | HEIGHT: 60 IN | OXYGEN SATURATION: 93 % | RESPIRATION RATE: 16 BRPM | DIASTOLIC BLOOD PRESSURE: 72 MMHG | WEIGHT: 151 LBS

## 2025-04-21 DIAGNOSIS — J06.9 VIRAL URI WITH COUGH: Primary | ICD-10-CM

## 2025-04-21 PROCEDURE — 87636 SARSCOV2 & INF A&B AMP PRB: CPT | Performed by: FAMILY MEDICINE

## 2025-04-21 PROCEDURE — G2211 COMPLEX E/M VISIT ADD ON: HCPCS | Performed by: FAMILY MEDICINE

## 2025-04-21 PROCEDURE — 99213 OFFICE O/P EST LOW 20 MIN: CPT | Performed by: FAMILY MEDICINE

## 2025-04-21 RX ORDER — BROMPHENIRAMINE MALEATE, PSEUDOEPHEDRINE HYDROCHLORIDE, AND DEXTROMETHORPHAN HYDROBROMIDE 2; 30; 10 MG/5ML; MG/5ML; MG/5ML
10 SYRUP ORAL 3 TIMES DAILY PRN
Qty: 200 ML | Refills: 0 | Status: SHIPPED | OUTPATIENT
Start: 2025-04-21

## 2025-04-21 RX ORDER — METHYLPREDNISOLONE 4 MG/1
TABLET ORAL
Qty: 21 TABLET | Refills: 0 | Status: SHIPPED | OUTPATIENT
Start: 2025-04-21

## 2025-04-21 NOTE — PROGRESS NOTES
[facility]        NAME: Diamond Aquino is a 69 y.o. female  : 1955    MRN: [id number]  DATE: 2025  TIME: 3:53 PM    Assessment and Plan   Viral URI with cough [J06.9]  1. Viral URI with cough  methylPREDNISolone 4 MG tablet therapy pack    brompheniramine-pseudoephedrine-DM 30-2-10 MG/5ML syrup    Covid19 and INFLUENZA A/B PCR          Steroids given for cough/congestion. Bromfed to be used for congestion. Do not take with steroids.  No signs of bacterial infection today. Follow up with PCP in 3-5 days if no improvement. Proceed to ER if symptoms worsen.    Medical Decision Making     PROBLEM: 1 acute, uncomplicated illness or injury    DATA: Covid/flu test    RISK: Prescription drug management      Chief Complaint     Chief Complaint   Patient presents with   • chest congestion     Post nasal drip, stuffy nose, chest congestion began two days ago. Subjective fever last night, sweats. Chest pain. OTC - Zyrtec D         History of Present Illness     Diamond Aquino is a 69 y.o. female presenting to the office today for upper respiratory complaints. Symptoms have been present for 2 days, and include chest tightness, night sweats, post-nasal drip and congestion. She has tried Zyrtec D for her symptoms, with little relief.  Sick contacts include: NA      Review of Systems     Review of Systems   Constitutional:  Positive for chills and fatigue.   HENT:  Positive for congestion, postnasal drip and sore throat. Negative for ear discharge, ear pain, sinus pressure and sinus pain.    Eyes:  Negative for pain and discharge.   Respiratory:  Positive for cough and chest tightness. Negative for shortness of breath.    Cardiovascular:  Negative for chest pain and palpitations.   Gastrointestinal:  Negative for abdominal pain, diarrhea, nausea and vomiting.   Genitourinary:  Negative for difficulty urinating and dysuria.   Musculoskeletal:  Negative for arthralgias and myalgias.   Skin:  Negative for rash.    Neurological:  Negative for dizziness, syncope, light-headedness, numbness and headaches.   Psychiatric/Behavioral:  Negative for agitation.    All other systems reviewed and are negative.      Current Medications       Current Outpatient Medications:   •  acetaminophen (TYLENOL) 325 mg tablet, Take 650 mg by mouth every 6 (six) hours as needed for mild pain, Disp: , Rfl:   •  albuterol (ProAir HFA) 90 mcg/act inhaler, Inhale 2 puffs every 4 (four) hours as needed for wheezing, Disp: 8 g, Rfl: 3  •  ALPRAZolam (XANAX) 0.5 mg tablet, as needed, Disp: , Rfl: 0  •  brompheniramine-pseudoephedrine-DM 30-2-10 MG/5ML syrup, Take 10 mL by mouth 3 (three) times a day as needed for cough or congestion, Disp: 200 mL, Rfl: 0  •  calcium citrate-vitamin D (CITRACAL+D) 315-200 MG-UNIT per tablet, Take 1 tablet by mouth every morning, Disp: , Rfl:   •  celecoxib (CeleBREX) 200 mg capsule, Take 1 capsule (200 mg total) by mouth daily Take with food, Disp: 90 capsule, Rfl: 1  •  clobetasol (TEMOVATE) 0.05 % ointment, Apply topically 2 (two) times a day as needed (irritation), Disp: 15 g, Rfl: 3  •  Coenzyme Q10 (Co Q 10) 100 MG CAPS, Take by mouth, Disp: , Rfl:   •  Dupilumab (Dupixent) 300 MG/2ML SOAJ, INJECT 1 PEN UNDER THE SKIN EVERY 14 DAYS, Disp: 4 mL, Rfl: 5  •  dupilumab (DUPIXENT) subcutaneous injection, Inject 2 mL (300 mg total) under the skin every 14 (fourteen) days, Disp: 4 mL, Rfl: 5  •  famciclovir (FAMVIR) 500 mg tablet, if needed, Disp: , Rfl:   •  Fluticasone Propionate (Xhance) 93 MCG/ACT EXHU, 1 spray into each nostril 2 (two) times a day, Disp: 16 mL, Rfl: 5  •  gabapentin (NEURONTIN) 400 mg capsule, Take 1 capsule (400 mg total) by mouth 3 (three) times a day, Disp: 270 capsule, Rfl: 1  •  methylPREDNISolone 4 MG tablet therapy pack, Use as directed on package, Disp: 21 tablet, Rfl: 0  •  Multiple Vitamins-Minerals (multivitamin with minerals) tablet, Take 1 tablet by mouth daily, Disp: , Rfl:   •  Multiple  Vitamins-Minerals (PRESERVISION AREDS 2 PO), Take by mouth 2 (two) times a day, Disp: , Rfl:   •  phenazopyridine (PYRIDIUM) 95 MG tablet, Take 1 tablet (95 mg total) by mouth 3 (three) times a day as needed for bladder spasms, Disp: 10 tablet, Rfl: 0  •  traZODone (DESYREL) 50 mg tablet, Take 1 tablet (50 mg total) by mouth daily at bedtime, Disp: 30 tablet, Rfl: 5    Current Allergies     Allergies as of 04/21/2025 - Reviewed 04/21/2025   Allergen Reaction Noted   • Pitavastatin Hives, Shortness Of Breath, and Rash 09/19/2014   • Cefditoren Nausea Only and Vomiting 07/06/2020   • Cefditoren pivoxil Hives 02/26/2016   • Dust mite extract Sneezing 02/26/2016   • Levofloxacin GI Intolerance 11/05/2022   • Mold extract [trichophyton] Sneezing 02/26/2016   • Moxifloxacin GI Intolerance 11/05/2022            The following portions of the patient's history were reviewed and updated as appropriate: allergies, current medications, past family history, past medical history, past social history, past surgical history and problem list.     Past Medical History:   Diagnosis Date   • Anesthesia complication     desaturation of oxygen mostly at night- on O2 at 2L at hs-may wake up with asthma issue   • Anxiety    • Arthritis    • Asthma    • Argueta esophagus    • Breathing difficulty     pt states s/p anesthesia will have an asthma attack-pt to bring ProAir   • Chest pain     upper chest-intermittent asthma related?   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   • Nasal polyps    • Sinusitis, chronic    • Subarachnoid hemorrhage (HCC) 05/02/2020   • Tinnitus    • Vertigo     on occ   • Wears glasses        Past Surgical History:   Procedure Laterality Date   •   SECTION N/A     x 2   • COLONOSCOPY     • ESOPHAGOGASTRODUODENOSCOPY     • HYSTERECTOMY      complete   • NASAL POLYP SURGERY      x3-2013,,2019   • [place] COLSC FLX W/RMVL OF TUMOR POLYP LESION SNARE TQ N/A 2017    Procedure: COLONOSCOPYwith  snare polypectomy.;  Surgeon: Cuong Mejia MD;  Location: [facility];  Service: Gastroenterology   • [place] EGD TRANSORAL BIOPSY SINGLE/MULTIPLE N/A 2017    Procedure: ESOPHAGOGASTRODUODENOSCOPY (EGD)and biopsy.;  Surgeon: Cuong Mejia MD;  Location: [facility];  Service: Gastroenterology   • [place] XCAPSL CTRC RMVL INSJ IO LENS PROSTH W/O ECP Right 2024    Procedure: EXTRACTION EXTRACAPSULAR CATARACT PHACO INTRAOCULAR LENS (IOL);  Surgeon: Jamie Engel MD;  Location: [facility];  Service: Ophthalmology   • TONSILLECTOMY N/A    • VEIN LIGATION AND STRIPPING Bilateral        Family History   Problem Relation Age of Onset   • Heart disease Mother         CHF   • Breast cancer additional onset Mother 49   • Breast cancer Mother 49   • Dysphagia Father    • Breast cancer Maternal Aunt    • Breast cancer Paternal Aunt    • Ovarian cancer Maternal Aunt    • Ovarian cancer Maternal Aunt    • Cervical cancer Maternal Aunt        Medications have been verified.    Objective     /72   Pulse 88   Temp (!) 97.1 °F (36.2 °C)   Resp 16   Ht 5' (1.524 m)   Wt 68.5 kg (151 lb)   LMP  (LMP Unknown)   SpO2 93%   BMI 29.49 kg/m²   No LMP recorded (lmp unknown). Patient has had a hysterectomy.     Physical Exam     Physical Exam  Vitals reviewed.   Constitutional:       General: She is not in acute distress.     Appearance: Normal appearance. She is not ill-appearing.   HENT:      Head: Normocephalic and atraumatic.      Right Ear: Ear canal normal. A middle ear effusion is present.      Left Ear: Ear canal normal. A middle ear effusion is present.      Nose: Congestion present.      Mouth/Throat:      Mouth: Mucous membranes are moist.       Pharynx: Posterior oropharyngeal erythema present. No oropharyngeal exudate.      Tonsils: No tonsillar exudate.   Eyes:      Extraocular Movements: Extraocular movements intact.      Conjunctiva/sclera: Conjunctivae normal.   Cardiovascular:      Rate and Rhythm: Normal rate and regular rhythm.      Pulses: Normal pulses.      Heart sounds: Normal heart sounds. No murmur heard.  Pulmonary:      Effort: Pulmonary effort is normal. No respiratory distress.      Breath sounds: Normal breath sounds. No wheezing.   Musculoskeletal:      Cervical back: Normal range of motion and neck supple. No tenderness.   Skin:     General: Skin is warm.   Neurological:      General: No focal deficit present.      Mental Status: She is alert.   Psychiatric:         Mood and Affect: Mood normal.         Behavior: Behavior normal.         Judgment: Judgment normal.                    decreased po intake

## 2025-04-23 LAB
FLUAV RNA RESP QL NAA+PROBE: NEGATIVE
FLUBV RNA RESP QL NAA+PROBE: NEGATIVE
SARS-COV-2 RNA RESP QL NAA+PROBE: NEGATIVE

## 2025-05-06 ENCOUNTER — OFFICE VISIT (OUTPATIENT)
Age: 70
End: 2025-05-06
Payer: MEDICARE

## 2025-05-06 VITALS
TEMPERATURE: 97.1 F | WEIGHT: 146.4 LBS | HEART RATE: 89 BPM | SYSTOLIC BLOOD PRESSURE: 116 MMHG | HEIGHT: 60 IN | DIASTOLIC BLOOD PRESSURE: 62 MMHG | OXYGEN SATURATION: 97 % | BODY MASS INDEX: 28.74 KG/M2

## 2025-05-06 DIAGNOSIS — M54.50 CHRONIC MIDLINE LOW BACK PAIN WITHOUT SCIATICA: ICD-10-CM

## 2025-05-06 DIAGNOSIS — G89.4 CHRONIC PAIN SYNDROME: ICD-10-CM

## 2025-05-06 DIAGNOSIS — M15.0 PRIMARY GENERALIZED (OSTEO)ARTHRITIS: ICD-10-CM

## 2025-05-06 DIAGNOSIS — R79.82 ELEVATED C-REACTIVE PROTEIN: ICD-10-CM

## 2025-05-06 DIAGNOSIS — R76.8 ANA POSITIVE: ICD-10-CM

## 2025-05-06 DIAGNOSIS — M79.10 MYALGIA: ICD-10-CM

## 2025-05-06 DIAGNOSIS — M79.7 FIBROMYALGIA: ICD-10-CM

## 2025-05-06 DIAGNOSIS — J33.9 NASAL POLYPS: ICD-10-CM

## 2025-05-06 DIAGNOSIS — G89.29 CHRONIC MIDLINE LOW BACK PAIN WITHOUT SCIATICA: ICD-10-CM

## 2025-05-06 DIAGNOSIS — M35.9 UNDIFFERENTIATED CONNECTIVE TISSUE DISEASE (HCC): Primary | ICD-10-CM

## 2025-05-06 DIAGNOSIS — R53.82 CHRONIC FATIGUE: ICD-10-CM

## 2025-05-06 DIAGNOSIS — J45.40 MODERATE PERSISTENT ASTHMA, UNSPECIFIED WHETHER COMPLICATED: ICD-10-CM

## 2025-05-06 PROCEDURE — G2211 COMPLEX E/M VISIT ADD ON: HCPCS | Performed by: INTERNAL MEDICINE

## 2025-05-06 PROCEDURE — 99215 OFFICE O/P EST HI 40 MIN: CPT | Performed by: INTERNAL MEDICINE

## 2025-05-06 RX ORDER — PREGABALIN 50 MG/1
CAPSULE ORAL
Qty: 90 CAPSULE | Refills: 3 | Status: SHIPPED | OUTPATIENT
Start: 2025-05-06

## 2025-05-06 RX ORDER — CETIRIZINE HYDROCHLORIDE 10 MG/1
10 TABLET ORAL DAILY
COMMUNITY

## 2025-05-06 NOTE — PROGRESS NOTES
Assessment/Plan:    Undifferentiated connective tissue disease (HCC)  History of low-grade titer CARLOS, low titer double-stranded DNA, with negative Sjogren's antibodies, negative rheumatoid factor and CCP, negative Veras and negative RNP antibodies, with history of low-grade fevers, occasional mouth ulcers, hair thinning, and dry eyes treated with Systane with history of Raynaud's phenomenon not seen on exam at this office visit, with documented nasal polyposis and history of sinusitis with marked positive response on Dupixent, with workup for EGPA negative in the past with negative ANCA profile.  While her sinus biopsy does show eosinophilic aggregates with inflammation predominantly lymphoplasmacytic, ANCA has been negative, she has no evidence of eosinophilia and IgE is normal, and her nasal exam has improved considerably with Dupixent, which would make EGPA unlikely.  Patient has been referred for extensive serologic evaluation to include lupus Avise CTD diagnostic testing, myositis specific antibodies, CK, aldolase, repeat CBC, CMP, and CRP.  Clinically the patient does not appear to have signs or symptoms consistent with connective tissue disease, however, we will update her workup with lab work as noted above, and plan follow-up in 3 months or sooner if needed.  Clinically her presentation most consistent with fibromyalgia with chronic pain, fatigue, brain fog, and generalized malaise.    Primary generalized (osteo)arthritis  Primary generalized osteoarthritis with interphalangeal osteoarthritis with no evidence clinically for inflammatory arthritis by physical findings in addition to musculoskeletal ultrasound, and serologies.  The ultrasound of her right carpal tunnel was suggestive of carpal tunnel syndrome however EMG studies were negative.  I do think she can treat symptomatically with wrist splints for nighttime use if needed.  She has been given a low-dose Celebrex which I told her to use very  cautiously in view of her history of Argueta's and GERD.  She has had chronic nonradicular low back pain.  I did refer her for x-rays of her lumbar spine.  May consider physical therapy if she does not have benefit with switching from gabapentin to Lyrica.  Encouraged home exercise as tolerated.  Plan follow-up in 3 months or sooner if needed.    Low back pain  Nonradicular low back pain.  Referred for lumbar spine films to rule out degenerative spondylosis.  I am sure that a component of her back pain is myofascial.  Patient is going to be transition from gabapentin to Lyrica to see if she has a better response for her chronic pain.  Would consider physical therapy to include water exercise if symptoms persist.  Encouraged gentle home exercise program as tolerated.  Continue to monitor.    Fibromyalgia  Chronic complex pain syndrome/fibromyalgia with widespread diffuse musculoskeletal pain, with associated sleep disturbance, chronic fatigue, generalized malaise, and questionable IBS.  History of brain fog with memory and concentration difficulties, and difficulty with word finding, with MRI of the brain with NeuroQuant, essentially unremarkable.  She has not had significant benefit with gabapentin and I have asked her to taper off the gabapentin and start Lyrica at a dose of 50 mg nightly for 2 weeks to then be increased to 50 mg twice daily for 2 weeks and ultimately increase to 50 mg every 8 hours thereafter.  I asked her to call to report on her response to the Lyrica, as I suspect that I may have to upwardly titrate the dose depending on her response.  She ultimately will be referred for formal physical therapy with water therapy, with the hope to transition to a water fitness program for long-term benefit.  She may be a candidate for low-dose naltrexone if she does not have significant benefit with Lyrica.  Depending on her workup, and response to Lyrica, would consider a trial of the anti-inflammatory diet,  which may be of benefit for many of her symptoms.  Will discuss that at the time of her next office visit in 3 months or sooner if needed.  Discussed in great detail with the patient.  Patient reassured.    Chronic pain syndrome  See dictated assessment on fibromyalgia.    Nasal polyps  History of nasal polyposis, sinusitis, and history of asthma, with marked response on Dupixent.  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  In addition she has no hypereosinophilia and her IgE is normal.  Low index of suspicion for EGPA.  I have ordered extensive laboratory evaluation to include lupus Avise CTD diagnostic testing and myositis specific antibodies in view of her complaints of weakness, although her muscle enzymes have been normal in the past.  We will repeat CK and aldolase as well.  Will consider repeat ANCA if her CRP remains elevated.  She will continue Dupixent per ENT and follow-up with ENT as scheduled.  Continue to monitor    Moderate persistent asthma  Intermittent symptoms.  Asthma generally well-controlled on Dupixent.  Follow-up ENT as scheduled.    Elevated C-reactive protein  Elevated CRP of 23.6 on lab work dated 8/19/2024.  CARLOS low-grade positive in a dilution of 40 in a speckled pattern with double-stranded DNA low titer positive at 10.  CRP to be repeated with extensive laboratory evaluation ordered.  No evidence for inflammatory arthropathy or significant connective tissue disease on exam at this visit.  Continue to monitor.    Chronic fatigue  Chronic fatigue likely secondary to widespread musculoskeletal pain syndrome consistent with fibromyalgia.  Reassess after patient has had an adequate trial of Lyrica.  She is to taper off of gabapentin which has provided little benefit.  Will likely consider a trial of the anti-inflammatory diet at the time of  the next office visit depending on her response to Lyrica.  Continue to monitor.         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  The ultrasound of her right carpal tunnel was suggestive of carpal tunnel syndrome however EMG studies were negative.  I do think she can treat symptomatically with wrist splints for nighttime use if needed.  She has been given a low-dose Celebrex which I told her to use very cautiously in view of her history of Argueta's and GERD.  She has had chronic nonradicular low back pain.  I did refer her for x-rays of her lumbar spine.  May consider physical therapy if she does not have benefit with switching from gabapentin to Lyrica.  Encouraged home exercise as tolerated.  Plan follow-up in 3 months or sooner if needed.         Nasal polyps    History of nasal polyposis, sinusitis, and history of asthma, with marked response on Dupixent.  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  In addition she has no hypereosinophilia and her IgE is normal.  Low index of suspicion for EGPA.  I have ordered extensive laboratory evaluation to include lupus Avise CTD diagnostic testing and myositis specific antibodies in view of her complaints of weakness, although her muscle enzymes have been normal in the past.  We will repeat CK and aldolase as well.  Will consider repeat ANCA if her CRP remains elevated.  She will continue Dupixent per ENT and follow-up with ENT as scheduled.  Continue to monitor         Myalgia    Relevant Orders    MyoMarker 3 Plus Profile (RDL) (Completed)    Low back pain    Nonradicular low  back pain.  Referred for lumbar spine films to rule out degenerative spondylosis.  I am sure that a component of her back pain is myofascial.  Patient is going to be transition from gabapentin to Lyrica to see if she has a better response for her chronic pain.  Would consider physical therapy to include water exercise if symptoms persist.  Encouraged gentle home exercise program as tolerated.  Continue to monitor.         Relevant Orders    XR spine lumbar minimum 4 views non injury (Completed)    Chronic pain syndrome    See dictated assessment on fibromyalgia.         Relevant Medications    pregabalin (LYRICA) 50 mg capsule    CARLOS positive    Relevant Orders    MISCELLANEOUS LAB TEST (Completed)    Undifferentiated connective tissue disease (HCC) - Primary    History of low-grade titer CARLOS, low titer double-stranded DNA, with negative Sjogren's antibodies, negative rheumatoid factor and CCP, negative Veras and negative RNP antibodies, with history of low-grade fevers, occasional mouth ulcers, hair thinning, and dry eyes treated with Systane with history of Raynaud's phenomenon not seen on exam at this office visit, with documented nasal polyposis and history of sinusitis with marked positive response on Dupixent, with workup for EGPA negative in the past with negative ANCA profile.  While her sinus biopsy does show eosinophilic aggregates with inflammation predominantly lymphoplasmacytic, ANCA has been negative, she has no evidence of eosinophilia and IgE is normal, and her nasal exam has improved considerably with Dupixent, which would make EGPA unlikely.  Patient has been referred for extensive serologic evaluation to include lupus Avise CTD diagnostic testing, myositis specific antibodies, CK, aldolase, repeat CBC, CMP, and CRP.  Clinically the patient does not appear to have signs or symptoms consistent with connective tissue disease, however, we will update her workup with lab work as noted above, and plan  follow-up in 3 months or sooner if needed.  Clinically her presentation most consistent with fibromyalgia with chronic pain, fatigue, brain fog, and generalized malaise.         Relevant Orders    MISCELLANEOUS LAB TEST (Completed)    C-reactive protein (Completed)    Sedimentation rate, automated (Completed)    CBC and differential (Completed)    Comprehensive metabolic panel (Completed)    Urinalysis with microscopic (Completed)    CK (Completed)    Aldolase (Completed)    MyoMarker 3 Plus Profile (RDL) (Completed)    Moderate persistent asthma    Intermittent symptoms.  Asthma generally well-controlled on Dupixent.  Follow-up ENT as scheduled.         Fibromyalgia    Chronic complex pain syndrome/fibromyalgia with widespread diffuse musculoskeletal pain, with associated sleep disturbance, chronic fatigue, generalized malaise, and questionable IBS.  History of brain fog with memory and concentration difficulties, and difficulty with word finding, with MRI of the brain with NeuroQuant, essentially unremarkable.  She has not had significant benefit with gabapentin and I have asked her to taper off the gabapentin and start Lyrica at a dose of 50 mg nightly for 2 weeks to then be increased to 50 mg twice daily for 2 weeks and ultimately increase to 50 mg every 8 hours thereafter.  I asked her to call to report on her response to the Lyrica, as I suspect that I may have to upwardly titrate the dose depending on her response.  She ultimately will be referred for formal physical therapy with water therapy, with the hope to transition to a water fitness program for long-term benefit.  She may be a candidate for low-dose naltrexone if she does not have significant benefit with Lyrica.  Depending on her workup, and response to Lyrica, would consider a trial of the anti-inflammatory diet, which may be of benefit for many of her symptoms.  Will discuss that at the time of her next office visit in 3 months or sooner if needed.   Discussed in great detail with the patient.  Patient reassured.         Relevant Medications    pregabalin (LYRICA) 50 mg capsule    Elevated C-reactive protein    Elevated CRP of 23.6 on lab work dated 8/19/2024.  CARLOS low-grade positive in a dilution of 40 in a speckled pattern with double-stranded DNA low titer positive at 10.  CRP to be repeated with extensive laboratory evaluation ordered.  No evidence for inflammatory arthropathy or significant connective tissue disease on exam at this visit.  Continue to monitor.         Relevant Orders    C-reactive protein (Completed)    Chronic fatigue    Chronic fatigue likely secondary to widespread musculoskeletal pain syndrome consistent with fibromyalgia.  Reassess after patient has had an adequate trial of Lyrica.  She is to taper off of gabapentin which has provided little benefit.  Will likely consider a trial of the anti-inflammatory diet at the time of the next office visit depending on her response to Lyrica.  Continue to monitor.                Reviewed records, labs, and imaging with the patient in detail.  Counseled patient.  Discussion regarding my findings and recommendations.  Office visit with documentation 70 min.    Subjective:      Patient ID: Diamond Aquino is a 70 y.o. female.    70-year-old female, being seen as a transfer of care from prior rheumatologist at [facility], with a complicated medical history of osteoarthritis, more prominent in her hands, and diffuse widespread musculoskeletal pain consistent with fibromyalgia, who has been seen by several rheumatologists, including 2 at [facility] and at least 1 at [facility].  She was last seen by rheumatology in September 2024 at which time clinically she had no signs or symptoms of connective tissue disease or vasculitis.  She has been evaluated extensively for EGPA in view of history of nasal polyposis, which has improved significantly on Dupixent followed by ENT.  Prior workups for EGPA  have been negative and clinically per her ENT, her nasal polyposis has improved significantly with Dupixent.  She has been noted to have low titer CARLOS and low titer double-stranded DNA antibodies with no history of rashes or serositis, but has complained of occasional low-grade fevers, occasional mouth ulcers, hair thinning, dyspnea on exertion secondary to asthma, dry eyes treated with Systane, Raynaud's, vague headaches likely musculoskeletal treated with Tylenol, slight weakness left thigh, and occasional nausea.  Present she has no abdominal pain and notes that her bowel movements are not daily, which is normal for her.  She does complain of nonradicular low back pain.  She did have recent UTI which was treated.  Her main complaint is of generalized pain everywhere, more prominent in her arms, and hands, with some complaints of tingling in her upper extremities and occasional hands, associated with sleep disturbance with early awakening and chronic fatigue.  She notes brain fog and difficulty with word finding, worse since post Covid.  She does have history of constipation and bloating, with history of odynophagia, and questionable pleurisy although she has had intermittent severe asthma, which has improved with Dupixent.  Prior to Dupixent, she was on Fasenra.  She has had biopsy by ENT which did reveal eosinophil aggregates with inflammation predominantly lymphoplasmacytic.  Her most recent ENT evaluation was significant for minimal nasal polyposis which had improved significantly noted by the ENT on Dupixent.  She does have a long history of sinusitis.  Patient has been evaluated by neuromuscular neurologist, with extensive workup thus far nonrevealing.    Past medical history significant for nasal polyposis, sinusitis, Argueta's, GERD, hiatal hernia, odynophagia hearing loss, colon polyp, hypercholesterolemia, tinnitus, vertigo, history of COVID, history concussion, DVT remote during pregnancy, generalized  anxiety disorder, and history of subarachnoid hemorrhage secondary to trauma in 2020.  She was seen by a neuromuscular neurologist due to history of weakness and facial numbness felt to be related to postconcussion.     Patient has had extensive workup with the most recent serologies dated 8/19/2024 significant for CARLOS positive in a dilution of 40 in a speckled pattern, rheumatoid factor and CCP negative.  Sjogren's antibodies negative.  Veras and RNP antibodies negative.  Sed rate 43.  CRP 23.6.    Review of pertinent serologies from 7/15/2019 significant for IgG subclasses with normal IgG4.  IgG slightly low at 597.  C3 and C4 normal.  CK normal.  Sjogren's antibodies negative.  CCP negative.  Protein to creatinine urine ratio borderline high at 0.20.  Lab work dated 7/1/2019 significant for CARLOS negative.  Double-stranded DNA antibodies negative.  Sed rate 16.  C-reactive protein elevated at 8.8.  Rheumatoid factor negative.  Lyme antibody negative.  Lab work dated 11/26/2018 significant for ANCA panel negative.  CARLOS negative.  IgE normal at 76.    X-rays of her hands dated 1/9/2025 significant for progressive first CMC osteoarthritis with prior hand films dated 6/20/2023 significant for osteoarthritis first CMC joints and IP thumb joints with some evidence of spurs consistent with osteoarthritis.  Musculoskeletal ultrasound bilateral hands and wrist dated 12/6/2023 significant for no evidence of synovitis or tenosynovitis.  No inflammation noted.  Ultrasound right carpal tunnel suspicious for carpal tunnel syndrome with no evidence for carpal tunnel syndrome, large fiber neuropathy, mononeuropathy, or cervical radiculopathy noted on EMGs dated 1/10/2025.  Ultrasound of the right elbow negative.  Cervical spine films significant for spondylosis.  Lumbar spine films significant for mild lumbar facet arthritis.  MRI with NeuroIconfinder brain 6/3/2024 was negative, with the exception of mild chronic  microangiopathy.  DEXA dated 8/13/2024 significant for L1, L2, and L4 T-score -2.2.  Left total hip T-score -1.6 with femoral neck T-score -1.9.  Right total hip T-score -2.0 with femoral neck T-score -2.0.  BMD stable from the prior study dated 2/28/2022.  Increased FRAX risk for fracture noted in view of history of parental hip fracture, history steroid use, history of RA, history COPD.  Bilateral hip films dated 8/19/2024 significant for mild osteoarthritis.      Allergies  Allergies   Allergen Reactions    Pitavastatin Hives, Shortness Of Breath and Rash     Nausea    Cefditoren Nausea Only and Vomiting    Cefditoren Pivoxil Hives     N/V    Dust Mite Extract Sneezing    Levofloxacin GI Intolerance    Mold Extract [Trichophyton] Sneezing    Moxifloxacin GI Intolerance       Home Medications    Current Outpatient Medications:     acetaminophen (TYLENOL) 325 mg tablet, Take 650 mg by mouth every 6 (six) hours as needed for mild pain, Disp: , Rfl:     albuterol (ProAir HFA) 90 mcg/act inhaler, Inhale 2 puffs every 4 (four) hours as needed for wheezing, Disp: 8 g, Rfl: 3    ALPRAZolam (XANAX) 0.5 mg tablet, as needed, Disp: , Rfl: 0    calcium citrate-vitamin D (CITRACAL+D) 315-200 MG-UNIT per tablet, Take 1 tablet by mouth every morning, Disp: , Rfl:     celecoxib (CeleBREX) 200 mg capsule, Take 1 capsule (200 mg total) by mouth daily Take with food, Disp: 90 capsule, Rfl: 1    cetirizine (ZyrTEC) 10 mg tablet, Take 10 mg by mouth daily (Patient not taking: Reported on 5/8/2025), Disp: , Rfl:     Dupilumab (Dupixent) 300 MG/2ML SOAJ, INJECT 1 PEN UNDER THE SKIN EVERY 14 DAYS, Disp: 4 mL, Rfl: 5    dupilumab (DUPIXENT) subcutaneous injection, Inject 2 mL (300 mg total) under the skin every 14 (fourteen) days, Disp: 4 mL, Rfl: 5    famciclovir (FAMVIR) 500 mg tablet, if needed (Patient not taking: Reported on 5/8/2025), Disp: , Rfl:     Fluticasone Propionate (Xhance) 93 MCG/ACT EXHU, 1 spray into each nostril 2  (two) times a day, Disp: 16 mL, Rfl: 5    gabapentin (NEURONTIN) 400 mg capsule, Take 1 capsule (400 mg total) by mouth 3 (three) times a day, Disp: 270 capsule, Rfl: 1    Multiple Vitamins-Minerals (multivitamin with minerals) tablet, Take 1 tablet by mouth daily, Disp: , Rfl:     Multiple Vitamins-Minerals (PRESERVISION AREDS 2 PO), Take by mouth 2 (two) times a day, Disp: , Rfl:     pregabalin (LYRICA) 50 mg capsule, Take 1 capsule at bedtime and in 2 weeks increase to 1 capsule twice daily for 2 weeks and then increase to 1 capsule every 8 hours (Patient not taking: Reported on 5/8/2025), Disp: 90 capsule, Rfl: 3    traZODone (DESYREL) 50 mg tablet, Take 1 tablet (50 mg total) by mouth daily at bedtime, Disp: 30 tablet, Rfl: 5    benzonatate (TESSALON) 200 MG capsule, Take 1 capsule (200 mg total) by mouth 3 (three) times a day as needed for cough, Disp: 20 capsule, Rfl: 0    brompheniramine-pseudoephedrine-DM 30-2-10 MG/5ML syrup, Take 10 mL by mouth 3 (three) times a day as needed for cough or congestion, Disp: 200 mL, Rfl: 0    clobetasol (TEMOVATE) 0.05 % ointment, Apply topically 2 (two) times a day as needed (irritation), Disp: 15 g, Rfl: 3    Coenzyme Q10 (Co Q 10) 100 MG CAPS, Take by mouth, Disp: , Rfl:     methylPREDNISolone 4 MG tablet therapy pack, Use as directed on package, Disp: 21 tablet, Rfl: 0    phenazopyridine (PYRIDIUM) 95 MG tablet, Take 1 tablet (95 mg total) by mouth 3 (three) times a day as needed for bladder spasms, Disp: 10 tablet, Rfl: 0    Past Medical History  Past Medical History:   Diagnosis Date    Anesthesia complication     desaturation of oxygen mostly at night- on O2 at 2L at hs-may wake up with asthma issue    Anxiety     Arthritis     Asthma     Argueta esophagus     Breathing difficulty     pt states s/p anesthesia will have an asthma attack-pt to bring ProAir    Chest pain     upper chest-intermittent asthma related?    Colon polyp     COVID-19 02/2020    Decreased  hearing of both ears     DVT (deep venous thrombosis) (HCC)     during pregnancy in the leg    Fibromyalgia     GERD (gastroesophageal reflux disease)     Hiatal hernia     Hypercholesteremia     Macular degeneration     wet-right eye injected 24    Nasal polyps     Sinusitis, chronic     Subarachnoid hemorrhage (HCC) 2020    Tinnitus     Vertigo     on occ    Wears glasses        Past Surgical History   Past Surgical History:   Procedure Laterality Date     SECTION N/A     x 2    COLONOSCOPY      ESOPHAGOGASTRODUODENOSCOPY      HYSTERECTOMY      complete    NASAL POLYP SURGERY      x3-2013,,    [place] COLSC FLX W/RMVL OF TUMOR POLYP LESION SNARE TQ N/A 2017    Procedure: COLONOSCOPYwith  snare polypectomy.;  Surgeon: Cuong Mejia MD;  Location: [facility] GI [facility];  Service: Gastroenterology    [place] EGD TRANSORAL BIOPSY SINGLE/MULTIPLE N/A 2017    Procedure: ESOPHAGOGASTRODUODENOSCOPY (EGD)and biopsy.;  Surgeon: Cuong Mejia MD;  Location: [facility] GI [facility];  Service: Gastroenterology    [place] XCAPSL CTRC RMVL INSJ IO LENS PROSTH W/O ECP Right 2024    Procedure: EXTRACTION EXTRACAPSULAR CATARACT PHACO INTRAOCULAR LENS (IOL);  Surgeon: Jamie Engel MD;  Location: [facility] MAIN OR;  Service: Ophthalmology    TONSILLECTOMY N/A     VEIN LIGATION AND STRIPPING Bilateral        Family History   Family History   Problem Relation Name Age of Onset    Heart disease Mother          CHF    Breast cancer additional onset Mother  49    Breast cancer Mother  49    Dysphagia Father      Breast cancer Maternal Aunt      Breast cancer Paternal Aunt      Ovarian cancer Maternal Aunt      Ovarian cancer Maternal Aunt      Cervical cancer Maternal Aunt         The following portions of the patient's history were reviewed and updated as appropriate: allergies, current medications, past family history, past medical history, past social history, past surgical history, and problem list.    Review of  Systems   Constitutional:  Positive for fatigue and fever (Occasional low-grade fevers). Negative for chills.   HENT:  Positive for mouth sores (Occasional mouth ulcers). Negative for ear pain and sore throat.    Eyes:  Negative for pain and visual disturbance.        Dry eyes using Systane.   Respiratory:  Negative for cough and shortness of breath (Asthma generally well-controlled at present.  Occasional dyspnea on exertion.).    Cardiovascular:  Negative for chest pain and palpitations.   Gastrointestinal:  Positive for nausea (Occasional nausea). Negative for abdominal pain and vomiting.   Genitourinary:  Negative for dysuria and hematuria.   Musculoskeletal:  Positive for arthralgias, back pain and myalgias.   Skin:  Negative for color change and rash.        Hair thinning with no alopecia or discoid lupus.   Neurological:  Positive for weakness, numbness and headaches. Negative for seizures and syncope.   Psychiatric/Behavioral:  Positive for sleep disturbance.    All other systems reviewed and are negative.        Objective:      /62 (BP Location: Right arm, Patient Position: Sitting, Cuff Size: Adult)   Pulse 89   Temp (!) 97.1 °F (36.2 °C) (Tympanic)   Ht 5' (1.524 m)   Wt 66.4 kg (146 lb 6.4 oz)   LMP  (LMP Unknown)   SpO2 97%   BMI 28.59 kg/m²          Physical Exam  Vitals reviewed.   Constitutional:       Appearance: Normal appearance.   HENT:      Head: Normocephalic.      Nose:      Comments: Nose and throat unremarkable.    Eyes:      Extraocular Movements: Extraocular movements intact.     Neck:      Comments: Without masses, thyromegaly, lymphadenopathy  Cardiovascular:      Rate and Rhythm: Regular rhythm.   Pulmonary:      Breath sounds: Normal breath sounds.   Abdominal:      Palpations: Abdomen is soft.     Musculoskeletal:      Cervical back: Neck supple.      Comments: Neck decreased lateral flexion.  Spurling's negative.  Shoulders slightly decreased abduction and external  rotation with tenderness appreciated supraspinatus tendon insertions.  Elbows full range of motion.  Wrist full range of motion.  Tinel's positive right wrist.  Hands squaring first carpometacarpal joints bilaterally left greater than right with few Heberden's nodes appreciated.  No synovitis noted.  Straight leg raising negative bilaterally.  Schober's negative.  No SI joint tenderness appreciated.  Hips full range of motion.  Knees full range of motion with patellofemoral crepitus.  Ankles full range of motion.  Feet rearfoot hyperpronation with very mild inner phalangeal osteoarthritis.  No synovitis appreciated.     Skin:     General: Skin is warm.      Comments: No Raynaud's appreciated.  No rashes noted.     Neurological:      Motor: Weakness (Very slight weakness left proximal thigh difficult to ascertain whether it is giveaway weakness secondary to pain however.) present.               This note was written in part using the assistance of the [employer] [id number] microphone system. Those portions using this system have been dictated and not read.

## 2025-05-06 NOTE — PATIENT INSTRUCTIONS
Get the lab work ordered completed.  Get the x-ray of your back completed.  We will give you a kit for one of the lab test and will be sent to [place].  It is time sensitive and must be drawn at [facility]'s Monday through Thursday before noon.  Once I have the results of the lab work I will be in touch to let you know the results and next steps.  I will send a note to Dr. Roman as well.  You are going to start the Lyrica low-dose as prescribed.  Start to decrease your gabapentin by dropping the morning dose for the first week then drop the midday dose for the second week then drop the evening dose for the third week so that you will be totally off of the gabapentin.  Call in 2 weeks and let me know how the Lyrica is working because I suspect we will have to raise that.

## 2025-05-08 ENCOUNTER — OFFICE VISIT (OUTPATIENT)
Dept: URGENT CARE | Facility: CLINIC | Age: 70
End: 2025-05-08
Payer: MEDICARE

## 2025-05-08 ENCOUNTER — TELEPHONE (OUTPATIENT)
Age: 70
End: 2025-05-08

## 2025-05-08 ENCOUNTER — APPOINTMENT (OUTPATIENT)
Dept: RADIOLOGY | Facility: CLINIC | Age: 70
End: 2025-05-08
Attending: PHYSICIAN ASSISTANT
Payer: MEDICARE

## 2025-05-08 VITALS
OXYGEN SATURATION: 98 % | BODY MASS INDEX: 28.66 KG/M2 | HEART RATE: 77 BPM | TEMPERATURE: 98 F | RESPIRATION RATE: 20 BRPM | WEIGHT: 146 LBS | HEIGHT: 60 IN

## 2025-05-08 DIAGNOSIS — J20.8 ACUTE BACTERIAL BRONCHITIS: Primary | ICD-10-CM

## 2025-05-08 DIAGNOSIS — B96.89 ACUTE BACTERIAL BRONCHITIS: Primary | ICD-10-CM

## 2025-05-08 DIAGNOSIS — R09.89 CHEST CONGESTION: ICD-10-CM

## 2025-05-08 PROCEDURE — 99214 OFFICE O/P EST MOD 30 MIN: CPT | Performed by: PHYSICIAN ASSISTANT

## 2025-05-08 PROCEDURE — 71046 X-RAY EXAM CHEST 2 VIEWS: CPT

## 2025-05-08 RX ORDER — CLARITHROMYCIN 500 MG/1
500 TABLET ORAL EVERY 12 HOURS SCHEDULED
Qty: 14 TABLET | Refills: 0 | Status: SHIPPED | OUTPATIENT
Start: 2025-05-08 | End: 2025-05-15

## 2025-05-08 RX ORDER — BENZONATATE 200 MG/1
200 CAPSULE ORAL 3 TIMES DAILY PRN
Qty: 20 CAPSULE | Refills: 0 | Status: SHIPPED | OUTPATIENT
Start: 2025-05-08

## 2025-05-08 NOTE — PROGRESS NOTES
[facility]        NAME: Diamond Aquino is a 70 y.o. female  : 1955    MRN: [id number]  DATE: May 8, 2025  TIME: 7:11 PM    Assessment and Plan   Acute bacterial bronchitis [J20.8, B96.89]  1. Acute bacterial bronchitis  XR chest pa and lateral    clarithromycin (BIAXIN) 500 mg tablet    benzonatate (TESSALON) 200 MG capsule        CXR clear per my preliminary interpretation, pending radiology report. Pt states clarithromycin works the best for her. I advised her to not take any Xanax until she finishes the antibiotic. Supportive care and staying hydrated discussed.    Discussed strict return to care precautions as well as red flag symptoms which should prompt immediate ED referral. Pt verbalized understanding and is in agreement with plan.  Please follow up with your primary care provider within the next week. Please remember that your visit today was with an urgent care provider and should not replace follow up with your primary care provider for chronic medical issues or annual physicals.       Patient Instructions       Follow up with PCP in 3-5 days.  Proceed to  ER if symptoms worsen.    If tests are performed, our office will contact you with results only if changes need to made to the care plan discussed with you at the visit. You can review your full results on [facility].    Chief Complaint     Chief Complaint   Patient presents with    viral illness     Sore throat , chest congestion, ear pain, cough, wheezing used inhaler x 2 since Monday         History of Present Illness       Pt is a 69 yo female pw cough, congestion x 2.5 weeks. Was seen here  and prescribed prednisone and Bromfed. States she felt a little better with the prednisone but never 100% and symptoms have persisted; then 3 days age symptoms acutely worsened again and she feels worse. Has had to use her rescue inhaler twice and has not had to do this in several years. Endorses wheezing and chest tightness as well as  congestion.         Review of Systems   Review of Systems   Constitutional:  Positive for diaphoresis (at night).   HENT:  Positive for congestion. Negative for sinus pressure and sinus pain.    Respiratory:  Positive for chest tightness. Negative for wheezing.    Gastrointestinal:  Negative for nausea and vomiting.   Musculoskeletal:  Negative for myalgias.   Neurological:  Negative for headaches.         Current Medications       Current Outpatient Medications:     acetaminophen (TYLENOL) 325 mg tablet, Take 650 mg by mouth every 6 (six) hours as needed for mild pain, Disp: , Rfl:     albuterol (ProAir HFA) 90 mcg/act inhaler, Inhale 2 puffs every 4 (four) hours as needed for wheezing, Disp: 8 g, Rfl: 3    ALPRAZolam (XANAX) 0.5 mg tablet, as needed, Disp: , Rfl: 0    benzonatate (TESSALON) 200 MG capsule, Take 1 capsule (200 mg total) by mouth 3 (three) times a day as needed for cough, Disp: 20 capsule, Rfl: 0    calcium citrate-vitamin D (CITRACAL+D) 315-200 MG-UNIT per tablet, Take 1 tablet by mouth every morning, Disp: , Rfl:     celecoxib (CeleBREX) 200 mg capsule, Take 1 capsule (200 mg total) by mouth daily Take with food, Disp: 90 capsule, Rfl: 1    clarithromycin (BIAXIN) 500 mg tablet, Take 1 tablet (500 mg total) by mouth every 12 (twelve) hours for 7 days, Disp: 14 tablet, Rfl: 0    Dupilumab (Dupixent) 300 MG/2ML SOAJ, INJECT 1 PEN UNDER THE SKIN EVERY 14 DAYS, Disp: 4 mL, Rfl: 5    dupilumab (DUPIXENT) subcutaneous injection, Inject 2 mL (300 mg total) under the skin every 14 (fourteen) days, Disp: 4 mL, Rfl: 5    Fluticasone Propionate (Xhance) 93 MCG/ACT EXHU, 1 spray into each nostril 2 (two) times a day, Disp: 16 mL, Rfl: 5    gabapentin (NEURONTIN) 400 mg capsule, Take 1 capsule (400 mg total) by mouth 3 (three) times a day, Disp: 270 capsule, Rfl: 1    Multiple Vitamins-Minerals (multivitamin with minerals) tablet, Take 1 tablet by mouth daily, Disp: , Rfl:     Multiple Vitamins-Minerals  (PRESERVISION AREDS 2 PO), Take by mouth 2 (two) times a day, Disp: , Rfl:     traZODone (DESYREL) 50 mg tablet, Take 1 tablet (50 mg total) by mouth daily at bedtime, Disp: 30 tablet, Rfl: 5    brompheniramine-pseudoephedrine-DM 30-2-10 MG/5ML syrup, Take 10 mL by mouth 3 (three) times a day as needed for cough or congestion, Disp: 200 mL, Rfl: 0    cetirizine (ZyrTEC) 10 mg tablet, Take 10 mg by mouth daily (Patient not taking: Reported on 5/8/2025), Disp: , Rfl:     clobetasol (TEMOVATE) 0.05 % ointment, Apply topically 2 (two) times a day as needed (irritation), Disp: 15 g, Rfl: 3    Coenzyme Q10 (Co Q 10) 100 MG CAPS, Take by mouth, Disp: , Rfl:     famciclovir (FAMVIR) 500 mg tablet, if needed (Patient not taking: Reported on 5/8/2025), Disp: , Rfl:     methylPREDNISolone 4 MG tablet therapy pack, Use as directed on package, Disp: 21 tablet, Rfl: 0    phenazopyridine (PYRIDIUM) 95 MG tablet, Take 1 tablet (95 mg total) by mouth 3 (three) times a day as needed for bladder spasms, Disp: 10 tablet, Rfl: 0    pregabalin (LYRICA) 50 mg capsule, Take 1 capsule at bedtime and in 2 weeks increase to 1 capsule twice daily for 2 weeks and then increase to 1 capsule every 8 hours (Patient not taking: Reported on 5/8/2025), Disp: 90 capsule, Rfl: 3    Current Allergies     Allergies as of 05/08/2025 - Reviewed 05/08/2025   Allergen Reaction Noted    Pitavastatin Hives, Shortness Of Breath, and Rash 09/19/2014    Cefditoren Nausea Only and Vomiting 07/06/2020    Cefditoren pivoxil Hives 02/26/2016    Dust mite extract Sneezing 02/26/2016    Levofloxacin GI Intolerance 11/05/2022    Mold extract [trichophyton] Sneezing 02/26/2016    Moxifloxacin GI Intolerance 11/05/2022            The following portions of the patient's history were reviewed and updated as appropriate: allergies, current medications, past family history, past medical history, past social history, past surgical history and problem list.     Past Medical  History:   Diagnosis Date    Anesthesia complication     desaturation of oxygen mostly at night- on O2 at 2L at hs-may wake up with asthma issue    Anxiety     Arthritis     Asthma     Argueta esophagus     Breathing difficulty     pt states s/p anesthesia will have an asthma attack-pt to bring ProAir    Chest pain     upper chest-intermittent asthma related?    Colon polyp     COVID-19 2020    Decreased hearing of both ears     DVT (deep venous thrombosis) (HCC)     during pregnancy in the leg    Fibromyalgia     GERD (gastroesophageal reflux disease)     Hiatal hernia     Hypercholesteremia     Macular degeneration     wet-right eye injected 24    Nasal polyps     Sinusitis, chronic     Subarachnoid hemorrhage (HCC) 2020    Tinnitus     Vertigo     on occ    Wears glasses        Past Surgical History:   Procedure Laterality Date     SECTION N/A     x 2    COLONOSCOPY      ESOPHAGOGASTRODUODENOSCOPY      HYSTERECTOMY      complete    NASAL POLYP SURGERY      x3-,,    TX COLSC FLX W/RMVL OF TUMOR POLYP LESION SNARE TQ N/A 2017    Procedure: COLONOSCOPYwith  snare polypectomy.;  Surgeon: Cuong Mejia MD;  Location: [facility] GI LAB;  Service: Gastroenterology    TX EGD TRANSORAL BIOPSY SINGLE/MULTIPLE N/A 2017    Procedure: ESOPHAGOGASTRODUODENOSCOPY (EGD)and biopsy.;  Surgeon: Cuong Mejia MD;  Location: [facility] GI LAB;  Service: Gastroenterology    TX XCAPSL CTRC RMVL INSJ IO LENS PROSTH W/O ECP Right 2024    Procedure: EXTRACTION EXTRACAPSULAR CATARACT PHACO INTRAOCULAR LENS (IOL);  Surgeon: Jamie Engel MD;  Location: [facility] MAIN OR;  Service: Ophthalmology    TONSILLECTOMY N/A     VEIN LIGATION AND STRIPPING Bilateral        Family History   Problem Relation Age of Onset    Heart disease Mother         CHF    Breast cancer additional onset Mother 49    Breast cancer Mother 49    Dysphagia Father     Breast cancer Maternal Aunt     Breast cancer Paternal  Aunt     Ovarian cancer Maternal Aunt     Ovarian cancer Maternal Aunt     Cervical cancer Maternal Aunt          Medications have been verified.        Objective   Pulse 77   Temp 98 °F (36.7 °C)   Resp 20   Ht 5' (1.524 m)   Wt 66.2 kg (146 lb)   LMP  (LMP Unknown)   SpO2 98%   BMI 28.51 kg/m²        Physical Exam     Physical Exam  Vitals and nursing note reviewed.   Constitutional:       General: She is not in acute distress.     Appearance: Normal appearance. She is not ill-appearing.   HENT:      Head: Normocephalic and atraumatic.      Right Ear: Ear canal and external ear normal. A middle ear effusion is present.      Left Ear: Ear canal and external ear normal. A middle ear effusion is present.      Nose: Nose normal.      Right Sinus: No maxillary sinus tenderness or frontal sinus tenderness.      Left Sinus: No maxillary sinus tenderness or frontal sinus tenderness.      Mouth/Throat:      Mouth: Mucous membranes are moist.      Pharynx: Oropharynx is clear. No oropharyngeal exudate or posterior oropharyngeal erythema.      Comments: Hoarse voice  Eyes:      Conjunctiva/sclera: Conjunctivae normal.   Cardiovascular:      Rate and Rhythm: Normal rate and regular rhythm.      Heart sounds: Normal heart sounds.   Pulmonary:      Effort: Pulmonary effort is normal. No respiratory distress.      Breath sounds: Normal breath sounds. No wheezing, rhonchi or rales.   Skin:     General: Skin is warm and dry.      Capillary Refill: Capillary refill takes less than 2 seconds.   Neurological:      Mental Status: She is alert and oriented to person, place, and time.   Psychiatric:         Behavior: Behavior normal.

## 2025-05-08 NOTE — TELEPHONE ENCOUNTER
PA for Pregabalin 50mg SUBMITTED to [employer]    via    [x]CMM-KEY: [id number]  []Surescripts-Case ID #    []Availity-Auth ID #  NDC #    []Faxed to plan   []Other website    []Phone call Case ID #       [x]PA sent as URGENT    All office notes, labs and other pertaining documents and studies sent. Clinical questions answered. Awaiting determination from insurance company.     Turnaround time for your insurance to make a decision on your Prior Authorization can take 7-21 business days.

## 2025-05-19 ENCOUNTER — APPOINTMENT (OUTPATIENT)
Dept: LAB | Facility: CLINIC | Age: 70
End: 2025-05-19
Attending: INTERNAL MEDICINE
Payer: COMMERCIAL

## 2025-05-19 DIAGNOSIS — M35.9 UNDIFFERENTIATED CONNECTIVE TISSUE DISEASE (HCC): ICD-10-CM

## 2025-05-19 DIAGNOSIS — R79.82 ELEVATED C-REACTIVE PROTEIN: ICD-10-CM

## 2025-05-19 DIAGNOSIS — M79.10 MYALGIA: ICD-10-CM

## 2025-05-19 DIAGNOSIS — R76.8 ANA POSITIVE: ICD-10-CM

## 2025-05-19 LAB
ALBUMIN SERPL BCG-MCNC: 4.2 G/DL (ref 3.5–5)
ALP SERPL-CCNC: 89 U/L (ref 34–104)
ALT SERPL W P-5'-P-CCNC: 44 U/L (ref 7–52)
ANION GAP SERPL CALCULATED.3IONS-SCNC: 6 MMOL/L (ref 4–13)
AST SERPL W P-5'-P-CCNC: 48 U/L (ref 13–39)
BACTERIA UR QL AUTO: ABNORMAL /HPF
BASOPHILS # BLD AUTO: 0.07 THOUSANDS/ÂΜL (ref 0–0.1)
BASOPHILS NFR BLD AUTO: 1 % (ref 0–1)
BILIRUB SERPL-MCNC: 0.46 MG/DL (ref 0.2–1)
BILIRUB UR QL STRIP: NEGATIVE
BUN SERPL-MCNC: 22 MG/DL (ref 5–25)
CALCIUM SERPL-MCNC: 9.7 MG/DL (ref 8.4–10.2)
CHLORIDE SERPL-SCNC: 102 MMOL/L (ref 96–108)
CK SERPL-CCNC: 78 U/L (ref 26–192)
CLARITY UR: CLEAR
CO2 SERPL-SCNC: 29 MMOL/L (ref 21–32)
COLOR UR: ABNORMAL
CREAT SERPL-MCNC: 0.81 MG/DL (ref 0.6–1.3)
CRP SERPL QL: 16.2 MG/L
EOSINOPHIL # BLD AUTO: 0.26 THOUSAND/ÂΜL (ref 0–0.61)
EOSINOPHIL NFR BLD AUTO: 3 % (ref 0–6)
ERYTHROCYTE [DISTWIDTH] IN BLOOD BY AUTOMATED COUNT: 13.7 % (ref 11.6–15.1)
ERYTHROCYTE [SEDIMENTATION RATE] IN BLOOD: 30 MM/HOUR (ref 0–29)
GFR SERPL CREATININE-BSD FRML MDRD: 73 ML/MIN/1.73SQ M
GLUCOSE SERPL-MCNC: 139 MG/DL (ref 65–140)
GLUCOSE UR STRIP-MCNC: NEGATIVE MG/DL
HCT VFR BLD AUTO: 41.7 % (ref 34.8–46.1)
HGB BLD-MCNC: 13.3 G/DL (ref 11.5–15.4)
HGB UR QL STRIP.AUTO: NEGATIVE
HYALINE CASTS #/AREA URNS LPF: ABNORMAL /LPF
IMM GRANULOCYTES # BLD AUTO: 0.05 THOUSAND/UL (ref 0–0.2)
IMM GRANULOCYTES NFR BLD AUTO: 1 % (ref 0–2)
KETONES UR STRIP-MCNC: NEGATIVE MG/DL
LEUKOCYTE ESTERASE UR QL STRIP: NEGATIVE
LYMPHOCYTES # BLD AUTO: 3.48 THOUSANDS/ÂΜL (ref 0.6–4.47)
LYMPHOCYTES NFR BLD AUTO: 33 % (ref 14–44)
MCH RBC QN AUTO: 31.4 PG (ref 26.8–34.3)
MCHC RBC AUTO-ENTMCNC: 31.9 G/DL (ref 31.4–37.4)
MCV RBC AUTO: 99 FL (ref 82–98)
MONOCYTES # BLD AUTO: 0.86 THOUSAND/ÂΜL (ref 0.17–1.22)
MONOCYTES NFR BLD AUTO: 8 % (ref 4–12)
NEUTROPHILS # BLD AUTO: 5.7 THOUSANDS/ÂΜL (ref 1.85–7.62)
NEUTS SEG NFR BLD AUTO: 54 % (ref 43–75)
NITRITE UR QL STRIP: NEGATIVE
NON-SQ EPI CELLS URNS QL MICRO: ABNORMAL /HPF
NRBC BLD AUTO-RTO: 0 /100 WBCS
PH UR STRIP.AUTO: 6 [PH]
PLATELET # BLD AUTO: 205 THOUSANDS/UL (ref 149–390)
PMV BLD AUTO: 10.6 FL (ref 8.9–12.7)
POTASSIUM SERPL-SCNC: 5 MMOL/L (ref 3.5–5.3)
PROT SERPL-MCNC: 7.3 G/DL (ref 6.4–8.4)
PROT UR STRIP-MCNC: NEGATIVE MG/DL
RBC # BLD AUTO: 4.23 MILLION/UL (ref 3.81–5.12)
RBC #/AREA URNS AUTO: ABNORMAL /HPF
SODIUM SERPL-SCNC: 137 MMOL/L (ref 135–147)
SP GR UR STRIP.AUTO: 1.01 (ref 1–1.03)
UROBILINOGEN UR STRIP-ACNC: <2 MG/DL
WBC # BLD AUTO: 10.42 THOUSAND/UL (ref 4.31–10.16)
WBC #/AREA URNS AUTO: ABNORMAL /HPF

## 2025-05-19 PROCEDURE — 82085 ASSAY OF ALDOLASE: CPT

## 2025-05-19 PROCEDURE — 83516 IMMUNOASSAY NONANTIBODY: CPT

## 2025-05-19 PROCEDURE — 85652 RBC SED RATE AUTOMATED: CPT

## 2025-05-19 PROCEDURE — 83520 IMMUNOASSAY QUANT NOS NONAB: CPT

## 2025-05-19 PROCEDURE — 80053 COMPREHEN METABOLIC PANEL: CPT

## 2025-05-19 PROCEDURE — 36415 COLL VENOUS BLD VENIPUNCTURE: CPT

## 2025-05-19 PROCEDURE — 86235 NUCLEAR ANTIGEN ANTIBODY: CPT

## 2025-05-19 PROCEDURE — 81001 URINALYSIS AUTO W/SCOPE: CPT

## 2025-05-19 PROCEDURE — 86140 C-REACTIVE PROTEIN: CPT

## 2025-05-19 PROCEDURE — 85025 COMPLETE CBC W/AUTO DIFF WBC: CPT

## 2025-05-19 PROCEDURE — 82550 ASSAY OF CK (CPK): CPT

## 2025-05-21 LAB — ALDOLASE SERPL-CCNC: 7.8 U/L (ref 3.3–10.3)

## 2025-06-03 LAB
EJ AB SER QL: NEGATIVE
ENA JO1 AB SER IA-ACNC: <20 UNITS
ENA PM/SCL AB SER-ACNC: <20 UNITS
ENA SS-A 52KD IGG SER IA-ACNC: <20 UNITS
FIBRILLARIN AB SER QL: NEGATIVE
KU AB SER QL: NEGATIVE
MDA5 AB SER LINE BLOT-ACNC: <20 UNITS
MI2 AB SER QL: NEGATIVE
MJ AB SER LINE BLOT-ACNC: <20 UNITS
OJ AB SER QL: NEGATIVE
PL12 AB SER QL: NEGATIVE
PL7 AB SER QL: NEGATIVE
SAE1 IGG SER QL LINE BLOT: <20 UNITS
SRP AB SERPL QL: NEGATIVE
TIF1-GAMMA AB SER LINE BLOT-ACNC: <20 UNITS
U1 SNRNP AB SER IA-ACNC: <20 UNITS
U2 SNRNP AB SER QL: NEGATIVE

## 2025-06-10 ENCOUNTER — TELEPHONE (OUTPATIENT)
Age: 70
End: 2025-06-10

## 2025-06-10 ENCOUNTER — HOSPITAL ENCOUNTER (OUTPATIENT)
Dept: RADIOLOGY | Facility: HOSPITAL | Age: 70
Discharge: HOME | End: 2025-06-10
Payer: MEDICARE

## 2025-06-10 DIAGNOSIS — G89.29 CHRONIC MIDLINE LOW BACK PAIN WITHOUT SCIATICA: ICD-10-CM

## 2025-06-10 DIAGNOSIS — M54.50 CHRONIC MIDLINE LOW BACK PAIN WITHOUT SCIATICA: ICD-10-CM

## 2025-06-10 PROCEDURE — 72110 X-RAY EXAM L-2 SPINE 4/>VWS: CPT

## 2025-06-10 NOTE — TELEPHONE ENCOUNTER
"Dr. Crawford    Lab results - pt requesting results of her labs drawn on 05/19/25.    Lyrica - Pt not sure if Lyrica is the right medication for her, not helping with her S/S. States she is having headaches and stomach issues but not sure if Lyrica related. Pt states she has the \"shakes\" chronically and they are worse, not sure if Lyrica is making worse.    Pt concerned about vasculitis.Pt worried about varicose veins and previous hx of vein stripping.      Please advise.  "

## 2025-06-11 ENCOUNTER — TELEPHONE (OUTPATIENT)
Age: 70
End: 2025-06-11

## 2025-06-11 NOTE — TELEPHONE ENCOUNTER
Caller: Cristi/spouse    Doctor: Dr. Zhang    Reason for call: Requested appt. Advised sxhedule. Will check w/wife and cb    Call back#: [phone]

## 2025-06-12 NOTE — TELEPHONE ENCOUNTER
RIDDHI for pt to CB to go over provider message. Per Dr. Crawford    Her lab work did show an elevated inflammation factor but she was recently seen and treated for a bronchitis with antibiotic and cough medicine, likely the cause of the elevated inflammation factors and mildly elevated white blood cell count. The lupus Avise showed low likelihood for lupus with negative CARLOS and anti dsDNA. She has had prior workup for vasculitis which has been negative. The biopsy of her nasal polyps in 2019 did show some eosinophils, however, she has not had elevated eosinophils in her CBCs, which she has had marked improvement with Dupixent for the rhinosinusitis, asthma, and nasal polyposis as noted on recent procedure to look into her nose by the ENT physician. ENT could consider doing another biopsy, although I am not sure that there is much to biopsy at this point. The only suggestion that I could make is for her to go to a center for vasculitis such as at [place] or [place], as she has had extensive serologies by the multiple rheumatologists that she has seen in the [facility]. I did check multiple antibodies to rule out inflammatory muscle disease which all were negative. With regard to the Lyrica, if she is having side effects, she should decrease by 1 tablet every 5 days until she is off. I would suggest an anti-inflammatory diet which may be of benefit for multiple of her symptoms including her fibromyalgia/chronic pain symptoms. It would be of benefit for her GI symptoms as well. You could give her the [employer]erta book reference to try or we could refer her to a functional medicine nutritionist, Dr. Tan, to help implement the diet. Her phone number is [phone].

## 2025-06-13 NOTE — TELEPHONE ENCOUNTER
Spoke with pt and went over message from Dr. Crawford extensively with pt, answering questions that she had and provided the information for the book and functional medicine nutritionist. States knows that she gets a lot of inflammation that is why she is getting polyps and nasal issues. Wants to know if should be tested again for inflammation markers. Pt states pain is getting worse rather then better. She states the prednisone does help but knows can't. She is concerned about having more pain getting off the lyrica. She wants to know if Dr. Crawford recommends anything else for the pain.       Prized message sent as well with message from Dr. Crawford as pt would like to be able to reference back and see if any further questions.

## 2025-06-16 NOTE — TELEPHONE ENCOUNTER
Pt aware verbalized understanding. Pt agreeable to PT referral. Pt is going to see if there is a physical therapist in her area that specialize in fibromyalgia.     Pt asking if Dr Crawford can send antiinflammatory medication to pharmacy.

## 2025-06-19 NOTE — TELEPHONE ENCOUNTER
Dr. Akila Valladares  6/18/25  5:19 PM  She is not a candidate for anti-inflammatory medications like Motrin review of her GI history.  That is why I was hoping she would be able to tolerate the Lyrica.  Unfortunately, I think her best bet is going to be physical therapy with a person who specializes in myofascial syndromes and the anti-inflammatory diet.  We could potentially discuss low-dose naltrexone at her next office visit, as that is something that needs to be compounded and we need to discuss its use in patients with chronic pain in person prior to prescribing it.    Pt aware verbalized understanding. Pt was taking Lyrica 50 mg 3 times a day asking if Dr. Valladares thinks she should increase. Asking if more would help her.Patient is reading Dr. Valladares book, exercising, and did PT. Pt thinks she is getting depressed due to her symptoms. Pt asking does she recommend a vitamin, fruit or should she be taking greens to help. No sooner appt for discussion of Naltrexone.

## 2025-06-19 NOTE — TELEPHONE ENCOUNTER
Spoke with the patient and gave her the latest message from Dr. Crawford. She is requesting that it be sent to her in a [employer] message which will be done. She stated she is not happy that she cannot start a new medication that Dr. Crawford has suggested until she is seen and that is not until January. Asked for an earlier appointment; no availablility    Will send a message to Dr. Crawford about her request.

## 2025-06-25 ENCOUNTER — OFFICE VISIT (OUTPATIENT)
Dept: OBGYN CLINIC | Facility: CLINIC | Age: 70
End: 2025-06-25
Payer: MEDICARE

## 2025-06-25 VITALS — BODY MASS INDEX: 28.07 KG/M2 | HEIGHT: 60 IN | WEIGHT: 143 LBS

## 2025-06-25 DIAGNOSIS — M18.12 ARTHRITIS OF CARPOMETACARPAL (CMC) JOINT OF LEFT THUMB: Primary | ICD-10-CM

## 2025-06-25 DIAGNOSIS — M18.11 ARTHRITIS OF CARPOMETACARPAL (CMC) JOINT OF RIGHT THUMB: ICD-10-CM

## 2025-06-25 PROBLEM — R76.8 ANA POSITIVE: Status: ACTIVE | Noted: 2025-06-25

## 2025-06-25 PROBLEM — R53.82 CHRONIC FATIGUE: Status: ACTIVE | Noted: 2025-06-25

## 2025-06-25 PROBLEM — R79.82 ELEVATED C-REACTIVE PROTEIN: Status: ACTIVE | Noted: 2025-06-25

## 2025-06-25 PROBLEM — M35.9 UNDIFFERENTIATED CONNECTIVE TISSUE DISEASE (HCC): Status: ACTIVE | Noted: 2025-06-25

## 2025-06-25 PROBLEM — M79.7 FIBROMYALGIA: Status: ACTIVE | Noted: 2025-06-25

## 2025-06-25 PROBLEM — J45.40 MODERATE PERSISTENT ASTHMA: Status: ACTIVE | Noted: 2025-06-25

## 2025-06-25 PROCEDURE — 20600 DRAIN/INJ JOINT/BURSA W/O US: CPT | Performed by: SURGERY

## 2025-06-25 PROCEDURE — 99213 OFFICE O/P EST LOW 20 MIN: CPT | Performed by: SURGERY

## 2025-06-25 RX ORDER — TRIAMCINOLONE ACETONIDE 40 MG/ML
20 INJECTION, SUSPENSION INTRA-ARTICULAR; INTRAMUSCULAR
Status: COMPLETED | OUTPATIENT
Start: 2025-06-25 | End: 2025-06-25

## 2025-06-25 RX ORDER — LIDOCAINE HYDROCHLORIDE 10 MG/ML
1 INJECTION, SOLUTION INFILTRATION; PERINEURAL
Status: COMPLETED | OUTPATIENT
Start: 2025-06-25 | End: 2025-06-25

## 2025-06-25 RX ADMIN — LIDOCAINE HYDROCHLORIDE 1 ML: 10 INJECTION, SOLUTION INFILTRATION; PERINEURAL at 15:30

## 2025-06-25 RX ADMIN — TRIAMCINOLONE ACETONIDE 20 MG: 40 INJECTION, SUSPENSION INTRA-ARTICULAR; INTRAMUSCULAR at 15:30

## 2025-06-25 NOTE — ASSESSMENT & PLAN NOTE
Intermittent symptoms.  Asthma generally well-controlled on Dupixent.  Follow-up ENT as scheduled.

## 2025-06-25 NOTE — PATIENT INSTRUCTIONS
Discussed conservative treatments for the hands to manage her arthritis which includes:  Heating in the morning and evenings for 20 minutes each  Can find heated arthritis mittens on [employer].  Use of topical agents such as Voltaren gel 1%, which has an anti-inflammatory property.  Localized cortisone injection into the thumb CMC joint every 3 to 4 months as needed for pain discomfort.  Use of thumb Comfort Cool braces that is supportive and not restrictive that can be used with activity. Another set can also be found on [employer].

## 2025-06-25 NOTE — ASSESSMENT & PLAN NOTE
Chronic fatigue likely secondary to widespread musculoskeletal pain syndrome consistent with fibromyalgia.  Reassess after patient has had an adequate trial of Lyrica.  She is to taper off of gabapentin which has provided little benefit.  Will likely consider a trial of the anti-inflammatory diet at the time of the next office visit depending on her response to Lyrica.  Continue to monitor.

## 2025-06-25 NOTE — ASSESSMENT & PLAN NOTE
History of low-grade titer CARLOS, low titer double-stranded DNA, with negative Sjogren's antibodies, negative rheumatoid factor and CCP, negative Veras and negative RNP antibodies, with history of low-grade fevers, occasional mouth ulcers, hair thinning, and dry eyes treated with Systane with history of Raynaud's phenomenon not seen on exam at this office visit, with documented nasal polyposis and history of sinusitis with marked positive response on Dupixent, with workup for EGPA negative in the past with negative ANCA profile.  While her sinus biopsy does show eosinophilic aggregates with inflammation predominantly lymphoplasmacytic, ANCA has been negative, she has no evidence of eosinophilia and IgE is normal, and her nasal exam has improved considerably with Dupixent, which would make EGPA unlikely.  Patient has been referred for extensive serologic evaluation to include lupus Avise CTD diagnostic testing, myositis specific antibodies, CK, aldolase, repeat CBC, CMP, and CRP.  Clinically the patient does not appear to have signs or symptoms consistent with connective tissue disease, however, we will update her workup with lab work as noted above, and plan follow-up in 3 months or sooner if needed.  Clinically her presentation most consistent with fibromyalgia with chronic pain, fatigue, brain fog, and generalized malaise.

## 2025-06-25 NOTE — ASSESSMENT & PLAN NOTE
Chronic complex pain syndrome/fibromyalgia with widespread diffuse musculoskeletal pain, with associated sleep disturbance, chronic fatigue, generalized malaise, and questionable IBS.  History of brain fog with memory and concentration difficulties, and difficulty with word finding, with MRI of the brain with NeuroQuant, essentially unremarkable.  She has not had significant benefit with gabapentin and I have asked her to taper off the gabapentin and start Lyrica at a dose of 50 mg nightly for 2 weeks to then be increased to 50 mg twice daily for 2 weeks and ultimately increase to 50 mg every 8 hours thereafter.  I asked her to call to report on her response to the Lyrica, as I suspect that I may have to upwardly titrate the dose depending on her response.  She ultimately will be referred for formal physical therapy with water therapy, with the hope to transition to a water fitness program for long-term benefit.  She may be a candidate for low-dose naltrexone if she does not have significant benefit with Lyrica.  Depending on her workup, and response to Lyrica, would consider a trial of the anti-inflammatory diet, which may be of benefit for many of her symptoms.  Will discuss that at the time of her next office visit in 3 months or sooner if needed.  Discussed in great detail with the patient.  Patient reassured.

## 2025-06-25 NOTE — ASSESSMENT & PLAN NOTE
Elevated CRP of 23.6 on lab work dated 8/19/2024.  CARLOS low-grade positive in a dilution of 40 in a speckled pattern with double-stranded DNA low titer positive at 10.  CRP to be repeated with extensive laboratory evaluation ordered.  No evidence for inflammatory arthropathy or significant connective tissue disease on exam at this visit.  Continue to monitor.

## 2025-06-25 NOTE — PROGRESS NOTES
ASSESSMENT/PLAN:      Assessment & Plan  Arthritis of carpometacarpal (CMC) joint of left thumb  She continues to pain at the base of the thumb   Discussed conservative treatments for the hands to manage her arthritis which includes:  Heating in the morning and evenings for 20 minutes each  Use of topical agents such as Voltaren gel 1%, which has an anti-inflammatory property.  Localized cortisone injection into the thumb CMC joint every 3 to 4 months as needed for pain discomfort.  Use of thumb Comfort Cool braces that is supportive and not restrictive that can be used with activity.        Arthritis of carpometacarpal (CMC) joint of right thumb  She continues to have pain at the base of the thumb  She feels that injection didn't last as long as she hoped.   Discussed conservative treatments for the hands to manage her arthritis which includes:  Heating in the morning and evenings for 20 minutes each  Use of topical agents such as Voltaren gel 1%, which has an anti-inflammatory property.  Localized cortisone injection into the thumb CMC joint every 3 to 4 months as needed for pain discomfort.  Use of thumb Comfort Cool braces that is supportive and not restrictive that can be used with activity.            The patient verbalized understanding of exam findings and treatment plan. We engaged in the shared decision-making process and treatment options were discussed at length with the patient. Surgical and conservative management discussed today along with risks and benefits.    Diagnoses and all orders for this visit:    Arthritis of carpometacarpal (CMC) joint of left thumb    Arthritis of carpometacarpal (CMC) joint of right thumb          Follow Up:  Return in about 3 months (around 9/25/2025) for BILATERAL THUMBS.      To Do Next Visit:  Re-evaluation of current  issue    ____________________________________________________________________________________________________________________________________________      CHIEF COMPLAINT:  Chief Complaint   Patient presents with    Follow-up     B/L CMC injection patient has not had a left injection she would like one right was on 1/23/25       SUBJECTIVE:  Diamond Aquino is a 70 y.o. year old RHD female who presents here for a follow-up for her bilateral chronic thumb CMC pain due to osteoarthritis.  At her last visit on 1/23/2025, patient had just the right thumb CMC injected.  Patient is using heat and topical agents- CBD.       I have personally reviewed all the relevant PMH, PSH, SH, FH, Medications and allergies.     PAST MEDICAL HISTORY:  Past Medical History[1]    PAST SURGICAL HISTORY:  Past Surgical History[2]    FAMILY HISTORY:  Family History[3]    SOCIAL HISTORY:  Social History[4]    MEDICATIONS:  Current Medications[5]    ALLERGIES:  Allergies[6]    REVIEW OF SYSTEMS:  Review of Systems   Constitutional:  Negative for chills, fever and unexpected weight change.   HENT:  Negative for hearing loss, nosebleeds and sore throat.    Eyes:  Negative for pain, redness and visual disturbance.   Respiratory:  Negative for cough, shortness of breath and wheezing.    Cardiovascular:  Negative for chest pain, palpitations and leg swelling.   Gastrointestinal:  Negative for abdominal pain and nausea.   Genitourinary:  Negative for dyspareunia, dysuria and frequency.   Musculoskeletal:  Positive for arthralgias.   Skin:  Negative for rash and wound.   Neurological:  Negative for dizziness, numbness and headaches.   Psychiatric/Behavioral:  Negative for decreased concentration and suicidal ideas. The patient is not nervous/anxious.        VITALS:  There were no vitals filed for this visit.      _____________________________________________________  PHYSICAL EXAMINATION:  General: well developed and well nourished, alert, oriented  times 3, and appears comfortable  Psychiatric: Normal  HEENT: Normocephalic, Atraumatic Trachea Midline, No torticollis  Pulmonary: No audible wheezing or respiratory distress   Cardiovascular: No pitting edema, 2+ radial pulse   Abdominal/GI: abdomen non tender, non distended   Skin: No masses, erythema, lacerations, fluctation, ulcerations  Neurovascular: Sensation Intact to the Median, Ulnar, Radial Nerve, Motor Intact to the Median, Ulnar, Radial Nerve, and Pulses Intact  Musculoskeletal: Normal, except as noted in detailed exam and in HPI.      MUSCULOSKELETAL EXAMINATION:    left CMC Exam:  No adduction contracture  No hyperextension deformity of MCP joint  Positive localized tenderness over radial and dorsal aspect of thumb (CMC joint)  Grind test is Positive for pain and Negative for crepitus  Metacarpal load shift test Positive  No triggering or tenderness over the A1 pulley  Negative pain with Finkelstein’s maneuver     right CMC Exam:  No adduction contracture  No hyperextension deformity of MCP joint  Positive localized tenderness over radial and dorsal aspect of thumb (CMC joint)  Grind test is Positive for pain and Positive for crepitus  Metacarpal load shift test positive  No triggering or tenderness over the A1 pulley  Negative pain with Finkelstein’s maneuver       ___________________________________________________    STUDIES REVIEWED:  I have personally reviewed and interpreted  AP lateral and oblique radiographs of  bilateral thumbs   which demonstrate stage III CMC arthrosis with large osteophytes        LABS REVIEWED:    HgA1c:   Lab Results   Component Value Date    HGBA1C 5.7 (H) 03/21/2025     BMP:   Lab Results   Component Value Date    GLUCOSE 91 05/02/2020    CALCIUM 9.7 05/19/2025     05/08/2015    K 5.0 05/19/2025    CO2 29 05/19/2025     05/19/2025    BUN 22 05/19/2025    CREATININE 0.81 05/19/2025             PROCEDURES PERFORMED:  Small joint arthrocentesis: L thumb  "CMC    Performed by: Heide Zhang MD  Authorized by: Heide Zhang MD    [facility] Protocol:  Consent: Verbal consent obtained  Risks and benefits: risks, benefits and alternatives were discussed  Consent given by: patient  Time out: Immediately prior to procedure a \"time out\" was called to verify the correct patient, procedure, equipment, support staff and site/side marked as required.  Timeout called at: 6/25/2025 3:53 PM.  Patient understanding: patient states understanding of the procedure being performed  Site marked: the operative site was marked  Patient identity confirmed: verbally with patient  Supporting Documentation  Indications: pain   Procedure Details  Location: thumb - L thumb CMC  Preparation: Patient was prepped and draped in the usual sterile fashion  Needle size: 25 G  Ultrasound guidance: no  Approach: volar  Medications administered: 1 mL lidocaine 1 %; 20 mg triamcinolone acetonide 40 mg/mL    Patient tolerance: patient tolerated the procedure well with no immediate complications  Dressing:  Sterile dressing applied      Small joint arthrocentesis: R thumb CMC    Performed by: Heide Zhang MD  Authorized by: Heide Zhang MD    [facility] Protocol:  Consent: Verbal consent obtained  Risks and benefits: risks, benefits and alternatives were discussed  Consent given by: patient  Time out: Immediately prior to procedure a \"time out\" was called to verify the correct patient, procedure, equipment, support staff and site/side marked as required.  Timeout called at: 6/25/2025 3:53 PM.  Patient understanding: patient states understanding of the procedure being performed  Site marked: the operative site was marked  Patient identity confirmed: verbally with patient  Supporting Documentation  Indications: pain   Procedure Details  Location: thumb - R thumb CMC  Preparation: Patient was prepped and draped in the usual sterile fashion  Needle size: 25 G  Ultrasound guidance: " no  Approach: volar  Medications administered: 1 mL lidocaine 1 %; 20 mg triamcinolone acetonide 40 mg/mL    Patient tolerance: patient tolerated the procedure well with no immediate complications  Dressing:  Sterile dressing applied          _____________________________________________________      Scribe Attestation      I,:  Katherine Chavez am acting as a scribe while in the presence of the attending physician.:       I,:  Heide Zhang MD personally performed the services described in this documentation    as scribed in my presence.:                         [1]   Past Medical History:  Diagnosis Date    Anesthesia complication     desaturation of oxygen mostly at night- on O2 at 2L at hs-may wake up with asthma issue    Anxiety     Arthritis     Asthma     Argueta esophagus     Breathing difficulty     pt states s/p anesthesia will have an asthma attack-pt to bring ProAir    Chest pain     upper chest-intermittent asthma related?    Colon polyp     COVID-19 2020    Decreased hearing of both ears     DVT (deep venous thrombosis) (HCC)     during pregnancy in the leg    Fibromyalgia     GERD (gastroesophageal reflux disease)     Hiatal hernia     Hypercholesteremia     Macular degeneration     wet-right eye injected 24    Nasal polyps     Sinusitis, chronic     Subarachnoid hemorrhage (HCC) 2020    Tinnitus     Vertigo     on occ    Wears glasses    [2]   Past Surgical History:  Procedure Laterality Date     SECTION N/A     x 2    COLONOSCOPY      ESOPHAGOGASTRODUODENOSCOPY      HYSTERECTOMY      complete    NASAL POLYP SURGERY      x3-2013,,2019    SC COLSC FLX W/RMVL OF TUMOR POLYP LESION SNARE TQ N/A 2017    Procedure: COLONOSCOPYwith  snare polypectomy.;  Surgeon: Cuong Mejia MD;  Location: [facility];  Service: Gastroenterology    SC EGD TRANSORAL BIOPSY SINGLE/MULTIPLE N/A 2017    Procedure: ESOPHAGOGASTRODUODENOSCOPY (EGD)and biopsy.;  Surgeon: Cuong  MD Roberto;  Location: [facility] GI LAB;  Service: Gastroenterology    OH XCAPSL CTRC RMVL INSJ IO LENS PROSTH W/O ECP Right 2/5/2024    Procedure: EXTRACTION EXTRACAPSULAR CATARACT PHACO INTRAOCULAR LENS (IOL);  Surgeon: Jamie Engel MD;  Location: [facility] MAIN OR;  Service: Ophthalmology    TONSILLECTOMY N/A     VEIN LIGATION AND STRIPPING Bilateral    [3]   Family History  Problem Relation Name Age of Onset    Heart disease Mother          CHF    Breast cancer additional onset Mother  49    Breast cancer Mother  49    Dysphagia Father      Breast cancer Maternal Aunt      Breast cancer Paternal Aunt      Ovarian cancer Maternal Aunt      Ovarian cancer Maternal Aunt      Cervical cancer Maternal Aunt     [4]   Social History  Tobacco Use    Smoking status: Never     Passive exposure: Past    Smokeless tobacco: Never   Vaping Use    Vaping status: Never Used   Substance Use Topics    Alcohol use: Yes     Comment: social    Drug use: Never   [5]   Current Outpatient Medications:     acetaminophen (TYLENOL) 325 mg tablet, Take 650 mg by mouth every 6 (six) hours as needed for mild pain, Disp: , Rfl:     albuterol (ProAir HFA) 90 mcg/act inhaler, Inhale 2 puffs every 4 (four) hours as needed for wheezing, Disp: 8 g, Rfl: 3    ALPRAZolam (XANAX) 0.5 mg tablet, as needed, Disp: , Rfl: 0    benzonatate (TESSALON) 200 MG capsule, Take 1 capsule (200 mg total) by mouth 3 (three) times a day as needed for cough, Disp: 20 capsule, Rfl: 0    calcium citrate-vitamin D (CITRACAL+D) 315-200 MG-UNIT per tablet, Take 1 tablet by mouth every morning, Disp: , Rfl:     celecoxib (CeleBREX) 200 mg capsule, Take 1 capsule (200 mg total) by mouth daily Take with food, Disp: 90 capsule, Rfl: 1    Dupilumab (Dupixent) 300 MG/2ML SOAJ, INJECT 1 PEN UNDER THE SKIN EVERY 14 DAYS, Disp: 4 mL, Rfl: 5    dupilumab (DUPIXENT) subcutaneous injection, Inject 2 mL (300 mg total) under the skin every 14 (fourteen) days, Disp: 4 mL, Rfl: 5     Fluticasone Propionate (Xhance) 93 MCG/ACT EXHU, 1 spray into each nostril 2 (two) times a day, Disp: 16 mL, Rfl: 5    gabapentin (NEURONTIN) 400 mg capsule, Take 1 capsule (400 mg total) by mouth 3 (three) times a day, Disp: 270 capsule, Rfl: 1    Multiple Vitamins-Minerals (multivitamin with minerals) tablet, Take 1 tablet by mouth in the morning., Disp: , Rfl:     Multiple Vitamins-Minerals (PRESERVISION AREDS 2 PO), Take by mouth in the morning and before bedtime., Disp: , Rfl:     traZODone (DESYREL) 50 mg tablet, Take 1 tablet (50 mg total) by mouth daily at bedtime, Disp: 30 tablet, Rfl: 5    brompheniramine-pseudoephedrine-DM 30-2-10 MG/5ML syrup, Take 10 mL by mouth 3 (three) times a day as needed for cough or congestion, Disp: 200 mL, Rfl: 0    cetirizine (ZyrTEC) 10 mg tablet, Take 10 mg by mouth daily (Patient not taking: Reported on 6/25/2025), Disp: , Rfl:     clobetasol (TEMOVATE) 0.05 % ointment, Apply topically 2 (two) times a day as needed (irritation), Disp: 15 g, Rfl: 3    Coenzyme Q10 (Co Q 10) 100 MG CAPS, Take by mouth, Disp: , Rfl:     famciclovir (FAMVIR) 500 mg tablet, if needed (Patient not taking: Reported on 5/8/2025), Disp: , Rfl:     methylPREDNISolone 4 MG tablet therapy pack, Use as directed on package, Disp: 21 tablet, Rfl: 0    phenazopyridine (PYRIDIUM) 95 MG tablet, Take 1 tablet (95 mg total) by mouth 3 (three) times a day as needed for bladder spasms, Disp: 10 tablet, Rfl: 0    pregabalin (LYRICA) 50 mg capsule, Take 1 capsule at bedtime and in 2 weeks increase to 1 capsule twice daily for 2 weeks and then increase to 1 capsule every 8 hours (Patient not taking: Reported on 6/25/2025), Disp: 90 capsule, Rfl: 3  [6]   Allergies  Allergen Reactions    Pitavastatin Hives, Shortness Of Breath and Rash     Nausea    Cefditoren Nausea Only and Vomiting    Cefditoren Pivoxil Hives     N/V    Dust Mite Extract Sneezing    Levofloxacin GI Intolerance    Mold Extract [Trichophyton]  Sneezing    Moxifloxacin GI Intolerance

## 2025-06-25 NOTE — ASSESSMENT & PLAN NOTE
Nonradicular low back pain.  Referred for lumbar spine films to rule out degenerative spondylosis.  I am sure that a component of her back pain is myofascial.  Patient is going to be transition from gabapentin to Lyrica to see if she has a better response for her chronic pain.  Would consider physical therapy to include water exercise if symptoms persist.  Encouraged gentle home exercise program as tolerated.  Continue to monitor.

## 2025-06-25 NOTE — ASSESSMENT & PLAN NOTE
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  The ultrasound of her right carpal tunnel was suggestive of carpal tunnel syndrome however EMG studies were negative.  I do think she can treat symptomatically with wrist splints for nighttime use if needed.  She has been given a low-dose Celebrex which I told her to use very cautiously in view of her history of Argueta's and GERD.  She has had chronic nonradicular low back pain.  I did refer her for x-rays of her lumbar spine.  May consider physical therapy if she does not have benefit with switching from gabapentin to Lyrica.  Encouraged home exercise as tolerated.  Plan follow-up in 3 months or sooner if needed.

## 2025-06-26 ENCOUNTER — TELEPHONE (OUTPATIENT)
Age: 70
End: 2025-06-26

## 2025-06-26 NOTE — TELEPHONE ENCOUNTER
Called pt to confirm which provider she will keep to continue her treatment since she has an appt with Dr. Kee and another one with Dr. Crawford.

## 2025-08-12 PROBLEM — D68.61 ANTI-PHOSPHOLIPID ANTIBODY SYNDROME (HCC): Status: ACTIVE | Noted: 2025-08-12

## (undated) DEVICE — SINGLE-USE POLYPECTOMY SNARE: Brand: SENSATION SHORT THROW

## (undated) DEVICE — SOLIDIFIER FLUID WASTE CONTROL 1500ML

## (undated) DEVICE — TRAP POLY

## (undated) DEVICE — "MB-142 MOUTHPIECE": Brand: MOUTHPIECE

## (undated) DEVICE — 1200CC GUARDIAN II: Brand: GUARDIAN

## (undated) DEVICE — "MAJ-901 WATER CONTAINER SET CV-160/140": Brand: WATER CONTAINER

## (undated) DEVICE — B-H IRRIGATING CAN 19GA FLAT ANGLED 8MM: Brand: OPHTHALMIC CANNULA

## (undated) DEVICE — GLOVE SRG BIOGEL 7.5

## (undated) DEVICE — AIR INJECT CANNULA 27GA: Brand: OPHTHALMIC CANNULA

## (undated) DEVICE — 60 ML SYRINGE,REGULAR TIP: Brand: MONOJECT

## (undated) DEVICE — GROUNDING PAD UNIVERSAL SLW

## (undated) DEVICE — SINGLE-USE BIOPSY FORCEPS: Brand: RADIAL JAW 4

## (undated) DEVICE — INTREPID® TRANSFORMER IA HP: Brand: INTREPID®

## (undated) DEVICE — LUBRICANT SURGILUBE TUBE 4 OZ  FLIP TOP

## (undated) DEVICE — GAUZE SPONGES,16 PLY: Brand: CURITY

## (undated) DEVICE — AIRLIFE™  ADULT CUSHION NASAL CANNULA WITH 7 FOOT (2.1 M) CRUSH-RESISTANT OXYGEN TUBING, AND U/CONNECT-IT ADAPTER: Brand: AIRLIFE™

## (undated) DEVICE — BRUSH ENDO CLEANING DBL-HEADER

## (undated) DEVICE — MEDI-VAC YANKAUER SUCTION HANDLE: Brand: CARDINAL HEALTH

## (undated) DEVICE — ACTIVE FMS W/ INTREPID* ULTRA SLEEVES, 0.9MM 45° ABS* INTREPID* BALANCED TIP: Brand: ALCON

## (undated) DEVICE — BITE BLOCK ENDO 60FR ADLT MAXI  DISP W/STRAP

## (undated) DEVICE — TUBING BUBBLE CLEAR 5MM X 100 FT NS

## (undated) DEVICE — CENTURION VISION SYSTEM FMS

## (undated) DEVICE — CLEARCUT® SLIT KNIFE INTREPID MICRO-COAXIAL SYSTEM 2.4 SB: Brand: CLEARCUT®; INTREPID

## (undated) DEVICE — MICROSURGICAL INSTRUMENT IRR. CYSTITOME 25GA STRAIGHT-REVERSE CUTTING: Brand: ALCON

## (undated) DEVICE — "MH-443 SUCTION VALVE F/EVIS140 EVIS160": Brand: SUCTION VALVE

## (undated) DEVICE — TRAVELKIT CONTAINS FIRST STEP KIT (200ML EP-4 KIT) AND SOILED SCOPE BAG - 1 KIT: Brand: TRAVELKIT CONTAINS FIRST STEP KIT AND SOILED SCOPE BAG

## (undated) DEVICE — THE MONARCH® "D" CARTRIDGE IS A SINGLE-USE POLYPROPYLENE CARTRIDGE FOR POSTERIOR CHAMBER IOL DELIVERY: Brand: MONARCH® III

## (undated) DEVICE — "MH-438 A/W VLVE F/140 EVIS-140": Brand: AIR/WATER VALVE

## (undated) DEVICE — GLOVE EXAM NON-STRL NTRL PLUS LRG PF

## (undated) DEVICE — TUBING AUX CHANNEL

## (undated) DEVICE — DISPOSABLE BIOPSY VALVE MAJ-1555: Brand: SINGLE USE BIOPSY VALVE (STERILE)

## (undated) DEVICE — EYE PACK CUSTOM -FINNEGAN